# Patient Record
Sex: FEMALE | Race: WHITE | NOT HISPANIC OR LATINO | Employment: FULL TIME | URBAN - METROPOLITAN AREA
[De-identification: names, ages, dates, MRNs, and addresses within clinical notes are randomized per-mention and may not be internally consistent; named-entity substitution may affect disease eponyms.]

---

## 2023-03-09 ENCOUNTER — PRE-ADMISSION TESTING (OUTPATIENT)
Dept: ADMISSIONS | Facility: MEDICAL CENTER | Age: 58
End: 2023-03-09
Attending: SURGERY
Payer: COMMERCIAL

## 2023-03-09 DIAGNOSIS — Z01.810 PRE-OPERATIVE CARDIOVASCULAR EXAMINATION: ICD-10-CM

## 2023-03-09 DIAGNOSIS — Z01.812 PRE-OPERATIVE LABORATORY EXAMINATION: ICD-10-CM

## 2023-03-09 LAB
ALBUMIN SERPL BCP-MCNC: 4.2 G/DL (ref 3.2–4.9)
ALBUMIN/GLOB SERPL: 1.4 G/DL
ALP SERPL-CCNC: 122 U/L (ref 30–99)
ALT SERPL-CCNC: 45 U/L (ref 2–50)
ANION GAP SERPL CALC-SCNC: 11 MMOL/L (ref 7–16)
AST SERPL-CCNC: 27 U/L (ref 12–45)
BASOPHILS # BLD AUTO: 0.5 % (ref 0–1.8)
BASOPHILS # BLD: 0.04 K/UL (ref 0–0.12)
BILIRUB SERPL-MCNC: 0.4 MG/DL (ref 0.1–1.5)
BUN SERPL-MCNC: 9 MG/DL (ref 8–22)
CALCIUM ALBUM COR SERPL-MCNC: 9.1 MG/DL (ref 8.5–10.5)
CALCIUM SERPL-MCNC: 9.3 MG/DL (ref 8.5–10.5)
CHLORIDE SERPL-SCNC: 103 MMOL/L (ref 96–112)
CO2 SERPL-SCNC: 26 MMOL/L (ref 20–33)
CREAT SERPL-MCNC: 0.44 MG/DL (ref 0.5–1.4)
EKG IMPRESSION: NORMAL
EOSINOPHIL # BLD AUTO: 0.09 K/UL (ref 0–0.51)
EOSINOPHIL NFR BLD: 1.1 % (ref 0–6.9)
ERYTHROCYTE [DISTWIDTH] IN BLOOD BY AUTOMATED COUNT: 48.2 FL (ref 35.9–50)
GFR SERPLBLD CREATININE-BSD FMLA CKD-EPI: 111 ML/MIN/1.73 M 2
GLOBULIN SER CALC-MCNC: 3 G/DL (ref 1.9–3.5)
GLUCOSE SERPL-MCNC: 118 MG/DL (ref 65–99)
HCT VFR BLD AUTO: 40.3 % (ref 37–47)
HGB BLD-MCNC: 13.9 G/DL (ref 12–16)
IMM GRANULOCYTES # BLD AUTO: 0.04 K/UL (ref 0–0.11)
IMM GRANULOCYTES NFR BLD AUTO: 0.5 % (ref 0–0.9)
INR PPP: 0.94 (ref 0.87–1.13)
LYMPHOCYTES # BLD AUTO: 2.54 K/UL (ref 1–4.8)
LYMPHOCYTES NFR BLD: 30.6 % (ref 22–41)
MCH RBC QN AUTO: 34 PG (ref 27–33)
MCHC RBC AUTO-ENTMCNC: 34.5 G/DL (ref 33.6–35)
MCV RBC AUTO: 98.5 FL (ref 81.4–97.8)
MONOCYTES # BLD AUTO: 0.45 K/UL (ref 0–0.85)
MONOCYTES NFR BLD AUTO: 5.4 % (ref 0–13.4)
NEUTROPHILS # BLD AUTO: 5.14 K/UL (ref 2–7.15)
NEUTROPHILS NFR BLD: 61.9 % (ref 44–72)
NRBC # BLD AUTO: 0 K/UL
NRBC BLD-RTO: 0 /100 WBC
PLATELET # BLD AUTO: 330 K/UL (ref 164–446)
PMV BLD AUTO: 9.5 FL (ref 9–12.9)
POTASSIUM SERPL-SCNC: 3.8 MMOL/L (ref 3.6–5.5)
PROT SERPL-MCNC: 7.2 G/DL (ref 6–8.2)
PROTHROMBIN TIME: 12.5 SEC (ref 12–14.6)
RBC # BLD AUTO: 4.09 M/UL (ref 4.2–5.4)
SODIUM SERPL-SCNC: 140 MMOL/L (ref 135–145)
WBC # BLD AUTO: 8.3 K/UL (ref 4.8–10.8)

## 2023-03-09 PROCEDURE — 93005 ELECTROCARDIOGRAM TRACING: CPT

## 2023-03-09 PROCEDURE — 85025 COMPLETE CBC W/AUTO DIFF WBC: CPT

## 2023-03-09 PROCEDURE — 36415 COLL VENOUS BLD VENIPUNCTURE: CPT

## 2023-03-09 PROCEDURE — 85610 PROTHROMBIN TIME: CPT

## 2023-03-09 PROCEDURE — 93010 ELECTROCARDIOGRAM REPORT: CPT | Performed by: INTERNAL MEDICINE

## 2023-03-09 PROCEDURE — 80053 COMPREHEN METABOLIC PANEL: CPT

## 2023-03-09 RX ORDER — ATORVASTATIN CALCIUM 20 MG/1
TABLET, FILM COATED ORAL
COMMUNITY
Start: 2023-01-23 | End: 2023-05-24

## 2023-03-14 DIAGNOSIS — Z17.1 MALIGNANT NEOPLASM OF OVERLAPPING SITES OF LEFT BREAST IN FEMALE, ESTROGEN RECEPTOR NEGATIVE (HCC): ICD-10-CM

## 2023-03-14 DIAGNOSIS — C50.812 MALIGNANT NEOPLASM OF OVERLAPPING SITES OF LEFT BREAST IN FEMALE, ESTROGEN RECEPTOR NEGATIVE (HCC): ICD-10-CM

## 2023-03-21 ENCOUNTER — PHARMACY VISIT (OUTPATIENT)
Dept: PHARMACY | Facility: MEDICAL CENTER | Age: 58
End: 2023-03-21
Payer: MEDICARE

## 2023-03-21 ENCOUNTER — HOSPITAL ENCOUNTER (OUTPATIENT)
Facility: MEDICAL CENTER | Age: 58
End: 2023-03-21
Attending: SURGERY | Admitting: SURGERY
Payer: COMMERCIAL

## 2023-03-21 ENCOUNTER — APPOINTMENT (OUTPATIENT)
Dept: RADIOLOGY | Facility: MEDICAL CENTER | Age: 58
End: 2023-03-21
Attending: SURGERY
Payer: COMMERCIAL

## 2023-03-21 ENCOUNTER — ANESTHESIA EVENT (OUTPATIENT)
Dept: SURGERY | Facility: MEDICAL CENTER | Age: 58
End: 2023-03-21
Payer: COMMERCIAL

## 2023-03-21 ENCOUNTER — ANESTHESIA (OUTPATIENT)
Dept: SURGERY | Facility: MEDICAL CENTER | Age: 58
End: 2023-03-21
Payer: COMMERCIAL

## 2023-03-21 VITALS
WEIGHT: 192.68 LBS | RESPIRATION RATE: 33 BRPM | TEMPERATURE: 97 F | SYSTOLIC BLOOD PRESSURE: 137 MMHG | HEART RATE: 76 BPM | DIASTOLIC BLOOD PRESSURE: 73 MMHG | BODY MASS INDEX: 32.9 KG/M2 | HEIGHT: 64 IN | OXYGEN SATURATION: 95 %

## 2023-03-21 DIAGNOSIS — G89.18 POSTOPERATIVE PAIN: ICD-10-CM

## 2023-03-21 PROBLEM — Z17.0 MALIGNANT NEOPLASM OF UPPER-OUTER QUADRANT OF LEFT BREAST IN FEMALE, ESTROGEN RECEPTOR POSITIVE (HCC): Status: ACTIVE | Noted: 2023-03-21

## 2023-03-21 PROBLEM — C50.412 MALIGNANT NEOPLASM OF UPPER-OUTER QUADRANT OF LEFT BREAST IN FEMALE, ESTROGEN RECEPTOR POSITIVE (HCC): Status: ACTIVE | Noted: 2023-03-21

## 2023-03-21 PROCEDURE — 00532 ANES ACCESS CTR VENOUS CRCJ: CPT | Performed by: ANESTHESIOLOGY

## 2023-03-21 PROCEDURE — 700101 HCHG RX REV CODE 250: Performed by: ANESTHESIOLOGY

## 2023-03-21 PROCEDURE — 700111 HCHG RX REV CODE 636 W/ 250 OVERRIDE (IP): Performed by: SURGERY

## 2023-03-21 PROCEDURE — 700105 HCHG RX REV CODE 258: Performed by: SURGERY

## 2023-03-21 PROCEDURE — A9270 NON-COVERED ITEM OR SERVICE: HCPCS | Performed by: ANESTHESIOLOGY

## 2023-03-21 PROCEDURE — 700101 HCHG RX REV CODE 250: Performed by: SURGERY

## 2023-03-21 PROCEDURE — 700111 HCHG RX REV CODE 636 W/ 250 OVERRIDE (IP): Performed by: ANESTHESIOLOGY

## 2023-03-21 PROCEDURE — 160036 HCHG PACU - EA ADDL 30 MINS PHASE I: Performed by: SURGERY

## 2023-03-21 PROCEDURE — 160028 HCHG SURGERY MINUTES - 1ST 30 MINS LEVEL 3: Performed by: SURGERY

## 2023-03-21 PROCEDURE — C1788 PORT, INDWELLING, IMP: HCPCS | Performed by: SURGERY

## 2023-03-21 PROCEDURE — 160025 RECOVERY II MINUTES (STATS): Performed by: SURGERY

## 2023-03-21 PROCEDURE — 160035 HCHG PACU - 1ST 60 MINS PHASE I: Performed by: SURGERY

## 2023-03-21 PROCEDURE — 160039 HCHG SURGERY MINUTES - EA ADDL 1 MIN LEVEL 3: Performed by: SURGERY

## 2023-03-21 PROCEDURE — 160048 HCHG OR STATISTICAL LEVEL 1-5: Performed by: SURGERY

## 2023-03-21 PROCEDURE — 160046 HCHG PACU - 1ST 60 MINS PHASE II: Performed by: SURGERY

## 2023-03-21 PROCEDURE — 700102 HCHG RX REV CODE 250 W/ 637 OVERRIDE(OP): Performed by: ANESTHESIOLOGY

## 2023-03-21 PROCEDURE — 160009 HCHG ANES TIME/MIN: Performed by: SURGERY

## 2023-03-21 PROCEDURE — RXMED WILLOW AMBULATORY MEDICATION CHARGE: Performed by: SURGERY

## 2023-03-21 PROCEDURE — 160002 HCHG RECOVERY MINUTES (STAT): Performed by: SURGERY

## 2023-03-21 DEVICE — CATHETER POWERPORT CLEARVUE MRI 8FR ATTACHABLE CHRONOFLEX: Type: IMPLANTABLE DEVICE | Site: CHEST | Status: FUNCTIONAL

## 2023-03-21 RX ORDER — ONDANSETRON 2 MG/ML
4 INJECTION INTRAMUSCULAR; INTRAVENOUS
Status: DISCONTINUED | OUTPATIENT
Start: 2023-03-21 | End: 2023-03-21 | Stop reason: HOSPADM

## 2023-03-21 RX ORDER — SODIUM CHLORIDE, SODIUM LACTATE, POTASSIUM CHLORIDE, CALCIUM CHLORIDE 600; 310; 30; 20 MG/100ML; MG/100ML; MG/100ML; MG/100ML
INJECTION, SOLUTION INTRAVENOUS CONTINUOUS
Status: DISCONTINUED | OUTPATIENT
Start: 2023-03-21 | End: 2023-03-21 | Stop reason: HOSPADM

## 2023-03-21 RX ORDER — DIPHENHYDRAMINE HYDROCHLORIDE 50 MG/ML
12.5 INJECTION INTRAMUSCULAR; INTRAVENOUS
Status: DISCONTINUED | OUTPATIENT
Start: 2023-03-21 | End: 2023-03-21 | Stop reason: HOSPADM

## 2023-03-21 RX ORDER — OXYCODONE HCL 5 MG/5 ML
10 SOLUTION, ORAL ORAL
Status: COMPLETED | OUTPATIENT
Start: 2023-03-21 | End: 2023-03-21

## 2023-03-21 RX ORDER — CELECOXIB 200 MG/1
200 CAPSULE ORAL ONCE
Status: COMPLETED | OUTPATIENT
Start: 2023-03-21 | End: 2023-03-21

## 2023-03-21 RX ORDER — MEPERIDINE HYDROCHLORIDE 25 MG/ML
6.25 INJECTION INTRAMUSCULAR; INTRAVENOUS; SUBCUTANEOUS
Status: DISCONTINUED | OUTPATIENT
Start: 2023-03-21 | End: 2023-03-21 | Stop reason: HOSPADM

## 2023-03-21 RX ORDER — MIDAZOLAM HYDROCHLORIDE 1 MG/ML
INJECTION INTRAMUSCULAR; INTRAVENOUS PRN
Status: DISCONTINUED | OUTPATIENT
Start: 2023-03-21 | End: 2023-03-21 | Stop reason: SURG

## 2023-03-21 RX ORDER — CEFAZOLIN SODIUM 1 G/3ML
INJECTION, POWDER, FOR SOLUTION INTRAMUSCULAR; INTRAVENOUS PRN
Status: DISCONTINUED | OUTPATIENT
Start: 2023-03-21 | End: 2023-03-21 | Stop reason: SURG

## 2023-03-21 RX ORDER — BUPIVACAINE HYDROCHLORIDE AND EPINEPHRINE 5; 5 MG/ML; UG/ML
INJECTION, SOLUTION EPIDURAL; INTRACAUDAL; PERINEURAL
Status: DISCONTINUED | OUTPATIENT
Start: 2023-03-21 | End: 2023-03-21 | Stop reason: HOSPADM

## 2023-03-21 RX ORDER — ACETAMINOPHEN 500 MG
1000 TABLET ORAL ONCE
Status: COMPLETED | OUTPATIENT
Start: 2023-03-21 | End: 2023-03-21

## 2023-03-21 RX ORDER — ONDANSETRON 2 MG/ML
INJECTION INTRAMUSCULAR; INTRAVENOUS PRN
Status: DISCONTINUED | OUTPATIENT
Start: 2023-03-21 | End: 2023-03-21 | Stop reason: SURG

## 2023-03-21 RX ORDER — LIDOCAINE HYDROCHLORIDE 20 MG/ML
INJECTION, SOLUTION EPIDURAL; INFILTRATION; INTRACAUDAL; PERINEURAL PRN
Status: DISCONTINUED | OUTPATIENT
Start: 2023-03-21 | End: 2023-03-21 | Stop reason: SURG

## 2023-03-21 RX ORDER — HEPARIN SODIUM,PORCINE 1000/ML
VIAL (ML) INJECTION
Status: DISCONTINUED | OUTPATIENT
Start: 2023-03-21 | End: 2023-03-21 | Stop reason: HOSPADM

## 2023-03-21 RX ORDER — HEPARIN SODIUM 5000 [USP'U]/ML
INJECTION, SOLUTION INTRAVENOUS; SUBCUTANEOUS
Status: DISCONTINUED
Start: 2023-03-21 | End: 2023-03-21 | Stop reason: HOSPADM

## 2023-03-21 RX ORDER — BUPIVACAINE HYDROCHLORIDE AND EPINEPHRINE 5; 5 MG/ML; UG/ML
INJECTION, SOLUTION EPIDURAL; INTRACAUDAL; PERINEURAL
Status: DISCONTINUED
Start: 2023-03-21 | End: 2023-03-21 | Stop reason: HOSPADM

## 2023-03-21 RX ORDER — LABETALOL HYDROCHLORIDE 5 MG/ML
INJECTION, SOLUTION INTRAVENOUS PRN
Status: DISCONTINUED | OUTPATIENT
Start: 2023-03-21 | End: 2023-03-21 | Stop reason: SURG

## 2023-03-21 RX ORDER — DEXAMETHASONE SODIUM PHOSPHATE 4 MG/ML
INJECTION, SOLUTION INTRA-ARTICULAR; INTRALESIONAL; INTRAMUSCULAR; INTRAVENOUS; SOFT TISSUE PRN
Status: DISCONTINUED | OUTPATIENT
Start: 2023-03-21 | End: 2023-03-21 | Stop reason: SURG

## 2023-03-21 RX ORDER — HALOPERIDOL 5 MG/ML
1 INJECTION INTRAMUSCULAR
Status: DISCONTINUED | OUTPATIENT
Start: 2023-03-21 | End: 2023-03-21 | Stop reason: HOSPADM

## 2023-03-21 RX ORDER — OXYCODONE HCL 5 MG/5 ML
5 SOLUTION, ORAL ORAL
Status: COMPLETED | OUTPATIENT
Start: 2023-03-21 | End: 2023-03-21

## 2023-03-21 RX ORDER — HYDROCODONE BITARTRATE AND ACETAMINOPHEN 5; 325 MG/1; MG/1
1 TABLET ORAL EVERY 4 HOURS PRN
Qty: 10 TABLET | Refills: 0 | Status: SHIPPED | OUTPATIENT
Start: 2023-03-21 | End: 2023-03-24

## 2023-03-21 RX ORDER — OXYCODONE HYDROCHLORIDE AND ACETAMINOPHEN 5; 325 MG/1; MG/1
1 TABLET ORAL EVERY 4 HOURS PRN
Qty: 10 TABLET | Refills: 0 | Status: SHIPPED | OUTPATIENT
Start: 2023-03-21 | End: 2023-03-24

## 2023-03-21 RX ADMIN — PROPOFOL 150 MG: 10 INJECTION, EMULSION INTRAVENOUS at 09:04

## 2023-03-21 RX ADMIN — MIDAZOLAM HYDROCHLORIDE 2 MG: 1 INJECTION, SOLUTION INTRAMUSCULAR; INTRAVENOUS at 09:01

## 2023-03-21 RX ADMIN — ONDANSETRON 4 MG: 2 INJECTION INTRAMUSCULAR; INTRAVENOUS at 09:34

## 2023-03-21 RX ADMIN — LIDOCAINE HYDROCHLORIDE 100 MG: 20 INJECTION, SOLUTION EPIDURAL; INFILTRATION; INTRACAUDAL at 09:04

## 2023-03-21 RX ADMIN — OXYCODONE HYDROCHLORIDE 10 MG: 5 SOLUTION ORAL at 10:05

## 2023-03-21 RX ADMIN — DEXAMETHASONE SODIUM PHOSPHATE 8 MG: 4 INJECTION, SOLUTION INTRA-ARTICULAR; INTRALESIONAL; INTRAMUSCULAR; INTRAVENOUS; SOFT TISSUE at 09:16

## 2023-03-21 RX ADMIN — ACETAMINOPHEN 1000 MG: 500 TABLET, FILM COATED ORAL at 08:03

## 2023-03-21 RX ADMIN — SODIUM CHLORIDE, POTASSIUM CHLORIDE, SODIUM LACTATE AND CALCIUM CHLORIDE: 600; 310; 30; 20 INJECTION, SOLUTION INTRAVENOUS at 09:01

## 2023-03-21 RX ADMIN — FENTANYL CITRATE 50 MCG: 50 INJECTION, SOLUTION INTRAMUSCULAR; INTRAVENOUS at 09:04

## 2023-03-21 RX ADMIN — CELECOXIB 200 MG: 200 CAPSULE ORAL at 08:03

## 2023-03-21 RX ADMIN — FENTANYL CITRATE 25 MCG: 50 INJECTION, SOLUTION INTRAMUSCULAR; INTRAVENOUS at 10:41

## 2023-03-21 RX ADMIN — CEFAZOLIN 2 G: 330 INJECTION, POWDER, FOR SOLUTION INTRAMUSCULAR; INTRAVENOUS at 09:08

## 2023-03-21 RX ADMIN — LABETALOL HYDROCHLORIDE 5 MG: 5 INJECTION, SOLUTION INTRAVENOUS at 09:04

## 2023-03-21 ASSESSMENT — PAIN SCALES - GENERAL: PAIN_LEVEL: 2

## 2023-03-21 ASSESSMENT — FIBROSIS 4 INDEX: FIB4 SCORE: 0.71

## 2023-03-21 NOTE — OR NURSING
0956 Patient arrived to PACU from OR. Report received. Patient attached to monitoring. VSS. Patient oxygenating well on 6 L via mask. Dressing on right chest is CDI, no drainage noted.     1005 Patient complaining of 8/10 pain in surgical site.  10 mg of oxy given PO per MAR. Patient refusing IV pain medication for now because of drowsiness.     1041 Patient having ongoing pain. 25 mcg of fentanyl given per MAR     1105 Patient meets phase two criteria. Tolerating PO liquids without complication. Report given to Silvia MILLAN RN. Patient transferred to phase two via [facility].

## 2023-03-21 NOTE — ANESTHESIA PROCEDURE NOTES
Airway    Date/Time: 3/21/2023 9:05 AM  Performed by: Chula Price M.D.  Authorized by: Chula Price M.D.     Location:  OR  Urgency:  Elective  Difficult Airway: No    Indications for Airway Management:  Anesthesia      Spontaneous Ventilation: absent    Sedation Level:  Deep  Preoxygenated: Yes    Mask Difficulty Assessment:  1 - vent by mask  Final Airway Type:  Supraglottic airway  Final Supraglottic Airway:  Standard LMA    SGA Size:  4  Number of Attempts at Approach:  1  Number of Other Approaches Attempted:  0

## 2023-03-21 NOTE — ANESTHESIA TIME REPORT
Anesthesia Start and Stop Event Times     Date Time Event    3/21/2023 0836 Ready for Procedure     0901 Anesthesia Start     0952 Anesthesia Stop        Responsible Staff  03/21/23    Name Role Begin End    Chula Price M.D. Anesth 0901 0952        Overtime Reason:  no overtime (within assigned shift)    Comments:

## 2023-03-21 NOTE — ANESTHESIA PREPROCEDURE EVALUATION
Case: [id number] Date/Time: 03/21/23 0915    Procedure: PLACEMENT OF A RIGHT POWER PORT    Pre-op diagnosis: PRIMARY MALIGNANT NEOPLASM OF    Location: [facility] ROOM 25 / SURGERY SAME DAY [facility]    Surgeons: Peña Monahan M.D.      56 yo female with breast cancer for port placement  Borderline HTN, monitoring at home per pt  Denies other medical illnesses, + cigarette smoking    Relevant Problems   No relevant active problems       Physical Exam    Airway   Mallampati: I  TM distance: >3 FB  Neck ROM: full       Cardiovascular - normal exam  Rhythm: regular  Rate: normal  (-) murmur     Dental - normal exam           Pulmonary - normal exam  Breath sounds clear to auscultation     Abdominal    Neurological - normal exam                 Anesthesia Plan    ASA 2       Plan - general       Airway plan will be LMA          Induction: intravenous    Postoperative Plan: Postoperative administration of opioids is intended.    Pertinent diagnostic labs and testing reviewed    Informed Consent:    Anesthetic plan and risks discussed with patient.    Use of blood products discussed with: patient whom consented to blood products.

## 2023-03-21 NOTE — OR NURSING
1105: report from JASON Echevarria. Pt arrived to Phase 2 bay #17. Stand and transfer to recliner chair. Connected to monitor. PIV saline locked. Right chest noted with guaze and tegaderm clean dry and intact.     1108: friend brought to bedside    1114: discharge instructions given to pt and friend by Silvia MILLAN RN. PIV removed with tip intact. Pt to get dressed.    1120: Pt discharged home in stable condition. Down to pick-up area via wheelchair accompanied by JACKIE Pryor. All belongings taken.

## 2023-03-21 NOTE — DISCHARGE INSTRUCTIONS
What to Expect Post Anesthesia    Rest and take it easy for the first 24 hours.  A responsible adult is recommended to remain with you during that time.  It is normal to feel sleepy.  We encourage you to not do anything that requires balance, judgment or coordination.    FOR 24 HOURS DO NOT:  Drive, operate machinery or run household appliances.  Drink beer or alcoholic beverages.  Make important decisions or sign legal documents.    To avoid nausea, slowly advance diet as tolerated, avoiding spicy or greasy foods for the first day.  Add more substantial food to your diet according to your provider's instructions.  Babies can be fed formula or breast milk as soon as they are hungry.  INCREASE FLUIDS AND FIBER TO AVOID CONSTIPATION.    MILD FLU-LIKE SYMPTOMS ARE NORMAL.  YOU MAY EXPERIENCE GENERALIZED MUSCLE ACHES, THROAT IRRITATION, HEADACHE AND/OR SOME NAUSEA.    If any questions arise, call your provider.  If your provider is not available, please feel free to call the Surgical Center at [phone].    MEDICATIONS: Resume taking daily medication.  Take prescribed pain medication with food.  If no medication is prescribed, you may take non-aspirin pain medication if needed.  PAIN MEDICATION CAN BE VERY CONSTIPATING.  Take a stool softener or laxative such as senokot, pericolace, or milk of magnesia if needed.    Last pain medication (oxycodone) given at 10:05 AM. Tylenol and celebrex given at 8:00 AM.   Prescription for Percocet @ pharmacy.

## 2023-03-21 NOTE — ANESTHESIA POSTPROCEDURE EVALUATION
Patient: Yolanda Roe    Procedure Summary     Date: 03/21/23 Room / Location: [facility] ROOM 25 / SURGERY SAME DAY [facility]    Anesthesia Start: 0901 Anesthesia Stop: 0952    Procedure: PLACEMENT OF A RIGHT POWER PORT (Right: Chest) Diagnosis: (PRIMARY MALIGNANT NEOPLASM )    Surgeons: Peña Monahan M.D. Responsible Provider: Chula Price M.D.    Anesthesia Type: general ASA Status: 2          Final Anesthesia Type: general  Last vitals  BP   Blood Pressure: (!) 190/106 (MD notified)    Temp   36.1 °C (97 °F)    Pulse   84   Resp   20    SpO2   98 %      Anesthesia Post Evaluation    Patient location during evaluation: PACU  Patient participation: complete - patient participated  Level of consciousness: awake and alert  Pain score: 2    Airway patency: patent  Anesthetic complications: no  Cardiovascular status: hemodynamically stable  Respiratory status: acceptable  Hydration status: euvolemic    PONV: none          No notable events documented.

## 2023-03-21 NOTE — OR SURGEON
Immediate Post OP Note    PreOp Diagnosis: Left Breast Cancer      PostOp Diagnosis: same      Procedure(s):  PLACEMENT OF A RIGHT POWER PORT - Wound Class: Clean  Ultrasound mapping of anatomy of neck with ultrasound venous cannulation    Surgeon(s):  Peña Monahan M.D.    Anesthesiologist/Type of Anesthesia:  Anesthesiologist: Chula Price M.D./General    Surgical Staff:  Circulator: Maria D Vazquez R.N.  Scrub Person: Abdelrahman Tarango  Radiology Technologist: Francis Abraham    Specimens removed if any:  * No specimens in log *    Estimated Blood Loss: minimal    Findings: tip of catheter at RA/SVC junction    Complications: no apparent complications        3/21/2023 10:06 AM Peña Monahan M.D.

## 2023-03-22 NOTE — OP REPORT
DATE OF SERVICE:  03/21/2023     SURGEON:  Peña Monahan MD     ANESTHESIOLOGIST:  Chula Price MD     TYPE OF ANESTHETIC:  General anesthetic using laryngeal mask airway plus local   0.5% Marcaine with epinephrine.     PREOPERATIVE DIAGNOSIS:  Invasive ductal carcinoma of the left breast, grade   III, ER/ID and HER-2 negative.     POSTOPERATIVE DIAGNOSIS:  Invasive ductal carcinoma of the left breast, grade   III, ER/ID and HER-2 negative.     PROCEDURES:  1.  Placement of a PowerPort via the right internal jugular vein.  2.  Ultrasound mapping of the neck anatomy with ultrasound guidance for venous   cannulation.     INDICATIONS OF PROCEDURE:  The patient is a 57-year-old patient who has a   3-month history of a palpable mass in the left breast. She does have a history   of back in 2003 on the left side with previous lumpectomy, node biopsy   followed by chemotherapy and radiation therapy when she was in [place].  The   current mass was noted about 3 months ago by the patient.  This ultimately led   to a mammogram and ultrasound, which revealed a massive, at least 2-3 cm in   size in the upper slightly inner aspect.  A biopsy had been performed, which   revealed a grade III, triple-negative invasive ductal carcinoma. On physical   examination, she was noted to have a much larger mass on the order of 7x5 cm   at the 11 o'clock position of the left breast.  NCCN guidelines were reviewed   and options were discussed and the patient was then referred to medical   oncology for discussion of neoadjuvant chemotherapy.  She saw Dr. Gómez and   neoadjuvant therapy is being arranged.  She presents today for placement of a   PowerPort so that she can have central venous access for her chemotherapy.    This PowerPort was placed via the right internal jugular vein today without   apparent complications with the tip at the right atrial superior vena caval   junction.  Ultrasound was used for mapping of the neck  anatomy as well as   venous cannulation.  There were no apparent complications.     FINDINGS AND PROCEDURE:  The patient was brought to the operating room and   placed on the table in supine position.  General anesthetic was then provided   by the anesthesiologist, Dr. Price.  A timeout was called.  The correct   patient, correct diagnosis, correct surgery, and correct location of surgery   were discussed and agreed upon.  She did receive preoperative IV antibiotics.    She was placed in the slightly Trendelenburg position and the head was gently   turned towards the left side.  The right neck and chest area was then prepped   and draped in usual sterile fashion.  Using a sterile ultrasound, 13.5 MHz   probe, the anatomy of the neck was evaluated.  The carotid artery, internal   jugular vein, external jugular vein, innominate vein, thyroid gland and other   structures were identified on both sides.  The patient was noted to have a   fairly large right internal jugular vein that was suitable for placement of a   port.  A small nick was made in the skin with #15 blade near the junction of   the 2 heads of the sternocleidomastoid muscle.  Through this small incision, a   16-gauge hollow needle was inserted under ultrasound guidance into the right   internal jugular vein, when blood returned into the syringe, a guidewire was   placed through the hollow needle, advanced into the superior vena cava.    Position of the vein was confirmed with ultrasound.  The position in the   superior vena cava was confirmed with fluoroscopy.  The wire was secured to   the neck.  Next, an incision was made just below the midportion of the right   clavicle with #15 blade.  This was about 4 cm long.  Dissection was carried   down with electrocautery down to the fascia of the pectoralis major muscle.  A   pocket was made over the fascia below the clavicle large enough to   accommodate the PowerPort.  PowerPort was flushed with heparinized  saline and   then the port itself was placed into the pocket.  A tunneling device was then   used to tunnel the catheter portion from the infraclavicular incision through   a subcutaneous route over the clavicle and out the incision in the right neck.    The catheter was then cut to the predetermined distance.  A trocar and   peel-away introducer was inserted over the guidewire in the neck and then   advanced into the superior vena cava under fluoroscopic vision.  This was done   to prevent any kinking of the wire.  The trocar and guidewire were then   removed.  The precut catheter was then placed into the peel-away introducer,   advanced to its full distance and then the peel-away introducer was removed.    Fluoroscopy revealed the tip of the catheter to be at the right atrial   superior vena caval junction.  Blood was easily aspirated through the port.    The port was then injected with dilute solution of heparinized saline and then   one mL of full-strength heparin at 1000 units per mL.  The port was sutured   to the chest wall in 2 locations with 3-0 Prolene suture to prevent flipping   of the port.  The wounds were irrigated and injected with 0.5% Marcaine with   epinephrine.  Both wounds were then closed using running 3-0 Vicryl sutures   for the subcutaneous tissue and running 4-0 Monocryl suture for the skin in   subcuticular fashion.  Steri-Strips and sterile dressings were placed on both   incisions.  The patient tolerated the procedure well with no complications.    Final sponge and needle count were correct.        ______________________________  ELLE GEE MD    [facility] [place]    DD:  03/21/2023 16:45  DT:  03/21/2023 19:49    Job#:  [id number]

## 2023-03-28 RX ORDER — HEPARIN SODIUM (PORCINE) LOCK FLUSH IV SOLN 100 UNIT/ML 100 UNIT/ML
500 SOLUTION INTRAVENOUS PRN
Status: CANCELLED | OUTPATIENT
Start: 2023-04-12

## 2023-03-28 RX ORDER — DIPHENHYDRAMINE HYDROCHLORIDE 50 MG/ML
50 INJECTION INTRAMUSCULAR; INTRAVENOUS PRN
Status: CANCELLED | OUTPATIENT
Start: 2023-04-05

## 2023-03-28 RX ORDER — DIPHENHYDRAMINE HYDROCHLORIDE 50 MG/ML
50 INJECTION INTRAMUSCULAR; INTRAVENOUS PRN
Status: CANCELLED | OUTPATIENT
Start: 2023-04-12

## 2023-03-28 RX ORDER — 0.9 % SODIUM CHLORIDE 0.9 %
3 VIAL (ML) INJECTION PRN
Status: CANCELLED | OUTPATIENT
Start: 2023-04-05

## 2023-03-28 RX ORDER — METHYLPREDNISOLONE SODIUM SUCCINATE 125 MG/2ML
125 INJECTION, POWDER, LYOPHILIZED, FOR SOLUTION INTRAMUSCULAR; INTRAVENOUS PRN
Status: CANCELLED | OUTPATIENT
Start: 2023-04-19

## 2023-03-28 RX ORDER — SODIUM CHLORIDE 9 MG/ML
INJECTION, SOLUTION INTRAVENOUS CONTINUOUS
Status: CANCELLED | OUTPATIENT
Start: 2023-04-05

## 2023-03-28 RX ORDER — PROCHLORPERAZINE MALEATE 10 MG
10 TABLET ORAL EVERY 6 HOURS PRN
Status: CANCELLED | OUTPATIENT
Start: 2023-04-12

## 2023-03-28 RX ORDER — 0.9 % SODIUM CHLORIDE 0.9 %
VIAL (ML) INJECTION PRN
Status: CANCELLED | OUTPATIENT
Start: 2023-04-12

## 2023-03-28 RX ORDER — ONDANSETRON 2 MG/ML
4 INJECTION INTRAMUSCULAR; INTRAVENOUS PRN
Status: CANCELLED | OUTPATIENT
Start: 2023-04-19

## 2023-03-28 RX ORDER — 0.9 % SODIUM CHLORIDE 0.9 %
10 VIAL (ML) INJECTION PRN
Status: CANCELLED | OUTPATIENT
Start: 2023-04-05

## 2023-03-28 RX ORDER — SODIUM CHLORIDE 9 MG/ML
INJECTION, SOLUTION INTRAVENOUS CONTINUOUS
Status: CANCELLED | OUTPATIENT
Start: 2023-04-12

## 2023-03-28 RX ORDER — METHYLPREDNISOLONE SODIUM SUCCINATE 125 MG/2ML
125 INJECTION, POWDER, LYOPHILIZED, FOR SOLUTION INTRAMUSCULAR; INTRAVENOUS PRN
Status: CANCELLED | OUTPATIENT
Start: 2023-04-12

## 2023-03-28 RX ORDER — PROCHLORPERAZINE MALEATE 10 MG
10 TABLET ORAL EVERY 6 HOURS PRN
Status: CANCELLED | OUTPATIENT
Start: 2023-04-05

## 2023-03-28 RX ORDER — METHYLPREDNISOLONE SODIUM SUCCINATE 125 MG/2ML
125 INJECTION, POWDER, LYOPHILIZED, FOR SOLUTION INTRAMUSCULAR; INTRAVENOUS PRN
Status: CANCELLED | OUTPATIENT
Start: 2023-04-05

## 2023-03-28 RX ORDER — ONDANSETRON 8 MG/1
8 TABLET, ORALLY DISINTEGRATING ORAL PRN
Status: CANCELLED | OUTPATIENT
Start: 2023-04-12

## 2023-03-28 RX ORDER — 0.9 % SODIUM CHLORIDE 0.9 %
3 VIAL (ML) INJECTION PRN
Status: CANCELLED | OUTPATIENT
Start: 2023-04-19

## 2023-03-28 RX ORDER — 0.9 % SODIUM CHLORIDE 0.9 %
VIAL (ML) INJECTION PRN
Status: CANCELLED | OUTPATIENT
Start: 2023-04-19

## 2023-03-28 RX ORDER — ONDANSETRON 8 MG/1
8 TABLET, ORALLY DISINTEGRATING ORAL PRN
Status: CANCELLED | OUTPATIENT
Start: 2023-04-05

## 2023-03-28 RX ORDER — 0.9 % SODIUM CHLORIDE 0.9 %
VIAL (ML) INJECTION PRN
Status: CANCELLED | OUTPATIENT
Start: 2023-04-05

## 2023-03-28 RX ORDER — HEPARIN SODIUM (PORCINE) LOCK FLUSH IV SOLN 100 UNIT/ML 100 UNIT/ML
500 SOLUTION INTRAVENOUS PRN
Status: CANCELLED | OUTPATIENT
Start: 2023-04-05

## 2023-03-28 RX ORDER — EPINEPHRINE 1 MG/ML(1)
0.5 AMPUL (ML) INJECTION PRN
Status: CANCELLED | OUTPATIENT
Start: 2023-04-12

## 2023-03-28 RX ORDER — 0.9 % SODIUM CHLORIDE 0.9 %
10 VIAL (ML) INJECTION PRN
Status: CANCELLED | OUTPATIENT
Start: 2023-04-19

## 2023-03-28 RX ORDER — 0.9 % SODIUM CHLORIDE 0.9 %
3 VIAL (ML) INJECTION PRN
Status: CANCELLED | OUTPATIENT
Start: 2023-04-12

## 2023-03-28 RX ORDER — EPINEPHRINE 1 MG/ML(1)
0.5 AMPUL (ML) INJECTION PRN
Status: CANCELLED | OUTPATIENT
Start: 2023-04-05

## 2023-03-28 RX ORDER — 0.9 % SODIUM CHLORIDE 0.9 %
10 VIAL (ML) INJECTION PRN
Status: CANCELLED | OUTPATIENT
Start: 2023-04-12

## 2023-03-28 RX ORDER — DIPHENHYDRAMINE HYDROCHLORIDE 50 MG/ML
50 INJECTION INTRAMUSCULAR; INTRAVENOUS PRN
Status: CANCELLED | OUTPATIENT
Start: 2023-04-19

## 2023-03-28 RX ORDER — ONDANSETRON 2 MG/ML
4 INJECTION INTRAMUSCULAR; INTRAVENOUS PRN
Status: CANCELLED | OUTPATIENT
Start: 2023-04-12

## 2023-03-28 RX ORDER — EPINEPHRINE 1 MG/ML(1)
0.5 AMPUL (ML) INJECTION PRN
Status: CANCELLED | OUTPATIENT
Start: 2023-04-19

## 2023-03-28 RX ORDER — ONDANSETRON 8 MG/1
8 TABLET, ORALLY DISINTEGRATING ORAL PRN
Status: CANCELLED | OUTPATIENT
Start: 2023-04-19

## 2023-03-28 RX ORDER — ONDANSETRON 2 MG/ML
4 INJECTION INTRAMUSCULAR; INTRAVENOUS PRN
Status: CANCELLED | OUTPATIENT
Start: 2023-04-05

## 2023-03-28 RX ORDER — HEPARIN SODIUM (PORCINE) LOCK FLUSH IV SOLN 100 UNIT/ML 100 UNIT/ML
500 SOLUTION INTRAVENOUS PRN
Status: CANCELLED | OUTPATIENT
Start: 2023-04-19

## 2023-03-28 RX ORDER — SODIUM CHLORIDE 9 MG/ML
INJECTION, SOLUTION INTRAVENOUS CONTINUOUS
Status: CANCELLED | OUTPATIENT
Start: 2023-04-19

## 2023-03-28 RX ORDER — PROCHLORPERAZINE MALEATE 10 MG
10 TABLET ORAL EVERY 6 HOURS PRN
Status: CANCELLED | OUTPATIENT
Start: 2023-04-19

## 2023-04-04 ENCOUNTER — HOSPITAL ENCOUNTER (OUTPATIENT)
Facility: MEDICAL CENTER | Age: 58
End: 2023-04-04
Attending: NURSE PRACTITIONER
Payer: COMMERCIAL

## 2023-04-04 PROCEDURE — 80053 COMPREHEN METABOLIC PANEL: CPT

## 2023-04-04 PROCEDURE — 84443 ASSAY THYROID STIM HORMONE: CPT

## 2023-04-04 PROCEDURE — 87340 HEPATITIS B SURFACE AG IA: CPT

## 2023-04-04 PROCEDURE — 86803 HEPATITIS C AB TEST: CPT

## 2023-04-04 PROCEDURE — 86706 HEP B SURFACE ANTIBODY: CPT

## 2023-04-04 PROCEDURE — 86704 HEP B CORE ANTIBODY TOTAL: CPT

## 2023-04-04 NOTE — PROGRESS NOTES
"Pharmacy Chemotherapy calculation:    DX: triple negative breast cancer    Cycle 1    Day 1  Previous treatment = 2003 at other facility    Regimen: pembrolizumab + paclitaxel/carboplatin --> pembrolizumab + cyclophosphamide/doxorubicin or epirubicin --> pembrolizmab  Pembrolizumab 200mg IV over 30 min on day 1  Paclitaxel 80mg/m2 IV over 60 min on days 1,8,15  Carboplatin AUC 1.5 IV over 30 min on day 1,8,15  Q21 days x 4 cycles  Followed by  Pembrolizumab 200mg IV over 30 min on day 1  Cyclophosphamide 600mg/m2 IV over 30 min on day 1  Doxorubicin 60mg/m2 IVP on day 1 OR epirubicin 90mg/m2 IVP on day 1  Q21 days x 4 cycles  Followed by  Pembrolizumab 200mg IV over 30 min on day 1  Q21 days x 9 cycles  NCCN Guidelines for Breast Cancer V.2.2022  Alexis P, et al- N Engl J Med. 2020 Feb 27;382(9):810-821.     BP (!) 156/87   Pulse 83   Temp 36.4 °C (97.6 °F) (Temporal)   Resp 18   Ht 1.61 m (5' 3.39\")   Wt 87.1 kg (192 lb 0.3 oz)   SpO2 97%   BMI 33.60 kg/m²   Body surface area is 1.97 meters squared.    All lab results 4/4/23 and 4/5/23 within treatment parameters. Potassium replaced. Free T4 pending.   Scr =0.52 Est crcl ~ 119mL/min (min Scr = 0.7)     Pembrolizumab 200mg fixed dose   No calculation required   ok to treat with final dose = 200mg IV on day 1    Paclitaxel 80mg/m2  x 1.97m2 = 157.6mg   <10% difference, ok to treat with final dose = 162mg IV on days 1,8, and 15    Carboplatin AUC 1.5 (119 + 25) = 216mg   <10% difference, ok to treat with final dose = 216mg IV on days 1,8, and 15      MATEUSZ Cadena Pharm.D.    "

## 2023-04-04 NOTE — PROGRESS NOTES
"Pharmacy Chemotherapy Verification Note:    Patient Name: Yolanda Roe      Dx: Metastatic Breast CA (ER/LA/HER2 negative) IIb       Protocol: Pembrolizumab + PACLitaxel + CARBOplatin (x 4 cycles) followed by  Pembrolizumab + Cyclophosphamide + DOXOrubicin   (x4 cycles)  Pembrolizumab 200 mg IV over 30 minutes on Day 1  PACLitaxel 80 mg/m2 IV over 60 minutes on Days 1, 8 and 15  Carboplatin AUC 1.5 IV over 30 minutes Days 1, 8, and 15  Repeat every 21 days x 4 cycles  ~followed by~  Pembrolizumab 200 mg IV over 30 minutes on Day 1  Cyclophosphamide 600 mg/m2 IV over 30 minutes on Day 1  DOXOrubicin 60 mg/m2 IV push on Day 1  Repeat every 21 days for 4 cycles    *Dosing Reference*  NCCN Guidelines for Breast Cancer V.2.2022  Alexis P, et al N Engl J Med 2020; 382(4) 139-920      Allergies:  Patient has no known allergies.      BP (!) 156/87   Pulse 83   Temp 36.4 °C (97.6 °F) (Temporal)   Resp 18   Ht 1.61 m (5' 3.39\")   Wt 87.1 kg (192 lb 0.3 oz)   SpO2 97%   BMI 33.60 kg/m²  Body surface area is 1.97 meters squared.  ANC~ 4060  Plt = 249 k        Hgb= 13.3             SCr = 0.52 mg/dL  CrCl = 119 mL/min (min SCr 0.7)  AST/ALT/AP = 25/28/122  TBili = <0.2           TSH=0.74     Drug Order   (Drug name, dose, route, IV Fluid & volume, frequency, number of doses) Cycle: 1 Day 1      Previous treatment: left side breast CA in 2003 s/p lumpectomy adjuvent chemo/XRT     Medication = Pembrolizumab  Base Dose = 200 mg   Fixed dose; no calc required= 200 mg  Final Dose = 200 mg  Route = IV  Fluid & Volume = NS 50 mL  Admin Duration = Over 30 minutes          <10% difference, okay to treat with final dose       Medication = PACLitaxel  Base Dose = 80 mg/m²  Calc Dose: Base Dose x 1.97 m² = 157.6 mg  Final Dose = 162 mg  Route = IV  Fluid & Volume =  mL  Admin Duration = Over 60 minutes          <10% difference, okay to treat with final dose     Medication = CARBOplatin   Base Dose = AUC 1.5   Calc Dose:Base " Dose x (119 ml/min + 25) = 216 mg  Final Dose = 216 mg  Route = IV  Fluid & Volume =  mL  Admin Duration = Over 30 minutes          <10% difference, okay to treat with final dose       By my signature below, I confirm this process was performed independently with the BSA and all final chemotherapy dosing calculations congruent. I have reviewed the above chemotherapy order and that my calculation of the final dose and BSA (when applicable) corroborate those calculations of the  pharmacist.     Diana Younger, PharmD, BCPS

## 2023-04-05 ENCOUNTER — OUTPATIENT INFUSION SERVICES (OUTPATIENT)
Dept: ONCOLOGY | Facility: MEDICAL CENTER | Age: 58
End: 2023-04-05
Attending: INTERNAL MEDICINE
Payer: COMMERCIAL

## 2023-04-05 ENCOUNTER — DOCUMENTATION (OUTPATIENT)
Dept: ONCOLOGY | Facility: MEDICAL CENTER | Age: 58
End: 2023-04-05
Payer: COMMERCIAL

## 2023-04-05 ENCOUNTER — PATIENT OUTREACH (OUTPATIENT)
Dept: ONCOLOGY | Facility: MEDICAL CENTER | Age: 58
End: 2023-04-05

## 2023-04-05 VITALS
SYSTOLIC BLOOD PRESSURE: 156 MMHG | DIASTOLIC BLOOD PRESSURE: 87 MMHG | OXYGEN SATURATION: 97 % | HEIGHT: 63 IN | BODY MASS INDEX: 34.02 KG/M2 | TEMPERATURE: 97.6 F | WEIGHT: 192.02 LBS | HEART RATE: 83 BPM | RESPIRATION RATE: 18 BRPM

## 2023-04-05 DIAGNOSIS — C50.812 MALIGNANT NEOPLASM OF OVERLAPPING SITES OF LEFT BREAST IN FEMALE, ESTROGEN RECEPTOR NEGATIVE (HCC): ICD-10-CM

## 2023-04-05 DIAGNOSIS — C50.412 MALIGNANT NEOPLASM OF UPPER-OUTER QUADRANT OF LEFT BREAST IN FEMALE, ESTROGEN RECEPTOR POSITIVE (HCC): ICD-10-CM

## 2023-04-05 DIAGNOSIS — Z17.1 MALIGNANT NEOPLASM OF OVERLAPPING SITES OF LEFT BREAST IN FEMALE, ESTROGEN RECEPTOR NEGATIVE (HCC): ICD-10-CM

## 2023-04-05 DIAGNOSIS — Z17.0 MALIGNANT NEOPLASM OF UPPER-OUTER QUADRANT OF LEFT BREAST IN FEMALE, ESTROGEN RECEPTOR POSITIVE (HCC): ICD-10-CM

## 2023-04-05 DIAGNOSIS — E87.6 HYPOKALEMIA: ICD-10-CM

## 2023-04-05 DIAGNOSIS — Z65.8 PSYCHOSOCIAL DISTRESS: ICD-10-CM

## 2023-04-05 LAB
ALBUMIN SERPL BCP-MCNC: 4.1 G/DL (ref 3.2–4.9)
ALBUMIN/GLOB SERPL: 1.6 G/DL
ALP SERPL-CCNC: 122 U/L (ref 30–99)
ALT SERPL-CCNC: 28 U/L (ref 2–50)
ANION GAP SERPL CALC-SCNC: 15 MMOL/L (ref 7–16)
AST SERPL-CCNC: 25 U/L (ref 12–45)
BASOPHILS # BLD AUTO: 0.4 % (ref 0–1.8)
BASOPHILS # BLD: 0.03 K/UL (ref 0–0.12)
BILIRUB SERPL-MCNC: <0.2 MG/DL (ref 0.1–1.5)
BUN SERPL-MCNC: 12 MG/DL (ref 8–22)
CALCIUM ALBUM COR SERPL-MCNC: 9 MG/DL (ref 8.5–10.5)
CALCIUM SERPL-MCNC: 9.1 MG/DL (ref 8.5–10.5)
CHLORIDE SERPL-SCNC: 105 MMOL/L (ref 96–112)
CO2 SERPL-SCNC: 24 MMOL/L (ref 20–33)
CREAT SERPL-MCNC: 0.52 MG/DL (ref 0.5–1.4)
EOSINOPHIL # BLD AUTO: 0.15 K/UL (ref 0–0.51)
EOSINOPHIL NFR BLD: 2.1 % (ref 0–6.9)
ERYTHROCYTE [DISTWIDTH] IN BLOOD BY AUTOMATED COUNT: 45.8 FL (ref 35.9–50)
GFR SERPLBLD CREATININE-BSD FMLA CKD-EPI: 107 ML/MIN/1.73 M 2
GLOBULIN SER CALC-MCNC: 2.6 G/DL (ref 1.9–3.5)
GLUCOSE SERPL-MCNC: 116 MG/DL (ref 65–99)
HBV CORE AB SERPL QL IA: NONREACTIVE
HBV SURFACE AB SERPL IA-ACNC: <3.5 MIU/ML (ref 0–10)
HBV SURFACE AG SER QL: NORMAL
HCT VFR BLD AUTO: 36.3 % (ref 37–47)
HCV AB SER QL: NORMAL
HGB BLD-MCNC: 13.3 G/DL (ref 12–16)
IMM GRANULOCYTES # BLD AUTO: 0.04 K/UL (ref 0–0.11)
IMM GRANULOCYTES NFR BLD AUTO: 0.6 % (ref 0–0.9)
LYMPHOCYTES # BLD AUTO: 2.49 K/UL (ref 1–4.8)
LYMPHOCYTES NFR BLD: 34.9 % (ref 22–41)
MCH RBC QN AUTO: 34.9 PG (ref 27–33)
MCHC RBC AUTO-ENTMCNC: 36.6 G/DL (ref 33.6–35)
MCV RBC AUTO: 95.3 FL (ref 81.4–97.8)
MONOCYTES # BLD AUTO: 0.37 K/UL (ref 0–0.85)
MONOCYTES NFR BLD AUTO: 5.2 % (ref 0–13.4)
NEUTROPHILS # BLD AUTO: 4.06 K/UL (ref 2–7.15)
NEUTROPHILS NFR BLD: 56.8 % (ref 44–72)
NRBC # BLD AUTO: 0 K/UL
NRBC BLD-RTO: 0 /100 WBC
OUTPT INFUS CBC COMMENT OICOM: ABNORMAL
PLATELET # BLD AUTO: 249 K/UL (ref 164–446)
PMV BLD AUTO: 9.1 FL (ref 9–12.9)
POTASSIUM SERPL-SCNC: 3.1 MMOL/L (ref 3.6–5.5)
PROT SERPL-MCNC: 6.7 G/DL (ref 6–8.2)
RBC # BLD AUTO: 3.81 M/UL (ref 4.2–5.4)
SODIUM SERPL-SCNC: 144 MMOL/L (ref 135–145)
T4 FREE SERPL-MCNC: 1.11 NG/DL (ref 0.93–1.7)
TSH SERPL DL<=0.005 MIU/L-ACNC: 0.74 UIU/ML (ref 0.38–5.33)
WBC # BLD AUTO: 7.1 K/UL (ref 4.8–10.8)

## 2023-04-05 PROCEDURE — 96417 CHEMO IV INFUS EACH ADDL SEQ: CPT

## 2023-04-05 PROCEDURE — 85025 COMPLETE CBC W/AUTO DIFF WBC: CPT

## 2023-04-05 PROCEDURE — 700101 HCHG RX REV CODE 250: Performed by: INTERNAL MEDICINE

## 2023-04-05 PROCEDURE — 700111 HCHG RX REV CODE 636 W/ 250 OVERRIDE (IP): Performed by: INTERNAL MEDICINE

## 2023-04-05 PROCEDURE — 96366 THER/PROPH/DIAG IV INF ADDON: CPT

## 2023-04-05 PROCEDURE — 304540 HCHG NITRO SET VENT 2ND TUB

## 2023-04-05 PROCEDURE — 96368 THER/DIAG CONCURRENT INF: CPT

## 2023-04-05 PROCEDURE — 96413 CHEMO IV INFUSION 1 HR: CPT

## 2023-04-05 PROCEDURE — A4212 NON CORING NEEDLE OR STYLET: HCPCS

## 2023-04-05 PROCEDURE — 96415 CHEMO IV INFUSION ADDL HR: CPT

## 2023-04-05 PROCEDURE — 700105 HCHG RX REV CODE 258: Performed by: INTERNAL MEDICINE

## 2023-04-05 PROCEDURE — 84439 ASSAY OF FREE THYROXINE: CPT

## 2023-04-05 PROCEDURE — 96367 TX/PROPH/DG ADDL SEQ IV INF: CPT

## 2023-04-05 PROCEDURE — 96375 TX/PRO/DX INJ NEW DRUG ADDON: CPT

## 2023-04-05 RX ORDER — POTASSIUM CHLORIDE 7.45 MG/ML
10 INJECTION INTRAVENOUS
Status: COMPLETED | OUTPATIENT
Start: 2023-04-05 | End: 2023-04-05

## 2023-04-05 RX ORDER — ONDANSETRON 8 MG/1
8 TABLET, ORALLY DISINTEGRATING ORAL EVERY 8 HOURS PRN
COMMUNITY
End: 2023-08-23

## 2023-04-05 RX ORDER — LIDOCAINE AND PRILOCAINE 25; 25 MG/G; MG/G
1 CREAM TOPICAL PRN
Status: ON HOLD | COMMUNITY

## 2023-04-05 RX ORDER — HEPARIN SODIUM (PORCINE) LOCK FLUSH IV SOLN 100 UNIT/ML 100 UNIT/ML
500 SOLUTION INTRAVENOUS PRN
Status: DISCONTINUED | OUTPATIENT
Start: 2023-04-05 | End: 2023-04-05 | Stop reason: HOSPADM

## 2023-04-05 RX ADMIN — LIDOCAINE HYDROCHLORIDE 0.5 ML: 10 INJECTION, SOLUTION EPIDURAL; INFILTRATION; INTRACAUDAL; PERINEURAL at 07:42

## 2023-04-05 RX ADMIN — POTASSIUM CHLORIDE 10 MEQ: 7.46 INJECTION, SOLUTION INTRAVENOUS at 11:01

## 2023-04-05 RX ADMIN — CARBOPLATIN 216 MG: 10 INJECTION, SOLUTION INTRAVENOUS at 13:17

## 2023-04-05 RX ADMIN — FAMOTIDINE 20 MG: 10 INJECTION INTRAVENOUS at 08:50

## 2023-04-05 RX ADMIN — DEXAMETHASONE SODIUM PHOSPHATE 12 MG: 4 INJECTION, SOLUTION INTRA-ARTICULAR; INTRALESIONAL; INTRAMUSCULAR; INTRAVENOUS; SOFT TISSUE at 09:05

## 2023-04-05 RX ADMIN — POTASSIUM CHLORIDE 10 MEQ: 7.46 INJECTION, SOLUTION INTRAVENOUS at 12:03

## 2023-04-05 RX ADMIN — POTASSIUM CHLORIDE 10 MEQ: 7.46 INJECTION, SOLUTION INTRAVENOUS at 13:08

## 2023-04-05 RX ADMIN — POTASSIUM CHLORIDE 10 MEQ: 7.46 INJECTION, SOLUTION INTRAVENOUS at 14:16

## 2023-04-05 RX ADMIN — DIPHENHYDRAMINE HYDROCHLORIDE 25 MG: 50 INJECTION, SOLUTION INTRAMUSCULAR; INTRAVENOUS at 09:25

## 2023-04-05 RX ADMIN — SODIUM CHLORIDE 200 MG: 9 INJECTION, SOLUTION INTRAVENOUS at 10:06

## 2023-04-05 RX ADMIN — PACLITAXEL 162 MG: 6 INJECTION, SOLUTION INTRAVENOUS at 10:50

## 2023-04-05 RX ADMIN — HEPARIN 500 UNITS: 100 SYRINGE at 15:25

## 2023-04-05 RX ADMIN — ONDANSETRON 16 MG: 2 INJECTION INTRAMUSCULAR; INTRAVENOUS at 09:40

## 2023-04-05 ASSESSMENT — FIBROSIS 4 INDEX: FIB4 SCORE: 0.83

## 2023-04-05 NOTE — PROGRESS NOTES
"[facility] Intake    Diagnosis Type: Breast Cancer  Preferred Language: English   Insurance:[employer] medicaid  Dental Insurance:\"unsure\" Last Appointment Date: \" about 4 years ago\"  Primary Care Provider Reviewed: yes  Last Appointment Date: \"about a month ago with Yuko Lala\"  Introduced Yolanda Roe to Oncology Support Services and provided contact information on 4/5/2023 .  Patient Care Team: Dr. Gómez at [facility]   Current Barriers Identified Include: Transportation  MyChart Status: Activated  Communication Preferences:Email; Best Contact Number: [phone]  Patient Contact's Reviewed: yes     Met with patient in [facility] during treatment for [facility] intake. Pt. States she has children and friends as her support system. Educated pt. On [employer]/events for Cancer Support Groups, reviewed the Resource Center, and provided pt. With LYNN Portillo's and BRAYAN Naranjo's card for contact information. Pt. States she uses [employer] for medical appointments and it gets\"pricey\". Provided patient with [facility] Resource information. Administered Distress Screening, pt scored an 8 stating \"training replacement for when I'm out of the office and anxiety of first infusion\".    Outcome:  No further needs at this time.          "

## 2023-04-05 NOTE — PROGRESS NOTES
"Nutrition Services: RD Consultation/ New Chemotherapy Start  Yolanda Roe is a 57 y.o. female with diagnosis of malignant neoplasm of left breast    RD met with pt to assess current intake, appetite, and nutritional status.  Pt presents to appointment unaccompanied. Denies n/v/d/c. Denies any changes to appetite or intake. Denies any changes to weight or presence of fluid retention. States was told is consuming too much water given low potassium and was provided high potassium containing foods. States is drinking more than 3-20oz bottles of water per day. Pt did not express any further nutrition-related questions or concerns at this time.     Dietary intake pattern:  Denies food allergies or intolerances, dislikes beef liver.      Past Medical History:   Diagnosis Date    Arthritis 03/09/2023    left knee    Cancer (HCC) 03/09/2023    breasty cancer initially diagnosed in 2003 and underwent chemo and radiation, recurrence of cancer in left breast    Dental disorder 03/09/2023    upper and lower partials    High cholesterol 03/09/2023    on medication    Hypertension 03/09/2023    no medication    Malignant neoplasm of upper-outer quadrant of left breast in female, estrogen receptor positive (HCC) 3/21/2023       Past Surgical History:   Procedure Laterality Date    CATH PLACEMENT Right 3/21/2023    Procedure: PLACEMENT OF A RIGHT POWER PORT;  Surgeon: Peña Monahan M.D.;  Location: SURGERY SAME DAY [facility];  Service: General    OTHER ORTHOPEDIC SURGERY  03/09/2023    back surgery L4-L5 1998    LUMPECTOMY  2003    2 surgeries    HYSTERECTOMY, VAGINAL  1991    \"2 partial hysterectomies\" still has one ovary     Biochemical/ Anthropometric Assessment:  Treatment Regimen: OP Breast Cancer Pembrolizumab + PACLitaxel + CARBOplatin (Neoadjuvant Course 1 x 4 Cycles) + Pembrolizumab + Cyclophosphamide + DOXOrubicin (Neoadjuvant Course 2 x 4 Cycles)  Pertinent Labs: K 3.1  Pertinent Meds: zofran, lipitor  Wt Readings " "from Last 1 Encounters:   04/05/23 87.1 kg (192 lb 0.3 oz)      Ht Readings from Last 1 Encounters:   04/05/23 1.61 m (5' 3.39\")      BMI Readings from Last 1 Encounters:   04/05/23 33.60 kg/m²   BMI Classification: Obesity Class I   Nutrition-Focused Physical Exam: Appears adequately nourished     Weight History:  Wt Readings from Last 6 Encounters:   04/05/23 87.1 kg (192 lb 0.3 oz)   03/21/23 87.4 kg (192 lb 10.9 oz)     Weight Change/Malnutrition Risk: Wt appearing stable per chart review within past 3 weeks. P denies any weight changes at this time.     Interventions:  Introduced self and discussed role of dietitian throughout treatment process   Provided and discussed handout regarding general nutrition guidelines as well as nutritional recommendations for patient's with breast cancer, including tips/tricks should side effects of tx occur.   Encouraged balanced diet with plant-based focus. Verbalized importance of nutrition and maintaining LBM throughout treatment.   Encouraged physical activity as tolerated with emphasis on reducing long periods of sedentary activity as able.   Increase meal and snack frequency to 4-6 x/day.   Focus on high protein foods, fruits and vegetables with all meals/snacks - handout provided.  RD reviewed high potassium beverages to include, such as orange juice, milk, coconut water, or V8 low-sodium juice.     Pt reports understanding and was receptive to information provided.   RD provided contact information. Encouraged pt to reach out as questions/concerns arise.   RD available PRN   [phone]   "

## 2023-04-05 NOTE — PROGRESS NOTES
Chemotherapy Verification - SECONDARY RN       Height = 1.61 m  Weight = 87.1 kg  BSA = 1.97 m^2       Medication: pembrolizumab  Dose: 200 mg (set dose)  Calculated Dose: 200 mg (set dose)                             (In mg/m2, AUC, mg/kg)     Medication: paclitaxel  Dose: 80 mg/m^2  Calculated Dose: 157.6 mg                             (In mg/m2, AUC, mg/kg)    Medication: carboplatin  Dose: AUC=1.5  Calculated Dose: 216.078 mg                            (In mg/m2, AUC, mg/kg)    Carboplatin calculation (if applicable):  (1.5*(119.052+25)) = 216.078    I confirm that this process was performed independently.

## 2023-04-05 NOTE — PROGRESS NOTES
into Infusions Services for Cycle 1 of Keytruda / Taxol / Carboplatin / Labs for Malignant neoplasm of overlapping sites of left breast in female. Pt denied having any new or acute complaints today. Port accessed in a sterile manner, had + blood return, flushed briskly. Pt given anticancer therapy as prescribed, tolerated well, denied having any complaints during or after infusion. Also electrolyte replaced per protocol. Port had + blood return after, flushed per [facility] policy, de-accessed, needle intact, insertion site covered with sterile gauze and paper tape. Discharge home to self care in [facility]. Appointment confirm for next treatment.

## 2023-04-05 NOTE — PROGRESS NOTES
Chemotherapy Verification - PRIMARY RN      Height = 63.39 in  Weight = 192 lb  BSA = 1.97 m2       Medication: Keytruda  Dose: 200 mg  Calculated Dose: 200 mg / Fixed dose                             (In mg/m2, AUC, mg/kg)     Medication: Taxol  Dose: 80 mg/m2  Calculated Dose: 157.6 mg                             (In mg/m2, AUC, mg/kg)    Medication: Carboplatin  Dose: 1.5 AUC  Calculated Dose: 218.181 mg                             (In mg/m2, AUC, mg/kg)    Carboplatin calculation (if applicable):  (((140-58)*[id number])*(0.7+(1*0.15))/(0.7*72)+25)*1.5*((1=1)+(1=2)) = 218.181 mg      I confirm this process was performed independently with the BSA and all final chemotherapy dosing calculations congruent.  Any discrepancies of 10% or greater have been addressed with the chemotherapy pharmacist. The resolution of the discrepancy has been documented in the EPIC progress notes.

## 2023-04-07 ENCOUNTER — PATIENT OUTREACH (OUTPATIENT)
Dept: ONCOLOGY | Facility: MEDICAL CENTER | Age: 58
End: 2023-04-07
Payer: COMMERCIAL

## 2023-04-07 NOTE — PROGRESS NOTES
"On April 7, 2023, Oncology Social Worker Maryse Gdoinez contacted pt. via telephone to follow up on psychosocial distress screening.  Pt. shared she was doing \"okay so far.\"   OSW Herbert encouraged pt. to get in contact with [employer] for transportation assistance.  Pt. agreed to do so.  Pt. denies additional stressors/barriers to care at this time.    "

## 2023-04-11 ENCOUNTER — HOSPITAL ENCOUNTER (OUTPATIENT)
Facility: MEDICAL CENTER | Age: 58
End: 2023-04-11
Attending: INTERNAL MEDICINE
Payer: COMMERCIAL

## 2023-04-11 ENCOUNTER — PATIENT OUTREACH (OUTPATIENT)
Dept: ONCOLOGY | Facility: MEDICAL CENTER | Age: 58
End: 2023-04-11
Payer: COMMERCIAL

## 2023-04-11 LAB
ANION GAP SERPL CALC-SCNC: 12 MMOL/L (ref 7–16)
BUN SERPL-MCNC: 14 MG/DL (ref 8–22)
CALCIUM SERPL-MCNC: 9 MG/DL (ref 8.5–10.5)
CHLORIDE SERPL-SCNC: 106 MMOL/L (ref 96–112)
CO2 SERPL-SCNC: 22 MMOL/L (ref 20–33)
CREAT SERPL-MCNC: 0.47 MG/DL (ref 0.5–1.4)
GFR SERPLBLD CREATININE-BSD FMLA CKD-EPI: 110 ML/MIN/1.73 M 2
GLUCOSE SERPL-MCNC: 111 MG/DL (ref 65–99)
POTASSIUM SERPL-SCNC: 3.8 MMOL/L (ref 3.6–5.5)
SODIUM SERPL-SCNC: 140 MMOL/L (ref 135–145)

## 2023-04-11 PROCEDURE — 80048 BASIC METABOLIC PNL TOTAL CA: CPT

## 2023-04-11 NOTE — PROGRESS NOTES
"Oncology Nurse Navigation (ONN) Assessment:  LYNN Mckoy reached out to patient to follow up on provider referral.  ONN discussed my role and the resources at [facility]. ONN explained I am here to provide support, education and guidance throughout her healthcare journey and to assist with any barriers to care.  Patient shared her understanding of his plan of care. ONN reviewed the patient's scheduled appointments.   She is scheduled for chemotherapy tomorrow, 4/12/23 and agrees to meet in person and receive assistance in the Resource Center.  Her hematologist is Dr. Gómez  at [facility] & patient has f/u visit with her today.  Patient's support team is daughter, Tanesha.  Patient's barrier to care is transportation.  She has been taking Uber which \"gets expensive\".  OSW provided patient with MTM information earlier.   Patient is employeed full time and tolerating her chemotherapy well.  Patient shared she has received a [employer] (MOTR) wig voucher. ONN introduction letter , information about [facility] Calendar sent to patient via personal email.   Patient verbalized understanding of information provided.  Her current questions were answered & she was encouraged to contact Navigation with future questions or concerns.       BARRIERS ASSESSMENT:  TRANSPORTATION:  Taking Uber to appointments, MTM information provided.  EMPLOYMENT:   Works full time as  at [employer].  No barriers.  FINANCIAL:  Denies barriers.  MOTR reviewed with patient.  ONN explained online application.  INSURANCE:  Denies barriers.  [facility] plan-Medicare.  SUPPORT SYSTEM:  Daughter, Tanesha.  PSYCHOLOGICAL:   DST of 8/10 completed 4/5/23 in [facility].   COMMUNICATION:  No barriers noted   FAMILY CARE:  No barriers.   SELF CARE:  No barriers.         INTERVENTION:  ONN introduction letter & " [facility] Calendar sent to patient today.

## 2023-04-12 ENCOUNTER — HOSPITAL ENCOUNTER (OUTPATIENT)
Facility: MEDICAL CENTER | Age: 58
End: 2023-04-12
Payer: COMMERCIAL

## 2023-04-12 ENCOUNTER — OUTPATIENT INFUSION SERVICES (OUTPATIENT)
Dept: ONCOLOGY | Facility: MEDICAL CENTER | Age: 58
End: 2023-04-12
Attending: INTERNAL MEDICINE
Payer: COMMERCIAL

## 2023-04-12 ENCOUNTER — PATIENT OUTREACH (OUTPATIENT)
Dept: ONCOLOGY | Facility: MEDICAL CENTER | Age: 58
End: 2023-04-12
Payer: COMMERCIAL

## 2023-04-12 VITALS
TEMPERATURE: 98 F | HEART RATE: 99 BPM | BODY MASS INDEX: 33.52 KG/M2 | OXYGEN SATURATION: 96 % | SYSTOLIC BLOOD PRESSURE: 155 MMHG | DIASTOLIC BLOOD PRESSURE: 94 MMHG | RESPIRATION RATE: 18 BRPM | WEIGHT: 189.15 LBS | HEIGHT: 63 IN

## 2023-04-12 DIAGNOSIS — Z17.1 MALIGNANT NEOPLASM OF OVERLAPPING SITES OF LEFT BREAST IN FEMALE, ESTROGEN RECEPTOR NEGATIVE (HCC): ICD-10-CM

## 2023-04-12 DIAGNOSIS — C50.812 MALIGNANT NEOPLASM OF OVERLAPPING SITES OF LEFT BREAST IN FEMALE, ESTROGEN RECEPTOR NEGATIVE (HCC): ICD-10-CM

## 2023-04-12 LAB
BASOPHILS # BLD AUTO: 0.8 % (ref 0–1.8)
BASOPHILS # BLD: 0.04 K/UL (ref 0–0.12)
CREAT SERPL-MCNC: 0.55 MG/DL (ref 0.5–1.4)
EOSINOPHIL # BLD AUTO: 0.16 K/UL (ref 0–0.51)
EOSINOPHIL NFR BLD: 3 % (ref 0–6.9)
ERYTHROCYTE [DISTWIDTH] IN BLOOD BY AUTOMATED COUNT: 45.1 FL (ref 35.9–50)
GFR SERPLBLD CREATININE-BSD FMLA CKD-EPI: 106 ML/MIN/1.73 M 2
HCT VFR BLD AUTO: 36.7 % (ref 37–47)
HGB BLD-MCNC: 12.9 G/DL (ref 12–16)
IMM GRANULOCYTES # BLD AUTO: 0.04 K/UL (ref 0–0.11)
IMM GRANULOCYTES NFR BLD AUTO: 0.8 % (ref 0–0.9)
LYMPHOCYTES # BLD AUTO: 2.61 K/UL (ref 1–4.8)
LYMPHOCYTES NFR BLD: 49.4 % (ref 22–41)
MCH RBC QN AUTO: 34.2 PG (ref 27–33)
MCHC RBC AUTO-ENTMCNC: 35.1 G/DL (ref 33.6–35)
MCV RBC AUTO: 97.3 FL (ref 81.4–97.8)
MONOCYTES # BLD AUTO: 0.25 K/UL (ref 0–0.85)
MONOCYTES NFR BLD AUTO: 4.7 % (ref 0–13.4)
NEUTROPHILS # BLD AUTO: 2.18 K/UL (ref 2–7.15)
NEUTROPHILS NFR BLD: 41.3 % (ref 44–72)
NRBC # BLD AUTO: 0 K/UL
NRBC BLD-RTO: 0 /100 WBC
OUTPT INFUS CBC COMMENT OICOM: ABNORMAL
PATHOLOGY CONSULT NOTE: NORMAL
PLATELET # BLD AUTO: 240 K/UL (ref 164–446)
PMV BLD AUTO: 9.8 FL (ref 9–12.9)
RBC # BLD AUTO: 3.77 M/UL (ref 4.2–5.4)
WBC # BLD AUTO: 5.3 K/UL (ref 4.8–10.8)

## 2023-04-12 PROCEDURE — 82565 ASSAY OF CREATININE: CPT

## 2023-04-12 PROCEDURE — 700105 HCHG RX REV CODE 258: Performed by: INTERNAL MEDICINE

## 2023-04-12 PROCEDURE — 700111 HCHG RX REV CODE 636 W/ 250 OVERRIDE (IP): Performed by: INTERNAL MEDICINE

## 2023-04-12 PROCEDURE — 85025 COMPLETE CBC W/AUTO DIFF WBC: CPT

## 2023-04-12 PROCEDURE — 96417 CHEMO IV INFUS EACH ADDL SEQ: CPT

## 2023-04-12 PROCEDURE — 96413 CHEMO IV INFUSION 1 HR: CPT

## 2023-04-12 PROCEDURE — 700101 HCHG RX REV CODE 250: Performed by: INTERNAL MEDICINE

## 2023-04-12 PROCEDURE — A4212 NON CORING NEEDLE OR STYLET: HCPCS

## 2023-04-12 PROCEDURE — 96375 TX/PRO/DX INJ NEW DRUG ADDON: CPT

## 2023-04-12 PROCEDURE — 96415 CHEMO IV INFUSION ADDL HR: CPT

## 2023-04-12 PROCEDURE — 304540 HCHG NITRO SET VENT 2ND TUB

## 2023-04-12 RX ORDER — HEPARIN SODIUM (PORCINE) LOCK FLUSH IV SOLN 100 UNIT/ML 100 UNIT/ML
500 SOLUTION INTRAVENOUS PRN
Status: DISCONTINUED | OUTPATIENT
Start: 2023-04-12 | End: 2023-04-12 | Stop reason: HOSPADM

## 2023-04-12 RX ADMIN — PACLITAXEL 162 MG: 6 INJECTION, SOLUTION INTRAVENOUS at 16:23

## 2023-04-12 RX ADMIN — HEPARIN 500 UNITS: 100 SYRINGE at 18:40

## 2023-04-12 RX ADMIN — DEXAMETHASONE SODIUM PHOSPHATE 12 MG: 4 INJECTION, SOLUTION INTRA-ARTICULAR; INTRALESIONAL; INTRAMUSCULAR; INTRAVENOUS; SOFT TISSUE at 15:57

## 2023-04-12 RX ADMIN — DIPHENHYDRAMINE HYDROCHLORIDE 25 MG: 50 INJECTION, SOLUTION INTRAMUSCULAR; INTRAVENOUS at 15:20

## 2023-04-12 RX ADMIN — CARBOPLATIN 213 MG: 10 INJECTION, SOLUTION INTRAVENOUS at 18:01

## 2023-04-12 RX ADMIN — FAMOTIDINE 20 MG: 10 INJECTION INTRAVENOUS at 15:59

## 2023-04-12 RX ADMIN — ONDANSETRON 16 MG: 2 INJECTION INTRAMUSCULAR; INTRAVENOUS at 15:41

## 2023-04-12 RX ADMIN — LIDOCAINE HYDROCHLORIDE 0.5 ML: 10 INJECTION, SOLUTION EPIDURAL; INFILTRATION; INTRACAUDAL; PERINEURAL at 13:35

## 2023-04-12 ASSESSMENT — FIBROSIS 4 INDEX: FIB4 SCORE: 1.1

## 2023-04-12 NOTE — PROGRESS NOTES
"Pharmacy Chemotherapy Verification    Patient Name: Yolanda Roe      Dx: Metastatic Breast CA (ER/MO/HER2 negative) IIb       Protocol: Pembrolizumab + PACLitaxel + CARBOplatin (x 4 cycles) followed by  Pembrolizumab + Cyclophosphamide + DOXOrubicin   (x4 cycles)  Pembrolizumab 200 mg IV over 30 minutes on Day 1  PACLitaxel 80 mg/m2 IV over 60 minutes on Days 1, 8 and 15  CARBOplatin AUC 1.5 IV over 30 minutes Days 1, 8, and 15  Repeat every 21 days x 4 cycles  ~followed by~  Pembrolizumab 200 mg IV over 30 minutes on Day 1  Cyclophosphamide 600 mg/m2 IV over 30 minutes on Day 1  DOXOrubicin 60 mg/m2 IV push on Day 1  Repeat every 21 days for 4 cycles  NCCN Guidelines for Breast Cancer V.2.2022  Alexis P, et al N Engl J Med 2020; 382(4) 547-935    Allergies:  Patient has no known allergies.      BP (!) 155/94   Pulse 99   Temp 36.7 °C (98 °F) (Temporal)   Resp 18   Ht 1.61 m (5' 3.39\")   Wt 85.8 kg (189 lb 2.5 oz)   SpO2 96%   BMI 33.10 kg/m²  Body surface area is 1.96 meters squared.    Labs 4/11/23  SCr 0.47 CrCl 117.3 mL/min     Labs 4/12/23  ANC 2180 Hgb 12.9 Plt 240k     Drug Order   (Drug name, dose, route, IV Fluid & volume, frequency, number of doses) Cycle: 1 Day 8      Previous treatment: C1D1 4/5/23     Medication = Pembrolizumab  Base Dose = 200 mg   Fixed dose; no calculation required  Final Dose = 200 mg  Route = IV  Fluid & Volume = NS 50 mL  Admin Duration = Over 30 minutes          <10% difference, okay to treat with final dose       Medication = PACLitaxel  Base Dose = 80 mg/m²  Calc Dose: Base Dose x 1.96 m² = 156.8 mg  Final Dose = 162 mg  Route = IV  Fluid & Volume =  mL  Admin Duration = Over 60 minutes          <10% difference, okay to treat with final dose     Medication = CARBOplatin   Base Dose = AUC 1.5   Calc Dose:Base Dose x (117.3 ml/min + 25) = 213.45 mg  Final Dose = 213 mg  Route = IV  Fluid & Volume =  mL  Admin Duration = Over 30 minutes          <10% " difference, okay to treat with final dose       By my signature below, I confirm this process was performed independently with the BSA and all final chemotherapy dosing calculations congruent. I have reviewed the above chemotherapy order and that my calculation of the final dose and BSA (when applicable) corroborate those calculations of the  pharmacist.     Jessica Jenkins, PharmD, BCOP

## 2023-04-12 NOTE — PROGRESS NOTES
Chemotherapy Verification - PRIMARY RN      Height = 161 cm  Weight = 85.8 kg  BSA = 1.96 m^2       Medication: PACLitaxel (TAXOL)  Dose: 80 mg/m^2  Calculated Dose: 156.8 mg                             (In mg/m2, AUC, mg/kg)     Medication: CARBOplatin (PARAPLATIN)  Dose: AUC 1.5  Calculated Dose: 215.484 mg                             (In mg/m2, AUC, mg/kg)    Carboplatin calculation (if applicable):  (((140-58)*[id number])*(0.7+(1*0.15))/(0.7*72)+25)*1.5*((1=1)+(1=2)) = 215.484 mg      I confirm this process was performed independently with the BSA and all final chemotherapy dosing calculations congruent.  Any discrepancies of 10% or greater have been addressed with the chemotherapy pharmacist. The resolution of the discrepancy has been documented in the EPIC progress notes.

## 2023-04-12 NOTE — PROGRESS NOTES
Chemotherapy Verification - SECONDARY RN       Height = 161 cm  Weight = 85.8 kg  BSA = 1.96 m2       Medication: Taxol  Dose: 80 mg/m2  Calculated Dose: 156.8 mg                             (In mg/m2, AUC, mg/kg)     Medication: Carboplatin  Dose: AUC = 1.5  Calculated Dose: 220.5 mg                             (In mg/m2, AUC, mg/kg)      Carboplatin calculation (if applicable):  (((140-58)*88.2)*(0.7+(1*0.15))/(0.7*72)+25)*1.5*((1=1)+(1=2)) = 220.463 mg      I confirm that this process was performed independently.

## 2023-04-12 NOTE — PROGRESS NOTES
"Pharmacy Chemotherapy calculation:    DX: triple negative breast cancer    Cycle 1    Day 8  Previous treatment = 4/5/23    Regimen: pembrolizumab + paclitaxel/carboplatin --> pembrolizumab + cyclophosphamide/doxorubicin or epirubicin --> pembrolizmab  Pembrolizumab 200mg IV over 30 min on day 1  Paclitaxel 80mg/m2 IV over 60 min on days 1,8,15  Carboplatin AUC 1.5 IV over 30 min on day 1,8,15  Q21 days x 4 cycles  Followed by  Pembrolizumab 200mg IV over 30 min on day 1  Cyclophosphamide 600mg/m2 IV over 30 min on day 1  Doxorubicin 60mg/m2 IVP on day 1 OR epirubicin 90mg/m2 IVP on day 1  Q21 days x 4 cycles  Followed by  Pembrolizumab 200mg IV over 30 min on day 1  Q21 days x 9 cycles  NCCN Guidelines for Breast Cancer V.2.2022  Alexis FISHER, et al- N Engl J Med. 2020 Feb 27;382(9):810-821.     BP (!) 155/94   Pulse 99   Temp 36.7 °C (98 °F) (Temporal)   Resp 18   Ht 1.61 m (5' 3.39\")   Wt 85.8 kg (189 lb 2.5 oz)   SpO2 96%   BMI 33.10 kg/m²   Body surface area is 1.96 meters squared.    All lab results 4/11/23 & 4/12/23 within treatment parameters.  Scr =0.47 Est crcl ~ 117mL/min (min Scr = 0.7)     Pembrolizumab 200mg fixed dose   No calculation required   ok to treat with final dose = ---mg IV on day 1    Paclitaxel 80mg/m2  x 1.96m2 = 156.8mg   <10% difference, ok to treat with final dose = 162mg IV on days 1,8, and 15    Carboplatin AUC 1.5 (117+ 25) = 213mg   <10% difference, ok to treat with final dose = 213mg IV on days 1,8, and 15      MATEUSZ Cadena Pharm.D.    "

## 2023-04-12 NOTE — PROGRESS NOTES
Radiation therapy PSR called LYNN Mckoy, patient is waiting to meet in lobby.  LYNN escorted patient to the conference room for privacy.  We reviewed her recent appointments and LYNN shared additional resource information with patient.  LYNN walked patient to the Resource Center and introduced her to our volunteer.  Patient was provided a [employer] tote and given tour of resource center resources.  LYNN Mckoy encouraged patient to call or stop by the XRT department for future questions or assistance.

## 2023-04-13 NOTE — PROGRESS NOTES
Yolanda is here for Day 8 Taxol/Carboplatin. Lidocaine used prior to accessing port. Port accessed using sterile technique; brisk blood return noted. Labs drawn as ordered. Labs reviewed and within parameters to proceed with treatment. Pre-medications given per MAR. Taxol given per MAR. Carboplatin given per MAR. Heparin instilled prior to de-accessing port. Gauze/tape applied to site. Next appointment scheduled. Discharged to self  care; no apparent distress noted.

## 2023-04-17 RX ORDER — HEPARIN SODIUM (PORCINE) LOCK FLUSH IV SOLN 100 UNIT/ML 100 UNIT/ML
500 SOLUTION INTRAVENOUS PRN
Status: CANCELLED | OUTPATIENT
Start: 2023-04-25

## 2023-04-17 RX ORDER — DIPHENHYDRAMINE HYDROCHLORIDE 50 MG/ML
50 INJECTION INTRAMUSCULAR; INTRAVENOUS PRN
Status: CANCELLED | OUTPATIENT
Start: 2023-05-04

## 2023-04-17 RX ORDER — ONDANSETRON 2 MG/ML
4 INJECTION INTRAMUSCULAR; INTRAVENOUS PRN
Status: CANCELLED | OUTPATIENT
Start: 2023-04-27

## 2023-04-17 RX ORDER — 0.9 % SODIUM CHLORIDE 0.9 %
10 VIAL (ML) INJECTION PRN
Status: CANCELLED | OUTPATIENT
Start: 2023-05-04

## 2023-04-17 RX ORDER — HEPARIN SODIUM (PORCINE) LOCK FLUSH IV SOLN 100 UNIT/ML 100 UNIT/ML
500 SOLUTION INTRAVENOUS PRN
Status: CANCELLED | OUTPATIENT
Start: 2023-05-11

## 2023-04-17 RX ORDER — DIPHENHYDRAMINE HYDROCHLORIDE 50 MG/ML
50 INJECTION INTRAMUSCULAR; INTRAVENOUS PRN
Status: CANCELLED | OUTPATIENT
Start: 2023-04-27

## 2023-04-17 RX ORDER — 0.9 % SODIUM CHLORIDE 0.9 %
3 VIAL (ML) INJECTION PRN
Status: CANCELLED | OUTPATIENT
Start: 2023-05-04

## 2023-04-17 RX ORDER — 0.9 % SODIUM CHLORIDE 0.9 %
10 VIAL (ML) INJECTION PRN
Status: CANCELLED | OUTPATIENT
Start: 2023-05-11

## 2023-04-17 RX ORDER — PROCHLORPERAZINE MALEATE 10 MG
10 TABLET ORAL EVERY 6 HOURS PRN
Status: CANCELLED | OUTPATIENT
Start: 2023-05-11

## 2023-04-17 RX ORDER — ONDANSETRON 8 MG/1
8 TABLET, ORALLY DISINTEGRATING ORAL PRN
Status: CANCELLED | OUTPATIENT
Start: 2023-04-27

## 2023-04-17 RX ORDER — 0.9 % SODIUM CHLORIDE 0.9 %
3 VIAL (ML) INJECTION PRN
Status: CANCELLED | OUTPATIENT
Start: 2023-04-27

## 2023-04-17 RX ORDER — 0.9 % SODIUM CHLORIDE 0.9 %
10 VIAL (ML) INJECTION PRN
Status: CANCELLED | OUTPATIENT
Start: 2023-04-25

## 2023-04-17 RX ORDER — 0.9 % SODIUM CHLORIDE 0.9 %
10 VIAL (ML) INJECTION PRN
Status: CANCELLED | OUTPATIENT
Start: 2023-04-27

## 2023-04-17 RX ORDER — ONDANSETRON 8 MG/1
8 TABLET, ORALLY DISINTEGRATING ORAL PRN
Status: CANCELLED | OUTPATIENT
Start: 2023-05-11

## 2023-04-17 RX ORDER — SODIUM CHLORIDE 9 MG/ML
INJECTION, SOLUTION INTRAVENOUS CONTINUOUS
Status: CANCELLED | OUTPATIENT
Start: 2023-04-27

## 2023-04-17 RX ORDER — 0.9 % SODIUM CHLORIDE 0.9 %
VIAL (ML) INJECTION PRN
Status: CANCELLED | OUTPATIENT
Start: 2023-05-04

## 2023-04-17 RX ORDER — EPINEPHRINE 1 MG/ML(1)
0.5 AMPUL (ML) INJECTION PRN
Status: CANCELLED | OUTPATIENT
Start: 2023-05-04

## 2023-04-17 RX ORDER — SODIUM CHLORIDE 9 MG/ML
INJECTION, SOLUTION INTRAVENOUS CONTINUOUS
Status: CANCELLED | OUTPATIENT
Start: 2023-05-04

## 2023-04-17 RX ORDER — HEPARIN SODIUM (PORCINE) LOCK FLUSH IV SOLN 100 UNIT/ML 100 UNIT/ML
500 SOLUTION INTRAVENOUS PRN
Status: CANCELLED | OUTPATIENT
Start: 2023-04-27

## 2023-04-17 RX ORDER — 0.9 % SODIUM CHLORIDE 0.9 %
VIAL (ML) INJECTION PRN
Status: CANCELLED | OUTPATIENT
Start: 2023-05-11

## 2023-04-17 RX ORDER — ONDANSETRON 2 MG/ML
4 INJECTION INTRAMUSCULAR; INTRAVENOUS PRN
Status: CANCELLED | OUTPATIENT
Start: 2023-05-04

## 2023-04-17 RX ORDER — METHYLPREDNISOLONE SODIUM SUCCINATE 125 MG/2ML
125 INJECTION, POWDER, LYOPHILIZED, FOR SOLUTION INTRAMUSCULAR; INTRAVENOUS PRN
Status: CANCELLED | OUTPATIENT
Start: 2023-05-04

## 2023-04-17 RX ORDER — ONDANSETRON 8 MG/1
8 TABLET, ORALLY DISINTEGRATING ORAL PRN
Status: CANCELLED | OUTPATIENT
Start: 2023-05-04

## 2023-04-17 RX ORDER — PROCHLORPERAZINE MALEATE 10 MG
10 TABLET ORAL EVERY 6 HOURS PRN
Status: CANCELLED | OUTPATIENT
Start: 2023-05-04

## 2023-04-17 RX ORDER — 0.9 % SODIUM CHLORIDE 0.9 %
VIAL (ML) INJECTION PRN
Status: CANCELLED | OUTPATIENT
Start: 2023-04-27

## 2023-04-17 RX ORDER — HEPARIN SODIUM (PORCINE) LOCK FLUSH IV SOLN 100 UNIT/ML 100 UNIT/ML
500 SOLUTION INTRAVENOUS PRN
Status: CANCELLED | OUTPATIENT
Start: 2023-05-04

## 2023-04-17 RX ORDER — 0.9 % SODIUM CHLORIDE 0.9 %
3 VIAL (ML) INJECTION PRN
Status: CANCELLED | OUTPATIENT
Start: 2023-04-25

## 2023-04-17 RX ORDER — SODIUM CHLORIDE 9 MG/ML
INJECTION, SOLUTION INTRAVENOUS CONTINUOUS
Status: CANCELLED | OUTPATIENT
Start: 2023-05-11

## 2023-04-17 RX ORDER — EPINEPHRINE 1 MG/ML(1)
0.5 AMPUL (ML) INJECTION PRN
Status: CANCELLED | OUTPATIENT
Start: 2023-04-27

## 2023-04-17 RX ORDER — ONDANSETRON 2 MG/ML
4 INJECTION INTRAMUSCULAR; INTRAVENOUS PRN
Status: CANCELLED | OUTPATIENT
Start: 2023-05-11

## 2023-04-17 RX ORDER — 0.9 % SODIUM CHLORIDE 0.9 %
VIAL (ML) INJECTION PRN
Status: CANCELLED | OUTPATIENT
Start: 2023-04-25

## 2023-04-17 RX ORDER — METHYLPREDNISOLONE SODIUM SUCCINATE 125 MG/2ML
125 INJECTION, POWDER, LYOPHILIZED, FOR SOLUTION INTRAMUSCULAR; INTRAVENOUS PRN
Status: CANCELLED | OUTPATIENT
Start: 2023-04-27

## 2023-04-17 RX ORDER — PROCHLORPERAZINE MALEATE 10 MG
10 TABLET ORAL EVERY 6 HOURS PRN
Status: CANCELLED | OUTPATIENT
Start: 2023-04-27

## 2023-04-17 RX ORDER — DIPHENHYDRAMINE HYDROCHLORIDE 50 MG/ML
50 INJECTION INTRAMUSCULAR; INTRAVENOUS PRN
Status: CANCELLED | OUTPATIENT
Start: 2023-05-11

## 2023-04-17 RX ORDER — METHYLPREDNISOLONE SODIUM SUCCINATE 125 MG/2ML
125 INJECTION, POWDER, LYOPHILIZED, FOR SOLUTION INTRAMUSCULAR; INTRAVENOUS PRN
Status: CANCELLED | OUTPATIENT
Start: 2023-05-11

## 2023-04-17 RX ORDER — 0.9 % SODIUM CHLORIDE 0.9 %
3 VIAL (ML) INJECTION PRN
Status: CANCELLED | OUTPATIENT
Start: 2023-05-11

## 2023-04-17 RX ORDER — EPINEPHRINE 1 MG/ML(1)
0.5 AMPUL (ML) INJECTION PRN
Status: CANCELLED | OUTPATIENT
Start: 2023-05-11

## 2023-04-19 ENCOUNTER — HOSPITAL ENCOUNTER (EMERGENCY)
Facility: MEDICAL CENTER | Age: 58
End: 2023-04-19
Attending: EMERGENCY MEDICINE
Payer: COMMERCIAL

## 2023-04-19 ENCOUNTER — APPOINTMENT (OUTPATIENT)
Dept: URGENT CARE | Facility: CLINIC | Age: 58
End: 2023-04-19
Payer: COMMERCIAL

## 2023-04-19 ENCOUNTER — OUTPATIENT INFUSION SERVICES (OUTPATIENT)
Dept: ONCOLOGY | Facility: MEDICAL CENTER | Age: 58
End: 2023-04-19
Attending: INTERNAL MEDICINE
Payer: COMMERCIAL

## 2023-04-19 ENCOUNTER — APPOINTMENT (OUTPATIENT)
Dept: URGENT CARE | Facility: PHYSICIAN GROUP | Age: 58
End: 2023-04-19
Payer: COMMERCIAL

## 2023-04-19 VITALS
WEIGHT: 188.71 LBS | HEIGHT: 63 IN | HEART RATE: 85 BPM | DIASTOLIC BLOOD PRESSURE: 101 MMHG | OXYGEN SATURATION: 91 % | BODY MASS INDEX: 33.44 KG/M2 | TEMPERATURE: 97.1 F | SYSTOLIC BLOOD PRESSURE: 131 MMHG | RESPIRATION RATE: 18 BRPM

## 2023-04-19 VITALS
BODY MASS INDEX: 32.22 KG/M2 | HEIGHT: 64 IN | RESPIRATION RATE: 16 BRPM | HEART RATE: 81 BPM | OXYGEN SATURATION: 95 % | DIASTOLIC BLOOD PRESSURE: 84 MMHG | WEIGHT: 188.71 LBS | TEMPERATURE: 98 F | SYSTOLIC BLOOD PRESSURE: 137 MMHG

## 2023-04-19 DIAGNOSIS — Z17.1 MALIGNANT NEOPLASM OF OVERLAPPING SITES OF LEFT BREAST IN FEMALE, ESTROGEN RECEPTOR NEGATIVE (HCC): ICD-10-CM

## 2023-04-19 DIAGNOSIS — Z17.0 MALIGNANT NEOPLASM OF UPPER-OUTER QUADRANT OF LEFT BREAST IN FEMALE, ESTROGEN RECEPTOR POSITIVE (HCC): ICD-10-CM

## 2023-04-19 DIAGNOSIS — R03.0 ELEVATED BLOOD PRESSURE READING: ICD-10-CM

## 2023-04-19 DIAGNOSIS — C50.412 MALIGNANT NEOPLASM OF UPPER-OUTER QUADRANT OF LEFT BREAST IN FEMALE, ESTROGEN RECEPTOR POSITIVE (HCC): ICD-10-CM

## 2023-04-19 DIAGNOSIS — C50.812 MALIGNANT NEOPLASM OF OVERLAPPING SITES OF LEFT BREAST IN FEMALE, ESTROGEN RECEPTOR NEGATIVE (HCC): ICD-10-CM

## 2023-04-19 LAB
BASOPHILS # BLD AUTO: 0.8 % (ref 0–1.8)
BASOPHILS # BLD: 0.03 K/UL (ref 0–0.12)
CREAT SERPL-MCNC: 0.46 MG/DL (ref 0.5–1.4)
EOSINOPHIL # BLD AUTO: 0.12 K/UL (ref 0–0.51)
EOSINOPHIL NFR BLD: 3.1 % (ref 0–6.9)
ERYTHROCYTE [DISTWIDTH] IN BLOOD BY AUTOMATED COUNT: 46.8 FL (ref 35.9–50)
GFR SERPLBLD CREATININE-BSD FMLA CKD-EPI: 110 ML/MIN/1.73 M 2
HCT VFR BLD AUTO: 37 % (ref 37–47)
HGB BLD-MCNC: 12.4 G/DL (ref 12–16)
IMM GRANULOCYTES # BLD AUTO: 0.03 K/UL (ref 0–0.11)
IMM GRANULOCYTES NFR BLD AUTO: 0.8 % (ref 0–0.9)
LYMPHOCYTES # BLD AUTO: 2.32 K/UL (ref 1–4.8)
LYMPHOCYTES NFR BLD: 60.7 % (ref 22–41)
MCH RBC QN AUTO: 33.6 PG (ref 27–33)
MCHC RBC AUTO-ENTMCNC: 33.5 G/DL (ref 33.6–35)
MCV RBC AUTO: 100.3 FL (ref 81.4–97.8)
MONOCYTES # BLD AUTO: 0.22 K/UL (ref 0–0.85)
MONOCYTES NFR BLD AUTO: 5.8 % (ref 0–13.4)
NEUTROPHILS # BLD AUTO: 1.1 K/UL (ref 2–7.15)
NEUTROPHILS NFR BLD: 28.8 % (ref 44–72)
NRBC # BLD AUTO: 0 K/UL
NRBC BLD-RTO: 0 /100 WBC
OUTPT INFUS CBC COMMENT OICOM: ABNORMAL
PLATELET # BLD AUTO: 219 K/UL (ref 164–446)
PMV BLD AUTO: 9.5 FL (ref 9–12.9)
RBC # BLD AUTO: 3.69 M/UL (ref 4.2–5.4)
WBC # BLD AUTO: 3.8 K/UL (ref 4.8–10.8)

## 2023-04-19 PROCEDURE — 36591 DRAW BLOOD OFF VENOUS DEVICE: CPT

## 2023-04-19 PROCEDURE — 82565 ASSAY OF CREATININE: CPT

## 2023-04-19 PROCEDURE — 700101 HCHG RX REV CODE 250: Performed by: INTERNAL MEDICINE

## 2023-04-19 PROCEDURE — A4212 NON CORING NEEDLE OR STYLET: HCPCS

## 2023-04-19 PROCEDURE — 700111 HCHG RX REV CODE 636 W/ 250 OVERRIDE (IP): Performed by: INTERNAL MEDICINE

## 2023-04-19 PROCEDURE — 85025 COMPLETE CBC W/AUTO DIFF WBC: CPT

## 2023-04-19 PROCEDURE — 99282 EMERGENCY DEPT VISIT SF MDM: CPT

## 2023-04-19 RX ORDER — HEPARIN SODIUM (PORCINE) LOCK FLUSH IV SOLN 100 UNIT/ML 100 UNIT/ML
500 SOLUTION INTRAVENOUS PRN
Status: DISCONTINUED | OUTPATIENT
Start: 2023-04-19 | End: 2023-04-19

## 2023-04-19 RX ORDER — AMLODIPINE BESYLATE 5 MG/1
5 TABLET ORAL DAILY
Qty: 30 TABLET | Refills: 0 | Status: ON HOLD | OUTPATIENT
Start: 2023-04-19 | End: 2024-03-20

## 2023-04-19 RX ADMIN — LIDOCAINE HYDROCHLORIDE 0.5 ML: 10 INJECTION, SOLUTION EPIDURAL; INFILTRATION; INTRACAUDAL; PERINEURAL at 13:59

## 2023-04-19 RX ADMIN — HEPARIN 500 UNITS: 100 SYRINGE at 15:15

## 2023-04-19 ASSESSMENT — FIBROSIS 4 INDEX
FIB4 SCORE: 1.25
FIB4 SCORE: 1.14

## 2023-04-19 NOTE — PROGRESS NOTES
Chemotherapy Verification - PRIMARY RN NO CHEMOTHERAPY TODAY DUE ELEVATED B/P      Height = 63.39 in  Weight = 188 lb  BSA = 1.96 m2       Medication: Taxol  Dose: 80 mg/m2  Calculated Dose: 156.8 mg                             (In mg/m2, AUC, mg/kg)    Medication: Carboplatin  Dose: 1.5 AUC  Calculated Dose: 225 mg                             (In mg/m2, AUC, mg/kg)    Carboplatin calculation (if applicable):  (1.5*(125+25)) = 225 mg    I confirm this process was performed independently with the BSA and all final chemotherapy dosing calculations congruent.  Any discrepancies of 10% or greater have been addressed with the chemotherapy pharmacist. The resolution of the discrepancy has been documented in the EPIC progress notes.

## 2023-04-19 NOTE — PROGRESS NOTES
"Pharmacy Chemotherapy Verification    **CHEMOTHERAPY CANCELLED DUE TO ELEVATED BLOOD PRESSURE**    DX: triple negative breast cancer    Cycle 1    Day 15  Previous treatment = 4/12/23    Regimen: pembrolizumab + PACLitaxel/CARBOplatin --> pembrolizumab + cyclophosphamide/DOOXrubicin or epirubicin --> pembrolizmab  Pembrolizumab 200 mg IV over 30 min on day 1  PACLitaxel 80 mg/m2 IV over 60 min on days 1,8,15  CARBOplatin AUC 1.5 IV over 30 min on day 1,8,15  Q21 days x 4 cycles  Followed by  Pembrolizumab 200 mg IV over 30 min on day 1  Cyclophosphamide 600 mg/m2 IV over 30 min on day 1  DOXOrubicin 60 mg/m2 IVP on day 1 OR epirubicin 90 mg/m2 IVP on day 1  Q21 days x 4 cycles  Followed by  Pembrolizumab 200mg IV over 30 min on day 1  Q21 days x 9 cycles  NCCN Guidelines for Breast Cancer V.2.2022  Alexis P, et al- N Engl J Med. 2020 Feb 27;382(9):810-821.     Allergies:Patient has no known allergies.  BP (!) 156/98   Pulse 95   Temp 36.2 °C (97.1 °F) (Temporal)   Resp 18   Ht 1.61 m (5' 3.39\")   Wt 85.6 kg (188 lb 11.4 oz)   SpO2 91%   BMI 33.02 kg/m²   Body surface area is 1.96 meters squared.    Labs 4/19/23  ANC 1100 Hgb 12.4 Plt 219k  SCr 0.46 CrCl 117 mL/min     Labs 4/11/23  AST/ALT/AP = 25/28/122 Tbili<0.2    Pembrolizumab 200 mg fixed dose, no calculation required  OK to treat with final dose = --- mg IV on day 1        Jessica Jenkins, PharmD, BCOP      "

## 2023-04-19 NOTE — PROGRESS NOTES
into [facility] for Cycle 1 Day 15 of Taxol / Carboplatin / Labs for Malignant neoplasm of overlapping sites of left breast in female. Pt denied having any new or acute complaints today. Reports tolerating past treatment well. Port accessed in a sterile manner, had + blood return, flushed briskly.  called regarding patients elevated B/P , orders received to hold chemotherapy today, patient to see PCP ASAP. Port had + blood return after, flushed per [facility] policy, de-accessed, needle intact, insertion site covered with sterile gauze and paper tape. Patient aware of s/s of stroke and what to do, also handout given. Discharge home to self care in [facility]. Appointment confirm for next treatment.

## 2023-04-20 NOTE — ED TRIAGE NOTES
"Chief Complaint   Patient presents with    Blood Pressure Problem     Patient reports she went to infusion therapy today for chemo and was told to come to ER for high blood pressure. Not currently taking high bp medication       58 yo female to triage for above complaint. Patient currently receiving chemo for breast cancer.  Patient placed in family room.    Pt is alert and oriented, speaking in full sentences, follows commands and responds appropriately to questions.     Patient placed back in lobby and educated on triage process. Asked to inform RN of any changes.    BP (!) 185/108   Pulse 87   Temp 36.5 °C (97.7 °F) (Temporal)   Resp 17   Ht 1.626 m (5' 4\")   Wt 85.6 kg (188 lb 11.4 oz)   SpO2 98%   BMI 32.39 kg/m²     "

## 2023-04-20 NOTE — ED NOTES
Patient provided discharge instructions. Patient verbalized understanding. Patient leaving ER in stable condition. Patient ambulatory with steady gait.

## 2023-04-20 NOTE — ED PROVIDER NOTES
ER Provider Note    Scribed for Chandan French M.d. by Ernesto Harley. 4/19/2023  9:53 PM    Primary Care Provider: Yuko Lala P.A.-C.    CHIEF COMPLAINT  Chief Complaint   Patient presents with    Blood Pressure Problem     Patient reports she went to infusion therapy today for chemo and was told to come to ER for high blood pressure. Not currently taking high bp medication         HPI/ROS  LIMITATION TO HISTORY   Select: : None  OUTSIDE HISTORIAN(S):  None    Yolanda Roe is a 57 y.o. female who presents to the ED for evaluation of high blood pressure. Patient states she has history of breast cancer. States she went to infusion therapy today for chemo and was told to go to the ER for high blood pressure. Patient states she is not currently on blood pressure medication. States she was found to have high blood pressure during other doctor appointments in the past as well. States she has had constant headaches since starting chemo. The headaches have been ongoing since about April. Denies chest pain, shortness of breath, weakness, or numbness. Denies pain. Denies any other medical problems other than the breast cancer. Denies history of diabetes. States her oncologist is Dr. Gómez.    PAST MEDICAL HISTORY  Past Medical History:   Diagnosis Date    Arthritis 03/09/2023    left knee    Cancer (HCC) 03/09/2023    breasty cancer initially diagnosed in 2003 and underwent chemo and radiation, recurrence of cancer in left breast    Dental disorder 03/09/2023    upper and lower partials    High cholesterol 03/09/2023    on medication    Hypertension 03/09/2023    no medication    Malignant neoplasm of upper-outer quadrant of left breast in female, estrogen receptor positive (HCC) 3/21/2023       SURGICAL HISTORY  Past Surgical History:   Procedure Laterality Date    CATH PLACEMENT Right 3/21/2023    Procedure: PLACEMENT OF A RIGHT POWER PORT;  Surgeon: Peña Monahan M.D.;  Location: SURGERY SAME DAY  "[facility];  Service: General    OTHER ORTHOPEDIC SURGERY  03/09/2023    back surgery L4-L5 1998    LUMPECTOMY  2003    2 surgeries    HYSTERECTOMY, VAGINAL  1991    \"2 partial hysterectomies\" still has one ovary       FAMILY HISTORY  No family history on file.    SOCIAL HISTORY   reports that she has been smoking cigarettes. She started smoking about 27 years ago. She has a 13.50 pack-year smoking history. She has never been exposed to tobacco smoke. She has never used smokeless tobacco. She reports current alcohol use of about 4.2 oz per week. She reports that she does not use drugs.    CURRENT MEDICATIONS  Discharge Medication List as of 4/19/2023 10:30 PM        CONTINUE these medications which have NOT CHANGED    Details   ondansetron (ZOFRAN ODT) 8 MG TABLET DISPERSIBLE Take 8 mg by mouth every 8 hours as needed for Nausea. BID PRN, Historical Med      lidocaine-prilocaine (EMLA) 2.5-2.5 % Cream Apply  topically as needed., Historical Med      atorvastatin (LIPITOR) 20 MG Tab TAKE 1 TABLET BY MOUTH ONCE DAILY AT BEDTIME FOR CHOLESTEROL LOWERING, Historical Med      Acetaminophen (TYLENOL ARTHRITIS PAIN PO) Take 1 Tablet by mouth every 6 hours as needed., Historical Med             ALLERGIES  Patient has no known allergies.    PHYSICAL EXAM  BP (!) 144/76   Pulse 87   Temp 36.5 °C (97.7 °F) (Temporal)   Resp 17   Ht 1.626 m (5' 4\")   Wt 85.6 kg (188 lb 11.4 oz)   SpO2 95%   BMI 32.39 kg/m²   Constitutional: Alert in no apparent distress.  HENT: No signs of trauma, Bilateral external ears normal, Nose normal. Uvula midline.   Eyes: Pupils are equal and reactive, Conjunctiva normal, Non-icteric.   Neck: Normal range of motion, No tenderness, Supple, No stridor.   Lymphatic: No lymphadenopathy noted.   Cardiovascular: Regular rate and rhythm, no murmurs.   Thorax & Lungs: Normal breath sounds, No respiratory distress, No wheezing, No chest tenderness.   Abdomen: Bowel sounds normal, Soft, No tenderness, No " peritoneal signs, No masses, No pulsatile masses.   Skin: Warm, Dry, No erythema, No rash.   Back: No bony tenderness, No CVA tenderness.   Extremities: Intact distal pulses, No edema, No tenderness, No cyanosis.  Musculoskeletal: Good range of motion in all major joints. No tenderness to palpation or major deformities noted.   Neurologic: Alert , Normal motor function, Normal sensory function, No focal deficits noted. Cranial nerves II through XII intact.  5 out of 5 strength x4.  Sensation intact light touch.  Normal finger-nose-finger.  Normal reflexes bilaterally.  No clonus. EOMI. PERRL.    Psychiatric: Affect normal, Judgment normal, Mood normal.        COURSE & MEDICAL DECISION MAKING     ED Observation Status? No; Patient does not meet criteria for ED Observation.     INITIAL ASSESSMENT, COURSE AND PLAN  Care Narrative: 57 y.o. female presents with chief complaint of elevated blood pressure reading  Patient with no chest pain or no new or different headaches that persist  Patient with no weakness or numbness  Consulted with pharmacy regarding best medication to start patient on she agrees that amlodipine is likely preferred agent  Patient agrees to call to schedule follow-up appointment with primary care physician and oncologist    9:53 PM Patient seen and examined at bedside.       DISPOSITION AND DISCUSSIONS        Escalation of care considered, and ultimately not performed: acute inpatient care management, however at this time, the patient is most appropriate for outpatient management.  Decision tools and prescription drugs considered including, but not limited to:Amlodipine     The patient will return for new or worsening symptoms and is stable at the time of discharge.    The patient is referred to a primary physician for blood pressure management, diabetic screening, and for all other preventative health concerns.    DISPOSITION:  Patient will be discharged home in stable condition.    FOLLOW  UP:  Yuko Lala P.A.-C.  [address]  [address]  [facility] [id number] [phone]      If symptoms worsen    [facility], Emergency Dept  [address] [facility]  [place] [address] [phone]          OUTPATIENT MEDICATIONS:  Discharge Medication List as of 4/19/2023 10:30 PM        START taking these medications    Details   amLODIPine (NORVASC) 5 MG Tab Take 1 Tablet by mouth every day., Disp-30 Tablet, R-0, Normal              FINAL DIANGOSIS  1. Elevated blood pressure reading           IErnesto (Scribe), am scribing for, and in the presence of, Chandan French M.D..    Electronically signed by: Ernesto Harley (Scribe), 4/19/2023    IChandan M.D. personally performed the services described in this documentation, as scribed by Ernesto Harley in my presence, and it is both accurate and complete.     The note accurately reflects work and decisions made by me.  Chandan French M.D.  4/20/2023  1:55 AM

## 2023-04-20 NOTE — ED NOTES
Patient to room from lobby with steady gait. Changed into gown, connected to monitor. Agree with triage note. Charted for ERP. Call light within reach.

## 2023-04-21 NOTE — PROGRESS NOTES
"Pharmacy Chemotherapy calculation:    DX: triple negative breast cancer    Cycle 2   Day 1 [C1D15 cancelled due to hypertension]  Previous treatment = C1D8 = 4/12/23    Regimen: pembrolizumab + paclitaxel/carboplatin --> pembrolizumab + cyclophosphamide/doxorubicin or epirubicin --> pembrolizmab  Pembrolizumab 200mg IV over 30 min on day 1  Paclitaxel 80mg/m2 IV over 60 min on days 1,8,15  Carboplatin AUC 1.5 IV over 30 min on day 1,8,15  Q21 days x 4 cycles  Followed by  Pembrolizumab 200mg IV over 30 min on day 1  Cyclophosphamide 600mg/m2 IV over 30 min on day 1  Doxorubicin 60mg/m2 IVP on day 1 OR epirubicin 90mg/m2 IVP on day 1  Q21 days x 4 cycles  Followed by  Pembrolizumab 200mg IV over 30 min on day 1  Q21 days x 9 cycles  NCCN Guidelines for Breast Cancer V.2.2022  Alexis P, et al- N Engl J Med. 2020 Feb 27;382(9):810-821.     BP (!) 154/93   Pulse 91   Temp 37.1 °C (98.7 °F) (Temporal)   Resp 18   Ht 1.61 m (5' 3.39\")   Wt 85.1 kg (187 lb 9.8 oz)   SpO2 97%   BMI 32.83 kg/m²   Body surface area is 1.95 meters squared.     All lab results 4/25/23 ([facility] and [facility]) within treatment parameters.  Scr =0.49 Est crcl ~ 116 mL/min (min Scr = 0.7)     Pembrolizumab 200mg fixed dose   No calculation required   ok to treat with final dose = 200 mg IV on day 1    Paclitaxel 80mg/m2  x 1.95 m2 = 156mg   <10% difference, ok to treat with final dose = 162 mg IV on days 1,8, and 15    Carboplatin AUC 1.5 (116+ 25) = 211 mg   <10% difference, ok to treat with final dose = 200 mg IV on days 1,8, and 15      MATEUSZ Cadena PharmEmilyD.    "

## 2023-04-24 NOTE — PROGRESS NOTES
"Pharmacy Chemotherapy Verification    Patient Name: Yolanda Roe      Dx: Metastatic Breast CA (ER/MO/HER2 negative) IIb       Protocol: Pembrolizumab + PACLitaxel + CARBOplatin (x 4 cycles) followed by  Pembrolizumab + Cyclophosphamide + DOXOrubicin   (x4 cycles)  Pembrolizumab 200 mg IV over 30 minutes on Day 1  PACLitaxel 80 mg/m2 IV over 60 minutes on Days 1, 8 and 15  CARBOplatin AUC 1.5 IV over 30 minutes Days 1, 8, and 15  Repeat every 21 days x 4 cycles  ~followed by~  Pembrolizumab 200 mg IV over 30 minutes on Day 1  Cyclophosphamide 600 mg/m2 IV over 30 minutes on Day 1  DOXOrubicin 60 mg/m2 IV push on Day 1  Repeat every 21 days for 4 cycles  NCCN Guidelines for Breast Cancer V.2.2022  Alexis FISHER, et al N Engl J Med 2020; 382(5) 342-833    Allergies:  Patient has no known allergies.      BP (!) 154/93   Pulse 91   Temp 37.1 °C (98.7 °F) (Temporal)   Resp 18   Ht 1.61 m (5' 3.39\")   Wt 85.1 kg (187 lb 9.8 oz)   SpO2 97%   BMI 32.83 kg/m²  Body surface area is 1.95 meters squared.    Labs 4/25/23  SCr 0.49 CrCl 116 mL/min   TSH 1.02  ANC 1890 Hgb 12.9 Plt 268k  LFT 26/40/134    T Bili 0.3     Drug Order   (Drug name, dose, route, IV Fluid & volume, frequency, number of doses) Cycle: 2 Day 1 (C2D15 cancelled by MD for elevated BP)      Previous treatment: C2D8 4/12/23     Medication = Pembrolizumab  Base Dose = 200 mg   Fixed dose; no calculation required  Final Dose = 200 mg  Route = IV  Fluid & Volume = NS 50 mL  Admin Duration = Over 30 minutes          <10% difference, okay to treat with final dose       Medication = PACLitaxel  Base Dose = 80 mg/m²  Calc Dose: Base Dose x 1.95 m² = 156 mg  Final Dose = 162 mg  Route = IV  Fluid & Volume =  mL  Admin Duration = Over 60 minutes          <10% difference, okay to treat with final dose     Medication = CARBOplatin   Base Dose = AUC 1.5   Calc Dose:Base Dose x (116 ml/min + 25) = 211.5 mg  Final Dose = 200 mg  Route = IV  Fluid & Volume = NS " 250 mL  Admin Duration = Over 30 minutes          <10% difference, okay to treat with final dose       By my signature below, I confirm this process was performed independently with the BSA and all final chemotherapy dosing calculations congruent. I have reviewed the above chemotherapy order and that my calculation of the final dose and BSA (when applicable) corroborate those calculations of the  pharmacist.     Diana Younger, PharmD

## 2023-04-25 ENCOUNTER — HOSPITAL ENCOUNTER (OUTPATIENT)
Facility: MEDICAL CENTER | Age: 58
End: 2023-04-25
Attending: INTERNAL MEDICINE
Payer: COMMERCIAL

## 2023-04-25 LAB
ALBUMIN SERPL BCP-MCNC: 4.5 G/DL (ref 3.2–4.9)
ALBUMIN/GLOB SERPL: 2 G/DL
ALP SERPL-CCNC: 134 U/L (ref 30–99)
ALT SERPL-CCNC: 40 U/L (ref 2–50)
AMBIGUOUS DTTM AMBI4: NORMAL
ANION GAP SERPL CALC-SCNC: 11 MMOL/L (ref 7–16)
AST SERPL-CCNC: 26 U/L (ref 12–45)
BILIRUB SERPL-MCNC: 0.3 MG/DL (ref 0.1–1.5)
BUN SERPL-MCNC: 11 MG/DL (ref 8–22)
CALCIUM ALBUM COR SERPL-MCNC: 8.9 MG/DL (ref 8.5–10.5)
CALCIUM SERPL-MCNC: 9.3 MG/DL (ref 8.5–10.5)
CHLORIDE SERPL-SCNC: 105 MMOL/L (ref 96–112)
CO2 SERPL-SCNC: 24 MMOL/L (ref 20–33)
CREAT SERPL-MCNC: 0.49 MG/DL (ref 0.5–1.4)
GFR SERPLBLD CREATININE-BSD FMLA CKD-EPI: 108 ML/MIN/1.73 M 2
GLOBULIN SER CALC-MCNC: 2.2 G/DL (ref 1.9–3.5)
GLUCOSE SERPL-MCNC: 113 MG/DL (ref 65–99)
POTASSIUM SERPL-SCNC: 4 MMOL/L (ref 3.6–5.5)
PROT SERPL-MCNC: 6.7 G/DL (ref 6–8.2)
SODIUM SERPL-SCNC: 140 MMOL/L (ref 135–145)
TSH SERPL DL<=0.005 MIU/L-ACNC: 1.02 UIU/ML (ref 0.38–5.33)

## 2023-04-25 PROCEDURE — 84443 ASSAY THYROID STIM HORMONE: CPT

## 2023-04-25 PROCEDURE — 80053 COMPREHEN METABOLIC PANEL: CPT

## 2023-04-26 ENCOUNTER — OUTPATIENT INFUSION SERVICES (OUTPATIENT)
Dept: ONCOLOGY | Facility: MEDICAL CENTER | Age: 58
End: 2023-04-26
Attending: INTERNAL MEDICINE
Payer: COMMERCIAL

## 2023-04-26 VITALS
WEIGHT: 187.61 LBS | HEART RATE: 91 BPM | DIASTOLIC BLOOD PRESSURE: 93 MMHG | RESPIRATION RATE: 18 BRPM | TEMPERATURE: 98.7 F | HEIGHT: 63 IN | BODY MASS INDEX: 33.24 KG/M2 | OXYGEN SATURATION: 97 % | SYSTOLIC BLOOD PRESSURE: 154 MMHG

## 2023-04-26 DIAGNOSIS — C50.812 MALIGNANT NEOPLASM OF OVERLAPPING SITES OF LEFT BREAST IN FEMALE, ESTROGEN RECEPTOR NEGATIVE (HCC): ICD-10-CM

## 2023-04-26 DIAGNOSIS — Z17.1 MALIGNANT NEOPLASM OF OVERLAPPING SITES OF LEFT BREAST IN FEMALE, ESTROGEN RECEPTOR NEGATIVE (HCC): ICD-10-CM

## 2023-04-26 LAB — T4 FREE SERPL-MCNC: 1.09 NG/DL (ref 0.93–1.7)

## 2023-04-26 PROCEDURE — 700105 HCHG RX REV CODE 258: Mod: UD | Performed by: INTERNAL MEDICINE

## 2023-04-26 PROCEDURE — A4212 NON CORING NEEDLE OR STYLET: HCPCS

## 2023-04-26 PROCEDURE — 96417 CHEMO IV INFUS EACH ADDL SEQ: CPT

## 2023-04-26 PROCEDURE — 84439 ASSAY OF FREE THYROXINE: CPT

## 2023-04-26 PROCEDURE — 304540 HCHG NITRO SET VENT 2ND TUB

## 2023-04-26 PROCEDURE — 700111 HCHG RX REV CODE 636 W/ 250 OVERRIDE (IP): Mod: UD

## 2023-04-26 PROCEDURE — 700111 HCHG RX REV CODE 636 W/ 250 OVERRIDE (IP): Mod: UD | Performed by: INTERNAL MEDICINE

## 2023-04-26 PROCEDURE — 96413 CHEMO IV INFUSION 1 HR: CPT

## 2023-04-26 PROCEDURE — 96375 TX/PRO/DX INJ NEW DRUG ADDON: CPT

## 2023-04-26 RX ORDER — LIDOCAINE HYDROCHLORIDE 10 MG/ML
INJECTION, SOLUTION EPIDURAL; INFILTRATION; INTRACAUDAL; PERINEURAL
Status: COMPLETED
Start: 2023-04-26 | End: 2023-04-26

## 2023-04-26 RX ORDER — LORAZEPAM 0.5 MG/1
1 TABLET ORAL NIGHTLY PRN
COMMUNITY
Start: 2023-03-09 | End: 2023-09-06

## 2023-04-26 RX ADMIN — DIPHENHYDRAMINE HYDROCHLORIDE 25 MG: 50 INJECTION, SOLUTION INTRAMUSCULAR; INTRAVENOUS at 09:59

## 2023-04-26 RX ADMIN — HEPARIN 500 UNITS: 100 SYRINGE at 13:06

## 2023-04-26 RX ADMIN — FAMOTIDINE 20 MG: 10 INJECTION INTRAVENOUS at 09:22

## 2023-04-26 RX ADMIN — ONDANSETRON 16 MG: 2 INJECTION INTRAMUSCULAR; INTRAVENOUS at 09:41

## 2023-04-26 RX ADMIN — DEXAMETHASONE SODIUM PHOSPHATE 12 MG: 4 INJECTION, SOLUTION INTRA-ARTICULAR; INTRALESIONAL; INTRAMUSCULAR; INTRAVENOUS; SOFT TISSUE at 09:29

## 2023-04-26 RX ADMIN — CARBOPLATIN 200 MG: 10 INJECTION, SOLUTION INTRAVENOUS at 12:21

## 2023-04-26 RX ADMIN — LIDOCAINE HYDROCHLORIDE 0.5 ML: 10 INJECTION, SOLUTION EPIDURAL; INFILTRATION; INTRACAUDAL; PERINEURAL at 09:00

## 2023-04-26 RX ADMIN — SODIUM CHLORIDE 200 MG: 9 INJECTION, SOLUTION INTRAVENOUS at 10:24

## 2023-04-26 RX ADMIN — PACLITAXEL 162 MG: 6 INJECTION, SOLUTION INTRAVENOUS at 11:03

## 2023-04-26 ASSESSMENT — FIBROSIS 4 INDEX: FIB4 SCORE: 1.11

## 2023-04-26 NOTE — PROGRESS NOTES
Chemotherapy Verification - PRIMARY RN      Height = 161 cm  Weight = 85.1 kg  BSA = 1.95 m^2       Medication: Pembrolizumab  Dose: SET DOSE  Calculated Dose: 200 mg                             (In mg/m2, AUC, mg/kg)     Medication: Paclitaxel  Dose: 80 mg/m^2  Calculated Dose: 156 mg                             (In mg/m2, AUC, mg/kg)    Medication: Carboplatin  Dose: AUC=1.5  Calculated Dose: 211.9 mg                             (In mg/m2, AUC, mg/kg)      Carboplatin calculation (if applicable): (((140-59)*[id number])*(0.7+(1*0.15))/(0.7*72)+25)*1.5*((1=1)+(1=2)) = 211.879      I confirm this process was performed independently with the BSA and all final chemotherapy dosing calculations congruent.  Any discrepancies of 10% or greater have been addressed with the chemotherapy pharmacist. The resolution of the discrepancy has been documented in the EPIC progress notes.

## 2023-04-26 NOTE — PROGRESS NOTES
Chemotherapy Verification - SECONDARY RN       Height = 161 cm  Weight = 85.1 kg  BSA = 1.95 m2       Medication: Keytruda  Dose: 200 mg set dose  Calculated Dose: 200 mg                             (In mg/m2, AUC, mg/kg)     Medication: Taxol  Dose: 80 mg/m2  Calculated Dose: 156 mg                             (In mg/m2, AUC, mg/kg)    Medication: Carboplatin  Dose: AUC 1.5  Calculated Dose: 211.5 mg                             (In mg/m2, AUC, mg/kg)    Carboplatin calculation (if applicable):  (1.5*(116+25)) = 211.5    I confirm that this process was performed independently.

## 2023-04-27 NOTE — PROGRESS NOTES
Pt presented to infusion for C1:C2:D1 Taxol/Carbo/Keytruda. D15 of C1 was cancelled due to high BP, pt is now on meds. Denied s/s of infection or open wounds. Labs drawn yesterday at [facility], met parameters for tx. Port accessed in sterile fashion after lidocaine used, brisk blood return observed. Pre-meds given. Keytruda, Taxol (1 hour) then Carboplatin infused without issue, no rxn noted. Port flushed, heparinized and de-accessed with needle intact, gauze drsg placed. Has next appt, returns weekly. Left on foot to self care.

## 2023-04-28 NOTE — PROGRESS NOTES
"Pharmacy Chemotherapy Verification    DX: triple negative breast cancer    Cycle 2 Day 8  Previous treatment = 4/12/23    Regimen: pembrolizumab + PACLitaxel/CARBOplatin --> pembrolizumab + cyclophosphamide/DOOXrubicin or epirubicin --> Pembrolizmab  Pembrolizumab 200 mg IV over 30 min on day 1  PACLitaxel 80 mg/m2 IV over 60 min on days 1,8,15  CARBOplatin AUC 1.5 IV over 30 min on day 1,8,15  Q21 days x 4 cycles  Followed by  Pembrolizumab 200 mg IV over 30 min on day 1  Cyclophosphamide 600 mg/m2 IV over 30 min on day 1  DOXOrubicin 60 mg/m2 IVP on day 1 OR epirubicin 90 mg/m2 IVP on day 1  Q21 days x 4 cycles  Followed by  Pembrolizumab 200mg IV over 30 min on day 1  Q21 days x 9 cycles  NCCN Guidelines for Breast Cancer V.2.2022  Alexis P, et al- N Engl J Med. 2020 Feb 27;382(8):810-821.     Allergies:Patient has no known allergies.  BP (!) 160/81   Pulse 94   Temp 36.2 °C (97.2 °F) (Temporal)   Resp 18   Ht 1.605 m (5' 3.19\")   Wt 85.6 kg (188 lb 11.4 oz)   SpO2 96%   BMI 33.23 kg/m²   Body surface area is 1.95 meters squared.    Labs 5/3/23  ANC 2090 Hgb 11.5 Plt 175k  SCr 0.47 CrCl 117 mL/min     Labs 4/25/23  AST/ALT/AP = 26/40/134 Tbili = 0.3    Pembrolizumab 200 mg fixed dose, no calculation required  OK to treat with final dose = 0 mg IV on day 1    PACLitaxel 80 mg/m2  x 1.95 m2 = 156 mg   <10% difference, ok to treat with final dose = 162 mg IV on Days 1,8, and 15    CARBOplatin AUC 1.5 (117 + 25) = 213 mg   <10% difference, ok to treat with final dose = 200 mg IV on Days 1,8, and 15    Jessica Jenkins, PharmD, BCOP      "

## 2023-05-02 NOTE — PROGRESS NOTES
"Pharmacy Chemotherapy Verification    Patient Name: Yolanda Roe      Dx: Metastatic Breast CA (ER/[place]/HER2 negative) IIb       Protocol: Pembrolizumab + PACLitaxel + CARBOplatin (x 4 cycles) followed by  Pembrolizumab + Cyclophosphamide + DOXOrubicin   (x4 cycles)  Pembrolizumab 200 mg IV over 30 minutes on Day 1  PACLitaxel 80 mg/m2 IV over 60 minutes on Days 1, 8 and 15  CARBOplatin AUC 1.5 IV over 30 minutes Days 1, 8, and 15  Repeat every 21 days x 4 cycles  ~followed by~  Pembrolizumab 200 mg IV over 30 minutes on Day 1  Cyclophosphamide 600 mg/m2 IV over 30 minutes on Day 1  DOXOrubicin 60 mg/m2 IV push on Day 1  Repeat every 21 days for 4 cycles  NCCN Guidelines for Breast Cancer V.2.2022  Alexis P, et al N Engl J Med 2020; 382(4) [phone]    Allergies:  Patient has no known allergies.      BP (!) 160/81   Pulse 94   Temp 36.2 °C (97.2 °F) (Temporal)   Resp 18   Ht 1.605 m (5' 3.19\")   Wt 85.6 kg (188 lb 11.4 oz)   SpO2 96%   BMI 33.23 kg/m²  Body surface area is 1.95 meters squared.    Labs 5/3/23:  ANC~ 2090 Plt = 175k   Hgb = 11.5     SCr = 0.47 mg/dL CrCl ~ 117 mL/min      Drug Order   (Drug name, dose, route, IV Fluid & volume, frequency, number of doses) Cycle 2 Day 8    Previous treatment: C2D1 4/26/23     Medication = Pembrolizumab  Base Dose = 200 mg   Fixed dose; no calculation required  Final Dose = -- mg  Route = IV  Fluid & Volume = NS 50 mL  Admin Duration = Over 30 minutes          <10% difference, okay to treat with final dose       Medication = PACLitaxel  Base Dose = 80 mg/m²  Calc Dose: Base Dose x 1.95 m² = 156 mg  Final Dose = 162 mg  Route = IV  Fluid & Volume =  mL  Admin Duration = Over 60 minutes          <10% difference, okay to treat with final dose     Medication = CARBOplatin   Base Dose = AUC 1.5   Calc Dose:Base Dose x (117 ml/min + 25) = 213 mg  Final Dose = 200 mg  Route = IV  Fluid & Volume =  mL  Admin Duration = Over 30 minutes          <10% " difference, okay to treat with final dose       By my signature below, I confirm this process was performed independently with the BSA and all final chemotherapy dosing calculations congruent. I have reviewed the above chemotherapy order and that my calculation of the final dose and BSA (when applicable) corroborate those calculations of the  pharmacist.     Misha Mckeon, PharmD

## 2023-05-03 ENCOUNTER — OUTPATIENT INFUSION SERVICES (OUTPATIENT)
Dept: ONCOLOGY | Facility: MEDICAL CENTER | Age: 58
End: 2023-05-03
Attending: INTERNAL MEDICINE
Payer: COMMERCIAL

## 2023-05-03 VITALS
TEMPERATURE: 97.2 F | RESPIRATION RATE: 18 BRPM | BODY MASS INDEX: 33.44 KG/M2 | HEIGHT: 63 IN | DIASTOLIC BLOOD PRESSURE: 81 MMHG | OXYGEN SATURATION: 96 % | WEIGHT: 188.71 LBS | SYSTOLIC BLOOD PRESSURE: 160 MMHG | HEART RATE: 94 BPM

## 2023-05-03 DIAGNOSIS — C50.812 MALIGNANT NEOPLASM OF OVERLAPPING SITES OF LEFT BREAST IN FEMALE, ESTROGEN RECEPTOR NEGATIVE (HCC): ICD-10-CM

## 2023-05-03 DIAGNOSIS — Z17.1 MALIGNANT NEOPLASM OF OVERLAPPING SITES OF LEFT BREAST IN FEMALE, ESTROGEN RECEPTOR NEGATIVE (HCC): ICD-10-CM

## 2023-05-03 LAB
BASOPHILS # BLD AUTO: 0.6 % (ref 0–1.8)
BASOPHILS # BLD: 0.03 K/UL (ref 0–0.12)
CREAT SERPL-MCNC: 0.47 MG/DL (ref 0.5–1.4)
EOSINOPHIL # BLD AUTO: 0.14 K/UL (ref 0–0.51)
EOSINOPHIL NFR BLD: 2.7 % (ref 0–6.9)
ERYTHROCYTE [DISTWIDTH] IN BLOOD BY AUTOMATED COUNT: 47.4 FL (ref 35.9–50)
GFR SERPLBLD CREATININE-BSD FMLA CKD-EPI: 110 ML/MIN/1.73 M 2
HCT VFR BLD AUTO: 33.8 % (ref 37–47)
HGB BLD-MCNC: 11.5 G/DL (ref 12–16)
IMM GRANULOCYTES # BLD AUTO: 0.03 K/UL (ref 0–0.11)
IMM GRANULOCYTES NFR BLD AUTO: 0.6 % (ref 0–0.9)
LYMPHOCYTES # BLD AUTO: 2.64 K/UL (ref 1–4.8)
LYMPHOCYTES NFR BLD: 50.8 % (ref 22–41)
MCH RBC QN AUTO: 33.5 PG (ref 27–33)
MCHC RBC AUTO-ENTMCNC: 34 G/DL (ref 33.6–35)
MCV RBC AUTO: 98.5 FL (ref 81.4–97.8)
MONOCYTES # BLD AUTO: 0.27 K/UL (ref 0–0.85)
MONOCYTES NFR BLD AUTO: 5.2 % (ref 0–13.4)
NEUTROPHILS # BLD AUTO: 2.09 K/UL (ref 2–7.15)
NEUTROPHILS NFR BLD: 40.1 % (ref 44–72)
NRBC # BLD AUTO: 0 K/UL
NRBC BLD-RTO: 0 /100 WBC
OUTPT INFUS CBC COMMENT OICOM: ABNORMAL
PLATELET # BLD AUTO: 175 K/UL (ref 164–446)
PMV BLD AUTO: 9.8 FL (ref 9–12.9)
RBC # BLD AUTO: 3.43 M/UL (ref 4.2–5.4)
WBC # BLD AUTO: 5.2 K/UL (ref 4.8–10.8)

## 2023-05-03 PROCEDURE — 700105 HCHG RX REV CODE 258: Mod: UD | Performed by: INTERNAL MEDICINE

## 2023-05-03 PROCEDURE — 82565 ASSAY OF CREATININE: CPT

## 2023-05-03 PROCEDURE — 85025 COMPLETE CBC W/AUTO DIFF WBC: CPT

## 2023-05-03 PROCEDURE — 700101 HCHG RX REV CODE 250: Mod: UD | Performed by: INTERNAL MEDICINE

## 2023-05-03 PROCEDURE — 96413 CHEMO IV INFUSION 1 HR: CPT

## 2023-05-03 PROCEDURE — 96375 TX/PRO/DX INJ NEW DRUG ADDON: CPT

## 2023-05-03 PROCEDURE — 700111 HCHG RX REV CODE 636 W/ 250 OVERRIDE (IP): Mod: UD | Performed by: INTERNAL MEDICINE

## 2023-05-03 PROCEDURE — 96417 CHEMO IV INFUS EACH ADDL SEQ: CPT

## 2023-05-03 PROCEDURE — 304540 HCHG NITRO SET VENT 2ND TUB

## 2023-05-03 RX ADMIN — CARBOPLATIN 200 MG: 10 INJECTION, SOLUTION INTRAVENOUS at 17:08

## 2023-05-03 RX ADMIN — LIDOCAINE HYDROCHLORIDE 0.5 ML: 10 INJECTION, SOLUTION EPIDURAL; INFILTRATION; INTRACAUDAL; PERINEURAL at 13:44

## 2023-05-03 RX ADMIN — HEPARIN 500 UNITS: 100 SYRINGE at 17:43

## 2023-05-03 RX ADMIN — DIPHENHYDRAMINE HYDROCHLORIDE 25 MG: 50 INJECTION, SOLUTION INTRAMUSCULAR; INTRAVENOUS at 14:47

## 2023-05-03 RX ADMIN — ONDANSETRON 16 MG: 2 INJECTION INTRAMUSCULAR; INTRAVENOUS at 15:11

## 2023-05-03 RX ADMIN — PACLITAXEL 162 MG: 6 INJECTION, SOLUTION INTRAVENOUS at 15:45

## 2023-05-03 RX ADMIN — FAMOTIDINE 20 MG: 10 INJECTION INTRAVENOUS at 14:48

## 2023-05-03 RX ADMIN — DEXAMETHASONE SODIUM PHOSPHATE 12 MG: 4 INJECTION, SOLUTION INTRA-ARTICULAR; INTRALESIONAL; INTRAMUSCULAR; INTRAVENOUS; SOFT TISSUE at 14:59

## 2023-05-03 ASSESSMENT — FIBROSIS 4 INDEX: FIB4 SCORE: 1.11

## 2023-05-03 ASSESSMENT — PAIN DESCRIPTION - PAIN TYPE: TYPE: ACUTE PAIN

## 2023-05-03 NOTE — PROGRESS NOTES
Chemotherapy Verification - PRIMARY RN      Height = 160.5 cm  Weight = 85.6 kg  BSA = 1.95 m^2       Medication: paclitaxel (Taxol)  Dose: 80 mg/m^2  Calculated Dose: 156 mg                             (In mg/m2, AUC, mg/kg)     Medication: carboplatin (Paraplatin)  Dose: Target AUC = 1.5  Calculated Dose: 212.837 mg                             (In mg/m2, AUC, mg/kg)    Carboplatin calculation (if applicable):  (1.5*(116.891+25)) = 212.837 mg      I confirm this process was performed independently with the BSA and all final chemotherapy dosing calculations congruent.  Any discrepancies of 10% or greater have been addressed with the chemotherapy pharmacist. The resolution of the discrepancy has been documented in the EPIC progress notes.

## 2023-05-03 NOTE — PROGRESS NOTES
Chemotherapy Verification - SECONDARY RN       Height = 160.5 cm  Weight = 85.6 kg  BSA = 1.95 m2       Medication: Taxol  Dose: 80 mg/m2  Calculated Dose: 156 mg                             (In mg/m2, AUC, mg/kg)     Medication: Carboplatin  Dose: AUC 1.5  Calculated Dose: 213 mg                             (In mg/m2, AUC, mg/kg)    Carboplatin calculation (if applicable):  (1.5*(117+25)) = 213    I confirm that this process was performed independently.

## 2023-05-04 NOTE — PROGRESS NOTES
Carboplatin completed without issue.  Port flushed per policy, de-accessed, and site covered with gauze and tape.  Pt discharged from [facility] in NAD under self care.

## 2023-05-04 NOTE — PROGRESS NOTES
[facility]anda [facility] for Day 8/ Cycle 2/ of paclitaxel and carboplatin for breast cancer. Pt denied having any new or acute complaints today, reports tolerating past treatments well. 1% lidocaine applied over port prior to access, Port accessed in a sterile manner, had positive blood return and flushed briskly. Labs drawn as ordered and reviewed. Pt meets parameters for treatment today. Premeds given as ordered. Pt given paclitaxel as prescribed, tolerated well, denied having any complaints during or after infusion. Carboplatin hung and infusing. Report given to JASON Hopkins. Pt has future appointments.

## 2023-05-09 ENCOUNTER — HOSPITAL ENCOUNTER (OUTPATIENT)
Facility: MEDICAL CENTER | Age: 58
End: 2023-05-09
Attending: NURSE PRACTITIONER
Payer: COMMERCIAL

## 2023-05-09 PROCEDURE — 80053 COMPREHEN METABOLIC PANEL: CPT

## 2023-05-09 NOTE — PROGRESS NOTES
"Pharmacy Chemotherapy Verification    DX: triple negative breast cancer    Cycle 2 Day 15  Previous treatment = 5/3/23    Regimen: pembrolizumab + PACLitaxel/CARBOplatin --> pembrolizumab + cyclophosphamide/DOOXrubicin or epirubicin --> Pembrolizmab  Pembrolizumab 200 mg IV over 30 min on day 1  PACLitaxel 80 mg/m2 IV over 60 min on days 1,8,15  CARBOplatin AUC 1.5 IV over 30 min on day 1,8,15  Q21 days x 4 cycles  Followed by  Pembrolizumab 200 mg IV over 30 min on day 1  Cyclophosphamide 600 mg/m2 IV over 30 min on day 1  DOXOrubicin 60 mg/m2 IVP on day 1 OR epirubicin 90 mg/m2 IVP on day 1  Q21 days x 4 cycles  Followed by  Pembrolizumab 200mg IV over 30 min on day 1  Q21 days x 9 cycles  NCCN Guidelines for Breast Cancer V.2.2022  Alexis P, et al- N Engl J Med. 2020 Feb 27;382():[phone].     Allergies:Patient has no known allergies.  /83   Pulse (!) 101   Temp 36.1 °C (97 °F) (Temporal)   Resp 18   Ht 1.605 m (5' 3.19\")   Wt 85.4 kg (188 lb 4.4 oz)   SpO2 95%   BMI 33.15 kg/m²   Body surface area is 1.95 meters squared.    Labs  5/9/23 ([facility])  ANC 1060 Hgb 11.2 Plt 169k    Labs 5/9/23  SCr 0.46 CrCl 116.6 mL/min   AST/ALT/AP = 31/45/112 Tbili = 0.4    Pembrolizumab 200 mg fixed dose, no calculation required  OK to treat with final dose = 0 mg IV on day 1    PACLitaxel 80 mg/m2  x 1.95 m2 = 156 mg   <10% difference, ok to treat with final dose = 162 mg IV on Days 1,8, and 15    CARBOplatin AUC 1.5 (117 + 25) = 213 mg   <10% difference, ok to treat with final dose = 200 mg IV on Days 1,8, and 15    Jessica Jenkins, PharmD, BCOP      "

## 2023-05-10 ENCOUNTER — OUTPATIENT INFUSION SERVICES (OUTPATIENT)
Dept: ONCOLOGY | Facility: MEDICAL CENTER | Age: 58
End: 2023-05-10
Attending: INTERNAL MEDICINE
Payer: COMMERCIAL

## 2023-05-10 VITALS
WEIGHT: 188.27 LBS | SYSTOLIC BLOOD PRESSURE: 110 MMHG | HEART RATE: 101 BPM | BODY MASS INDEX: 33.36 KG/M2 | TEMPERATURE: 97 F | OXYGEN SATURATION: 95 % | DIASTOLIC BLOOD PRESSURE: 83 MMHG | HEIGHT: 63 IN | RESPIRATION RATE: 18 BRPM

## 2023-05-10 DIAGNOSIS — Z17.1 MALIGNANT NEOPLASM OF OVERLAPPING SITES OF LEFT BREAST IN FEMALE, ESTROGEN RECEPTOR NEGATIVE (HCC): ICD-10-CM

## 2023-05-10 DIAGNOSIS — C50.812 MALIGNANT NEOPLASM OF OVERLAPPING SITES OF LEFT BREAST IN FEMALE, ESTROGEN RECEPTOR NEGATIVE (HCC): ICD-10-CM

## 2023-05-10 LAB
ALBUMIN SERPL BCP-MCNC: 4.2 G/DL (ref 3.2–4.9)
ALBUMIN/GLOB SERPL: 1.7 G/DL
ALP SERPL-CCNC: 112 U/L (ref 30–99)
ALT SERPL-CCNC: 45 U/L (ref 2–50)
ANION GAP SERPL CALC-SCNC: 10 MMOL/L (ref 7–16)
AST SERPL-CCNC: 31 U/L (ref 12–45)
BILIRUB SERPL-MCNC: 0.4 MG/DL (ref 0.1–1.5)
BUN SERPL-MCNC: 12 MG/DL (ref 8–22)
CALCIUM ALBUM COR SERPL-MCNC: 8.6 MG/DL (ref 8.5–10.5)
CALCIUM SERPL-MCNC: 8.8 MG/DL (ref 8.5–10.5)
CHLORIDE SERPL-SCNC: 104 MMOL/L (ref 96–112)
CO2 SERPL-SCNC: 26 MMOL/L (ref 20–33)
CREAT SERPL-MCNC: 0.46 MG/DL (ref 0.5–1.4)
GFR SERPLBLD CREATININE-BSD FMLA CKD-EPI: 110 ML/MIN/1.73 M 2
GLOBULIN SER CALC-MCNC: 2.5 G/DL (ref 1.9–3.5)
GLUCOSE SERPL-MCNC: 97 MG/DL (ref 65–99)
POTASSIUM SERPL-SCNC: 3.7 MMOL/L (ref 3.6–5.5)
PROT SERPL-MCNC: 6.7 G/DL (ref 6–8.2)
SODIUM SERPL-SCNC: 140 MMOL/L (ref 135–145)

## 2023-05-10 PROCEDURE — 700105 HCHG RX REV CODE 258: Mod: UD | Performed by: INTERNAL MEDICINE

## 2023-05-10 PROCEDURE — 700101 HCHG RX REV CODE 250: Mod: UD | Performed by: INTERNAL MEDICINE

## 2023-05-10 PROCEDURE — 304540 HCHG NITRO SET VENT 2ND TUB

## 2023-05-10 PROCEDURE — 96375 TX/PRO/DX INJ NEW DRUG ADDON: CPT

## 2023-05-10 PROCEDURE — 96417 CHEMO IV INFUS EACH ADDL SEQ: CPT

## 2023-05-10 PROCEDURE — 700111 HCHG RX REV CODE 636 W/ 250 OVERRIDE (IP): Mod: UD | Performed by: INTERNAL MEDICINE

## 2023-05-10 PROCEDURE — A4212 NON CORING NEEDLE OR STYLET: HCPCS

## 2023-05-10 PROCEDURE — 96413 CHEMO IV INFUSION 1 HR: CPT

## 2023-05-10 RX ADMIN — DEXAMETHASONE SODIUM PHOSPHATE 12 MG: 4 INJECTION, SOLUTION INTRA-ARTICULAR; INTRALESIONAL; INTRAMUSCULAR; INTRAVENOUS; SOFT TISSUE at 14:12

## 2023-05-10 RX ADMIN — ONDANSETRON 16 MG: 2 INJECTION INTRAMUSCULAR; INTRAVENOUS at 14:37

## 2023-05-10 RX ADMIN — HEPARIN 500 UNITS: 100 SYRINGE at 17:22

## 2023-05-10 RX ADMIN — FAMOTIDINE 20 MG: 10 INJECTION INTRAVENOUS at 14:14

## 2023-05-10 RX ADMIN — DIPHENHYDRAMINE HYDROCHLORIDE 25 MG: 50 INJECTION, SOLUTION INTRAMUSCULAR; INTRAVENOUS at 14:27

## 2023-05-10 RX ADMIN — CARBOPLATIN 200 MG: 10 INJECTION, SOLUTION INTRAVENOUS at 16:36

## 2023-05-10 RX ADMIN — PACLITAXEL 162 MG: 6 INJECTION, SOLUTION INTRAVENOUS at 15:05

## 2023-05-10 RX ADMIN — LIDOCAINE HYDROCHLORIDE 0.5 ML: 10 INJECTION, SOLUTION EPIDURAL; INFILTRATION; INTRACAUDAL; PERINEURAL at 13:54

## 2023-05-10 ASSESSMENT — PAIN DESCRIPTION - PAIN TYPE: TYPE: ACUTE PAIN

## 2023-05-10 ASSESSMENT — FIBROSIS 4 INDEX: FIB4 SCORE: 1.56

## 2023-05-10 NOTE — PROGRESS NOTES
[facility]olanda [facility] for Day 15/ Cycle 2/ of paclitaxel and carboplatin for breast cancer. Pt denied having any new or acute complaints today, reports tolerating past treatments well. 1% lidocaine given for port prior to access, Port accessed in a sterile manner, had positive blood return and flushed briskly. Labs drawn as ordered on 5/9/23 and reviewed. Pt meets parameters for treatment today.     Premeds given as ordered. Pt given paclitaxel as prescribed, tolerated well, denied having any complaints during or after infusion. Carboplatin as prescribed and both without adverse reactions.     Port flushed with blood return noted; heparin locked; covered with gauze and tape. Pt with next appointment and left in no apparent distress.

## 2023-05-10 NOTE — PROGRESS NOTES
Chemotherapy Verification - PRIMARY RN      Height = 160.5 cm  Weight = 85.4 kg  BSA = 1.95 m2       Medication: Paclitaxel (Taxol)   Dose: 80 mg/m2  Calculated Dose: 156 mg                             (In mg/m2, AUC, mg/kg)       Medication: Carboplatin (Paraplatin)  Dose: AUC = 1.5  Calculated Dose: 212.387 mg                            (In mg/m2, AUC, mg/kg)      Carboplatin calculation (if applicable):  (1.5*(116.591+25)) = 212.387      I confirm this process was performed independently with the BSA and all final chemotherapy dosing calculations congruent.  Any discrepancies of 10% or greater have been addressed with the chemotherapy pharmacist. The resolution of the discrepancy has been documented in the EPIC progress notes.

## 2023-05-10 NOTE — PROGRESS NOTES
"Pharmacy Chemotherapy Verification    Patient Name: Yolanda Roe      Dx: Metastatic Breast CA (ER/[place]/HER2 negative) IIb       Protocol: Pembrolizumab + PACLitaxel + CARBOplatin (x 4 cycles) followed by  Pembrolizumab + Cyclophosphamide + DOXOrubicin   (x4 cycles)  Pembrolizumab 200 mg IV over 30 minutes on Day 1  PACLitaxel 80 mg/m2 IV over 60 minutes on Days 1, 8 and 15  CARBOplatin AUC 1.5 IV over 30 minutes Days 1, 8, and 15  Repeat every 21 days x 4 cycles  ~followed by~  Pembrolizumab 200 mg IV over 30 minutes on Day 1  Cyclophosphamide 600 mg/m2 IV over 30 minutes on Day 1  DOXOrubicin 60 mg/m2 IV push on Day 1  Repeat every 21 days for 4 cycles  NCCN Guidelines for Breast Cancer V.2.2022  Alexis P, et al N Engl J Med 2020; [phone]) [phone]    Allergies:  Patient has no known allergies.      /83   Pulse (!) 101   Temp 36.1 °C (97 °F) (Temporal)   Resp 18   Ht 1.605 m (5' 3.19\")   Wt 85.4 kg (188 lb 4.4 oz)   SpO2 95%   BMI 33.15 kg/m²  Body surface area is 1.95 meters squared.    Labs 5/9/23:  ANC~ 1060 Plt = 169k   Hgb = 11.2     SCr = 0.46 mg/dL CrCl ~ 117 mL/min      Drug Order   (Drug name, dose, route, IV Fluid & volume, frequency, number of doses) Cycle 2 Day 15    Previous treatment: C2D8 5/3/23     Medication = Pembrolizumab  Base Dose = 200 mg   Fixed dose; no calculation required  Final Dose = -- mg  Route = IV  Fluid & Volume = NS 50 mL  Admin Duration = Over 30 minutes          <10% difference, okay to treat with final dose       Medication = PACLitaxel  Base Dose = 80 mg/m²  Calc Dose: Base Dose x 1.95 m² = 156 mg  Final Dose = 162 mg  Route = IV  Fluid & Volume =  mL  Admin Duration = Over 60 minutes          <10% difference, okay to treat with final dose     Medication = CARBOplatin   Base Dose = AUC 1.5   Calc Dose:Base Dose x (117 ml/min + 25) = 213 mg  Final Dose = 200 mg  Route = IV  Fluid & Volume =  mL  Admin Duration = Over 30 minutes          <10% difference, " okay to treat with final dose       By my signature below, I confirm this process was performed independently with the BSA and all final chemotherapy dosing calculations congruent. I have reviewed the above chemotherapy order and that my calculation of the final dose and BSA (when applicable) corroborate those calculations of the  pharmacist.     Diana Younger, PharmD

## 2023-05-10 NOTE — PROGRESS NOTES
Chemotherapy Verification - SECONDARY RN       Height = 1.605 m  Weight = 85.4 kg  BSA = 1.95 m^2       Medication: paclitaxel  Dose: 80 mg/m^2  Calculated Dose: 156 mg                             (In mg/m2, AUC, mg/kg)     Medication: carboplatin  Dose: AUC=1.5  Calculated Dose: 212.387 mg                            (In mg/m2, AUC, mg/kg)    Carboplatin calculation (if applicable):  (1.5*(116.591+25)) = 212.387    I confirm that this process was performed independently.

## 2023-05-17 ENCOUNTER — OUTPATIENT INFUSION SERVICES (OUTPATIENT)
Dept: ONCOLOGY | Facility: MEDICAL CENTER | Age: 58
End: 2023-05-17
Attending: INTERNAL MEDICINE
Payer: COMMERCIAL

## 2023-05-17 VITALS
HEIGHT: 63 IN | RESPIRATION RATE: 16 BRPM | TEMPERATURE: 97.2 F | HEART RATE: 90 BPM | DIASTOLIC BLOOD PRESSURE: 88 MMHG | SYSTOLIC BLOOD PRESSURE: 137 MMHG | OXYGEN SATURATION: 98 % | BODY MASS INDEX: 33.4 KG/M2 | WEIGHT: 188.49 LBS

## 2023-05-17 DIAGNOSIS — C50.812 MALIGNANT NEOPLASM OF OVERLAPPING SITES OF LEFT BREAST IN FEMALE, ESTROGEN RECEPTOR NEGATIVE (HCC): ICD-10-CM

## 2023-05-17 DIAGNOSIS — Z17.0 MALIGNANT NEOPLASM OF UPPER-OUTER QUADRANT OF LEFT BREAST IN FEMALE, ESTROGEN RECEPTOR POSITIVE (HCC): ICD-10-CM

## 2023-05-17 DIAGNOSIS — C50.412 MALIGNANT NEOPLASM OF UPPER-OUTER QUADRANT OF LEFT BREAST IN FEMALE, ESTROGEN RECEPTOR POSITIVE (HCC): ICD-10-CM

## 2023-05-17 DIAGNOSIS — Z17.1 MALIGNANT NEOPLASM OF OVERLAPPING SITES OF LEFT BREAST IN FEMALE, ESTROGEN RECEPTOR NEGATIVE (HCC): ICD-10-CM

## 2023-05-17 LAB
ALBUMIN SERPL BCP-MCNC: 4 G/DL (ref 3.2–4.9)
ALBUMIN/GLOB SERPL: 1.7 G/DL
ALP SERPL-CCNC: 113 U/L (ref 30–99)
ALT SERPL-CCNC: 58 U/L (ref 2–50)
ANION GAP SERPL CALC-SCNC: 10 MMOL/L (ref 7–16)
AST SERPL-CCNC: 38 U/L (ref 12–45)
BASOPHILS # BLD AUTO: 0.3 % (ref 0–1.8)
BASOPHILS # BLD: 0.01 K/UL (ref 0–0.12)
BILIRUB SERPL-MCNC: 0.4 MG/DL (ref 0.1–1.5)
BUN SERPL-MCNC: 13 MG/DL (ref 8–22)
CALCIUM ALBUM COR SERPL-MCNC: 8.6 MG/DL (ref 8.5–10.5)
CALCIUM SERPL-MCNC: 8.6 MG/DL (ref 8.5–10.5)
CHLORIDE SERPL-SCNC: 109 MMOL/L (ref 96–112)
CO2 SERPL-SCNC: 23 MMOL/L (ref 20–33)
CREAT SERPL-MCNC: 0.44 MG/DL (ref 0.5–1.4)
EOSINOPHIL # BLD AUTO: 0.03 K/UL (ref 0–0.51)
EOSINOPHIL NFR BLD: 1 % (ref 0–6.9)
ERYTHROCYTE [DISTWIDTH] IN BLOOD BY AUTOMATED COUNT: 50.6 FL (ref 35.9–50)
GFR SERPLBLD CREATININE-BSD FMLA CKD-EPI: 111 ML/MIN/1.73 M 2
GLOBULIN SER CALC-MCNC: 2.3 G/DL (ref 1.9–3.5)
GLUCOSE SERPL-MCNC: 110 MG/DL (ref 65–99)
HCT VFR BLD AUTO: 30.6 % (ref 37–47)
HGB BLD-MCNC: 10.6 G/DL (ref 12–16)
IMM GRANULOCYTES # BLD AUTO: 0.01 K/UL (ref 0–0.11)
IMM GRANULOCYTES NFR BLD AUTO: 0.3 % (ref 0–0.9)
LYMPHOCYTES # BLD AUTO: 1.89 K/UL (ref 1–4.8)
LYMPHOCYTES NFR BLD: 64.3 % (ref 22–41)
MCH RBC QN AUTO: 35.2 PG (ref 27–33)
MCHC RBC AUTO-ENTMCNC: 34.6 G/DL (ref 33.6–35)
MCV RBC AUTO: 101.7 FL (ref 81.4–97.8)
MONOCYTES # BLD AUTO: 0.14 K/UL (ref 0–0.85)
MONOCYTES NFR BLD AUTO: 4.8 % (ref 0–13.4)
NEUTROPHILS # BLD AUTO: 0.86 K/UL (ref 2–7.15)
NEUTROPHILS NFR BLD: 29.3 % (ref 44–72)
NRBC # BLD AUTO: 0 K/UL
NRBC BLD-RTO: 0 /100 WBC
OUTPT INFUS CBC COMMENT OICOM: ABNORMAL
PLATELET # BLD AUTO: 168 K/UL (ref 164–446)
PMV BLD AUTO: 9.5 FL (ref 9–12.9)
POTASSIUM SERPL-SCNC: 3.6 MMOL/L (ref 3.6–5.5)
PROT SERPL-MCNC: 6.3 G/DL (ref 6–8.2)
RBC # BLD AUTO: 3.01 M/UL (ref 4.2–5.4)
SODIUM SERPL-SCNC: 142 MMOL/L (ref 135–145)
T4 FREE SERPL-MCNC: 1.14 NG/DL (ref 0.93–1.7)
TSH SERPL DL<=0.005 MIU/L-ACNC: 0.76 UIU/ML (ref 0.38–5.33)
WBC # BLD AUTO: 2.9 K/UL (ref 4.8–10.8)

## 2023-05-17 PROCEDURE — 700111 HCHG RX REV CODE 636 W/ 250 OVERRIDE (IP): Mod: UD | Performed by: INTERNAL MEDICINE

## 2023-05-17 PROCEDURE — 36591 DRAW BLOOD OFF VENOUS DEVICE: CPT

## 2023-05-17 PROCEDURE — 84439 ASSAY OF FREE THYROXINE: CPT

## 2023-05-17 PROCEDURE — 96372 THER/PROPH/DIAG INJ SC/IM: CPT

## 2023-05-17 PROCEDURE — 85025 COMPLETE CBC W/AUTO DIFF WBC: CPT

## 2023-05-17 PROCEDURE — 36593 DECLOT VASCULAR DEVICE: CPT

## 2023-05-17 PROCEDURE — 700111 HCHG RX REV CODE 636 W/ 250 OVERRIDE (IP): Mod: UD

## 2023-05-17 PROCEDURE — A4212 NON CORING NEEDLE OR STYLET: HCPCS

## 2023-05-17 PROCEDURE — 84443 ASSAY THYROID STIM HORMONE: CPT

## 2023-05-17 PROCEDURE — 80053 COMPREHEN METABOLIC PANEL: CPT

## 2023-05-17 RX ORDER — CARISOPRODOL 350 MG/1
350 TABLET ORAL 4 TIMES DAILY
COMMUNITY
End: 2023-09-06

## 2023-05-17 RX ADMIN — HEPARIN 500 UNITS: 100 SYRINGE at 11:31

## 2023-05-17 RX ADMIN — FILGRASTIM-SNDZ 480 MCG: 480 INJECTION, SOLUTION INTRAVENOUS; SUBCUTANEOUS at 11:31

## 2023-05-17 RX ADMIN — ALTEPLASE 2 MG: 2.2 INJECTION, POWDER, LYOPHILIZED, FOR SOLUTION INTRAVENOUS at 10:53

## 2023-05-17 ASSESSMENT — FIBROSIS 4 INDEX: FIB4 SCORE: 1.56

## 2023-05-17 NOTE — PROGRESS NOTES
Pt arrived ambulatory to [facility] for Cycle 3, day 1 Carbo/Taxol/Keytruda. POC discussed.  R chest port in place with EMLA cream, accessed in sterile field, no blood return noted.  Alteplase given as ordered, while instilling labs drawn from [facility].  Results reviewed and reported to Dr. Gómez.  Orders received to defer treatment x 1 week and give Zarxio today.  Zarxio given as ordered to back of R arm. Blood return noted from port after 30 minutes dwell time.  Port flushed per policy, de-accessed, and site covered with gauze and tape.  Neutropenic precautions reviewed with patient, she verbalized understanding. Next appointment confirmed, pt discharged from IS in NAD under self care.

## 2023-05-17 NOTE — PROGRESS NOTES
"Pharmacy Chemotherapy Verification    **CHEMOTHERAPY DEFERRED DUE TO LOW ANC**    DX: triple negative breast cancer    Cycle 3 Day 1  Previous treatment = 5/10/22    Regimen: pembrolizumab + PACLitaxel/CARBOplatin --> pembrolizumab + cyclophosphamide/DOOXrubicin or epirubicin --> Pembrolizmab  Pembrolizumab 200 mg IV over 30 min on day 1  PACLitaxel 80 mg/m2 IV over 60 min on days 1,8,15  CARBOplatin AUC 1.5 IV over 30 min on day 1,8,15  Q21 days x 4 cycles  Followed by  Pembrolizumab 200 mg IV over 30 min on day 1  Cyclophosphamide 600 mg/m2 IV over 30 min on day 1  DOXOrubicin 60 mg/m2 IVP on day 1 OR epirubicin 90 mg/m2 IVP on day 1  Q21 days x 4 cycles  Followed by  Pembrolizumab 200mg IV over 30 min on day 1  Q21 days x 9 cycles  NCCN Guidelines for Breast Cancer V.2.2022  Alexis P, et al- N Engl J Med. 2020 Feb 27;382(9):810-821.     Allergies:Patient has no known allergies.  /88   Pulse 90   Temp 36.2 °C (97.2 °F) (Temporal)   Resp 16   Ht 1.605 m (5' 3.19\")   Wt 85.5 kg (188 lb 7.9 oz)   SpO2 98%   BMI 33.19 kg/m²   Body surface area is 1.95 meters squared.    Labs  5/17/*23   Hgb 10.6 Plt 168K  SCr 0.44 CrCl 117  AST/ALT/AP = 38/5/pending Tbili = 0.4      Jessica Jenkins, PharmD, BCOP    " Pt to ER for further eval

## 2023-05-20 NOTE — PROGRESS NOTES
"Pharmacy Chemotherapy Verification    DX: triple negative breast cancer    Cycle 3 Day 1  Previous treatment = 5/10/22    Regimen: pembrolizumab + PACLitaxel/CARBOplatin --> pembrolizumab + cyclophosphamide/DOOXrubicin or epirubicin --> Pembrolizmab  Pembrolizumab 200 mg IV over 30 min on day 1  PACLitaxel 80 mg/m2 IV over 60 min on days 1,8,15  CARBOplatin AUC 1.5 IV over 30 min on day 1,8,15  Q21 days x 4 cycles  Followed by  Pembrolizumab 200 mg IV over 30 min on day 1  Cyclophosphamide 600 mg/m2 IV over 30 min on day 1  DOXOrubicin 60 mg/m2 IVP on day 1 OR epirubicin 90 mg/m2 IVP on day 1  Q21 days x 4 cycles  Followed by  Pembrolizumab 200mg IV over 30 min on day 1  Q21 days x 9 cycles  NCCN Guidelines for Breast Cancer V.2.2022  Alexis P, et al- N Engl J Med. 2020 Feb 27;382(3):810-821.     Allergies:Patient has no known allergies.  BP (!) 155/92   Pulse 97   Temp 36.3 °C (97.4 °F) (Temporal)   Resp 16   Ht 1.605 m (5' 3.19\")   Wt 86.3 kg (190 lb 4.1 oz)   SpO2 97%   BMI 33.50 kg/m²   Body surface area is 1.96 meters squared.    Labs  5/23/23 ([facility])  ANC 1380 Hgb 11.2 Plt 210k  SCr 0.46 CrCl 117.7 mL/min   AST/ALT/AP = 20/43/148 Tbili = 0.3    Labs 5/17/23  TSH 0.76 Free T4 1.14    Pembrolizumab 200 mg fixed dose, no calculation required  OK to treat with final dose = 200 mg IV on Day 1    PACLitaxel 80 mg/m2  x 1.96 m2 = 156.8 mg   <10% difference, ok to treat with final dose = 162 mg IV on Days 1,8, and 15    CARBOplatin AUC 1.5 (117.7 + 25) = 214.05 mg   <10% difference, ok to treat with final dose = 200 mg IV on Days 1,8, and 15    Jessica Jenkins, PharmD, BCOP  "

## 2023-05-23 ENCOUNTER — HOSPITAL ENCOUNTER (OUTPATIENT)
Facility: MEDICAL CENTER | Age: 58
End: 2023-05-23
Attending: INTERNAL MEDICINE
Payer: COMMERCIAL

## 2023-05-23 PROCEDURE — 80053 COMPREHEN METABOLIC PANEL: CPT

## 2023-05-24 ENCOUNTER — OUTPATIENT INFUSION SERVICES (OUTPATIENT)
Dept: ONCOLOGY | Facility: MEDICAL CENTER | Age: 58
End: 2023-05-24
Attending: INTERNAL MEDICINE
Payer: COMMERCIAL

## 2023-05-24 VITALS
WEIGHT: 190.26 LBS | HEART RATE: 97 BPM | RESPIRATION RATE: 16 BRPM | DIASTOLIC BLOOD PRESSURE: 92 MMHG | BODY MASS INDEX: 33.71 KG/M2 | OXYGEN SATURATION: 97 % | TEMPERATURE: 97.4 F | SYSTOLIC BLOOD PRESSURE: 155 MMHG | HEIGHT: 63 IN

## 2023-05-24 DIAGNOSIS — C50.812 MALIGNANT NEOPLASM OF OVERLAPPING SITES OF LEFT BREAST IN FEMALE, ESTROGEN RECEPTOR NEGATIVE (HCC): ICD-10-CM

## 2023-05-24 DIAGNOSIS — Z17.1 MALIGNANT NEOPLASM OF OVERLAPPING SITES OF LEFT BREAST IN FEMALE, ESTROGEN RECEPTOR NEGATIVE (HCC): ICD-10-CM

## 2023-05-24 LAB
ALBUMIN SERPL BCP-MCNC: 4.2 G/DL (ref 3.2–4.9)
ALBUMIN/GLOB SERPL: 1.9 G/DL
ALP SERPL-CCNC: 148 U/L (ref 30–99)
ALT SERPL-CCNC: 43 U/L (ref 2–50)
ANION GAP SERPL CALC-SCNC: 11 MMOL/L (ref 7–16)
AST SERPL-CCNC: 20 U/L (ref 12–45)
BILIRUB SERPL-MCNC: 0.3 MG/DL (ref 0.1–1.5)
BUN SERPL-MCNC: 10 MG/DL (ref 8–22)
CALCIUM ALBUM COR SERPL-MCNC: 9 MG/DL (ref 8.5–10.5)
CALCIUM SERPL-MCNC: 9.2 MG/DL (ref 8.5–10.5)
CHLORIDE SERPL-SCNC: 104 MMOL/L (ref 96–112)
CO2 SERPL-SCNC: 26 MMOL/L (ref 20–33)
CREAT SERPL-MCNC: 0.46 MG/DL (ref 0.5–1.4)
GFR SERPLBLD CREATININE-BSD FMLA CKD-EPI: 110 ML/MIN/1.73 M 2
GLOBULIN SER CALC-MCNC: 2.2 G/DL (ref 1.9–3.5)
GLUCOSE SERPL-MCNC: 108 MG/DL (ref 65–99)
POTASSIUM SERPL-SCNC: 3.6 MMOL/L (ref 3.6–5.5)
PROT SERPL-MCNC: 6.4 G/DL (ref 6–8.2)
SODIUM SERPL-SCNC: 141 MMOL/L (ref 135–145)

## 2023-05-24 PROCEDURE — 96413 CHEMO IV INFUSION 1 HR: CPT

## 2023-05-24 PROCEDURE — 304540 HCHG NITRO SET VENT 2ND TUB

## 2023-05-24 PROCEDURE — A4212 NON CORING NEEDLE OR STYLET: HCPCS

## 2023-05-24 PROCEDURE — 96375 TX/PRO/DX INJ NEW DRUG ADDON: CPT

## 2023-05-24 PROCEDURE — 700105 HCHG RX REV CODE 258: Mod: UD | Performed by: INTERNAL MEDICINE

## 2023-05-24 PROCEDURE — 700111 HCHG RX REV CODE 636 W/ 250 OVERRIDE (IP): Mod: UD | Performed by: INTERNAL MEDICINE

## 2023-05-24 PROCEDURE — 96417 CHEMO IV INFUS EACH ADDL SEQ: CPT

## 2023-05-24 RX ORDER — HEPARIN SODIUM (PORCINE) LOCK FLUSH IV SOLN 100 UNIT/ML 100 UNIT/ML
500 SOLUTION INTRAVENOUS PRN
Status: CANCELLED | OUTPATIENT
Start: 2023-06-08

## 2023-05-24 RX ORDER — SODIUM CHLORIDE 9 MG/ML
INJECTION, SOLUTION INTRAVENOUS CONTINUOUS
Status: CANCELLED | OUTPATIENT
Start: 2023-06-01

## 2023-05-24 RX ORDER — ONDANSETRON 8 MG/1
8 TABLET, ORALLY DISINTEGRATING ORAL PRN
Status: CANCELLED | OUTPATIENT
Start: 2023-05-24

## 2023-05-24 RX ORDER — 0.9 % SODIUM CHLORIDE 0.9 %
3 VIAL (ML) INJECTION PRN
Status: CANCELLED | OUTPATIENT
Start: 2023-06-08

## 2023-05-24 RX ORDER — 0.9 % SODIUM CHLORIDE 0.9 %
10 VIAL (ML) INJECTION PRN
Status: CANCELLED | OUTPATIENT
Start: 2023-06-01

## 2023-05-24 RX ORDER — 0.9 % SODIUM CHLORIDE 0.9 %
VIAL (ML) INJECTION PRN
Status: CANCELLED | OUTPATIENT
Start: 2023-06-08

## 2023-05-24 RX ORDER — ONDANSETRON 2 MG/ML
4 INJECTION INTRAMUSCULAR; INTRAVENOUS PRN
Status: CANCELLED | OUTPATIENT
Start: 2023-05-24

## 2023-05-24 RX ORDER — 0.9 % SODIUM CHLORIDE 0.9 %
10 VIAL (ML) INJECTION PRN
Status: CANCELLED | OUTPATIENT
Start: 2023-06-08

## 2023-05-24 RX ORDER — EPINEPHRINE 1 MG/ML(1)
0.5 AMPUL (ML) INJECTION PRN
Status: CANCELLED | OUTPATIENT
Start: 2023-05-24

## 2023-05-24 RX ORDER — ROSUVASTATIN CALCIUM 10 MG/1
10 TABLET, COATED ORAL NIGHTLY
Status: ON HOLD | COMMUNITY
Start: 2023-05-19

## 2023-05-24 RX ORDER — ONDANSETRON 2 MG/ML
4 INJECTION INTRAMUSCULAR; INTRAVENOUS PRN
Status: CANCELLED | OUTPATIENT
Start: 2023-06-08

## 2023-05-24 RX ORDER — METHYLPREDNISOLONE SODIUM SUCCINATE 125 MG/2ML
125 INJECTION, POWDER, LYOPHILIZED, FOR SOLUTION INTRAMUSCULAR; INTRAVENOUS PRN
Status: CANCELLED | OUTPATIENT
Start: 2023-06-01

## 2023-05-24 RX ORDER — ONDANSETRON 2 MG/ML
4 INJECTION INTRAMUSCULAR; INTRAVENOUS PRN
Status: CANCELLED | OUTPATIENT
Start: 2023-06-01

## 2023-05-24 RX ORDER — DIPHENHYDRAMINE HYDROCHLORIDE 50 MG/ML
50 INJECTION INTRAMUSCULAR; INTRAVENOUS PRN
Status: CANCELLED | OUTPATIENT
Start: 2023-06-01

## 2023-05-24 RX ORDER — PROCHLORPERAZINE MALEATE 10 MG
10 TABLET ORAL EVERY 6 HOURS PRN
Status: CANCELLED | OUTPATIENT
Start: 2023-06-01

## 2023-05-24 RX ORDER — SODIUM CHLORIDE 9 MG/ML
INJECTION, SOLUTION INTRAVENOUS CONTINUOUS
Status: CANCELLED | OUTPATIENT
Start: 2023-05-24

## 2023-05-24 RX ORDER — 0.9 % SODIUM CHLORIDE 0.9 %
VIAL (ML) INJECTION PRN
Status: CANCELLED | OUTPATIENT
Start: 2023-05-24

## 2023-05-24 RX ORDER — DIPHENHYDRAMINE HYDROCHLORIDE 50 MG/ML
50 INJECTION INTRAMUSCULAR; INTRAVENOUS PRN
Status: CANCELLED | OUTPATIENT
Start: 2023-05-24

## 2023-05-24 RX ORDER — METHYLPREDNISOLONE SODIUM SUCCINATE 125 MG/2ML
125 INJECTION, POWDER, LYOPHILIZED, FOR SOLUTION INTRAMUSCULAR; INTRAVENOUS PRN
Status: CANCELLED | OUTPATIENT
Start: 2023-06-08

## 2023-05-24 RX ORDER — 0.9 % SODIUM CHLORIDE 0.9 %
VIAL (ML) INJECTION PRN
Status: CANCELLED | OUTPATIENT
Start: 2023-06-01

## 2023-05-24 RX ORDER — 0.9 % SODIUM CHLORIDE 0.9 %
3 VIAL (ML) INJECTION PRN
Status: CANCELLED | OUTPATIENT
Start: 2023-06-01

## 2023-05-24 RX ORDER — HEPARIN SODIUM (PORCINE) LOCK FLUSH IV SOLN 100 UNIT/ML 100 UNIT/ML
500 SOLUTION INTRAVENOUS PRN
Status: CANCELLED | OUTPATIENT
Start: 2023-05-24

## 2023-05-24 RX ORDER — 0.9 % SODIUM CHLORIDE 0.9 %
10 VIAL (ML) INJECTION PRN
Status: CANCELLED | OUTPATIENT
Start: 2023-05-24

## 2023-05-24 RX ORDER — ONDANSETRON 8 MG/1
8 TABLET, ORALLY DISINTEGRATING ORAL PRN
Status: CANCELLED | OUTPATIENT
Start: 2023-06-08

## 2023-05-24 RX ORDER — EPINEPHRINE 1 MG/ML(1)
0.5 AMPUL (ML) INJECTION PRN
Status: CANCELLED | OUTPATIENT
Start: 2023-06-08

## 2023-05-24 RX ORDER — HEPARIN SODIUM (PORCINE) LOCK FLUSH IV SOLN 100 UNIT/ML 100 UNIT/ML
500 SOLUTION INTRAVENOUS PRN
Status: CANCELLED | OUTPATIENT
Start: 2023-06-01

## 2023-05-24 RX ORDER — ONDANSETRON 8 MG/1
8 TABLET, ORALLY DISINTEGRATING ORAL PRN
Status: CANCELLED | OUTPATIENT
Start: 2023-06-01

## 2023-05-24 RX ORDER — SODIUM CHLORIDE 9 MG/ML
INJECTION, SOLUTION INTRAVENOUS CONTINUOUS
Status: CANCELLED | OUTPATIENT
Start: 2023-06-08

## 2023-05-24 RX ORDER — METHYLPREDNISOLONE SODIUM SUCCINATE 125 MG/2ML
125 INJECTION, POWDER, LYOPHILIZED, FOR SOLUTION INTRAMUSCULAR; INTRAVENOUS PRN
Status: CANCELLED | OUTPATIENT
Start: 2023-05-24

## 2023-05-24 RX ORDER — DIPHENHYDRAMINE HYDROCHLORIDE 50 MG/ML
50 INJECTION INTRAMUSCULAR; INTRAVENOUS PRN
Status: CANCELLED | OUTPATIENT
Start: 2023-06-08

## 2023-05-24 RX ORDER — PROCHLORPERAZINE MALEATE 10 MG
10 TABLET ORAL EVERY 6 HOURS PRN
Status: CANCELLED | OUTPATIENT
Start: 2023-05-24

## 2023-05-24 RX ORDER — PROCHLORPERAZINE MALEATE 10 MG
10 TABLET ORAL EVERY 6 HOURS PRN
Status: CANCELLED | OUTPATIENT
Start: 2023-06-08

## 2023-05-24 RX ORDER — EPINEPHRINE 1 MG/ML(1)
0.5 AMPUL (ML) INJECTION PRN
Status: CANCELLED | OUTPATIENT
Start: 2023-06-01

## 2023-05-24 RX ORDER — 0.9 % SODIUM CHLORIDE 0.9 %
3 VIAL (ML) INJECTION PRN
Status: CANCELLED | OUTPATIENT
Start: 2023-05-24

## 2023-05-24 RX ADMIN — ONDANSETRON 16 MG: 2 INJECTION INTRAMUSCULAR; INTRAVENOUS at 14:09

## 2023-05-24 RX ADMIN — DIPHENHYDRAMINE HYDROCHLORIDE 25 MG: 50 INJECTION, SOLUTION INTRAMUSCULAR; INTRAVENOUS at 13:52

## 2023-05-24 RX ADMIN — CARBOPLATIN 200 MG: 10 INJECTION, SOLUTION INTRAVENOUS at 16:54

## 2023-05-24 RX ADMIN — HEPARIN 500 UNITS: 100 SYRINGE at 17:48

## 2023-05-24 RX ADMIN — DEXAMETHASONE SODIUM PHOSPHATE 12 MG: 4 INJECTION, SOLUTION INTRA-ARTICULAR; INTRALESIONAL; INTRAMUSCULAR; INTRAVENOUS; SOFT TISSUE at 14:34

## 2023-05-24 RX ADMIN — FAMOTIDINE 20 MG: 10 INJECTION INTRAVENOUS at 13:52

## 2023-05-24 RX ADMIN — SODIUM CHLORIDE 200 MG: 9 INJECTION, SOLUTION INTRAVENOUS at 14:57

## 2023-05-24 RX ADMIN — PACLITAXEL 162 MG: 6 INJECTION, SOLUTION INTRAVENOUS at 15:35

## 2023-05-24 ASSESSMENT — FIBROSIS 4 INDEX: FIB4 SCORE: 1.07

## 2023-05-24 NOTE — PROGRESS NOTES
Chemotherapy Verification - SECONDARY RN   Course 1: C3 D1    Height = 160.5 cm  Weight = 86.3 kg  BSA = 1.96 m^2       Medication: pembrolizumab (KEYTRUDA)  Dose: 200 mg set dose  Calculated Dose: 200 mg set dose                             (In mg/m2, AUC, mg/kg)     Medication: paclitaxel (TAXOL)  Dose: 80 mg/m2  Calculated Dose: 156.8 mg                             (In mg/m2, AUC, mg/kg)    Medication: carboplatin (PARAPLATIN)  Dose: target AUC 1.5  Calculated Dose: 214.15 mg                             (In mg/m2, AUC, mg/kg)      Carboplatin calculation (if applicable):  (1.5*(117.764+25)) = 214.146    I confirm that this process was performed independently.

## 2023-05-24 NOTE — PROGRESS NOTES
"Pharmacy Chemotherapy Verification    Patient Name: Yolanda Roe      Dx: Metastatic Breast CA (ER/[place]/HER2 negative) IIb       Protocol: Pembrolizumab + PACLitaxel + CARBOplatin (x 4 cycles) followed by  Pembrolizumab + Cyclophosphamide + DOXOrubicin   (x4 cycles)  Pembrolizumab 200 mg IV over 30 minutes on Day 1  PACLitaxel 80 mg/m2 IV over 60 minutes on Days 1, 8 and 15  CARBOplatin AUC 1.5 IV over 30 minutes Days 1, 8, and 15  Repeat every 21 days x 4 cycles  ~followed by~  Pembrolizumab 200 mg IV over 30 minutes on Day 1  Cyclophosphamide 600 mg/m2 IV over 30 minutes on Day 1  DOXOrubicin 60 mg/m2 IV push on Day 1  Repeat every 21 days for 4 cycles  NCCN Guidelines for Breast Cancer V.2.2022  Alexis P, et al N Engl J Med 2020; 382(5) 545-937    Allergies:  Patient has no known allergies.      BP (!) 155/92   Pulse 97   Temp 36.3 °C (97.4 °F) (Temporal)   Resp 16   Ht 1.605 m (5' 3.19\")   Wt 86.3 kg (190 lb 4.1 oz)   SpO2 97%   BMI 33.50 kg/m²  Body surface area is 1.96 meters squared.    Labs 5/23/23 ([facility]):  ANC~ 1380 Plt = 210k   Hgb = 11.2     Labs 5/23/23:  SCr = 0.46mg/dL CrCl ~ 118 mL/min (Minimum Scr of 0.7 used)  AST/ALT/ALK  = 20/43/148 TBili = 0.3  Lab 5/17/23:  TSH = 0.76  Free T4 = 1.14     Drug Order   (Drug name, dose, route, IV Fluid & volume, frequency, number of doses) Cycle 3 Day 1  Previous treatment: C2D15 on 5/10/23     Medication = Pembrolizumab  Base Dose = 200 mg   Fixed dose; no calculation required  Final Dose = 200 mg  Route = IV  Fluid & Volume = NS 50 mL  Admin Duration = Over 30 minutes          <10% difference, okay to treat with final dose       Medication = PACLitaxel  Base Dose = 80 mg/m²  Calc Dose: Base Dose x 1.96 m² = 156.8 mg  Final Dose = 162 mg  Route = IV  Fluid & Volume =  mL  Admin Duration = Over 60 minutes          <10% difference, okay to treat with final dose     Medication = CARBOplatin   Base Dose = AUC 1.5   Calc Dose:AUC x (118 ml/min + 25) = " 214.5 mg  Final Dose = 200 mg  Route = IV  Fluid & Volume =  mL  Admin Duration = Over 30 minutes          <10% difference, okay to treat with final dose       By my signature below, I confirm this process was performed independently with the BSA and all final chemotherapy dosing calculations congruent. I have reviewed the above chemotherapy order and that my calculation of the final dose and BSA (when applicable) corroborate those calculations of the  pharmacist.     Michelle Castro, PharmD

## 2023-05-24 NOTE — PROGRESS NOTES
Chemotherapy Verification - PRIMARY RN      Height = 160.5 cm  Weight = 86.3 kg  BSA = 1.96 m^2       Medication: pembrolizumab (KEYTRUDA)  Dose: 200 mg (set dose)  Calculated Dose: 200 mg (set dose)                             (In mg/m2, AUC, mg/kg)     Medication: PACLitaxel (TAXOL)  Dose: 80 mg/m^2  Calculated Dose: 156.8 mg                             (In mg/m2, AUC, mg/kg)    Medication: CARBOplatin (PARAPLATIN)  Dose: AUC 1.5   Calculated Dose: 214.338 mg                             (In mg/m2, AUC, mg/kg)    Carboplatin calculation (if applicable):  (((140-59)*[id number])*(0.7+(1*0.15))/(0.7*72)+25)*1.5*((1=1)+(1=2)) = 214.338 mg      I confirm this process was performed independently with the BSA and all final chemotherapy dosing calculations congruent.  Any discrepancies of 10% or greater have been addressed with the chemotherapy pharmacist. The resolution of the discrepancy has been documented in the EPIC progress notes.

## 2023-05-25 ENCOUNTER — OUTPATIENT INFUSION SERVICES (OUTPATIENT)
Dept: ONCOLOGY | Facility: MEDICAL CENTER | Age: 58
End: 2023-05-25
Attending: INTERNAL MEDICINE
Payer: COMMERCIAL

## 2023-05-25 VITALS
SYSTOLIC BLOOD PRESSURE: 152 MMHG | TEMPERATURE: 97.8 F | BODY MASS INDEX: 33.59 KG/M2 | HEART RATE: 112 BPM | RESPIRATION RATE: 18 BRPM | HEIGHT: 63 IN | OXYGEN SATURATION: 98 % | WEIGHT: 189.6 LBS | DIASTOLIC BLOOD PRESSURE: 93 MMHG

## 2023-05-25 DIAGNOSIS — Z17.1 MALIGNANT NEOPLASM OF OVERLAPPING SITES OF LEFT BREAST IN FEMALE, ESTROGEN RECEPTOR NEGATIVE (HCC): ICD-10-CM

## 2023-05-25 DIAGNOSIS — C50.812 MALIGNANT NEOPLASM OF OVERLAPPING SITES OF LEFT BREAST IN FEMALE, ESTROGEN RECEPTOR NEGATIVE (HCC): ICD-10-CM

## 2023-05-25 PROCEDURE — 700111 HCHG RX REV CODE 636 W/ 250 OVERRIDE (IP): Mod: UD | Performed by: INTERNAL MEDICINE

## 2023-05-25 PROCEDURE — 96372 THER/PROPH/DIAG INJ SC/IM: CPT

## 2023-05-25 RX ADMIN — FILGRASTIM-SNDZ 480 MCG: 480 INJECTION, SOLUTION INTRAVENOUS; SUBCUTANEOUS at 17:54

## 2023-05-25 ASSESSMENT — FIBROSIS 4 INDEX: FIB4 SCORE: 1.07

## 2023-05-25 NOTE — PROGRESS NOTES
Yolanda is here for Keytruda/Taxol/Carboplatin. Port accessed using sterile technique; brisk blood return noted. Labs previously drawn and reviewed today. Labs within parameters to proceed treatment. Pre-medications given per MAR. Keytruda/Taxol/Carboplatin given per MAR. Heparin instilled prior to de-accessing port. Port de-accessed; gauze/tape applied to site. Next appointment scheduled. Discharged to self care; no apparent distress noted.

## 2023-05-26 ENCOUNTER — OUTPATIENT INFUSION SERVICES (OUTPATIENT)
Dept: ONCOLOGY | Facility: MEDICAL CENTER | Age: 58
End: 2023-05-26
Attending: INTERNAL MEDICINE
Payer: COMMERCIAL

## 2023-05-26 VITALS
BODY MASS INDEX: 32.41 KG/M2 | WEIGHT: 189.82 LBS | DIASTOLIC BLOOD PRESSURE: 82 MMHG | HEIGHT: 64 IN | TEMPERATURE: 98.7 F | SYSTOLIC BLOOD PRESSURE: 149 MMHG | RESPIRATION RATE: 17 BRPM | HEART RATE: 98 BPM | OXYGEN SATURATION: 95 %

## 2023-05-26 DIAGNOSIS — Z17.1 MALIGNANT NEOPLASM OF OVERLAPPING SITES OF LEFT BREAST IN FEMALE, ESTROGEN RECEPTOR NEGATIVE (HCC): ICD-10-CM

## 2023-05-26 DIAGNOSIS — C50.812 MALIGNANT NEOPLASM OF OVERLAPPING SITES OF LEFT BREAST IN FEMALE, ESTROGEN RECEPTOR NEGATIVE (HCC): ICD-10-CM

## 2023-05-26 PROCEDURE — 96372 THER/PROPH/DIAG INJ SC/IM: CPT

## 2023-05-26 PROCEDURE — 700111 HCHG RX REV CODE 636 W/ 250 OVERRIDE (IP): Mod: UD | Performed by: INTERNAL MEDICINE

## 2023-05-26 RX ADMIN — FILGRASTIM-SNDZ 480 MCG: 480 INJECTION, SOLUTION INTRAVENOUS; SUBCUTANEOUS at 17:05

## 2023-05-26 ASSESSMENT — FIBROSIS 4 INDEX: FIB4 SCORE: 1.07

## 2023-05-26 NOTE — PROGRESS NOTES
Pt presents to [facility] for filgrastim-sndz injection. Filgrastim-sndz injected into R back arm with no s/s of adverse reactions. Next appointment confirmed and education provided. Pt discharged to self care with all personal belongings and in NAD.

## 2023-05-27 NOTE — PROGRESS NOTES
Pt presented to infusion for 3rd day of daily Zarxio injections. Tolerating well so far. No s/s of infection. Zarxio given in back of left arm, declined bandaid. Has next chemo appt, left on foot to self care.

## 2023-05-30 NOTE — PROGRESS NOTES
"Pharmacy Chemotherapy Verification    Patient Name: Yolanda Roe      Dx: Metastatic Breast CA (ER/[place]/HER2 negative) IIb       Protocol: Pembrolizumab + PACLitaxel + CARBOplatin (x 4 cycles) followed by  Pembrolizumab + Cyclophosphamide + DOXOrubicin   (x4 cycles)  Pembrolizumab 200 mg IV over 30 minutes on Day 1  PACLitaxel 80 mg/m2 IV over 60 minutes on Days 1, 8 and 15  CARBOplatin AUC 1.5 IV over 30 minutes Days 1, 8, and 15  Repeat every 21 days x 4 cycles  ~followed by~  Pembrolizumab 200 mg IV over 30 minutes on Day 1  Cyclophosphamide 600 mg/m2 IV over 30 minutes on Day 1  DOXOrubicin 60 mg/m2 IV push on Day 1  Repeat every 21 days for 4 cycles  NCCN Guidelines for Breast Cancer V.2.2022  Alexis P, et al N Engl J Med 2020; 382(3) 820-588    Allergies:  Patient has no known allergies.      BP (!) 151/80   Pulse 87   Temp 36.1 °C (97 °F) (Temporal)   Resp 16   Ht 1.62 m (5' 3.78\")   Wt 85.1 kg (187 lb 9.8 oz)   SpO2 93%   BMI 32.43 kg/m²  Body surface area is 1.96 meters squared.    Labs 5/31/23:  ANC~ 930 Plt = 185k   Hgb = 10.3     SCr = 0.39 mg/dL CrCl ~ 116 mL/min (Minimum Scr of 0.7 used)  Labs 5/23/23:  AST/ALT/ALK  = 20/43/148 TBili = 0.3  Lab 5/17/23:  TSH = 0.76  Free T4 = 1.14   *Okay to treat with low ANC per Dr Gómez; will be receiving Zarxio    Drug Order   (Drug name, dose, route, IV Fluid & volume, frequency, number of doses) Cycle 3 Day 8  Previous treatment: C3D1 on 5/24/23     Medication = Pembrolizumab  Base Dose = 200 mg   Fixed dose; no calculation required  Final Dose =0 mg  Route = IV  Fluid & Volume = NS 50 mL  Admin Duration = Over 30 minutes   Day 1 only       <10% difference, okay to treat with final dose       Medication = PACLitaxel  Base Dose = 80 mg/m²  Calc Dose: Base Dose x 1.96 m² = 156.8 mg  Final Dose = 162 mg  Route = IV  Fluid & Volume =  mL  Admin Duration = Over 60 minutes          <10% difference, okay to treat with final dose     Medication = " CARBOplatin   Base Dose = AUC 1.5   Calc Dose:AUC x (116 ml/min + 25) = 211.5mg  Final Dose = 200 mg  Route = IV  Fluid & Volume =  mL  Admin Duration = Over 30 minutes          <10% difference, okay to treat with final dose       By my signature below, I confirm this process was performed independently with the BSA and all final chemotherapy dosing calculations congruent. I have reviewed the above chemotherapy order and that my calculation of the final dose and BSA (when applicable) corroborate those calculations of the  pharmacist.     Diana Younger, PharmD

## 2023-05-30 NOTE — PROGRESS NOTES
"Pharmacy Chemotherapy Verification    DX: triple negative breast cancer    Cycle 3 Day 8  Previous treatment = 5/24/23    Regimen: pembrolizumab + PACLitaxel/CARBOplatin --> pembrolizumab + cyclophosphamide/DOOXrubicin or epirubicin --> Pembrolizmab  Pembrolizumab 200 mg IV over 30 min on day 1  PACLitaxel 80 mg/m2 IV over 60 min on days 1,8,15  CARBOplatin AUC 1.5 IV over 30 min on day 1,8,15  Q21 days x 4 cycles  Followed by  Pembrolizumab 200 mg IV over 30 min on day 1  Cyclophosphamide 600 mg/m2 IV over 30 min on day 1  DOXOrubicin 60 mg/m2 IVP on day 1 OR epirubicin 90 mg/m2 IVP on day 1  Q21 days x 4 cycles  Followed by  Pembrolizumab 200mg IV over 30 min on day 1  Q21 days x 9 cycles  NCCN Guidelines for Breast Cancer V.2.2022  Alexis P, et al- N Engl J Med. 2020 Feb 27;382(6):810-821.     Allergies:Patient has no known allergies.  BP (!) 151/80   Pulse 87   Temp 36.1 °C (97 °F) (Temporal)   Resp 16   Ht 1.62 m (5' 3.78\")   Wt 85.1 kg (187 lb 9.8 oz)   SpO2 93%   BMI 32.43 kg/m²   Body surface area is 1.96 meters squared.    All lab results 5/31/23 within treatment parameters. Except ANC < 1000.   Scr = 0.39 Est crcl ~ 116mL/min (min Scr = 0.7)   MD aware of all current lab results. Orders received to proceed with treatment.  Zarxio scheduled for 2 days post chemotherapy.      Pembrolizumab 200 mg fixed dose, no calculation required  OK to treat with final dose = --- mg IV on Day 1    PACLitaxel 80 mg/m2  x 1.96m2 = 156.8mg   <10% difference, ok to treat with final dose = 162 mg IV on Days 1,8, and 15    CARBOplatin AUC 1.5 (116+ 25) = 211mg   <10% difference, ok to treat with final dose = 200 mg IV on Days 1,8, and 15    MATEUSZ Cadena Pharm.D.    "

## 2023-05-31 ENCOUNTER — OUTPATIENT INFUSION SERVICES (OUTPATIENT)
Dept: ONCOLOGY | Facility: MEDICAL CENTER | Age: 58
End: 2023-05-31
Attending: INTERNAL MEDICINE
Payer: COMMERCIAL

## 2023-05-31 VITALS
BODY MASS INDEX: 32.03 KG/M2 | HEART RATE: 87 BPM | RESPIRATION RATE: 16 BRPM | HEIGHT: 64 IN | OXYGEN SATURATION: 93 % | DIASTOLIC BLOOD PRESSURE: 80 MMHG | WEIGHT: 187.61 LBS | TEMPERATURE: 97 F | SYSTOLIC BLOOD PRESSURE: 151 MMHG

## 2023-05-31 DIAGNOSIS — Z17.1 MALIGNANT NEOPLASM OF OVERLAPPING SITES OF LEFT BREAST IN FEMALE, ESTROGEN RECEPTOR NEGATIVE (HCC): ICD-10-CM

## 2023-05-31 DIAGNOSIS — C50.812 MALIGNANT NEOPLASM OF OVERLAPPING SITES OF LEFT BREAST IN FEMALE, ESTROGEN RECEPTOR NEGATIVE (HCC): ICD-10-CM

## 2023-05-31 LAB
BASOPHILS # BLD AUTO: 0.6 % (ref 0–1.8)
BASOPHILS # BLD: 0.02 K/UL (ref 0–0.12)
CREAT SERPL-MCNC: 0.39 MG/DL (ref 0.5–1.4)
EOSINOPHIL # BLD AUTO: 0.04 K/UL (ref 0–0.51)
EOSINOPHIL NFR BLD: 1.2 % (ref 0–6.9)
ERYTHROCYTE [DISTWIDTH] IN BLOOD BY AUTOMATED COUNT: 52.2 FL (ref 35.9–50)
GFR SERPLBLD CREATININE-BSD FMLA CKD-EPI: 115 ML/MIN/1.73 M 2
HCT VFR BLD AUTO: 29.8 % (ref 37–47)
HGB BLD-MCNC: 10.3 G/DL (ref 12–16)
IMM GRANULOCYTES # BLD AUTO: 0.05 K/UL (ref 0–0.11)
IMM GRANULOCYTES NFR BLD AUTO: 1.4 % (ref 0–0.9)
LYMPHOCYTES # BLD AUTO: 2.08 K/UL (ref 1–4.8)
LYMPHOCYTES NFR BLD: 59.9 % (ref 22–41)
MCH RBC QN AUTO: 34.2 PG (ref 27–33)
MCHC RBC AUTO-ENTMCNC: 34.6 G/DL (ref 32.2–35.5)
MCV RBC AUTO: 99 FL (ref 81.4–97.8)
MONOCYTES # BLD AUTO: 0.35 K/UL (ref 0–0.85)
MONOCYTES NFR BLD AUTO: 10.1 % (ref 0–13.4)
NEUTROPHILS # BLD AUTO: 0.93 K/UL (ref 1.82–7.42)
NEUTROPHILS NFR BLD: 26.8 % (ref 44–72)
NRBC # BLD AUTO: 0 K/UL
NRBC BLD-RTO: 0 /100 WBC (ref 0–0.2)
OUTPT INFUS CBC COMMENT OICOM: ABNORMAL
PLATELET # BLD AUTO: 185 K/UL (ref 164–446)
PMV BLD AUTO: 9.6 FL (ref 9–12.9)
RBC # BLD AUTO: 3.01 M/UL (ref 4.2–5.4)
WBC # BLD AUTO: 3.5 K/UL (ref 4.8–10.8)

## 2023-05-31 PROCEDURE — A4212 NON CORING NEEDLE OR STYLET: HCPCS

## 2023-05-31 PROCEDURE — 96375 TX/PRO/DX INJ NEW DRUG ADDON: CPT

## 2023-05-31 PROCEDURE — 700111 HCHG RX REV CODE 636 W/ 250 OVERRIDE (IP): Mod: UD | Performed by: INTERNAL MEDICINE

## 2023-05-31 PROCEDURE — 96413 CHEMO IV INFUSION 1 HR: CPT

## 2023-05-31 PROCEDURE — 96417 CHEMO IV INFUS EACH ADDL SEQ: CPT

## 2023-05-31 PROCEDURE — 700105 HCHG RX REV CODE 258: Mod: UD | Performed by: INTERNAL MEDICINE

## 2023-05-31 PROCEDURE — 96376 TX/PRO/DX INJ SAME DRUG ADON: CPT

## 2023-05-31 PROCEDURE — 85025 COMPLETE CBC W/AUTO DIFF WBC: CPT

## 2023-05-31 PROCEDURE — 304540 HCHG NITRO SET VENT 2ND TUB

## 2023-05-31 PROCEDURE — 82565 ASSAY OF CREATININE: CPT

## 2023-05-31 RX ORDER — METHYLPREDNISOLONE SODIUM SUCCINATE 125 MG/2ML
125 INJECTION, POWDER, LYOPHILIZED, FOR SOLUTION INTRAMUSCULAR; INTRAVENOUS PRN
Status: DISCONTINUED | OUTPATIENT
Start: 2023-05-31 | End: 2023-05-31 | Stop reason: HOSPADM

## 2023-05-31 RX ORDER — DIPHENHYDRAMINE HYDROCHLORIDE 50 MG/ML
50 INJECTION INTRAMUSCULAR; INTRAVENOUS PRN
Status: COMPLETED | OUTPATIENT
Start: 2023-05-31 | End: 2023-05-31

## 2023-05-31 RX ORDER — EPINEPHRINE 1 MG/ML(1)
0.5 AMPUL (ML) INJECTION PRN
Status: DISCONTINUED | OUTPATIENT
Start: 2023-05-31 | End: 2023-05-31 | Stop reason: HOSPADM

## 2023-05-31 RX ADMIN — ONDANSETRON HYDROCHLORIDE 16 MG: 2 INJECTION, SOLUTION INTRAMUSCULAR; INTRAVENOUS at 11:04

## 2023-05-31 RX ADMIN — CARBOPLATIN 200 MG: 10 INJECTION, SOLUTION INTRAVENOUS at 13:26

## 2023-05-31 RX ADMIN — DIPHENHYDRAMINE HYDROCHLORIDE 25 MG: 50 INJECTION, SOLUTION INTRAMUSCULAR; INTRAVENOUS at 10:48

## 2023-05-31 RX ADMIN — HEPARIN 500 UNITS: 100 SYRINGE at 15:18

## 2023-05-31 RX ADMIN — PACLITAXEL 162 MG: 6 INJECTION, SOLUTION, CONCENTRATE INTRAVENOUS at 11:58

## 2023-05-31 RX ADMIN — FAMOTIDINE 20 MG: 10 INJECTION, SOLUTION INTRAVENOUS at 10:50

## 2023-05-31 RX ADMIN — DEXAMETHASONE SODIUM PHOSPHATE 12 MG: 4 INJECTION, SOLUTION INTRA-ARTICULAR; INTRALESIONAL; INTRAMUSCULAR; INTRAVENOUS; SOFT TISSUE at 11:24

## 2023-05-31 RX ADMIN — DIPHENHYDRAMINE HYDROCHLORIDE 50 MG: 50 INJECTION, SOLUTION INTRAMUSCULAR; INTRAVENOUS at 14:08

## 2023-05-31 ASSESSMENT — FIBROSIS 4 INDEX: FIB4 SCORE: 1.07

## 2023-05-31 NOTE — PROGRESS NOTES
Chemotherapy Verification - SECONDARY RN   Course 1: C3 D8  Per Dr. Henry OK to proceed with treatment with ANC of 930    Height = 162 cm  Weight = 85.1 kg  BSA = 1.96 m^2       Medication: paclitaxel (TAXOL)  Dose: 80 mg/m2  Calculated Dose: 156.8 mg                             (In mg/m2, AUC, mg/kg)     Medication: carboplatin (PARAPLATIN)  Dose: AUC 1.5  Calculated Dose: 211.65 mg                             (In mg/m2, AUC, mg/kg)      Carboplatin calculation (if applicable):  (1.5*(116.099+25)) = 211.649    I confirm that this process was performed independently.

## 2023-05-31 NOTE — PROGRESS NOTES
Chemotherapy Verification - PRIMARY RN      Height = 1.62m  Weight = 85.1kg  BSA = 1.96m^2       Medication: Paclitaxel  Dose: 80mg/m^2  Calculated Dose: 156.8mg                             (In mg/m2, AUC, mg/kg)     Medication: Carboplatin  Dose: Target AUC 1.5  Calculated Dose: 211.649mg                             (In mg/m2, AUC, mg/kg)      Carboplatin calculation (if applicable):  (1.5*(116.099+25)) = 211.649mg      I confirm this process was performed independently with the BSA and all final chemotherapy dosing calculations congruent.  Any discrepancies of 10% or greater have been addressed with the chemotherapy pharmacist. The resolution of the discrepancy has been documented in the EPIC progress notes.

## 2023-05-31 NOTE — PROGRESS NOTES
[facility]olanda [facility] for Day [facility]/ Course 1 Cycle 3 of Taxol and Carboplatin for breast cancer. Pt denied having any new or acute complaints today, reports tolerating past treatments well. Port accessed in a sterile manner, had + blood return, labs drawn, flushed briskly. Labs came back with ANC of 0.93. Contacted Dr Henry about labs value and MD gave ok to proceed with treatment since pt is receiving Zarxio the next two consecutive days. Pt given pre-meds and chemo as prescribed. Pt had a reaction of itchiness starting after receiving 265ml of carboplatin. Carboplatin paused at 1403, 50mg of benadryl given at 1408. Waited 30mins after injection, pt's itching subsided. Contacted Josh GONZALEZ about reaction, LISA ordered to run remaining carboplatin at half the rate which was prescribed. Resumed infusion at 1451 and ended at 1500. Pt tolerated remaining 30ml of carboplatin and NS rinse. No side effects or reactions after administering remaining carboplatin. IV Port had + blood return after, flushed per [facility] policy, de-accessed, needle intact, insertion site covered with sterile gauze and paper tape. Pt has future appointments. Pt discharged home to self care.

## 2023-06-01 ENCOUNTER — OUTPATIENT INFUSION SERVICES (OUTPATIENT)
Dept: ONCOLOGY | Facility: MEDICAL CENTER | Age: 58
End: 2023-06-01
Attending: INTERNAL MEDICINE
Payer: COMMERCIAL

## 2023-06-01 VITALS
HEIGHT: 64 IN | WEIGHT: 190.04 LBS | DIASTOLIC BLOOD PRESSURE: 101 MMHG | OXYGEN SATURATION: 92 % | SYSTOLIC BLOOD PRESSURE: 166 MMHG | TEMPERATURE: 97.5 F | BODY MASS INDEX: 32.44 KG/M2 | RESPIRATION RATE: 16 BRPM | HEART RATE: 105 BPM

## 2023-06-01 DIAGNOSIS — Z17.1 MALIGNANT NEOPLASM OF OVERLAPPING SITES OF LEFT BREAST IN FEMALE, ESTROGEN RECEPTOR NEGATIVE (HCC): ICD-10-CM

## 2023-06-01 DIAGNOSIS — C50.812 MALIGNANT NEOPLASM OF OVERLAPPING SITES OF LEFT BREAST IN FEMALE, ESTROGEN RECEPTOR NEGATIVE (HCC): ICD-10-CM

## 2023-06-01 PROCEDURE — 700111 HCHG RX REV CODE 636 W/ 250 OVERRIDE (IP): Mod: UD | Performed by: INTERNAL MEDICINE

## 2023-06-01 PROCEDURE — 96372 THER/PROPH/DIAG INJ SC/IM: CPT

## 2023-06-01 RX ADMIN — FILGRASTIM-SNDZ 480 MCG: 480 INJECTION, SOLUTION INTRAVENOUS; SUBCUTANEOUS at 17:14

## 2023-06-01 ASSESSMENT — FIBROSIS 4 INDEX: FIB4 SCORE: 0.97

## 2023-06-02 ENCOUNTER — OUTPATIENT INFUSION SERVICES (OUTPATIENT)
Dept: ONCOLOGY | Facility: MEDICAL CENTER | Age: 58
End: 2023-06-02
Attending: INTERNAL MEDICINE
Payer: COMMERCIAL

## 2023-06-02 VITALS
TEMPERATURE: 98.4 F | HEART RATE: 89 BPM | OXYGEN SATURATION: 95 % | BODY MASS INDEX: 32.14 KG/M2 | SYSTOLIC BLOOD PRESSURE: 144 MMHG | HEIGHT: 64 IN | RESPIRATION RATE: 18 BRPM | DIASTOLIC BLOOD PRESSURE: 80 MMHG | WEIGHT: 188.27 LBS

## 2023-06-02 DIAGNOSIS — C50.812 MALIGNANT NEOPLASM OF OVERLAPPING SITES OF LEFT BREAST IN FEMALE, ESTROGEN RECEPTOR NEGATIVE (HCC): ICD-10-CM

## 2023-06-02 DIAGNOSIS — Z17.1 MALIGNANT NEOPLASM OF OVERLAPPING SITES OF LEFT BREAST IN FEMALE, ESTROGEN RECEPTOR NEGATIVE (HCC): ICD-10-CM

## 2023-06-02 PROCEDURE — 700111 HCHG RX REV CODE 636 W/ 250 OVERRIDE (IP): Mod: UD | Performed by: INTERNAL MEDICINE

## 2023-06-02 PROCEDURE — 96372 THER/PROPH/DIAG INJ SC/IM: CPT

## 2023-06-02 RX ADMIN — FILGRASTIM-SNDZ 480 MCG: 480 INJECTION, SOLUTION INTRAVENOUS; SUBCUTANEOUS at 17:53

## 2023-06-02 ASSESSMENT — FIBROSIS 4 INDEX: FIB4 SCORE: 0.97

## 2023-06-02 NOTE — PROGRESS NOTES
Yolanda to infusion for Zarxio injection. 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 RN advised patient monitor at home and seek medical care if BP remains elevated. Zarxio given SQ in R upper arm, patient tolerated well with no adverse effects. Patient left in stable condition, knows when to return for appt tomorrow.

## 2023-06-03 NOTE — PROGRESS NOTES
Yolanda to infusion for Zarxio injection. Reports feeling well today aside from fatigue and lower back/knee pain, states she has been on her feet all day. Zarxio given SQ in L upper arm, patient tolerated well with no adverse effects. Patient left in stable condition, knows when to return for next appt. Per patient she is not able to come earlier than 1815 on Fridays due to work schedule, discussed with scheduling who only had appts at 1745 for the next few weeks. Patient states she will call day of to let RN know if she is going to be late.

## 2023-06-06 ENCOUNTER — HOSPITAL ENCOUNTER (OUTPATIENT)
Facility: MEDICAL CENTER | Age: 58
End: 2023-06-06
Attending: NURSE PRACTITIONER
Payer: COMMERCIAL

## 2023-06-06 LAB
ALBUMIN SERPL BCP-MCNC: 4.2 G/DL (ref 3.2–4.9)
ALBUMIN/GLOB SERPL: 1.8 G/DL
ALP SERPL-CCNC: 136 U/L (ref 30–99)
ALT SERPL-CCNC: 51 U/L (ref 2–50)
ANION GAP SERPL CALC-SCNC: 12 MMOL/L (ref 7–16)
AST SERPL-CCNC: 30 U/L (ref 12–45)
BILIRUB SERPL-MCNC: 0.4 MG/DL (ref 0.1–1.5)
BUN SERPL-MCNC: 9 MG/DL (ref 8–22)
CALCIUM ALBUM COR SERPL-MCNC: 8.8 MG/DL (ref 8.5–10.5)
CALCIUM SERPL-MCNC: 9 MG/DL (ref 8.5–10.5)
CHLORIDE SERPL-SCNC: 104 MMOL/L (ref 96–112)
CO2 SERPL-SCNC: 27 MMOL/L (ref 20–33)
CREAT SERPL-MCNC: 0.38 MG/DL (ref 0.5–1.4)
GFR SERPLBLD CREATININE-BSD FMLA CKD-EPI: 115 ML/MIN/1.73 M 2
GLOBULIN SER CALC-MCNC: 2.4 G/DL (ref 1.9–3.5)
GLUCOSE SERPL-MCNC: 101 MG/DL (ref 65–99)
POTASSIUM SERPL-SCNC: 3.3 MMOL/L (ref 3.6–5.5)
PROT SERPL-MCNC: 6.6 G/DL (ref 6–8.2)
SODIUM SERPL-SCNC: 143 MMOL/L (ref 135–145)
TSH SERPL DL<=0.005 MIU/L-ACNC: 0.78 UIU/ML (ref 0.38–5.33)

## 2023-06-06 PROCEDURE — 80053 COMPREHEN METABOLIC PANEL: CPT

## 2023-06-06 PROCEDURE — 84443 ASSAY THYROID STIM HORMONE: CPT

## 2023-06-07 ENCOUNTER — OUTPATIENT INFUSION SERVICES (OUTPATIENT)
Dept: ONCOLOGY | Facility: MEDICAL CENTER | Age: 58
End: 2023-06-07
Attending: INTERNAL MEDICINE
Payer: COMMERCIAL

## 2023-06-07 VITALS
BODY MASS INDEX: 32.81 KG/M2 | HEART RATE: 90 BPM | HEIGHT: 63 IN | WEIGHT: 185.19 LBS | OXYGEN SATURATION: 95 % | TEMPERATURE: 97.6 F | DIASTOLIC BLOOD PRESSURE: 75 MMHG | RESPIRATION RATE: 18 BRPM | SYSTOLIC BLOOD PRESSURE: 133 MMHG

## 2023-06-07 DIAGNOSIS — Z17.1 MALIGNANT NEOPLASM OF OVERLAPPING SITES OF LEFT BREAST IN FEMALE, ESTROGEN RECEPTOR NEGATIVE (HCC): ICD-10-CM

## 2023-06-07 DIAGNOSIS — C50.812 MALIGNANT NEOPLASM OF OVERLAPPING SITES OF LEFT BREAST IN FEMALE, ESTROGEN RECEPTOR NEGATIVE (HCC): ICD-10-CM

## 2023-06-07 PROCEDURE — A4212 NON CORING NEEDLE OR STYLET: HCPCS

## 2023-06-07 PROCEDURE — 96413 CHEMO IV INFUSION 1 HR: CPT

## 2023-06-07 PROCEDURE — 96417 CHEMO IV INFUS EACH ADDL SEQ: CPT

## 2023-06-07 PROCEDURE — 304540 HCHG NITRO SET VENT 2ND TUB

## 2023-06-07 PROCEDURE — 96376 TX/PRO/DX INJ SAME DRUG ADON: CPT

## 2023-06-07 PROCEDURE — 96375 TX/PRO/DX INJ NEW DRUG ADDON: CPT

## 2023-06-07 PROCEDURE — 700105 HCHG RX REV CODE 258: Mod: UD | Performed by: INTERNAL MEDICINE

## 2023-06-07 PROCEDURE — 700111 HCHG RX REV CODE 636 W/ 250 OVERRIDE (IP): Mod: UD | Performed by: INTERNAL MEDICINE

## 2023-06-07 RX ORDER — AZITHROMYCIN 250 MG/1
TABLET, FILM COATED ORAL
COMMUNITY
Start: 2023-06-06 | End: 2023-06-22

## 2023-06-07 RX ORDER — METHYLPREDNISOLONE SODIUM SUCCINATE 125 MG/2ML
125 INJECTION, POWDER, LYOPHILIZED, FOR SOLUTION INTRAMUSCULAR; INTRAVENOUS PRN
Status: DISCONTINUED | OUTPATIENT
Start: 2023-06-07 | End: 2023-06-07 | Stop reason: HOSPADM

## 2023-06-07 RX ORDER — POTASSIUM CHLORIDE 20 MEQ/1
40 TABLET, EXTENDED RELEASE ORAL ONCE
Status: CANCELLED
Start: 2023-06-09 | End: 2023-06-09

## 2023-06-07 RX ORDER — DIPHENHYDRAMINE HYDROCHLORIDE 50 MG/ML
50 INJECTION INTRAMUSCULAR; INTRAVENOUS PRN
Status: COMPLETED | OUTPATIENT
Start: 2023-06-07 | End: 2023-06-07

## 2023-06-07 RX ADMIN — DEXAMETHASONE SODIUM PHOSPHATE 12 MG: 4 INJECTION, SOLUTION INTRAMUSCULAR; INTRAVENOUS at 10:47

## 2023-06-07 RX ADMIN — PACLITAXEL 162 MG: 6 INJECTION, SOLUTION INTRAVENOUS at 11:39

## 2023-06-07 RX ADMIN — FAMOTIDINE 20 MG: 10 INJECTION, SOLUTION INTRAVENOUS at 10:42

## 2023-06-07 RX ADMIN — DIPHENHYDRAMINE HYDROCHLORIDE 50 MG: 50 INJECTION, SOLUTION INTRAMUSCULAR; INTRAVENOUS at 13:40

## 2023-06-07 RX ADMIN — CARBOPLATIN 200 MG: 10 INJECTION, SOLUTION INTRAVENOUS at 12:49

## 2023-06-07 RX ADMIN — ONDANSETRON HYDROCHLORIDE 16 MG: 2 INJECTION, SOLUTION INTRAMUSCULAR; INTRAVENOUS at 11:03

## 2023-06-07 RX ADMIN — HEPARIN 500 UNITS: 100 SYRINGE at 16:05

## 2023-06-07 RX ADMIN — DIPHENHYDRAMINE HYDROCHLORIDE 25 MG: 50 INJECTION, SOLUTION INTRAMUSCULAR; INTRAVENOUS at 11:20

## 2023-06-07 ASSESSMENT — FIBROSIS 4 INDEX: FIB4 SCORE: 1.34

## 2023-06-07 NOTE — PROGRESS NOTES
Chemotherapy Verification - SECONDARY RN   Course 1: C3 D15    Height = 160 cm  Weight = 84 kg  BSA = 1.93 m^2       Medication: paclitaxel (TAXOL)  Dose: 80 mg/m2  Calculated Dose: 154.4 mg                             (In mg/m2, AUC, mg/kg)     Medication: carboplatin (PARAPLATIN)  Dose: target AUC 1.5  Calculated Dose: 209.36 mg                             (In mg/m2, AUC, mg/kg)      Carboplatin calculation (if applicable):  (1.5*(114.57+25)) = 209.355    I confirm that this process was performed independently.

## 2023-06-07 NOTE — PROGRESS NOTES
"Pharmacy Chemotherapy Verification    DX: triple negative breast cancer    Cycle 3 Day 15  Previous treatment = 5/31/23    Regimen: pembrolizumab + PACLitaxel/CARBOplatin --> pembrolizumab + cyclophosphamide/DOOXrubicin or epirubicin --> Pembrolizmab  Pembrolizumab 200 mg IV over 30 min on day 1  PACLitaxel 80 mg/m2 IV over 60 min on days 1,8,15  CARBOplatin AUC 1.5 IV over 30 min on day 1,8,15  Q21 days x 4 cycles  Followed by  Pembrolizumab 200 mg IV over 30 min on day 1  Cyclophosphamide 600 mg/m2 IV over 30 min on day 1  DOXOrubicin 60 mg/m2 IVP on day 1 OR epirubicin 90 mg/m2 IVP on day 1  Q21 days x 4 cycles  Followed by  Pembrolizumab 200mg IV over 30 min on day 1  Q21 days x 9 cycles  NCCN Guidelines for Breast Cancer V.2.2022  Alexis P, et al- N Engl J Med. 2020 Feb 27;382(2):810-821.     Allergies:Patient has no known allergies.  /76   Pulse 89   Temp 36.6 °C (97.9 °F) (Temporal)   Resp 18   Ht 1.6 m (5' 2.99\") Comment: Bare foot  Wt 84 kg (185 lb 3 oz)   SpO2 97%   BMI 32.81 kg/m²   Body surface area is 1.93 meters squared.    All lab results 6/6/23 from [facility]/ [facility] within treatment parameters.   Scr = 0.38 Est crcl ~ 114mL/min    Pembrolizumab 200 mg fixed dose, no calculation required  OK to treat with final dose = --- mg IV on Day 1    PACLitaxel 80 mg/m2  x 1.93m2 = 154.4mg   <10% difference, ok to treat with final dose = 162 mg IV on Days 1,8, and 15    CARBOplatin AUC 1.5 (114+ 25) = 208.5mg   <10% difference, ok to treat with final dose = 200mg IV on Days 1,8, and 15    MATEUSZ Cadena PharmEmilyD.  "

## 2023-06-07 NOTE — PROGRESS NOTES
Chemotherapy Verification - PRIMARY RN      Height = 1.6 m  Weight = 84 kg  BSA = 1.93 m^2       Medication: paclitaxel  Dose: 80 mg/m^2  Calculated Dose: 154.4 mg                             (In mg/m2, AUC, mg/kg)     Medication: carboplatin  Dose: AUC=1.5  Calculated Dose: 209.355 mg                             (In mg/m2, AUC, mg/kg)    Carboplatin calculation (if applicable):  (1.5*(114.57+25)) = 209.355      I confirm this process was performed independently with the BSA and all final chemotherapy dosing calculations congruent.  Any discrepancies of 10% or greater have been addressed with the chemotherapy pharmacist. The resolution of the discrepancy has been documented in the EPIC progress notes.

## 2023-06-08 ENCOUNTER — OUTPATIENT INFUSION SERVICES (OUTPATIENT)
Dept: ONCOLOGY | Facility: MEDICAL CENTER | Age: 58
End: 2023-06-08
Attending: INTERNAL MEDICINE
Payer: COMMERCIAL

## 2023-06-08 VITALS
BODY MASS INDEX: 33.59 KG/M2 | HEART RATE: 101 BPM | DIASTOLIC BLOOD PRESSURE: 82 MMHG | WEIGHT: 189.6 LBS | HEIGHT: 63 IN | OXYGEN SATURATION: 96 % | RESPIRATION RATE: 18 BRPM | TEMPERATURE: 98 F | SYSTOLIC BLOOD PRESSURE: 146 MMHG

## 2023-06-08 DIAGNOSIS — Z17.1 MALIGNANT NEOPLASM OF OVERLAPPING SITES OF LEFT BREAST IN FEMALE, ESTROGEN RECEPTOR NEGATIVE (HCC): ICD-10-CM

## 2023-06-08 DIAGNOSIS — C50.812 MALIGNANT NEOPLASM OF OVERLAPPING SITES OF LEFT BREAST IN FEMALE, ESTROGEN RECEPTOR NEGATIVE (HCC): ICD-10-CM

## 2023-06-08 PROCEDURE — 700111 HCHG RX REV CODE 636 W/ 250 OVERRIDE (IP): Mod: UD | Performed by: INTERNAL MEDICINE

## 2023-06-08 PROCEDURE — A9270 NON-COVERED ITEM OR SERVICE: HCPCS | Mod: UD | Performed by: INTERNAL MEDICINE

## 2023-06-08 PROCEDURE — 96372 THER/PROPH/DIAG INJ SC/IM: CPT

## 2023-06-08 PROCEDURE — 700102 HCHG RX REV CODE 250 W/ 637 OVERRIDE(OP): Mod: UD | Performed by: INTERNAL MEDICINE

## 2023-06-08 RX ORDER — METHYLPREDNISOLONE SODIUM SUCCINATE 125 MG/2ML
125 INJECTION, POWDER, LYOPHILIZED, FOR SOLUTION INTRAMUSCULAR; INTRAVENOUS PRN
Status: CANCELLED | OUTPATIENT
Start: 2023-06-15

## 2023-06-08 RX ORDER — 0.9 % SODIUM CHLORIDE 0.9 %
10 VIAL (ML) INJECTION PRN
Status: CANCELLED | OUTPATIENT
Start: 2023-06-22

## 2023-06-08 RX ORDER — SODIUM CHLORIDE 9 MG/ML
INJECTION, SOLUTION INTRAVENOUS CONTINUOUS
Status: CANCELLED | OUTPATIENT
Start: 2023-06-15

## 2023-06-08 RX ORDER — PROCHLORPERAZINE MALEATE 10 MG
10 TABLET ORAL EVERY 6 HOURS PRN
Status: CANCELLED | OUTPATIENT
Start: 2023-06-29

## 2023-06-08 RX ORDER — 0.9 % SODIUM CHLORIDE 0.9 %
10 VIAL (ML) INJECTION PRN
Status: CANCELLED | OUTPATIENT
Start: 2023-06-29

## 2023-06-08 RX ORDER — DIPHENHYDRAMINE HYDROCHLORIDE 50 MG/ML
50 INJECTION INTRAMUSCULAR; INTRAVENOUS PRN
Status: CANCELLED | OUTPATIENT
Start: 2023-06-22

## 2023-06-08 RX ORDER — HEPARIN SODIUM (PORCINE) LOCK FLUSH IV SOLN 100 UNIT/ML 100 UNIT/ML
500 SOLUTION INTRAVENOUS PRN
Status: CANCELLED | OUTPATIENT
Start: 2023-06-29

## 2023-06-08 RX ORDER — EPINEPHRINE 1 MG/ML(1)
0.5 AMPUL (ML) INJECTION PRN
Status: CANCELLED | OUTPATIENT
Start: 2023-06-15

## 2023-06-08 RX ORDER — DIPHENHYDRAMINE HYDROCHLORIDE 50 MG/ML
50 INJECTION INTRAMUSCULAR; INTRAVENOUS PRN
Status: CANCELLED | OUTPATIENT
Start: 2023-06-15

## 2023-06-08 RX ORDER — ONDANSETRON 8 MG/1
8 TABLET, ORALLY DISINTEGRATING ORAL PRN
Status: CANCELLED | OUTPATIENT
Start: 2023-06-29

## 2023-06-08 RX ORDER — 0.9 % SODIUM CHLORIDE 0.9 %
3 VIAL (ML) INJECTION PRN
Status: CANCELLED | OUTPATIENT
Start: 2023-06-29

## 2023-06-08 RX ORDER — SODIUM CHLORIDE 9 MG/ML
INJECTION, SOLUTION INTRAVENOUS CONTINUOUS
Status: CANCELLED | OUTPATIENT
Start: 2023-06-22

## 2023-06-08 RX ORDER — METHYLPREDNISOLONE SODIUM SUCCINATE 125 MG/2ML
125 INJECTION, POWDER, LYOPHILIZED, FOR SOLUTION INTRAMUSCULAR; INTRAVENOUS PRN
Status: CANCELLED | OUTPATIENT
Start: 2023-06-29

## 2023-06-08 RX ORDER — 0.9 % SODIUM CHLORIDE 0.9 %
3 VIAL (ML) INJECTION PRN
Status: CANCELLED | OUTPATIENT
Start: 2023-06-14

## 2023-06-08 RX ORDER — 0.9 % SODIUM CHLORIDE 0.9 %
VIAL (ML) INJECTION PRN
Status: CANCELLED | OUTPATIENT
Start: 2023-06-22

## 2023-06-08 RX ORDER — ONDANSETRON 8 MG/1
8 TABLET, ORALLY DISINTEGRATING ORAL PRN
Status: CANCELLED | OUTPATIENT
Start: 2023-06-22

## 2023-06-08 RX ORDER — EPINEPHRINE 1 MG/ML(1)
0.5 AMPUL (ML) INJECTION PRN
Status: CANCELLED | OUTPATIENT
Start: 2023-06-22

## 2023-06-08 RX ORDER — 0.9 % SODIUM CHLORIDE 0.9 %
10 VIAL (ML) INJECTION PRN
Status: CANCELLED | OUTPATIENT
Start: 2023-06-14

## 2023-06-08 RX ORDER — HEPARIN SODIUM (PORCINE) LOCK FLUSH IV SOLN 100 UNIT/ML 100 UNIT/ML
500 SOLUTION INTRAVENOUS PRN
Status: CANCELLED | OUTPATIENT
Start: 2023-06-14

## 2023-06-08 RX ORDER — ONDANSETRON 2 MG/ML
4 INJECTION INTRAMUSCULAR; INTRAVENOUS PRN
Status: CANCELLED | OUTPATIENT
Start: 2023-06-29

## 2023-06-08 RX ORDER — POTASSIUM CHLORIDE 20 MEQ/1
40 TABLET, EXTENDED RELEASE ORAL ONCE
Status: COMPLETED | OUTPATIENT
Start: 2023-06-08 | End: 2023-06-08

## 2023-06-08 RX ORDER — METHYLPREDNISOLONE SODIUM SUCCINATE 125 MG/2ML
125 INJECTION, POWDER, LYOPHILIZED, FOR SOLUTION INTRAMUSCULAR; INTRAVENOUS PRN
Status: CANCELLED | OUTPATIENT
Start: 2023-06-22

## 2023-06-08 RX ORDER — 0.9 % SODIUM CHLORIDE 0.9 %
10 VIAL (ML) INJECTION PRN
Status: CANCELLED | OUTPATIENT
Start: 2023-06-15

## 2023-06-08 RX ORDER — ONDANSETRON 2 MG/ML
4 INJECTION INTRAMUSCULAR; INTRAVENOUS PRN
Status: CANCELLED | OUTPATIENT
Start: 2023-06-15

## 2023-06-08 RX ORDER — ONDANSETRON 8 MG/1
8 TABLET, ORALLY DISINTEGRATING ORAL PRN
Status: CANCELLED | OUTPATIENT
Start: 2023-06-15

## 2023-06-08 RX ORDER — HEPARIN SODIUM (PORCINE) LOCK FLUSH IV SOLN 100 UNIT/ML 100 UNIT/ML
500 SOLUTION INTRAVENOUS PRN
Status: CANCELLED | OUTPATIENT
Start: 2023-06-15

## 2023-06-08 RX ORDER — HEPARIN SODIUM (PORCINE) LOCK FLUSH IV SOLN 100 UNIT/ML 100 UNIT/ML
500 SOLUTION INTRAVENOUS PRN
Status: CANCELLED | OUTPATIENT
Start: 2023-06-22

## 2023-06-08 RX ORDER — 0.9 % SODIUM CHLORIDE 0.9 %
VIAL (ML) INJECTION PRN
Status: CANCELLED | OUTPATIENT
Start: 2023-06-15

## 2023-06-08 RX ORDER — PROCHLORPERAZINE MALEATE 10 MG
10 TABLET ORAL EVERY 6 HOURS PRN
Status: CANCELLED | OUTPATIENT
Start: 2023-06-22

## 2023-06-08 RX ORDER — SODIUM CHLORIDE 9 MG/ML
INJECTION, SOLUTION INTRAVENOUS CONTINUOUS
Status: CANCELLED | OUTPATIENT
Start: 2023-06-29

## 2023-06-08 RX ORDER — PROCHLORPERAZINE MALEATE 10 MG
10 TABLET ORAL EVERY 6 HOURS PRN
Status: CANCELLED | OUTPATIENT
Start: 2023-06-15

## 2023-06-08 RX ORDER — EPINEPHRINE 1 MG/ML(1)
0.5 AMPUL (ML) INJECTION PRN
Status: CANCELLED | OUTPATIENT
Start: 2023-06-29

## 2023-06-08 RX ORDER — 0.9 % SODIUM CHLORIDE 0.9 %
3 VIAL (ML) INJECTION PRN
Status: CANCELLED | OUTPATIENT
Start: 2023-06-15

## 2023-06-08 RX ORDER — 0.9 % SODIUM CHLORIDE 0.9 %
VIAL (ML) INJECTION PRN
Status: CANCELLED | OUTPATIENT
Start: 2023-06-29

## 2023-06-08 RX ORDER — 0.9 % SODIUM CHLORIDE 0.9 %
VIAL (ML) INJECTION PRN
Status: CANCELLED | OUTPATIENT
Start: 2023-06-14

## 2023-06-08 RX ORDER — 0.9 % SODIUM CHLORIDE 0.9 %
3 VIAL (ML) INJECTION PRN
Status: CANCELLED | OUTPATIENT
Start: 2023-06-22

## 2023-06-08 RX ORDER — DIPHENHYDRAMINE HYDROCHLORIDE 50 MG/ML
50 INJECTION INTRAMUSCULAR; INTRAVENOUS PRN
Status: CANCELLED | OUTPATIENT
Start: 2023-06-29

## 2023-06-08 RX ORDER — ONDANSETRON 2 MG/ML
4 INJECTION INTRAMUSCULAR; INTRAVENOUS PRN
Status: CANCELLED | OUTPATIENT
Start: 2023-06-22

## 2023-06-08 RX ADMIN — FILGRASTIM-SNDZ 480 MCG: 480 INJECTION, SOLUTION INTRAVENOUS; SUBCUTANEOUS at 17:22

## 2023-06-08 RX ADMIN — POTASSIUM CHLORIDE 40 MEQ: 1500 TABLET, EXTENDED RELEASE ORAL at 17:20

## 2023-06-08 ASSESSMENT — FIBROSIS 4 INDEX: FIB4 SCORE: 1.34

## 2023-06-08 NOTE — PROGRESS NOTES
Pt presents to [facility] for paclitaxel and carboplatin. R chest port accessed using sterile technique; brisk blood return observed and pt tolerated well. Labs previously drawn and within treatable parameters. Pre meds administered and paclitaxel infused with no s/s of adverse reactions. Carboplatin initiated at 1/3rd rate; approximately 45 minutes after start pt became very itchy. Infusion stopped and PRN benadryl given. Pts itching subsisted and infusion restarted at half rate and pt finished infusion with no further s/s of adverse reactions. Brisk blood return observed from port before flushed, heparin locked, and de-accessed; gauze and tape dressing applied. Next appointment confirmed and education provided. Pt discharged to self care with all personal belongings and in NAD.

## 2023-06-09 ENCOUNTER — OUTPATIENT INFUSION SERVICES (OUTPATIENT)
Dept: ONCOLOGY | Facility: MEDICAL CENTER | Age: 58
End: 2023-06-09
Attending: INTERNAL MEDICINE
Payer: COMMERCIAL

## 2023-06-09 VITALS
DIASTOLIC BLOOD PRESSURE: 70 MMHG | HEART RATE: 96 BPM | SYSTOLIC BLOOD PRESSURE: 142 MMHG | OXYGEN SATURATION: 94 % | BODY MASS INDEX: 33.28 KG/M2 | RESPIRATION RATE: 16 BRPM | TEMPERATURE: 96.7 F | WEIGHT: 187.83 LBS | HEIGHT: 63 IN

## 2023-06-09 DIAGNOSIS — C50.812 MALIGNANT NEOPLASM OF OVERLAPPING SITES OF LEFT BREAST IN FEMALE, ESTROGEN RECEPTOR NEGATIVE (HCC): ICD-10-CM

## 2023-06-09 DIAGNOSIS — Z17.1 MALIGNANT NEOPLASM OF OVERLAPPING SITES OF LEFT BREAST IN FEMALE, ESTROGEN RECEPTOR NEGATIVE (HCC): ICD-10-CM

## 2023-06-09 PROCEDURE — 96372 THER/PROPH/DIAG INJ SC/IM: CPT

## 2023-06-09 PROCEDURE — 700111 HCHG RX REV CODE 636 W/ 250 OVERRIDE (IP): Mod: UD | Performed by: INTERNAL MEDICINE

## 2023-06-09 RX ADMIN — FILGRASTIM-SNDZ 480 MCG: 480 INJECTION, SOLUTION INTRAVENOUS; SUBCUTANEOUS at 17:36

## 2023-06-09 ASSESSMENT — FIBROSIS 4 INDEX: FIB4 SCORE: 1.34

## 2023-06-09 NOTE — PROGRESS NOTES
Pt arrives to [facility] for Zarxio injection.  Pt denies s/sx of infection, nausea or other complaints today.  Zarxio given SC to back of LUE.  Educated pt on taking Loratadine to prevent bone pain.  Potassium 40meq tablets given to replace per electrolyte protocol.  Confirmed tomorrow's appt time with pt.  Pt dc home to self care.

## 2023-06-10 NOTE — PROGRESS NOTES
[facility]. POC discussed, pt reports fatigue, denies n/v, mouth sores or s/s of infection. Pt reports she will be taking her Claritin. Zarxio administered per MAR and pt tolerated well. No s/s of reactions or complications noted. Band-aid applied to site. Pt has future appointments. Pt discharged to home in good condition.

## 2023-06-13 NOTE — PROGRESS NOTES
"Pharmacy Chemotherapy Verification    Patient Name: Yolanda Roe      Dx: Metastatic Breast CA (ER/[place]/HER2 negative) IIb       Protocol: Pembrolizumab + PACLitaxel + CARBOplatin (x 4 cycles) followed by  Pembrolizumab + Cyclophosphamide + DOXOrubicin   (x4 cycles)  Pembrolizumab 200 mg IV over 30 minutes on Day 1  PACLitaxel 80 mg/m2 IV over 60 minutes on Days 1, 8 and 15  CARBOplatin AUC 1.5 IV over 30 minutes Days 1, 8, and 15  Repeat every 21 days x 4 cycles  ~followed by~  Pembrolizumab 200 mg IV over 30 minutes on Day 1  Cyclophosphamide 600 mg/m2 IV over 30 minutes on Day 1  DOXOrubicin 60 mg/m2 IV push on Day 1  Repeat every 21 days for 4 cycles  NCCN Guidelines for Breast Cancer V.2.2022  Alexis FISHER, et al N Engl J Med 2020; 382(0) 188-644    Allergies:  Patient has no known allergies.      /74   Pulse 93   Temp 35.9 °C (96.7 °F) (Temporal)   Resp 16   Ht 1.6 m (5' 2.99\")   Wt 83.8 kg (184 lb 11.9 oz)   SpO2 95%   BMI 32.73 kg/m²  Body surface area is 1.93 meters squared.    Labs 6/14/23:  ANC~ 1890 Plt = 217k   Hgb = 10.4  SCr = 0.4 mg/dL CrCl ~ 114.3 mL/min (Minimum SCr of 0.7 used)    Drug Order   (Drug name, dose, route, IV Fluid & volume, frequency, number of doses) Cycle 4 Day 1  Previous treatment: C3D15 on 6/7/23     Medication = Pembrolizumab  Base Dose = 200 mg   Fixed dose; no calculation required  Final Dose =200 mg  Route = IV  Fluid & Volume = NS 50 mL  Admin Duration = Over 30 minutes   Day 1 only       <10% difference, okay to treat with final dose       Medication = PACLitaxel  Base Dose = 80 mg/m²  Calc Dose: Base Dose x 1.93 m² = 154.4 mg  Final Dose = 162 mg  Route = IV  Fluid & Volume =  mL  Admin Duration = Over 60 minutes          <10% difference, okay to treat with final dose     Medication = CARBOplatin   Base Dose = AUC 1.5   Calc Dose:AUC x (114.3 ml/min + 25) = 209mg  Final Dose = 200 mg  Route = IV  Fluid & Volume =  mL  Admin Duration = Over 30 minutes "          <10% difference, okay to treat with final dose       By my signature below, I confirm this process was performed independently with the BSA and all final chemotherapy dosing calculations congruent. I have reviewed the above chemotherapy order and that my calculation of the final dose and BSA (when applicable) corroborate those calculations of the  pharmacist.     Diana Younger, PharmD

## 2023-06-13 NOTE — PROGRESS NOTES
"Pharmacy Chemotherapy Verification    DX: triple negative breast cancer    Cycle 4 Day 1  Previous treatment = 6/7/23    Regimen: pembrolizumab + PACLitaxel/CARBOplatin --> pembrolizumab + cyclophosphamide/DOOXrubicin or epirubicin --> Pembrolizmab  Pembrolizumab 200 mg IV over 30 min on day 1  PACLitaxel 80 mg/m2 IV over 60 min on days 1,8,15  CARBOplatin AUC 1.5 IV over 30 min on day 1,8,15  Q21 days x 4 cycles  Followed by  Pembrolizumab 200 mg IV over 30 min on day 1  Cyclophosphamide 600 mg/m2 IV over 30 min on day 1  DOXOrubicin 60 mg/m2 IVP on day 1 OR epirubicin 90 mg/m2 IVP on day 1  Q21 days x 4 cycles  Followed by  Pembrolizumab 200mg IV over 30 min on day 1  Q21 days x 9 cycles  NCCN Guidelines for Breast Cancer V.2.2022  Alexis P, et al- N Engl J Med. 2020 Feb 27;382(2):810-821.     Allergies:Patient has no known allergies.  /74   Pulse 93   Temp 35.9 °C (96.7 °F) (Temporal)   Resp 16   Ht 1.6 m (5' 2.99\")   Wt 83.8 kg (184 lb 11.9 oz)   SpO2 95%   BMI 32.73 kg/m²   Body surface area is 1.93 meters squared.    All lab results 6/14/23 within treatment parameters.   Scr = 0.4 Est crcl ~ 114mL/min (min Scr = 0.7)     Pembrolizumab 200 mg fixed dose, no calculation required  OK to treat with final dose = 200mg IV on Day 1    PACLitaxel 80 mg/m2  x 1.93m2 = 154.4mg   <10% difference, ok to treat with final dose = 162 mg IV on Days 1,8, and 15    CARBOplatin AUC 1.5 (114 + 25) = 208.5mg   <10% difference, ok to treat with final dose = 200mg IV on Days 1,8, and 15    MATEUSZ Cadena Pharm.D.  "

## 2023-06-14 ENCOUNTER — OUTPATIENT INFUSION SERVICES (OUTPATIENT)
Dept: ONCOLOGY | Facility: MEDICAL CENTER | Age: 58
End: 2023-06-14
Attending: INTERNAL MEDICINE
Payer: COMMERCIAL

## 2023-06-14 VITALS
HEIGHT: 63 IN | OXYGEN SATURATION: 95 % | SYSTOLIC BLOOD PRESSURE: 128 MMHG | TEMPERATURE: 96.7 F | DIASTOLIC BLOOD PRESSURE: 74 MMHG | BODY MASS INDEX: 32.73 KG/M2 | WEIGHT: 184.75 LBS | HEART RATE: 93 BPM | RESPIRATION RATE: 16 BRPM

## 2023-06-14 DIAGNOSIS — Z17.1 MALIGNANT NEOPLASM OF OVERLAPPING SITES OF LEFT BREAST IN FEMALE, ESTROGEN RECEPTOR NEGATIVE (HCC): ICD-10-CM

## 2023-06-14 DIAGNOSIS — C50.812 MALIGNANT NEOPLASM OF OVERLAPPING SITES OF LEFT BREAST IN FEMALE, ESTROGEN RECEPTOR NEGATIVE (HCC): ICD-10-CM

## 2023-06-14 LAB
ALBUMIN SERPL BCP-MCNC: 4.2 G/DL (ref 3.2–4.9)
ALBUMIN/GLOB SERPL: 1.8 G/DL
ALP SERPL-CCNC: 122 U/L (ref 30–99)
ALT SERPL-CCNC: 45 U/L (ref 2–50)
ANION GAP SERPL CALC-SCNC: 12 MMOL/L (ref 7–16)
AST SERPL-CCNC: 27 U/L (ref 12–45)
BASOPHILS # BLD AUTO: 1.3 % (ref 0–1.8)
BASOPHILS # BLD: 0.07 K/UL (ref 0–0.12)
BILIRUB SERPL-MCNC: 0.3 MG/DL (ref 0.1–1.5)
BUN SERPL-MCNC: 13 MG/DL (ref 8–22)
CALCIUM ALBUM COR SERPL-MCNC: 8.8 MG/DL (ref 8.5–10.5)
CALCIUM SERPL-MCNC: 9 MG/DL (ref 8.5–10.5)
CHLORIDE SERPL-SCNC: 105 MMOL/L (ref 96–112)
CO2 SERPL-SCNC: 24 MMOL/L (ref 20–33)
CREAT SERPL-MCNC: 0.4 MG/DL (ref 0.5–1.4)
EOSINOPHIL # BLD AUTO: 0.02 K/UL (ref 0–0.51)
EOSINOPHIL NFR BLD: 0.4 % (ref 0–6.9)
ERYTHROCYTE [DISTWIDTH] IN BLOOD BY AUTOMATED COUNT: 56.7 FL (ref 35.9–50)
GFR SERPLBLD CREATININE-BSD FMLA CKD-EPI: 114 ML/MIN/1.73 M 2
GLOBULIN SER CALC-MCNC: 2.4 G/DL (ref 1.9–3.5)
GLUCOSE SERPL-MCNC: 110 MG/DL (ref 65–99)
HCT VFR BLD AUTO: 30.2 % (ref 37–47)
HGB BLD-MCNC: 10.4 G/DL (ref 12–16)
IMM GRANULOCYTES # BLD AUTO: 0.49 K/UL (ref 0–0.11)
IMM GRANULOCYTES NFR BLD AUTO: 8.8 % (ref 0–0.9)
LYMPHOCYTES # BLD AUTO: 2.43 K/UL (ref 1–4.8)
LYMPHOCYTES NFR BLD: 43.9 % (ref 22–41)
MCH RBC QN AUTO: 35.4 PG (ref 27–33)
MCHC RBC AUTO-ENTMCNC: 34.4 G/DL (ref 32.2–35.5)
MCV RBC AUTO: 102.7 FL (ref 81.4–97.8)
MONOCYTES # BLD AUTO: 0.64 K/UL (ref 0–0.85)
MONOCYTES NFR BLD AUTO: 11.6 % (ref 0–13.4)
NEUTROPHILS # BLD AUTO: 1.89 K/UL (ref 1.82–7.42)
NEUTROPHILS NFR BLD: 34 % (ref 44–72)
NRBC # BLD AUTO: 0 K/UL
NRBC BLD-RTO: 0 /100 WBC (ref 0–0.2)
OUTPT INFUS CBC COMMENT OICOM: ABNORMAL
PLATELET # BLD AUTO: 217 K/UL (ref 164–446)
PMV BLD AUTO: 9.6 FL (ref 9–12.9)
POTASSIUM SERPL-SCNC: 3.8 MMOL/L (ref 3.6–5.5)
PROT SERPL-MCNC: 6.6 G/DL (ref 6–8.2)
RBC # BLD AUTO: 2.94 M/UL (ref 4.2–5.4)
SODIUM SERPL-SCNC: 141 MMOL/L (ref 135–145)
T4 FREE SERPL-MCNC: 1.23 NG/DL (ref 0.93–1.7)
TSH SERPL DL<=0.005 MIU/L-ACNC: 0.63 UIU/ML (ref 0.38–5.33)
WBC # BLD AUTO: 5.5 K/UL (ref 4.8–10.8)

## 2023-06-14 PROCEDURE — 700105 HCHG RX REV CODE 258: Mod: UD | Performed by: INTERNAL MEDICINE

## 2023-06-14 PROCEDURE — 80053 COMPREHEN METABOLIC PANEL: CPT

## 2023-06-14 PROCEDURE — 700111 HCHG RX REV CODE 636 W/ 250 OVERRIDE (IP): Mod: UD | Performed by: INTERNAL MEDICINE

## 2023-06-14 PROCEDURE — 96413 CHEMO IV INFUSION 1 HR: CPT

## 2023-06-14 PROCEDURE — 85025 COMPLETE CBC W/AUTO DIFF WBC: CPT

## 2023-06-14 PROCEDURE — 84439 ASSAY OF FREE THYROXINE: CPT

## 2023-06-14 PROCEDURE — 84443 ASSAY THYROID STIM HORMONE: CPT

## 2023-06-14 PROCEDURE — 96417 CHEMO IV INFUS EACH ADDL SEQ: CPT

## 2023-06-14 PROCEDURE — 96375 TX/PRO/DX INJ NEW DRUG ADDON: CPT

## 2023-06-14 PROCEDURE — A4212 NON CORING NEEDLE OR STYLET: HCPCS

## 2023-06-14 PROCEDURE — 96415 CHEMO IV INFUSION ADDL HR: CPT

## 2023-06-14 PROCEDURE — 304540 HCHG NITRO SET VENT 2ND TUB

## 2023-06-14 RX ORDER — DIPHENHYDRAMINE HYDROCHLORIDE 50 MG/ML
50 INJECTION INTRAMUSCULAR; INTRAVENOUS PRN
Status: DISCONTINUED | OUTPATIENT
Start: 2023-06-14 | End: 2023-06-14 | Stop reason: HOSPADM

## 2023-06-14 RX ORDER — METHYLPREDNISOLONE SODIUM SUCCINATE 125 MG/2ML
125 INJECTION, POWDER, LYOPHILIZED, FOR SOLUTION INTRAMUSCULAR; INTRAVENOUS PRN
Status: DISCONTINUED | OUTPATIENT
Start: 2023-06-14 | End: 2023-06-14 | Stop reason: HOSPADM

## 2023-06-14 RX ADMIN — DEXAMETHASONE SODIUM PHOSPHATE 12 MG: 4 INJECTION, SOLUTION INTRA-ARTICULAR; INTRALESIONAL; INTRAMUSCULAR; INTRAVENOUS; SOFT TISSUE at 12:25

## 2023-06-14 RX ADMIN — ONDANSETRON 16 MG: 2 INJECTION INTRAMUSCULAR; INTRAVENOUS at 12:36

## 2023-06-14 RX ADMIN — CARBOPLATIN 200 MG: 10 INJECTION, SOLUTION INTRAVENOUS at 15:18

## 2023-06-14 RX ADMIN — HEPARIN 500 UNITS: 100 SYRINGE at 17:50

## 2023-06-14 RX ADMIN — DIPHENHYDRAMINE HYDROCHLORIDE 25 MG: 50 INJECTION, SOLUTION INTRAMUSCULAR; INTRAVENOUS at 12:55

## 2023-06-14 RX ADMIN — FAMOTIDINE 20 MG: 10 INJECTION, SOLUTION INTRAVENOUS at 13:07

## 2023-06-14 RX ADMIN — SODIUM CHLORIDE 200 MG: 9 INJECTION, SOLUTION INTRAVENOUS at 13:20

## 2023-06-14 RX ADMIN — PACLITAXEL 162 MG: 6 INJECTION, SOLUTION INTRAVENOUS at 13:58

## 2023-06-14 ASSESSMENT — FIBROSIS 4 INDEX: FIB4 SCORE: 1.34

## 2023-06-14 NOTE — PROGRESS NOTES
Chemotherapy Verification - SECONDARY RN       Height = 1.6m  Weight = 83.8kg  BSA = 1.93m2       Medication: pembrolizumab  Dose: flat dose  Calculated Dose: 200mg                             (In mg/m2, AUC, mg/kg)     Medication: paclitaxel  Dose: 80mg/m2  Calculated Dose: 154.4mg                             (In mg/m2, AUC, mg/kg)    Medication: carboplatin  Dose: AUC 1.5  Calculated Dose: 208.8mg                             (In mg/m2, AUC, mg/kg)      Carboplatin calculation (if applicable):  (1.5*(114.2+25)) = 208.8    I confirm that this process was performed independently.

## 2023-06-14 NOTE — PROGRESS NOTES
[facility]anda into [facility] for Day [facility]/ Cycle 4 for keytruda, paclitaxel, an carboplatin. Pt denied having any new or acute complaints today, reports tolerating past treatments well. Port accessed in a sterile manner, had + blood return, flushed briskly. Pt given pre-medications, keytruda, taxol, and paraplatin as tolerated. Yolanda denied having any complaints during or after infusion. Port had + blood return after, flushed per [facility] policy, de-accessed, needle intact, insertion site covered with sterile gauze and paper tape. Next appointment scheduled, Yolanda discharged home to self care.

## 2023-06-14 NOTE — PROGRESS NOTES
Chemotherapy Verification - PRIMARY RN      Height = 160 cm  Weight = 83.8 kg  BSA = 1.93 m^2       Medication: pembrolizumab  Dose: 200 mg-set dose  Calculated Dose: 200 mg-set dose                             (In mg/m2, AUC, mg/kg)     Medication: paclitaxel  Dose: 80 mg/m^2  Calculated Dose: 154.4 mg                             (In mg/m2, AUC, mg/kg)    Medication: carboplatin  Dose: 1.5  Calculated Dose: 208.5 mg                             (In mg/m2, AUC, mg/kg)          Carboplatin calculation (if applicable):  (1.5*(114+25)) = 208.5      I confirm this process was performed independently with the BSA and all final chemotherapy dosing calculations congruent.  Any discrepancies of 10% or greater have been addressed with the chemotherapy pharmacist. The resolution of the discrepancy has been documented in the EPIC progress notes.

## 2023-06-15 ENCOUNTER — OUTPATIENT INFUSION SERVICES (OUTPATIENT)
Dept: ONCOLOGY | Facility: MEDICAL CENTER | Age: 58
End: 2023-06-15
Attending: INTERNAL MEDICINE
Payer: COMMERCIAL

## 2023-06-15 VITALS
OXYGEN SATURATION: 95 % | DIASTOLIC BLOOD PRESSURE: 84 MMHG | HEIGHT: 63 IN | HEART RATE: 103 BPM | BODY MASS INDEX: 32.77 KG/M2 | SYSTOLIC BLOOD PRESSURE: 135 MMHG | TEMPERATURE: 97.5 F | WEIGHT: 184.97 LBS | RESPIRATION RATE: 18 BRPM

## 2023-06-15 DIAGNOSIS — Z17.1 MALIGNANT NEOPLASM OF OVERLAPPING SITES OF LEFT BREAST IN FEMALE, ESTROGEN RECEPTOR NEGATIVE (HCC): ICD-10-CM

## 2023-06-15 DIAGNOSIS — C50.812 MALIGNANT NEOPLASM OF OVERLAPPING SITES OF LEFT BREAST IN FEMALE, ESTROGEN RECEPTOR NEGATIVE (HCC): ICD-10-CM

## 2023-06-15 PROCEDURE — 96372 THER/PROPH/DIAG INJ SC/IM: CPT

## 2023-06-15 PROCEDURE — 700111 HCHG RX REV CODE 636 W/ 250 OVERRIDE (IP): Mod: UD | Performed by: INTERNAL MEDICINE

## 2023-06-15 RX ADMIN — FILGRASTIM-SNDZ 480 MCG: 480 INJECTION, SOLUTION INTRAVENOUS; SUBCUTANEOUS at 17:44

## 2023-06-15 ASSESSMENT — FIBROSIS 4 INDEX: FIB4 SCORE: 1.09

## 2023-06-16 ENCOUNTER — OUTPATIENT INFUSION SERVICES (OUTPATIENT)
Dept: ONCOLOGY | Facility: MEDICAL CENTER | Age: 58
End: 2023-06-16
Attending: INTERNAL MEDICINE
Payer: COMMERCIAL

## 2023-06-16 VITALS
RESPIRATION RATE: 16 BRPM | HEIGHT: 63 IN | HEART RATE: 102 BPM | SYSTOLIC BLOOD PRESSURE: 129 MMHG | BODY MASS INDEX: 32.73 KG/M2 | DIASTOLIC BLOOD PRESSURE: 77 MMHG | TEMPERATURE: 96.9 F | OXYGEN SATURATION: 96 % | WEIGHT: 184.75 LBS

## 2023-06-16 DIAGNOSIS — C50.812 MALIGNANT NEOPLASM OF OVERLAPPING SITES OF LEFT BREAST IN FEMALE, ESTROGEN RECEPTOR NEGATIVE (HCC): ICD-10-CM

## 2023-06-16 DIAGNOSIS — Z17.1 MALIGNANT NEOPLASM OF OVERLAPPING SITES OF LEFT BREAST IN FEMALE, ESTROGEN RECEPTOR NEGATIVE (HCC): ICD-10-CM

## 2023-06-16 PROCEDURE — 700111 HCHG RX REV CODE 636 W/ 250 OVERRIDE (IP): Mod: UD | Performed by: INTERNAL MEDICINE

## 2023-06-16 PROCEDURE — 96372 THER/PROPH/DIAG INJ SC/IM: CPT

## 2023-06-16 RX ADMIN — FILGRASTIM-SNDZ 480 MCG: 480 INJECTION, SOLUTION INTRAVENOUS; SUBCUTANEOUS at 17:37

## 2023-06-16 ASSESSMENT — FIBROSIS 4 INDEX: FIB4 SCORE: 1.09

## 2023-06-16 NOTE — PROGRESS NOTES
Pt arrives to [facility] for Zarxio injection.  Pt denies s/sx of infection.  Pt reports mild nausea today.  Zarxio given SC to back of LUE.  Educated pt on taking Loratadine to prevent bone pain.  Confirmed tomorrow's appt time with pt.  Pt dc home to self care.

## 2023-06-17 NOTE — PROGRESS NOTES
[facility] for [facility] zar[facility]io injection.  Pt denied having any new complaints, acute infections, or unexpected side effects. She does report that her fatigue and symptoms were severe enough for her to call-in ill to work today. Zarxio injection given in upper back of left arm SQ. Pt tolerated well, band-aid applied to injection site. Future appointments confirmed with Pt prior to leaving, Yolanda discharged home to self care.

## 2023-06-21 ENCOUNTER — OUTPATIENT INFUSION SERVICES (OUTPATIENT)
Dept: ONCOLOGY | Facility: MEDICAL CENTER | Age: 58
End: 2023-06-21
Attending: INTERNAL MEDICINE
Payer: COMMERCIAL

## 2023-06-21 VITALS
DIASTOLIC BLOOD PRESSURE: 71 MMHG | RESPIRATION RATE: 17 BRPM | WEIGHT: 186.07 LBS | HEIGHT: 63 IN | SYSTOLIC BLOOD PRESSURE: 113 MMHG | OXYGEN SATURATION: 96 % | HEART RATE: 85 BPM | TEMPERATURE: 97.2 F | BODY MASS INDEX: 32.97 KG/M2

## 2023-06-21 DIAGNOSIS — C50.812 MALIGNANT NEOPLASM OF OVERLAPPING SITES OF LEFT BREAST IN FEMALE, ESTROGEN RECEPTOR NEGATIVE (HCC): ICD-10-CM

## 2023-06-21 DIAGNOSIS — Z17.1 MALIGNANT NEOPLASM OF OVERLAPPING SITES OF LEFT BREAST IN FEMALE, ESTROGEN RECEPTOR NEGATIVE (HCC): ICD-10-CM

## 2023-06-21 LAB
BASOPHILS # BLD AUTO: 0.8 % (ref 0–1.8)
BASOPHILS # BLD: 0.04 K/UL (ref 0–0.12)
CREAT SERPL-MCNC: 0.41 MG/DL (ref 0.5–1.4)
EOSINOPHIL # BLD AUTO: 0.04 K/UL (ref 0–0.51)
EOSINOPHIL NFR BLD: 0.8 % (ref 0–6.9)
ERYTHROCYTE [DISTWIDTH] IN BLOOD BY AUTOMATED COUNT: 60.3 FL (ref 35.9–50)
GFR SERPLBLD CREATININE-BSD FMLA CKD-EPI: 113 ML/MIN/1.73 M 2
HCT VFR BLD AUTO: 28.9 % (ref 37–47)
HGB BLD-MCNC: 9.9 G/DL (ref 12–16)
IMM GRANULOCYTES # BLD AUTO: 0.31 K/UL (ref 0–0.11)
IMM GRANULOCYTES NFR BLD AUTO: 6 % (ref 0–0.9)
LYMPHOCYTES # BLD AUTO: 2.28 K/UL (ref 1–4.8)
LYMPHOCYTES NFR BLD: 44.3 % (ref 22–41)
MCH RBC QN AUTO: 35.9 PG (ref 27–33)
MCHC RBC AUTO-ENTMCNC: 34.3 G/DL (ref 32.2–35.5)
MCV RBC AUTO: 104.7 FL (ref 81.4–97.8)
MONOCYTES # BLD AUTO: 0.51 K/UL (ref 0–0.85)
MONOCYTES NFR BLD AUTO: 9.9 % (ref 0–13.4)
NEUTROPHILS # BLD AUTO: 1.97 K/UL (ref 1.82–7.42)
NEUTROPHILS NFR BLD: 38.2 % (ref 44–72)
NRBC # BLD AUTO: 0 K/UL
NRBC BLD-RTO: 0 /100 WBC (ref 0–0.2)
OUTPT INFUS CBC COMMENT OICOM: ABNORMAL
PLATELET # BLD AUTO: 190 K/UL (ref 164–446)
PMV BLD AUTO: 9.7 FL (ref 9–12.9)
RBC # BLD AUTO: 2.76 M/UL (ref 4.2–5.4)
WBC # BLD AUTO: 5.2 K/UL (ref 4.8–10.8)

## 2023-06-21 PROCEDURE — 36593 DECLOT VASCULAR DEVICE: CPT

## 2023-06-21 PROCEDURE — 96375 TX/PRO/DX INJ NEW DRUG ADDON: CPT

## 2023-06-21 PROCEDURE — 96413 CHEMO IV INFUSION 1 HR: CPT

## 2023-06-21 PROCEDURE — 96415 CHEMO IV INFUSION ADDL HR: CPT

## 2023-06-21 PROCEDURE — 85025 COMPLETE CBC W/AUTO DIFF WBC: CPT

## 2023-06-21 PROCEDURE — 96417 CHEMO IV INFUS EACH ADDL SEQ: CPT

## 2023-06-21 PROCEDURE — 700105 HCHG RX REV CODE 258: Mod: UD | Performed by: INTERNAL MEDICINE

## 2023-06-21 PROCEDURE — 304540 HCHG NITRO SET VENT 2ND TUB

## 2023-06-21 PROCEDURE — 82565 ASSAY OF CREATININE: CPT

## 2023-06-21 PROCEDURE — A4212 NON CORING NEEDLE OR STYLET: HCPCS

## 2023-06-21 PROCEDURE — 700111 HCHG RX REV CODE 636 W/ 250 OVERRIDE (IP): Mod: UD | Performed by: INTERNAL MEDICINE

## 2023-06-21 PROCEDURE — 700111 HCHG RX REV CODE 636 W/ 250 OVERRIDE (IP): Mod: UD

## 2023-06-21 RX ADMIN — CARBOPLATIN 200 MG: 10 INJECTION INTRAVENOUS at 13:38

## 2023-06-21 RX ADMIN — DEXAMETHASONE SODIUM PHOSPHATE 12 MG: 4 INJECTION, SOLUTION INTRA-ARTICULAR; INTRALESIONAL; INTRAMUSCULAR; INTRAVENOUS; SOFT TISSUE at 10:50

## 2023-06-21 RX ADMIN — PACLITAXEL 162 MG: 6 INJECTION, SOLUTION INTRAVENOUS at 12:07

## 2023-06-21 RX ADMIN — HEPARIN 500 UNITS: 100 SYRINGE at 16:06

## 2023-06-21 RX ADMIN — ONDANSETRON 16 MG: 2 INJECTION INTRAMUSCULAR; INTRAVENOUS at 11:05

## 2023-06-21 RX ADMIN — ALTEPLASE 2 MG: 2.2 INJECTION, POWDER, LYOPHILIZED, FOR SOLUTION INTRAVENOUS at 09:43

## 2023-06-21 RX ADMIN — FAMOTIDINE 20 MG: 10 INJECTION, SOLUTION INTRAVENOUS at 11:45

## 2023-06-21 RX ADMIN — DIPHENHYDRAMINE HYDROCHLORIDE 25 MG: 50 INJECTION, SOLUTION INTRAMUSCULAR; INTRAVENOUS at 11:25

## 2023-06-21 ASSESSMENT — FIBROSIS 4 INDEX: FIB4 SCORE: 1.09

## 2023-06-21 NOTE — PROGRESS NOTES
Chemotherapy Verification - PRIMARY RN      Height = 160 cm  Weight = 84.4 kg  BSA = 1.94 m2       Medication: Taxol  Dose: 80 mg/m2  Calculated Dose: 155.2 mg                             (In mg/m2, AUC, mg/kg)     Medication: Carboplatin  Dose: Target AUC = 1.5  Calculated Dose: 225 mg                             (In mg/m2, AUC, mg/kg)        Carboplatin calculation (if applicable):  (1.5*(125+25)) = 225      I confirm this process was performed independently with the BSA and all final chemotherapy dosing calculations congruent.  Any discrepancies of 10% or greater have been addressed with the chemotherapy pharmacist. The resolution of the discrepancy has been documented in the EPIC progress notes.

## 2023-06-21 NOTE — PROGRESS NOTES
"Pharmacy Chemotherapy Verification    Patient Name: Yolanda Roe      Dx: Metastatic Breast CA (ER/[place]/HER2 negative) IIb       Protocol: Pembrolizumab + PACLitaxel + CARBOplatin (x 4 cycles) followed by  Pembrolizumab + Cyclophosphamide + DOXOrubicin   (x4 cycles)  Pembrolizumab 200 mg IV over 30 minutes on Day 1  PACLitaxel 80 mg/m2 IV over 60 minutes on Days 1, 8 and 15  CARBOplatin AUC 1.5 IV over 30 minutes Days 1, 8, and 15  Repeat every 21 days x 4 cycles  ~followed by~  Pembrolizumab 200 mg IV over 30 minutes on Day 1  Cyclophosphamide 600 mg/m2 IV over 30 minutes on Day 1  DOXOrubicin 60 mg/m2 IV push on Day 1  Repeat every 21 days for 4 cycles  NCCN Guidelines for Breast Cancer V.2.2022  Alexis P, et al N Engl J Med 2020; 382(4) 406-822    Allergies:  Patient has no known allergies.      /71   Pulse 85   Temp 36.2 °C (97.2 °F) (Temporal)   Resp 17   Ht 1.6 m (5' 2.99\")   Wt 84.4 kg (186 lb 1.1 oz)   SpO2 96%   BMI 32.97 kg/m²  Body surface area is 1.94 meters squared.    Labs 6/21/23:  ANC~ 1970 Plt = 190k   Hgb = 9.9     SCr = 0.41 mg/dL CrCl ~ 115 mL/min (Minimum SCr of 0.7 used)    Drug Order   (Drug name, dose, route, IV Fluid & volume, frequency, number of doses) Cycle 4 Day 8  Previous treatment: C4D1 on 6/14/23     Medication = Pembrolizumab  Base Dose = 200 mg   Fixed dose; no calculation required  Final Dose =0 mg  Route = IV  Fluid & Volume = NS 50 mL  Admin Duration = Over 30 minutes   Day 1 only       <10% difference, okay to treat with final dose       Medication = PACLitaxel  Base Dose = 80 mg/m²  Calc Dose: Base Dose x 1.94 m² = 155.2 mg  Final Dose = 162 mg  Route = IV  Fluid & Volume =  mL  Admin Duration = Over 60 minutes          <10% difference, okay to treat with final dose     Medication = CARBOplatin   Base Dose = AUC 1.5   Calc Dose:AUC x (115 ml/min + 25) = 210 mg  Final Dose = 200 mg  Route = IV  Fluid & Volume =  mL  Admin Duration = Over 30 minutes    "       <10% difference, okay to treat with final dose       By my signature below, I confirm this process was performed independently with the BSA and all final chemotherapy dosing calculations congruent. I have reviewed the above chemotherapy order and that my calculation of the final dose and BSA (when applicable) corroborate those calculations of the  pharmacist.     Michelle Castro, PharmD

## 2023-06-21 NOTE — PROGRESS NOTES
"Pharmacy Chemotherapy Verification    DX: triple negative breast cancer    Cycle 4 Day 8  Previous treatment = 6/14/23    Regimen: pembrolizumab + PACLitaxel/CARBOplatin --> pembrolizumab + cyclophosphamide/DOOXrubicin or epirubicin --> Pembrolizmab  Pembrolizumab 200 mg IV over 30 min on day 1  PACLitaxel 80 mg/m2 IV over 60 min on days 1,8,15  CARBOplatin AUC 1.5 IV over 30 min on day 1,8,15  Q21 days x 4 cycles  Followed by  Pembrolizumab 200 mg IV over 30 min on day 1  Cyclophosphamide 600 mg/m2 IV over 30 min on day 1  DOXOrubicin 60 mg/m2 IVP on day 1 OR epirubicin 90 mg/m2 IVP on day 1  Q21 days x 4 cycles  Followed by  Pembrolizumab 200mg IV over 30 min on day 1  Q21 days x 9 cycles  NCCN Guidelines for Breast Cancer V.2.2022  Alexis P, et al- N Engl J Med. 2020 Feb 27;382(8):810-821.     Allergies:Patient has no known allergies.  /71   Pulse 85   Temp 36.2 °C (97.2 °F) (Temporal)   Resp 17   Ht 1.6 m (5' 2.99\")   Wt 84.4 kg (186 lb 1.1 oz)   SpO2 96%   BMI 32.97 kg/m²   Body surface area is 1.94 meters squared.    All lab results 6/21/23 within treatment parameters.   Scr = 0.41 Est crcl ~ 115mL/min (min Scr = 0.7)     Pembrolizumab 200 mg fixed dose, no calculation required  OK to treat with final dose = 200mg IV on Day 1    PACLitaxel 80 mg/m2  x 1.94m2 = 155mg   <10% difference, ok to treat with final dose = 162mg IV on Days 1,8, and 15    CARBOplatin AUC 1.5 (115+ 25) = 210mg   <10% difference, ok to treat with final dose = 200mg IV on Days 1,8, and 15    MATEUSZ Cadena Pharm.D.  "

## 2023-06-21 NOTE — PROGRESS NOTES
Chemotherapy Verification - SECONDARY RN       Height = 160 cm  Weight = 84.4 kg  BSA = 1.94 m^2       Medication: PACLitaxel (TAXOL)  Dose: 80 mg/m^2  Calculated Dose: 155.2 mg                             (In mg/m2, AUC, mg/kg)     Medication: CARBOplatin (PARAPLATIN)  Dose: AUC 1.5  Calculated Dose: 210.092 mg                             (In mg/m2, AUC, mg/kg)    Carboplatin calculation (if applicable):  (1.5*(115.061+25)) = 210.092 mg    I confirm that this process was performed independently.

## 2023-06-22 ENCOUNTER — OUTPATIENT INFUSION SERVICES (OUTPATIENT)
Dept: ONCOLOGY | Facility: MEDICAL CENTER | Age: 58
End: 2023-06-22
Attending: INTERNAL MEDICINE
Payer: COMMERCIAL

## 2023-06-22 VITALS
OXYGEN SATURATION: 98 % | HEART RATE: 97 BPM | TEMPERATURE: 97.6 F | SYSTOLIC BLOOD PRESSURE: 132 MMHG | DIASTOLIC BLOOD PRESSURE: 84 MMHG | HEIGHT: 63 IN | RESPIRATION RATE: 18 BRPM | WEIGHT: 187.17 LBS | BODY MASS INDEX: 33.16 KG/M2

## 2023-06-22 DIAGNOSIS — C50.812 MALIGNANT NEOPLASM OF OVERLAPPING SITES OF LEFT BREAST IN FEMALE, ESTROGEN RECEPTOR NEGATIVE (HCC): ICD-10-CM

## 2023-06-22 DIAGNOSIS — Z17.1 MALIGNANT NEOPLASM OF OVERLAPPING SITES OF LEFT BREAST IN FEMALE, ESTROGEN RECEPTOR NEGATIVE (HCC): ICD-10-CM

## 2023-06-22 PROCEDURE — 700111 HCHG RX REV CODE 636 W/ 250 OVERRIDE (IP): Mod: UD | Performed by: INTERNAL MEDICINE

## 2023-06-22 PROCEDURE — 96372 THER/PROPH/DIAG INJ SC/IM: CPT

## 2023-06-22 RX ADMIN — FILGRASTIM-SNDZ 480 MCG: 480 INJECTION, SOLUTION INTRAVENOUS; SUBCUTANEOUS at 17:40

## 2023-06-22 ASSESSMENT — FIBROSIS 4 INDEX: FIB4 SCORE: 1.25

## 2023-06-22 NOTE — PROGRESS NOTES
Yolanda arrives ambulatory to [facility] for Cycle 4 Day 8 Taxol/ Carboplatin.  Yolanda denies any new or acute changes.  Yolanda reports having hypersensitivity reaction previously to carboplatin, reports tolerating Taxol well.  Right upper chest port accessed in sterile fashion, flushes easily, no blood return observed.  Labs collected via 23g butterfly to left AC.  Alteplase instilled to port.  Port checked after 30 minutes, + blood return observed.  Labs resulted and reviewed, parameters met for treatment.  Pre-medications administered per MAR.  Taxol infused as ordered, Yolanda tolerated well.  Carboplatin infused over 2 hours per patient preference.  No s/s adverse reaction observed or verbalized.  Port flushed with NS, heparin instilled.  Port de-accessed, sterile gauze and tape to site.  Yolanda discharged in no apparent distress, next appointment confirmed.

## 2023-06-23 ENCOUNTER — OUTPATIENT INFUSION SERVICES (OUTPATIENT)
Dept: ONCOLOGY | Facility: MEDICAL CENTER | Age: 58
End: 2023-06-23
Attending: INTERNAL MEDICINE
Payer: COMMERCIAL

## 2023-06-23 VITALS
SYSTOLIC BLOOD PRESSURE: 126 MMHG | DIASTOLIC BLOOD PRESSURE: 70 MMHG | OXYGEN SATURATION: 93 % | BODY MASS INDEX: 32.89 KG/M2 | RESPIRATION RATE: 16 BRPM | HEIGHT: 63 IN | TEMPERATURE: 97.9 F | WEIGHT: 185.63 LBS | HEART RATE: 100 BPM

## 2023-06-23 DIAGNOSIS — Z17.1 MALIGNANT NEOPLASM OF OVERLAPPING SITES OF LEFT BREAST IN FEMALE, ESTROGEN RECEPTOR NEGATIVE (HCC): ICD-10-CM

## 2023-06-23 DIAGNOSIS — C50.812 MALIGNANT NEOPLASM OF OVERLAPPING SITES OF LEFT BREAST IN FEMALE, ESTROGEN RECEPTOR NEGATIVE (HCC): ICD-10-CM

## 2023-06-23 PROCEDURE — 96372 THER/PROPH/DIAG INJ SC/IM: CPT

## 2023-06-23 PROCEDURE — 700111 HCHG RX REV CODE 636 W/ 250 OVERRIDE (IP): Mod: UD | Performed by: INTERNAL MEDICINE

## 2023-06-23 RX ADMIN — FILGRASTIM-SNDZ 480 MCG: 480 INJECTION, SOLUTION INTRAVENOUS; SUBCUTANEOUS at 17:34

## 2023-06-23 ASSESSMENT — FIBROSIS 4 INDEX: FIB4 SCORE: 1.25

## 2023-06-23 NOTE — PROGRESS NOTES
Yolanda is here for Zarxio injection. Zarxio injection given per MAR. Next appointment scheduled. Discharged to self care; no apparent distress noted.

## 2023-06-24 NOTE — PROGRESS NOTES
Pt arrived ambulatory to [facility] for Zarxio injection. POC discussed with pt and she agrees with plan. Pt medicated per MAR. Pt tolerated Zarxio injection without s/s adverse reaction. Pt discharged to self care, [facility]. Pt's next appt confirmed 6/28/2023.

## 2023-06-25 NOTE — PROGRESS NOTES
"Pharmacy Chemotherapy Verification    Patient Name: Yolanda Roe      Dx: Metastatic Breast CA (ER/NE/HER2 negative) IIb       Protocol: Pembrolizumab + PACLitaxel + CARBOplatin (x 4 cycles) followed by  Pembrolizumab + Cyclophosphamide + DOXOrubicin   (x4 cycles)  Pembrolizumab 200 mg IV over 30 minutes on Day 1  PACLitaxel 80 mg/m2 IV over 60 minutes on Days 1, 8 and 15  CARBOplatin AUC 1.5 IV over 30 minutes Days 1, 8, and 15  Repeat every 21 days x 4 cycles  ~followed by~  Pembrolizumab 200 mg IV over 30 minutes on Day 1  Cyclophosphamide 600 mg/m2 IV over 30 minutes on Day 1  DOXOrubicin 60 mg/m2 IV push on Day 1  Repeat every 21 days for 4 cycles  NCCN Guidelines for Breast Cancer V.2.2022  Alexis P, et al N Engl J Med 2020; 382(4) 658-877    Allergies:  Patient has no known allergies.      /78   Pulse 94   Temp 36.6 °C (97.8 °F) (Temporal)   Resp 18   Ht 1.6 m (5' 2.99\")   Wt 84.3 kg (185 lb 13.6 oz)   SpO2 97%   BMI 32.93 kg/m²  Body surface area is 1.94 meters squared.    Labs 6/27/23 ([facility])  ANC~1590 Hgb 10 Plt 182k  SCr 0.5 CrCl 115 mL/min   AST/ALT/AP = 26/39/102 Tbili 0.6  TSH 1.215 Free T4 0.81    Drug Order   (Drug name, dose, route, IV Fluid & volume, frequency, number of doses) Cycle 4 Day 15  Previous treatment: C4D8 6/21/23     Medication = Pembrolizumab  Base Dose = 200 mg   Fixed dose; no calculation required  Final Dose = 0 mg  Route = IV  Fluid & Volume = NS 50 mL  Admin Duration = Over 30 minutes   Day 1 only       <10% difference, okay to treat with final dose       Medication = PACLitaxel  Base Dose = 80 mg/m²  Calc Dose: Base Dose x 1.94 m² = 155.2 mg  Final Dose = 162 mg  Route = IV  Fluid & Volume =  mL  Admin Duration = Over 60 minutes          <10% difference, okay to treat with final dose     Medication = CARBOplatin   Base Dose = AUC 1.5   Calc Dose:AUC x (115 ml/min + 25) = 210 mg  Final Dose = 190 mg  Route = IV  Fluid & Volume =  mL  Admin Duration = " Over 30 minutes          <10% difference, okay to treat with final dose       By my signature below, I confirm this process was performed independently with the BSA and all final chemotherapy dosing calculations congruent. I have reviewed the above chemotherapy order and that my calculation of the final dose and BSA (when applicable) corroborate those calculations of the  pharmacist.     Jessica Jenkins, PharmD, BCOP

## 2023-06-27 ENCOUNTER — HOSPITAL ENCOUNTER (OUTPATIENT)
Facility: MEDICAL CENTER | Age: 58
End: 2023-06-27
Attending: INTERNAL MEDICINE
Payer: COMMERCIAL

## 2023-06-27 LAB — T3FREE SERPL-MCNC: 3.31 PG/ML (ref 2–4.4)

## 2023-06-27 PROCEDURE — 84481 FREE ASSAY (FT-3): CPT

## 2023-06-28 ENCOUNTER — OUTPATIENT INFUSION SERVICES (OUTPATIENT)
Dept: ONCOLOGY | Facility: MEDICAL CENTER | Age: 58
End: 2023-06-28
Attending: INTERNAL MEDICINE
Payer: COMMERCIAL

## 2023-06-28 VITALS
TEMPERATURE: 97.8 F | HEIGHT: 63 IN | OXYGEN SATURATION: 97 % | BODY MASS INDEX: 32.93 KG/M2 | SYSTOLIC BLOOD PRESSURE: 133 MMHG | DIASTOLIC BLOOD PRESSURE: 78 MMHG | RESPIRATION RATE: 18 BRPM | HEART RATE: 94 BPM | WEIGHT: 185.85 LBS

## 2023-06-28 DIAGNOSIS — Z17.1 MALIGNANT NEOPLASM OF OVERLAPPING SITES OF LEFT BREAST IN FEMALE, ESTROGEN RECEPTOR NEGATIVE (HCC): ICD-10-CM

## 2023-06-28 DIAGNOSIS — C50.812 MALIGNANT NEOPLASM OF OVERLAPPING SITES OF LEFT BREAST IN FEMALE, ESTROGEN RECEPTOR NEGATIVE (HCC): ICD-10-CM

## 2023-06-28 PROCEDURE — 700105 HCHG RX REV CODE 258: Mod: JZ,UD | Performed by: INTERNAL MEDICINE

## 2023-06-28 PROCEDURE — 96413 CHEMO IV INFUSION 1 HR: CPT

## 2023-06-28 PROCEDURE — A9270 NON-COVERED ITEM OR SERVICE: HCPCS | Mod: UD | Performed by: INTERNAL MEDICINE

## 2023-06-28 PROCEDURE — A4212 NON CORING NEEDLE OR STYLET: HCPCS

## 2023-06-28 PROCEDURE — 96415 CHEMO IV INFUSION ADDL HR: CPT

## 2023-06-28 PROCEDURE — 96417 CHEMO IV INFUS EACH ADDL SEQ: CPT

## 2023-06-28 PROCEDURE — 700102 HCHG RX REV CODE 250 W/ 637 OVERRIDE(OP): Mod: UD | Performed by: INTERNAL MEDICINE

## 2023-06-28 PROCEDURE — 700111 HCHG RX REV CODE 636 W/ 250 OVERRIDE (IP): Mod: UD | Performed by: INTERNAL MEDICINE

## 2023-06-28 PROCEDURE — 96375 TX/PRO/DX INJ NEW DRUG ADDON: CPT

## 2023-06-28 PROCEDURE — 304540 HCHG NITRO SET VENT 2ND TUB

## 2023-06-28 RX ORDER — POTASSIUM CHLORIDE 20 MEQ/1
40 TABLET, EXTENDED RELEASE ORAL ONCE
Status: COMPLETED | OUTPATIENT
Start: 2023-06-28 | End: 2023-06-28

## 2023-06-28 RX ADMIN — DEXAMETHASONE SODIUM PHOSPHATE 12 MG: 4 INJECTION, SOLUTION INTRA-ARTICULAR; INTRALESIONAL; INTRAMUSCULAR; INTRAVENOUS; SOFT TISSUE at 11:22

## 2023-06-28 RX ADMIN — CARBOPLATIN 190 MG: 10 INJECTION INTRAVENOUS at 13:50

## 2023-06-28 RX ADMIN — PACLITAXEL 162 MG: 6 INJECTION, SOLUTION INTRAVENOUS at 12:25

## 2023-06-28 RX ADMIN — HEPARIN 500 UNITS: 100 SYRINGE at 16:25

## 2023-06-28 RX ADMIN — POTASSIUM CHLORIDE 40 MEQ: 1500 TABLET, EXTENDED RELEASE ORAL at 11:21

## 2023-06-28 RX ADMIN — FAMOTIDINE 20 MG: 10 INJECTION INTRAVENOUS at 11:21

## 2023-06-28 RX ADMIN — DIPHENHYDRAMINE HYDROCHLORIDE 25 MG: 50 INJECTION, SOLUTION INTRAMUSCULAR; INTRAVENOUS at 11:50

## 2023-06-28 RX ADMIN — ONDANSETRON 16 MG: 2 INJECTION INTRAMUSCULAR; INTRAVENOUS at 11:35

## 2023-06-28 ASSESSMENT — FIBROSIS 4 INDEX: FIB4 SCORE: 1.25

## 2023-06-28 NOTE — PROGRESS NOTES
Chemotherapy Verification - PRIMARY RN      Height = 160 cm  Weight = 84.3 kg  BSA = 1.94 m2       Medication: Paclitaxol  Dose: 80 mg/m2  Calculated Dose: 155.2 mg                             (In mg/m2, AUC, mg/kg)     Medication: Carboplatin  Dose: AUC 1.5  Calculated Dose: 209.847 mg                             (In mg/m2, AUC, mg/kg)      Carboplatin calculation (if applicable):  (1.5*(114.898+25)) = 209.847      I confirm this process was performed independently with the BSA and all final chemotherapy dosing calculations congruent.  Any discrepancies of 10% or greater have been addressed with the chemotherapy pharmacist. The resolution of the discrepancy has been documented in the EPIC progress notes.

## 2023-06-28 NOTE — PROGRESS NOTES
Chemotherapy Verification - SECONDARY RN   Course 1: C4 D15     Height = 160 cm  Weight = 84.3 kg  BSA = 1.94 m^2       Medication: paclitaxel (TAXOL)  Dose: 80 mg/m2  Calculated Dose: 155.2 mg                             (In mg/m2, AUC, mg/kg)     Medication: carboplatin (PARAPLATIN)  Dose: target AUC 1.5  Calculated Dose: 209.85 mg                             (In mg/m2, AUC, mg/kg)      Carboplatin calculation (if applicable):  (1.5*(114.898+25)) = 209.847    I confirm that this process was performed independently.

## 2023-06-28 NOTE — PROGRESS NOTES
Pt arrived ambulatory to [facility] for D15 Course 1, Cycle 4 of carbo/taxol. POC discussed and pt acknowledged agreement. PORT in place with EMLA cream applied prior to arrival. PORT accessed per [facility] protocol, brisk blood return noted and pt tolerated well. Labs obtained from [facility] and reviewed. K+ 3.3. Potassium replaced orally per electrolyte replacement protocol. Pre-medications and chemotherapy administered per MAR. Carboplatin infused over 2 hours per pt preference due to previous hx of reaction; pt tolerated well. No s/s of reactions or complications noted. PORT flushed per policy and de-accessed with needle intact; site covered with sterile gauze/paper tape and pt tolerated well. Pt confirmed next appts for Zarxio and reports she will be holding off on her next cycle of chemo pending her scans this week and breast surgeon consult. Pt states Dr. Gómez future chemo is pending her surgical consult. Pt states Dr. Gómez's team will be following up if future appts is indicated in IS. Pt discharged ambulatory in [facility].

## 2023-06-29 ENCOUNTER — OUTPATIENT INFUSION SERVICES (OUTPATIENT)
Dept: ONCOLOGY | Facility: MEDICAL CENTER | Age: 58
End: 2023-06-29
Attending: INTERNAL MEDICINE
Payer: COMMERCIAL

## 2023-06-29 VITALS
HEART RATE: 102 BPM | OXYGEN SATURATION: 100 % | TEMPERATURE: 97.7 F | DIASTOLIC BLOOD PRESSURE: 79 MMHG | BODY MASS INDEX: 33.2 KG/M2 | RESPIRATION RATE: 18 BRPM | SYSTOLIC BLOOD PRESSURE: 156 MMHG | WEIGHT: 187.39 LBS | HEIGHT: 63 IN

## 2023-06-29 DIAGNOSIS — Z17.1 MALIGNANT NEOPLASM OF OVERLAPPING SITES OF LEFT BREAST IN FEMALE, ESTROGEN RECEPTOR NEGATIVE (HCC): ICD-10-CM

## 2023-06-29 DIAGNOSIS — C50.812 MALIGNANT NEOPLASM OF OVERLAPPING SITES OF LEFT BREAST IN FEMALE, ESTROGEN RECEPTOR NEGATIVE (HCC): ICD-10-CM

## 2023-06-29 PROCEDURE — 96372 THER/PROPH/DIAG INJ SC/IM: CPT

## 2023-06-29 PROCEDURE — 700111 HCHG RX REV CODE 636 W/ 250 OVERRIDE (IP): Mod: JZ,UD | Performed by: INTERNAL MEDICINE

## 2023-06-29 RX ADMIN — FILGRASTIM-SNDZ 480 MCG: 480 INJECTION, SOLUTION INTRAVENOUS; SUBCUTANEOUS at 17:29

## 2023-06-29 ASSESSMENT — FIBROSIS 4 INDEX: FIB4 SCORE: 1.25

## 2023-06-30 ENCOUNTER — OUTPATIENT INFUSION SERVICES (OUTPATIENT)
Dept: ONCOLOGY | Facility: MEDICAL CENTER | Age: 58
End: 2023-06-30
Attending: INTERNAL MEDICINE
Payer: COMMERCIAL

## 2023-06-30 VITALS
TEMPERATURE: 98 F | RESPIRATION RATE: 18 BRPM | HEART RATE: 97 BPM | SYSTOLIC BLOOD PRESSURE: 137 MMHG | DIASTOLIC BLOOD PRESSURE: 81 MMHG | OXYGEN SATURATION: 95 %

## 2023-06-30 DIAGNOSIS — C50.812 MALIGNANT NEOPLASM OF OVERLAPPING SITES OF LEFT BREAST IN FEMALE, ESTROGEN RECEPTOR NEGATIVE (HCC): ICD-10-CM

## 2023-06-30 DIAGNOSIS — Z17.1 MALIGNANT NEOPLASM OF OVERLAPPING SITES OF LEFT BREAST IN FEMALE, ESTROGEN RECEPTOR NEGATIVE (HCC): ICD-10-CM

## 2023-06-30 PROCEDURE — 700111 HCHG RX REV CODE 636 W/ 250 OVERRIDE (IP): Mod: JZ,UD | Performed by: INTERNAL MEDICINE

## 2023-06-30 PROCEDURE — 96372 THER/PROPH/DIAG INJ SC/IM: CPT

## 2023-06-30 RX ADMIN — FILGRASTIM-SNDZ 480 MCG: 480 INJECTION, SOLUTION INTRAVENOUS; SUBCUTANEOUS at 18:05

## 2023-06-30 NOTE — PROGRESS NOTES
Pt is here today for zarxio injection. She has nothing new to report.     Zarxio given to the back of her right arm and was tolerated well.     Pt will return tomorrow.

## 2023-07-01 NOTE — PROGRESS NOTES
[facility]olanda arrives to hospitals for post-chemotherapy Zarxio. Patient denies acute health concerns. Patient had a breast scan on 6/20. MRI to be done on 7/6. Provider appt scheduled for 7/18. Patient reports that provider may cancel course 2 and only proceed with maintenance keytruda after surgery. Awaiting changes to POC. Zarxio administered SQ to back of left arm without issues. Patient tolerated treatment well. Emailed scheduling regarding plan and potential appt for 7/5. Discharged home to self care in no apparent distress.

## 2023-07-06 RX ORDER — 0.9 % SODIUM CHLORIDE 0.9 %
VIAL (ML) INJECTION PRN
Status: CANCELLED | OUTPATIENT
Start: 2023-07-12

## 2023-07-06 RX ORDER — DIPHENHYDRAMINE HYDROCHLORIDE 50 MG/ML
50 INJECTION INTRAMUSCULAR; INTRAVENOUS PRN
Status: CANCELLED | OUTPATIENT
Start: 2023-07-12

## 2023-07-06 RX ORDER — HEPARIN SODIUM (PORCINE) LOCK FLUSH IV SOLN 100 UNIT/ML 100 UNIT/ML
500 SOLUTION INTRAVENOUS PRN
Status: CANCELLED | OUTPATIENT
Start: 2023-07-12

## 2023-07-06 RX ORDER — 0.9 % SODIUM CHLORIDE 0.9 %
10 VIAL (ML) INJECTION PRN
Status: CANCELLED | OUTPATIENT
Start: 2023-07-12

## 2023-07-06 RX ORDER — 0.9 % SODIUM CHLORIDE 0.9 %
3 VIAL (ML) INJECTION PRN
Status: CANCELLED | OUTPATIENT
Start: 2023-07-11

## 2023-07-06 RX ORDER — 0.9 % SODIUM CHLORIDE 0.9 %
3 VIAL (ML) INJECTION PRN
Status: CANCELLED | OUTPATIENT
Start: 2023-07-12

## 2023-07-06 RX ORDER — SODIUM CHLORIDE 9 MG/ML
INJECTION, SOLUTION INTRAVENOUS CONTINUOUS
Status: CANCELLED | OUTPATIENT
Start: 2023-07-12

## 2023-07-06 RX ORDER — PROCHLORPERAZINE MALEATE 10 MG
10 TABLET ORAL EVERY 6 HOURS PRN
Status: CANCELLED | OUTPATIENT
Start: 2023-07-12

## 2023-07-06 RX ORDER — SODIUM CHLORIDE 9 MG/ML
1000 INJECTION, SOLUTION INTRAVENOUS ONCE
Status: CANCELLED | OUTPATIENT
Start: 2023-07-12

## 2023-07-06 RX ORDER — HEPARIN SODIUM (PORCINE) LOCK FLUSH IV SOLN 100 UNIT/ML 100 UNIT/ML
500 SOLUTION INTRAVENOUS PRN
Status: CANCELLED | OUTPATIENT
Start: 2023-07-11

## 2023-07-06 RX ORDER — ONDANSETRON 8 MG/1
8 TABLET, ORALLY DISINTEGRATING ORAL PRN
Status: CANCELLED | OUTPATIENT
Start: 2023-07-12

## 2023-07-06 RX ORDER — 0.9 % SODIUM CHLORIDE 0.9 %
10 VIAL (ML) INJECTION PRN
Status: CANCELLED | OUTPATIENT
Start: 2023-07-11

## 2023-07-06 RX ORDER — METHYLPREDNISOLONE SODIUM SUCCINATE 125 MG/2ML
125 INJECTION, POWDER, LYOPHILIZED, FOR SOLUTION INTRAMUSCULAR; INTRAVENOUS PRN
Status: CANCELLED | OUTPATIENT
Start: 2023-07-12

## 2023-07-06 RX ORDER — 0.9 % SODIUM CHLORIDE 0.9 %
VIAL (ML) INJECTION PRN
Status: CANCELLED | OUTPATIENT
Start: 2023-07-11

## 2023-07-06 RX ORDER — EPINEPHRINE 1 MG/ML(1)
0.5 AMPUL (ML) INJECTION PRN
Status: CANCELLED | OUTPATIENT
Start: 2023-07-12

## 2023-07-06 RX ORDER — ONDANSETRON 2 MG/ML
4 INJECTION INTRAMUSCULAR; INTRAVENOUS PRN
Status: CANCELLED | OUTPATIENT
Start: 2023-07-12

## 2023-07-09 NOTE — PROGRESS NOTES
"Pharmacy Chemotherapy Verification    Patient Name: Yolanda Roe      Dx: Metastatic Breast CA (ER/AR/HER2 negative) IIb       Protocol: Pembrolizumab + PACLitaxel + CARBOplatin (x 4 cycles) followed by  Pembrolizumab + Cyclophosphamide + DOXOrubicin   (x4 cycles)  Pembrolizumab 200 mg IV over 30 minutes on Day 1  PACLitaxel 80 mg/m2 IV over 60 minutes on Days 1, 8 and 15  CARBOplatin AUC 1.5 IV over 30 minutes Days 1, 8, and 15  Repeat every 21 days x 4 cycles- completed 6/28/23  ~followed by~  Pembrolizumab 200 mg IV over 30 minutes on Day 1  Cyclophosphamide 600 mg/m2 IV over 30 minutes on Day 1  DOXOrubicin 60 mg/m2 IV push on Day 1  Repeat every 21 days for 4 cycles  NCCN Guidelines for Breast Cancer V.2.2022  Alexis P, et al N Engl J Med 2020; 382(1) 540-559    Allergies:  Patient has no known allergies.      /77   Pulse 95   Temp 36.4 °C (97.5 °F) (Temporal)   Resp 16   Ht 1.6 m (5' 2.99\")   Wt 84.1 kg (185 lb 6.5 oz)   SpO2 96%   BMI 32.85 kg/m²  Body surface area is 1.93 meters squared.    All lab results 7/12/23 within treatment parameters.     Drug Order   (Drug name, dose, route, IV Fluid & volume, frequency, number of doses) Cycle 5 Day 1  Previous treatment: C4D15 6/28/23     Medication = Pembrolizumab  Base Dose = 200 mg   Fixed dose; no calculation required  Final Dose = 200mg  Route = IV  Fluid & Volume = NS 50 mL  Admin Duration = Over 30 minutes         <10% difference, okay to treat with final dose       Medication = cyclophosphamide  Base Dose = 600mg/m²  Calc Dose: Base Dose x 1.93m² = 1158mg  Final Dose = 1200mg  Route = IV  Fluid & Volume =  mL  Admin Duration = Over 30-60min          <10% difference, okay to treat with final dose     Medication = doxorubicin  Base Dose = 60mg/m2  Calc Dose: base dose x 1.93m2 = 115.8mg  Final Dose = 120mg  Route = IV  Fluid & Volume = 60mL(2mg/mL)  Admin Duration = Over 20minutes          <10% difference, okay to treat with final " dose       By my signature below, I confirm this process was performed independently with the BSA and all final chemotherapy dosing calculations congruent. I have reviewed the above chemotherapy order and that my calculation of the final dose and BSA (when applicable) corroborate those calculations of the  pharmacist.     Garrett Cadena, PharmD

## 2023-07-12 ENCOUNTER — OUTPATIENT INFUSION SERVICES (OUTPATIENT)
Dept: ONCOLOGY | Facility: MEDICAL CENTER | Age: 58
End: 2023-07-12
Attending: INTERNAL MEDICINE
Payer: COMMERCIAL

## 2023-07-12 VITALS
SYSTOLIC BLOOD PRESSURE: 132 MMHG | BODY MASS INDEX: 32.85 KG/M2 | RESPIRATION RATE: 16 BRPM | HEART RATE: 95 BPM | OXYGEN SATURATION: 96 % | HEIGHT: 63 IN | TEMPERATURE: 97.5 F | DIASTOLIC BLOOD PRESSURE: 77 MMHG | WEIGHT: 185.41 LBS

## 2023-07-12 DIAGNOSIS — Z17.1 MALIGNANT NEOPLASM OF OVERLAPPING SITES OF LEFT BREAST IN FEMALE, ESTROGEN RECEPTOR NEGATIVE (HCC): ICD-10-CM

## 2023-07-12 DIAGNOSIS — C50.812 MALIGNANT NEOPLASM OF OVERLAPPING SITES OF LEFT BREAST IN FEMALE, ESTROGEN RECEPTOR NEGATIVE (HCC): ICD-10-CM

## 2023-07-12 LAB
ALBUMIN SERPL BCP-MCNC: 3.8 G/DL (ref 3.2–4.9)
ALBUMIN/GLOB SERPL: 1.5 G/DL
ALP SERPL-CCNC: 167 U/L (ref 30–99)
ALT SERPL-CCNC: 36 U/L (ref 2–50)
ANION GAP SERPL CALC-SCNC: 10 MMOL/L (ref 7–16)
AST SERPL-CCNC: 30 U/L (ref 12–45)
BASOPHILS # BLD AUTO: 0.2 % (ref 0–1.8)
BASOPHILS # BLD: 0.01 K/UL (ref 0–0.12)
BILIRUB SERPL-MCNC: 0.2 MG/DL (ref 0.1–1.5)
BUN SERPL-MCNC: 10 MG/DL (ref 8–22)
CALCIUM ALBUM COR SERPL-MCNC: 8.6 MG/DL (ref 8.5–10.5)
CALCIUM SERPL-MCNC: 8.4 MG/DL (ref 8.5–10.5)
CHLORIDE SERPL-SCNC: 106 MMOL/L (ref 96–112)
CO2 SERPL-SCNC: 23 MMOL/L (ref 20–33)
CREAT SERPL-MCNC: 0.41 MG/DL (ref 0.5–1.4)
EOSINOPHIL # BLD AUTO: 0.06 K/UL (ref 0–0.51)
EOSINOPHIL NFR BLD: 1.4 % (ref 0–6.9)
ERYTHROCYTE [DISTWIDTH] IN BLOOD BY AUTOMATED COUNT: 63.9 FL (ref 35.9–50)
GFR SERPLBLD CREATININE-BSD FMLA CKD-EPI: 113 ML/MIN/1.73 M 2
GLOBULIN SER CALC-MCNC: 2.6 G/DL (ref 1.9–3.5)
GLUCOSE SERPL-MCNC: 152 MG/DL (ref 65–99)
HCT VFR BLD AUTO: 26.1 % (ref 37–47)
HGB BLD-MCNC: 8.8 G/DL (ref 12–16)
IMM GRANULOCYTES # BLD AUTO: 0.01 K/UL (ref 0–0.11)
IMM GRANULOCYTES NFR BLD AUTO: 0.2 % (ref 0–0.9)
LYMPHOCYTES # BLD AUTO: 1.59 K/UL (ref 1–4.8)
LYMPHOCYTES NFR BLD: 36.6 % (ref 22–41)
MCH RBC QN AUTO: 37.1 PG (ref 27–33)
MCHC RBC AUTO-ENTMCNC: 33.7 G/DL (ref 32.2–35.5)
MCV RBC AUTO: 110.1 FL (ref 81.4–97.8)
MONOCYTES # BLD AUTO: 0.3 K/UL (ref 0–0.85)
MONOCYTES NFR BLD AUTO: 6.9 % (ref 0–13.4)
NEUTROPHILS # BLD AUTO: 2.37 K/UL (ref 1.82–7.42)
NEUTROPHILS NFR BLD: 54.7 % (ref 44–72)
NRBC # BLD AUTO: 0 K/UL
NRBC BLD-RTO: 0 /100 WBC (ref 0–0.2)
OUTPT INFUS CBC COMMENT OICOM: ABNORMAL
PLATELET # BLD AUTO: 168 K/UL (ref 164–446)
PMV BLD AUTO: 9.5 FL (ref 9–12.9)
POTASSIUM SERPL-SCNC: 3.9 MMOL/L (ref 3.6–5.5)
PROT SERPL-MCNC: 6.4 G/DL (ref 6–8.2)
RBC # BLD AUTO: 2.37 M/UL (ref 4.2–5.4)
SODIUM SERPL-SCNC: 139 MMOL/L (ref 135–145)
T4 FREE SERPL-MCNC: 0.96 NG/DL (ref 0.93–1.7)
TSH SERPL DL<=0.005 MIU/L-ACNC: 0.87 UIU/ML (ref 0.38–5.33)
WBC # BLD AUTO: 4.3 K/UL (ref 4.8–10.8)

## 2023-07-12 PROCEDURE — 700111 HCHG RX REV CODE 636 W/ 250 OVERRIDE (IP): Mod: UD | Performed by: INTERNAL MEDICINE

## 2023-07-12 PROCEDURE — 700105 HCHG RX REV CODE 258: Mod: UD | Performed by: INTERNAL MEDICINE

## 2023-07-12 PROCEDURE — 85025 COMPLETE CBC W/AUTO DIFF WBC: CPT

## 2023-07-12 PROCEDURE — A4212 NON CORING NEEDLE OR STYLET: HCPCS

## 2023-07-12 PROCEDURE — 84439 ASSAY OF FREE THYROXINE: CPT

## 2023-07-12 PROCEDURE — 96417 CHEMO IV INFUS EACH ADDL SEQ: CPT

## 2023-07-12 PROCEDURE — 96367 TX/PROPH/DG ADDL SEQ IV INF: CPT

## 2023-07-12 PROCEDURE — 84443 ASSAY THYROID STIM HORMONE: CPT

## 2023-07-12 PROCEDURE — 96413 CHEMO IV INFUSION 1 HR: CPT

## 2023-07-12 PROCEDURE — 36593 DECLOT VASCULAR DEVICE: CPT

## 2023-07-12 PROCEDURE — 96361 HYDRATE IV INFUSION ADD-ON: CPT

## 2023-07-12 PROCEDURE — 80053 COMPREHEN METABOLIC PANEL: CPT

## 2023-07-12 PROCEDURE — 96375 TX/PRO/DX INJ NEW DRUG ADDON: CPT

## 2023-07-12 RX ORDER — SODIUM CHLORIDE 9 MG/ML
1000 INJECTION, SOLUTION INTRAVENOUS ONCE
Status: COMPLETED | OUTPATIENT
Start: 2023-07-12 | End: 2023-07-12

## 2023-07-12 RX ADMIN — FOSAPREPITANT 150 MG: 150 INJECTION, POWDER, LYOPHILIZED, FOR SOLUTION INTRAVENOUS at 10:07

## 2023-07-12 RX ADMIN — SODIUM CHLORIDE 1000 ML: 9 INJECTION, SOLUTION INTRAVENOUS at 08:23

## 2023-07-12 RX ADMIN — SODIUM CHLORIDE 200 MG: 9 INJECTION, SOLUTION INTRAVENOUS at 10:51

## 2023-07-12 RX ADMIN — CYCLOPHOSPHAMIDE 1200 MG: 200 INJECTION, SOLUTION INTRAVENOUS at 12:13

## 2023-07-12 RX ADMIN — DOXORUBICIN HYDROCHLORIDE 120 MG: 2 INJECTION, SOLUTION INTRAVENOUS at 11:39

## 2023-07-12 RX ADMIN — ALTEPLASE 2 MG: 2.2 INJECTION, POWDER, LYOPHILIZED, FOR SOLUTION INTRAVENOUS at 07:44

## 2023-07-12 RX ADMIN — HEPARIN 500 UNITS: 100 SYRINGE at 13:52

## 2023-07-12 RX ADMIN — ONDANSETRON 16 MG: 2 INJECTION INTRAMUSCULAR; INTRAVENOUS at 09:46

## 2023-07-12 RX ADMIN — DEXAMETHASONE SODIUM PHOSPHATE 12 MG: 4 INJECTION, SOLUTION INTRA-ARTICULAR; INTRALESIONAL; INTRAMUSCULAR; INTRAVENOUS; SOFT TISSUE at 09:31

## 2023-07-12 ASSESSMENT — FIBROSIS 4 INDEX: FIB4 SCORE: 1.25

## 2023-07-12 NOTE — PROGRESS NOTES
Chemotherapy Verification - PRIMARY RN      Height = 1.6 m  Weight = 84.1 kg  BSA = 1.93 m^2       Medication: pembrolizumab  Dose: 200 mg (set dose)  Calculated Dose: 200 mg (set dose)                             (In mg/m2, AUC, mg/kg)     Medication: doxorubicin  Dose: 60 mg/m^2  Calculated Dose: 115.8 mg                             (In mg/m2, AUC, mg/kg)    Medication: cyclophosphamide  Dose: 600 mg/m^2  Calculated Dose: 1158 mg                             (In mg/m2, AUC, mg/kg)    I confirm this process was performed independently with the BSA and all final chemotherapy dosing calculations congruent.  Any discrepancies of 10% or greater have been addressed with the chemotherapy pharmacist. The resolution of the discrepancy has been documented in the EPIC progress notes.

## 2023-07-12 NOTE — PROGRESS NOTES
Pt presents to [facility] for first new cycle of pembrolizumab, doxorubicin, and cyclophosphamide. R chest port accessed using sterile technique; no blood return observed and pt tolerated flushing well. Alteplase instilled and brisk blood return observed after approximately 30 minutes. Labs drawn and within treatable parameters. Pre meds administered and pembrolizumab, doxorubicin, and cyclophosphamide with pre hydration infused with no s/s of adverse reactions. Brisk blood return observed from port before flushed, heparin locked, and de-accessed; gauze and tape dressing applied. Next appointment confirmed and education provided. Pt discharged to self care with all personal belongings and in NAD.

## 2023-07-12 NOTE — PROGRESS NOTES
"Pharmacy Chemotherapy Verification    DX: triple negative breast cancer    Cycle 5 Day 1  Previous treatment = 6/28/23    Regimen: pembrolizumab + PACLitaxel/CARBOplatin --> pembrolizumab + cyclophosphamide/DOOXrubicin or epirubicin --> Pembrolizmab  Pembrolizumab 200 mg IV over 30 min on day 1  PACLitaxel 80 mg/m2 IV over 60 min on days 1,8,15  CARBOplatin AUC 1.5 IV over 30 min on day 1,8,15  Q21 days x 4 cycles  Followed by  Pembrolizumab 200 mg IV over 30 min on day 1  Cyclophosphamide 600 mg/m2 IV over 30 min on day 1  DOXOrubicin 60 mg/m2 IVP on day 1 OR epirubicin 90 mg/m2 IVP on day 1  Q21 days x 4 cycles  Followed by  Pembrolizumab 200mg IV over 30 min on day 1  Q21 days x 9 cycles  NCCN Guidelines for Breast Cancer V.2.2022  Alexis P, et al- N Engl J Med. 2020 Feb 27;382(4):810-821.     Allergies:Patient has no known allergies.  /77   Pulse 95   Temp 36.4 °C (97.5 °F) (Temporal)   Resp 16   Ht 1.6 m (5' 2.99\")   Wt 84.1 kg (185 lb 6.5 oz)   SpO2 96%   BMI 32.85 kg/m²   Body surface area is 1.93 meters squared.    Labs 7/12/23  ANC~2370 Hgb 8.8 Plt 168k  SCr 0.41 CrCl 114.5 mL/min   AST/ALT/AP = 30/36/167 Tbili = 0.2  Free T4 0.96 TSH in process    ECHO 3/16/23 ([facility])  LVEF ~60%    Pembrolizumab 200 mg fixed dose, no calculation required  OK to treat with final dose = 200 mg IV on Day 1    DOXOrubicin 60 mg/m2 x 1.93 m2 = 115.8 mg   <10% difference, OK to treat with final dose = 120 mg IV    Cyclophosphamide 600 mg/m2 x 1.93 m2 = 1158 mg   <10% difference, OK to treat with final dose = 1200 mg IV    Jessica Jenkins, PharmD, BCOP  "

## 2023-07-12 NOTE — PROGRESS NOTES
Chemotherapy Verification - SECONDARY RN       Height = 160 cm  Weight = 84.1 kg  BSA = 1.93 m2       Medication: Keytruda  Dose: 200 mg set dose  Calculated Dose: 200 mg                             (In mg/m2, AUC, mg/kg)     Medication: Doxorubicin  Dose: 60 mg/m2  Calculated Dose: 115.8 mg                             (In mg/m2, AUC, mg/kg)    Medication: cyclophosphamide  Dose: 600 mg/m2  Calculated Dose: 1158 mg                             (In mg/m2, AUC, mg/kg)    I confirm that this process was performed independently.

## 2023-07-13 ENCOUNTER — OUTPATIENT INFUSION SERVICES (OUTPATIENT)
Dept: ONCOLOGY | Facility: MEDICAL CENTER | Age: 58
End: 2023-07-13
Attending: INTERNAL MEDICINE
Payer: COMMERCIAL

## 2023-07-13 VITALS
RESPIRATION RATE: 18 BRPM | BODY MASS INDEX: 33.59 KG/M2 | TEMPERATURE: 97.7 F | DIASTOLIC BLOOD PRESSURE: 82 MMHG | WEIGHT: 189.6 LBS | SYSTOLIC BLOOD PRESSURE: 152 MMHG | HEART RATE: 98 BPM | OXYGEN SATURATION: 97 % | HEIGHT: 63 IN

## 2023-07-13 DIAGNOSIS — Z17.1 MALIGNANT NEOPLASM OF OVERLAPPING SITES OF LEFT BREAST IN FEMALE, ESTROGEN RECEPTOR NEGATIVE (HCC): ICD-10-CM

## 2023-07-13 DIAGNOSIS — C50.812 MALIGNANT NEOPLASM OF OVERLAPPING SITES OF LEFT BREAST IN FEMALE, ESTROGEN RECEPTOR NEGATIVE (HCC): ICD-10-CM

## 2023-07-13 PROCEDURE — 96372 THER/PROPH/DIAG INJ SC/IM: CPT

## 2023-07-13 PROCEDURE — 700111 HCHG RX REV CODE 636 W/ 250 OVERRIDE (IP)

## 2023-07-13 PROCEDURE — 700111 HCHG RX REV CODE 636 W/ 250 OVERRIDE (IP): Mod: UD | Performed by: NURSE PRACTITIONER

## 2023-07-13 PROCEDURE — 700105 HCHG RX REV CODE 258: Mod: UD

## 2023-07-13 RX ADMIN — PEGFILGRASTIM-CBQV 6 MG: 6 INJECTION, SOLUTION SUBCUTANEOUS at 17:23

## 2023-07-13 ASSESSMENT — FIBROSIS 4 INDEX: FIB4 SCORE: 1.76

## 2023-07-14 ENCOUNTER — APPOINTMENT (OUTPATIENT)
Dept: ONCOLOGY | Facility: MEDICAL CENTER | Age: 58
End: 2023-07-14
Attending: INTERNAL MEDICINE
Payer: COMMERCIAL

## 2023-07-14 NOTE — PROGRESS NOTES
[facility]olanda amb[facility] for Udenyca. Yolanda completed last cycle of chemo 7/12/23 @ 1326. POC reviewed, medication drug info reviewed/ handout provided. Pt afebrile and denied S/S of infection at this time. Udenyca given as prescribed in back of L arm, band-aid applied.  RN reviewed neutropenic precautions, and S/S of when to follow up with their MD or the ED. Education done regarding possible side effect of bone pain, Pt verbalized understanding. Future appointments confirmed prior to leaving and Yolanda left department appearing in good spirits and NAD.

## 2023-07-20 ENCOUNTER — APPOINTMENT (OUTPATIENT)
Dept: ONCOLOGY | Facility: MEDICAL CENTER | Age: 58
End: 2023-07-20
Attending: INTERNAL MEDICINE
Payer: COMMERCIAL

## 2023-07-21 ENCOUNTER — APPOINTMENT (OUTPATIENT)
Dept: ONCOLOGY | Facility: MEDICAL CENTER | Age: 58
End: 2023-07-21
Attending: INTERNAL MEDICINE
Payer: COMMERCIAL

## 2023-07-27 ENCOUNTER — APPOINTMENT (OUTPATIENT)
Dept: ONCOLOGY | Facility: MEDICAL CENTER | Age: 58
End: 2023-07-27
Attending: INTERNAL MEDICINE
Payer: COMMERCIAL

## 2023-07-27 ENCOUNTER — PATIENT OUTREACH (OUTPATIENT)
Dept: ONCOLOGY | Facility: MEDICAL CENTER | Age: 58
End: 2023-07-27

## 2023-07-27 DIAGNOSIS — Z17.0 MALIGNANT NEOPLASM OF UPPER-OUTER QUADRANT OF LEFT BREAST IN FEMALE, ESTROGEN RECEPTOR POSITIVE (HCC): ICD-10-CM

## 2023-07-27 DIAGNOSIS — C50.412 MALIGNANT NEOPLASM OF UPPER-OUTER QUADRANT OF LEFT BREAST IN FEMALE, ESTROGEN RECEPTOR POSITIVE (HCC): ICD-10-CM

## 2023-07-27 NOTE — PROGRESS NOTES
"Oncology Nurse Navigation.    07/27/23 @ 1200.  Nurse Navigator (ELLEN) received information from  our Financial Resource Advocate ,Jose A, that the patient is loosing her Medicaid insurance on July 31, 2021.  She had qualified under the COVID relief program, but that has ended.  This week she received a letter from [place] Medicaid stating her insurance would not be renewed.    07/27/23 @ 1213.  Nurse Navigator called [facility] ([facility]) @  [phone] and spoke with Neli.  NN explained the patient's situation & Neli shared patient should call the same phone number and state she is looking for cancer Medicaid [facility]    07/27/23 @ 1215.  Nurse Navigator called the patient who shared her current situation related to the Medicaid insurance renewal being denied.  She also shared she continues to have transportation issues.  Patient continue to work full time at [employer] as a .  Her days off are Wednesday & Thursday and she attempts to schedule her oncology appointments on those days.  She shared she has no vehicle, her two daughters live out of the [place] area and are not able to assist her and, her neighbors are only able to assist her \"sometimes, because they work\".   Patient shared she has tried multiple times to contact [facility] without success.  She states \"I call the number, but it is very confusing\".  NN explained the process with [facility] and [facility] ([facility]).  NN provided the patient with the number shown above.  Patient confirmed she has NN contact information & will reach out for further assistance.       7/27/23 @ 1232.   NN sent a Teams message to our FRA, Jose A, with an update on the patient's insurance.    7/27 @ 1250.  Patient call NN and updated she spoke with Neli at [facility] who asked her some questions.  Neli is sending an email to French Rudolph at [facility] regarding " patient's situation.  Neli told patient to call Access to Healthcare back if patient did not hear from [facility] by Monday.  NN encouraged patient to stay positive.  Patient has a New Patient Consultation visit with Dr. Monahan on 7/31/23.  She asked about cancelling.  NN encouraged her to keep the appointment at this time, that we will try to avoid any delays in care.  Patient verbalized understanding and will contact NN for any future questions or concerns.

## 2023-07-28 ENCOUNTER — APPOINTMENT (OUTPATIENT)
Dept: ONCOLOGY | Facility: MEDICAL CENTER | Age: 58
End: 2023-07-28
Attending: INTERNAL MEDICINE
Payer: COMMERCIAL

## 2023-07-30 RX ORDER — DIPHENHYDRAMINE HYDROCHLORIDE 50 MG/ML
50 INJECTION INTRAMUSCULAR; INTRAVENOUS PRN
Status: CANCELLED | OUTPATIENT
Start: 2023-08-03

## 2023-07-30 RX ORDER — METHYLPREDNISOLONE SODIUM SUCCINATE 125 MG/2ML
125 INJECTION, POWDER, LYOPHILIZED, FOR SOLUTION INTRAMUSCULAR; INTRAVENOUS PRN
Status: CANCELLED | OUTPATIENT
Start: 2023-08-03

## 2023-07-30 RX ORDER — EPINEPHRINE 1 MG/ML(1)
0.5 AMPUL (ML) INJECTION PRN
Status: CANCELLED | OUTPATIENT
Start: 2023-08-03

## 2023-07-30 RX ORDER — ONDANSETRON 2 MG/ML
4 INJECTION INTRAMUSCULAR; INTRAVENOUS PRN
Status: CANCELLED | OUTPATIENT
Start: 2023-08-03

## 2023-07-30 RX ORDER — 0.9 % SODIUM CHLORIDE 0.9 %
10 VIAL (ML) INJECTION PRN
Status: CANCELLED | OUTPATIENT
Start: 2023-08-03

## 2023-07-30 RX ORDER — HEPARIN SODIUM (PORCINE) LOCK FLUSH IV SOLN 100 UNIT/ML 100 UNIT/ML
500 SOLUTION INTRAVENOUS PRN
Status: CANCELLED | OUTPATIENT
Start: 2023-08-01

## 2023-07-30 RX ORDER — 0.9 % SODIUM CHLORIDE 0.9 %
10 VIAL (ML) INJECTION PRN
Status: CANCELLED | OUTPATIENT
Start: 2023-08-01

## 2023-07-30 RX ORDER — SODIUM CHLORIDE 9 MG/ML
INJECTION, SOLUTION INTRAVENOUS CONTINUOUS
Status: CANCELLED | OUTPATIENT
Start: 2023-08-03

## 2023-07-30 RX ORDER — HEPARIN SODIUM (PORCINE) LOCK FLUSH IV SOLN 100 UNIT/ML 100 UNIT/ML
500 SOLUTION INTRAVENOUS PRN
Status: CANCELLED | OUTPATIENT
Start: 2023-08-03

## 2023-07-30 RX ORDER — 0.9 % SODIUM CHLORIDE 0.9 %
3 VIAL (ML) INJECTION PRN
Status: CANCELLED | OUTPATIENT
Start: 2023-08-01

## 2023-07-30 RX ORDER — 0.9 % SODIUM CHLORIDE 0.9 %
3 VIAL (ML) INJECTION PRN
Status: CANCELLED | OUTPATIENT
Start: 2023-08-03

## 2023-07-30 RX ORDER — ONDANSETRON 8 MG/1
8 TABLET, ORALLY DISINTEGRATING ORAL PRN
Status: CANCELLED | OUTPATIENT
Start: 2023-08-03

## 2023-07-30 RX ORDER — 0.9 % SODIUM CHLORIDE 0.9 %
VIAL (ML) INJECTION PRN
Status: CANCELLED | OUTPATIENT
Start: 2023-08-03

## 2023-07-30 RX ORDER — SODIUM CHLORIDE 9 MG/ML
1000 INJECTION, SOLUTION INTRAVENOUS ONCE
Status: CANCELLED | OUTPATIENT
Start: 2023-08-03

## 2023-07-30 RX ORDER — 0.9 % SODIUM CHLORIDE 0.9 %
VIAL (ML) INJECTION PRN
Status: CANCELLED | OUTPATIENT
Start: 2023-08-01

## 2023-07-30 RX ORDER — PROCHLORPERAZINE MALEATE 10 MG
10 TABLET ORAL EVERY 6 HOURS PRN
Status: CANCELLED | OUTPATIENT
Start: 2023-08-03

## 2023-08-01 NOTE — PROGRESS NOTES
"Pharmacy Chemotherapy Verification    DX: triple negative breast cancer    Cycle 6 total (course 2, cycle 1)  Previous treatment = 7/12/23    Regimen: pembrolizumab + PACLitaxel/CARBOplatin --> pembrolizumab + cyclophosphamide/DOOXrubicin or epirubicin --> Pembrolizmab  Pembrolizumab 200 mg IV over 30 min on day 1  PACLitaxel 80 mg/m2 IV over 60 min on days 1,8,15  CARBOplatin AUC 1.5 IV over 30 min on day 1,8,15  Q21 days x 4 cycles  Followed by  Pembrolizumab 200 mg IV over 30 min on day 1  Cyclophosphamide 600 mg/m2 IV over 30 min on day 1  *8/2/23- dose reduced by 20% to 480mg/m2 for cytopenias  DOXOrubicin 60 mg/m2 IVP on day 1 OR epirubicin 90 mg/m2 IVP on day 1  *8/2/23- dose reduced by 20% to 48mg/m2 for cytopenias  Q21 days x 4 cycles  Followed by  Pembrolizumab 200mg IV over 30 min on day 1  Q21 days x 9 cycles  NCCN Guidelines for Breast Cancer V.2.2022  Alexis P, et al- N Engl J Med. 2020 Feb 27;382(5):810-821.     Allergies:Patient has no known allergies.  /76   Pulse 89   Temp 36.2 °C (97.2 °F) (Temporal)   Resp 18   Ht 1.61 m (5' 3.39\") Comment: Shoes off  Wt 83.2 kg (183 lb 6.8 oz)   SpO2 94%   BMI 32.10 kg/m²   Body surface area is 1.93 meters squared.    All lab results 8/2/23 within treatment parameters. Potassium to be replaced per protocol.      ECHO 3/16/23 ([facility])  LVEF ~60%    Pembrolizumab 200 mg fixed dose, no calculation required  OK to treat with final dose = 200 mg IV     DOXOrubicin 48mg/m2 x 1.93m2 = 92.6mg   <10% difference, OK to treat with final dose = 96mg IV    Cyclophosphamide 480mg/m2 x 1.93m2 = 926mg   <10% difference, OK to treat with final dose = 960mg IV    Garrett Cadena, PharmD  "

## 2023-08-02 ENCOUNTER — OUTPATIENT INFUSION SERVICES (OUTPATIENT)
Dept: ONCOLOGY | Facility: MEDICAL CENTER | Age: 58
End: 2023-08-02
Attending: INTERNAL MEDICINE
Payer: COMMERCIAL

## 2023-08-02 VITALS
BODY MASS INDEX: 32.5 KG/M2 | WEIGHT: 183.42 LBS | TEMPERATURE: 97.2 F | SYSTOLIC BLOOD PRESSURE: 131 MMHG | OXYGEN SATURATION: 94 % | HEART RATE: 89 BPM | DIASTOLIC BLOOD PRESSURE: 76 MMHG | HEIGHT: 63 IN | RESPIRATION RATE: 18 BRPM

## 2023-08-02 DIAGNOSIS — Z17.1 MALIGNANT NEOPLASM OF OVERLAPPING SITES OF LEFT BREAST IN FEMALE, ESTROGEN RECEPTOR NEGATIVE (HCC): ICD-10-CM

## 2023-08-02 DIAGNOSIS — C50.812 MALIGNANT NEOPLASM OF OVERLAPPING SITES OF LEFT BREAST IN FEMALE, ESTROGEN RECEPTOR NEGATIVE (HCC): ICD-10-CM

## 2023-08-02 LAB
ALBUMIN SERPL BCP-MCNC: 3.8 G/DL (ref 3.2–4.9)
ALBUMIN/GLOB SERPL: 1.5 G/DL
ALP SERPL-CCNC: 123 U/L (ref 30–99)
ALT SERPL-CCNC: 24 U/L (ref 2–50)
ANION GAP SERPL CALC-SCNC: 8 MMOL/L (ref 7–16)
AST SERPL-CCNC: 23 U/L (ref 12–45)
BASOPHILS # BLD AUTO: 0.4 % (ref 0–1.8)
BASOPHILS # BLD: 0.02 K/UL (ref 0–0.12)
BILIRUB SERPL-MCNC: 0.2 MG/DL (ref 0.1–1.5)
BUN SERPL-MCNC: 7 MG/DL (ref 8–22)
CALCIUM ALBUM COR SERPL-MCNC: 8.9 MG/DL (ref 8.5–10.5)
CALCIUM SERPL-MCNC: 8.7 MG/DL (ref 8.5–10.5)
CHLORIDE SERPL-SCNC: 104 MMOL/L (ref 96–112)
CO2 SERPL-SCNC: 27 MMOL/L (ref 20–33)
CREAT SERPL-MCNC: 0.39 MG/DL (ref 0.5–1.4)
EOSINOPHIL # BLD AUTO: 0.01 K/UL (ref 0–0.51)
EOSINOPHIL NFR BLD: 0.2 % (ref 0–6.9)
ERYTHROCYTE [DISTWIDTH] IN BLOOD BY AUTOMATED COUNT: 59.5 FL (ref 35.9–50)
GFR SERPLBLD CREATININE-BSD FMLA CKD-EPI: 114 ML/MIN/1.73 M 2
GLOBULIN SER CALC-MCNC: 2.5 G/DL (ref 1.9–3.5)
GLUCOSE SERPL-MCNC: 115 MG/DL (ref 65–99)
HCT VFR BLD AUTO: 25.8 % (ref 37–47)
HGB BLD-MCNC: 8.7 G/DL (ref 12–16)
IMM GRANULOCYTES # BLD AUTO: 0.05 K/UL (ref 0–0.11)
IMM GRANULOCYTES NFR BLD AUTO: 0.9 % (ref 0–0.9)
LYMPHOCYTES # BLD AUTO: 1.8 K/UL (ref 1–4.8)
LYMPHOCYTES NFR BLD: 33.1 % (ref 22–41)
MCH RBC QN AUTO: 36.4 PG (ref 27–33)
MCHC RBC AUTO-ENTMCNC: 33.7 G/DL (ref 32.2–35.5)
MCV RBC AUTO: 107.9 FL (ref 81.4–97.8)
MONOCYTES # BLD AUTO: 0.47 K/UL (ref 0–0.85)
MONOCYTES NFR BLD AUTO: 8.6 % (ref 0–13.4)
NEUTROPHILS # BLD AUTO: 3.09 K/UL (ref 1.82–7.42)
NEUTROPHILS NFR BLD: 56.8 % (ref 44–72)
NRBC # BLD AUTO: 0 K/UL
NRBC BLD-RTO: 0 /100 WBC (ref 0–0.2)
OUTPT INFUS CBC COMMENT OICOM: ABNORMAL
PLATELET # BLD AUTO: 353 K/UL (ref 164–446)
PMV BLD AUTO: 9.5 FL (ref 9–12.9)
POTASSIUM SERPL-SCNC: 3.2 MMOL/L (ref 3.6–5.5)
PROT SERPL-MCNC: 6.3 G/DL (ref 6–8.2)
RBC # BLD AUTO: 2.39 M/UL (ref 4.2–5.4)
SODIUM SERPL-SCNC: 139 MMOL/L (ref 135–145)
T4 FREE SERPL-MCNC: 1.03 NG/DL (ref 0.93–1.7)
TSH SERPL DL<=0.005 MIU/L-ACNC: 0.87 UIU/ML (ref 0.38–5.33)
WBC # BLD AUTO: 5.4 K/UL (ref 4.8–10.8)

## 2023-08-02 PROCEDURE — 96417 CHEMO IV INFUS EACH ADDL SEQ: CPT

## 2023-08-02 PROCEDURE — 80053 COMPREHEN METABOLIC PANEL: CPT

## 2023-08-02 PROCEDURE — 85025 COMPLETE CBC W/AUTO DIFF WBC: CPT

## 2023-08-02 PROCEDURE — 84439 ASSAY OF FREE THYROXINE: CPT

## 2023-08-02 PROCEDURE — 700102 HCHG RX REV CODE 250 W/ 637 OVERRIDE(OP): Mod: JZ,UD | Performed by: INTERNAL MEDICINE

## 2023-08-02 PROCEDURE — 96413 CHEMO IV INFUSION 1 HR: CPT

## 2023-08-02 PROCEDURE — 700111 HCHG RX REV CODE 636 W/ 250 OVERRIDE (IP): Performed by: INTERNAL MEDICINE

## 2023-08-02 PROCEDURE — 96361 HYDRATE IV INFUSION ADD-ON: CPT

## 2023-08-02 PROCEDURE — 84443 ASSAY THYROID STIM HORMONE: CPT

## 2023-08-02 PROCEDURE — A9270 NON-COVERED ITEM OR SERVICE: HCPCS | Mod: JZ,UD | Performed by: INTERNAL MEDICINE

## 2023-08-02 PROCEDURE — 700105 HCHG RX REV CODE 258: Mod: JZ,UD | Performed by: INTERNAL MEDICINE

## 2023-08-02 PROCEDURE — 96375 TX/PRO/DX INJ NEW DRUG ADDON: CPT

## 2023-08-02 PROCEDURE — A4212 NON CORING NEEDLE OR STYLET: HCPCS

## 2023-08-02 PROCEDURE — 96367 TX/PROPH/DG ADDL SEQ IV INF: CPT

## 2023-08-02 RX ORDER — POTASSIUM CHLORIDE 20 MEQ/1
40 TABLET, EXTENDED RELEASE ORAL ONCE
Status: COMPLETED | OUTPATIENT
Start: 2023-08-02 | End: 2023-08-02

## 2023-08-02 RX ORDER — SODIUM CHLORIDE 9 MG/ML
1000 INJECTION, SOLUTION INTRAVENOUS ONCE
Status: COMPLETED | OUTPATIENT
Start: 2023-08-02 | End: 2023-08-02

## 2023-08-02 RX ADMIN — CYCLOPHOSPHAMIDE 960 MG: 200 INJECTION, SOLUTION INTRAVENOUS at 13:22

## 2023-08-02 RX ADMIN — SODIUM CHLORIDE 200 MG: 9 INJECTION, SOLUTION INTRAVENOUS at 11:46

## 2023-08-02 RX ADMIN — HEPARIN 500 UNITS: 100 SYRINGE at 14:43

## 2023-08-02 RX ADMIN — FOSAPREPITANT 150 MG: 150 INJECTION, POWDER, LYOPHILIZED, FOR SOLUTION INTRAVENOUS at 11:10

## 2023-08-02 RX ADMIN — POTASSIUM CHLORIDE 40 MEQ: 1500 TABLET, EXTENDED RELEASE ORAL at 10:28

## 2023-08-02 RX ADMIN — SODIUM CHLORIDE 1000 ML: 9 INJECTION, SOLUTION INTRAVENOUS at 09:40

## 2023-08-02 RX ADMIN — ONDANSETRON 16 MG: 2 INJECTION INTRAMUSCULAR; INTRAVENOUS at 10:55

## 2023-08-02 RX ADMIN — DOXORUBICIN HYDROCHLORIDE 96 MG: 2 INJECTION, SOLUTION INTRAVENOUS at 12:26

## 2023-08-02 RX ADMIN — DEXAMETHASONE SODIUM PHOSPHATE 12 MG: 4 INJECTION, SOLUTION INTRA-ARTICULAR; INTRALESIONAL; INTRAMUSCULAR; INTRAVENOUS; SOFT TISSUE at 10:28

## 2023-08-02 ASSESSMENT — FIBROSIS 4 INDEX: FIB4 SCORE: 1.76

## 2023-08-02 NOTE — PROGRESS NOTES
"Pharmacy Chemotherapy Verification    Patient Name: Yolanda Roe      Dx: Metastatic Breast CA (ER/LA/HER2 negative) IIb       Protocol: Pembrolizumab + PACLitaxel + CARBOplatin (x 4 cycles) followed by  Pembrolizumab + Cyclophosphamide + DOXOrubicin   (x4 cycles)  Pembrolizumab 200 mg IV over 30 minutes on Day 1  PACLitaxel 80 mg/m2 IV over 60 minutes on Days 1, 8 and 15  CARBOplatin AUC 1.5 IV over 30 minutes Days 1, 8, and 15  Repeat every 21 days x 4 cycles- completed 6/28/23  ~followed by~  Pembrolizumab 200 mg IV over 30 minutes on Day 1  Cyclophosphamide 600 mg/m2 IV over 30 minutes on Day 1  -Dose reduced to 480 mg/m2 starting Cycle 6 due to counts  DOXOrubicin 60 mg/m2 IV push on Day 1   -Dose reduced to 48 mg/m2 starting Cycle 6 due to counts  Repeat every 21 days for 4 cycles  NCCN Guidelines for Breast Cancer V.2.2022  Alexis P, et al N Engl J Med 2020; 382(5) 776-659    Allergies:  Patient has no known allergies.      /76   Pulse 89   Temp 36.2 °C (97.2 °F) (Temporal)   Resp 18   Ht 1.61 m (5' 3.39\") Comment: Shoes off  Wt 83.2 kg (183 lb 6.8 oz)   SpO2 94%   BMI 32.10 kg/m²  Body surface area is 1.93 meters squared.    Labs 8/2/23  ANC 3090 Hgb 8.7 Plt 353k  SCr 0.39 CrCl 113.3 mL/min   AST/ALT/AP = 23/24/123 Tbili = 0.2  TSH 0.87 Free T4 1.03    ECHO 3/16/23 ([facility])  LVEF ~60%     Drug Order   (Drug name, dose, route, IV Fluid & volume, frequency, number of doses) Cycle 6 Day 1  Previous treatment: C5D1 7/12/23     Medication = Pembrolizumab  Base Dose = 200 mg   Fixed dose; no calculation required  Final Dose = 200 mg  Route = IV  Fluid & Volume = NS 50 mL  Admin Duration = Over 30 minutes         <10% difference, okay to treat with final dose       Medication = cyclophosphamide  Base Dose = 480 mg/m²  Calc Dose: Base Dose x 1.93 m² = 926.4 mg  Final Dose = 960 mg  Route = IV  Fluid & Volume =  mL  Admin Duration = Over 30-60 minutes          <10% difference, okay to treat " with final dose     Medication = DOXOrubicin  Base Dose = 48 mg/m2  Calc Dose: base dose x 1.93 m2 = 92.64 mg  Final Dose = 96 mg  Route = IV  Fluid & Volume = 48 mL (2 mg/mL)  Admin Duration = Over 20 minutes          <10% difference, okay to treat with final dose       By my signature below, I confirm this process was performed independently with the BSA and all final chemotherapy dosing calculations congruent. I have reviewed the above chemotherapy order and that my calculation of the final dose and BSA (when applicable) corroborate those calculations of the  pharmacist.     Jessica Jenkins, PharmD, BCOP

## 2023-08-02 NOTE — PROGRESS NOTES
[facility] for Day [facility]/ Cycle 2 for keytruda, doxorubicin, and cyclophosphamide. Pt denied having any new or acute complaints today, reports tolerating past treatments well. Established and confirmed appointments to monitor for thrombocytopenia with [facility]. Port accessed in a sterile manner, had + blood return, flushed briskly. Pt given potassium replacement, hydration, pre-medications, keytruda, doxorubicin, and cytoxan as prescribed, tolerated well, denied having any complaints during or after infusion. Port had + blood return after, flushed per [facility] policy, de-accessed, needle intact, insertion site covered with sterile gauze and paper tape. Next appointment scheduled, Yolanda discharged home to self care.

## 2023-08-02 NOTE — PROGRESS NOTES
Chemotherapy Verification - SECONDARY RN       Height = 161 cm  Weight = 83.2 kg  BSA = 1.93 m2       Medication: Keytruda  Dose: 200 mg set dose  Calculated Dose: 200 mg                             (In mg/m2, AUC, mg/kg)     Medication: Doxorubicin  Dose: 48 mg/m2  Calculated Dose: 92.64 mg                             (In mg/m2, AUC, mg/kg)    Medication: Cytoxan  Dose: 480 mg/m2  Calculated Dose: 926.4 mg                             (In mg/m2, AUC, mg/kg)    I confirm that this process was performed independently.

## 2023-08-02 NOTE — PROGRESS NOTES
Chemotherapy Verification - PRIMARY RN      Height = 161 cm  Weight = 83.2 kg  BSA = 1.93 m^2       Medication: pembrolizumab  Dose: 200 mg-set dose  Calculated Dose: 200 mg-set dose                             (In mg/m2, AUC, mg/kg)     Medication: doxorubicin  Dose: 48 mg/m^2  Calculated Dose: 92.64 mg                             (In mg/m2, AUC, mg/kg)    Medication: cyclophosphamide  Dose: 480 mg/m^2  Calculated Dose: 926.4 mg                             (In mg/m2, AUC, mg/kg)        I confirm this process was performed independently with the BSA and all final chemotherapy dosing calculations congruent.  Any discrepancies of 10% or greater have been addressed with the chemotherapy pharmacist. The resolution of the discrepancy has been documented in the EPIC progress notes.

## 2023-08-03 ENCOUNTER — OUTPATIENT INFUSION SERVICES (OUTPATIENT)
Dept: ONCOLOGY | Facility: MEDICAL CENTER | Age: 58
End: 2023-08-03
Attending: INTERNAL MEDICINE
Payer: COMMERCIAL

## 2023-08-03 VITALS
DIASTOLIC BLOOD PRESSURE: 80 MMHG | SYSTOLIC BLOOD PRESSURE: 125 MMHG | RESPIRATION RATE: 18 BRPM | TEMPERATURE: 98 F | HEART RATE: 91 BPM | OXYGEN SATURATION: 97 %

## 2023-08-03 DIAGNOSIS — Z17.1 MALIGNANT NEOPLASM OF OVERLAPPING SITES OF LEFT BREAST IN FEMALE, ESTROGEN RECEPTOR NEGATIVE (HCC): ICD-10-CM

## 2023-08-03 DIAGNOSIS — C50.812 MALIGNANT NEOPLASM OF OVERLAPPING SITES OF LEFT BREAST IN FEMALE, ESTROGEN RECEPTOR NEGATIVE (HCC): ICD-10-CM

## 2023-08-03 PROCEDURE — 700111 HCHG RX REV CODE 636 W/ 250 OVERRIDE (IP)

## 2023-08-03 PROCEDURE — 700111 HCHG RX REV CODE 636 W/ 250 OVERRIDE (IP): Mod: UD | Performed by: INTERNAL MEDICINE

## 2023-08-03 PROCEDURE — 96372 THER/PROPH/DIAG INJ SC/IM: CPT

## 2023-08-03 RX ADMIN — PEGFILGRASTIM-CBQV 6 MG: 6 INJECTION, SOLUTION SUBCUTANEOUS at 17:12

## 2023-08-04 NOTE — PROGRESS NOTES
Yolanda to infusion for Udenyca injection. Reports feeling chills throughout the day today but did not check temp at home, patient afebrile here. Patient denies any apparent s/s of infection such as cough, dysuria or cloudy urine. RN educated patient on monitoring temp and going to ER for any fever >100.4 or any s/s of infection, patient verbalized understanding. Confirmed it has been at least 24 hours since completion of chemo. Udenyca injection given SQ in L upper arm, patient tolerated well with no adverse effects. Patient left in stable condition, knows when to return for next appt, will be discussing with Dr. Gómez as she thinks she is nearing her lifetime limit of doxorubicin as she was previously treated for breast cancer 18 years ago and thinks she received doxorubicin at that time.

## 2023-08-14 ENCOUNTER — HOSPITAL ENCOUNTER (OUTPATIENT)
Dept: LAB | Facility: MEDICAL CENTER | Age: 58
End: 2023-08-14
Attending: INTERNAL MEDICINE
Payer: COMMERCIAL

## 2023-08-14 LAB
ANION GAP SERPL CALC-SCNC: 10 MMOL/L (ref 7–16)
BUN SERPL-MCNC: 10 MG/DL (ref 8–22)
CALCIUM SERPL-MCNC: 8.6 MG/DL (ref 8.5–10.5)
CHLORIDE SERPL-SCNC: 108 MMOL/L (ref 96–112)
CO2 SERPL-SCNC: 23 MMOL/L (ref 20–33)
CREAT SERPL-MCNC: 0.48 MG/DL (ref 0.5–1.4)
GFR SERPLBLD CREATININE-BSD FMLA CKD-EPI: 109 ML/MIN/1.73 M 2
GLUCOSE SERPL-MCNC: 112 MG/DL (ref 65–99)
POTASSIUM SERPL-SCNC: 3.8 MMOL/L (ref 3.6–5.5)
SODIUM SERPL-SCNC: 141 MMOL/L (ref 135–145)

## 2023-08-14 PROCEDURE — 36415 COLL VENOUS BLD VENIPUNCTURE: CPT

## 2023-08-14 PROCEDURE — 80048 BASIC METABOLIC PNL TOTAL CA: CPT

## 2023-08-15 ENCOUNTER — APPOINTMENT (OUTPATIENT)
Dept: ADMISSIONS | Facility: MEDICAL CENTER | Age: 58
End: 2023-08-15
Attending: SURGERY
Payer: COMMERCIAL

## 2023-08-17 ENCOUNTER — PATIENT OUTREACH (OUTPATIENT)
Dept: ONCOLOGY | Facility: MEDICAL CENTER | Age: 58
End: 2023-08-17
Payer: COMMERCIAL

## 2023-08-17 NOTE — PROGRESS NOTES
"On August 17, 2023, Oncology Social Worker Maryse Godinez reached out to pt. to follow up on referral from Oncology Nurse Navigator.  INES Godinez re-introduced herself and reason for her call.  Pt. shared she no longer has medicaid as they terminated her medicaid due to \"COVID relief running out.\"  Pt. shared she obtained insurance through her employer but shared she \"can't afford the out of pockets.\"      INES Godinez inquired if pt. followed through with [facility].  Pt. shared they were asking for \"too many things and I did not want to get delayed.\"  INES Godinez explained [facility] would have been able to apply her for a special medicaid for breast cancer patients.      Pt. shared her navigator at [facility] has helped her apply for [facility] [employer].  INES Godinez stressed to pt. the importance of making sure she returns their call and provides any documents they may ask of her to see if she qualifies for financial assistance through them.  Pt. shared they were calling her now.  INES Godinez encouraged pt. to take call.      "

## 2023-08-22 ENCOUNTER — PATIENT OUTREACH (OUTPATIENT)
Dept: ONCOLOGY | Facility: MEDICAL CENTER | Age: 58
End: 2023-08-22
Payer: COMMERCIAL

## 2023-08-22 NOTE — PROGRESS NOTES
"Oncology Nurse Navigation:  Nurse Navigator called patient to reconnect with plan of care and insurance status.  Patient shared she did not attempt [facility] assistance, \"they were going to take too long\".  Patient shared she acquired medical insurance through her employer.   Patient shared she has contacted [employer] [facility] and submitted requested paperwork today.  Patient has breast surgery scheduled with Dr. Monahan 9/19/23..  NN reminded patient of breast cancer class that can be attended in-person or virtually.  Patient declined stating \"Dr. Monahan was very thorough\".  NN  offered to review any questions or concerns.  Patient denied any at this time.  Patient confirmed she has NN contact information and will call NN for any support as she moves forward in her care.    "

## 2023-08-23 ENCOUNTER — OUTPATIENT INFUSION SERVICES (OUTPATIENT)
Dept: ONCOLOGY | Facility: MEDICAL CENTER | Age: 58
End: 2023-08-23
Attending: INTERNAL MEDICINE
Payer: COMMERCIAL

## 2023-08-23 VITALS
RESPIRATION RATE: 18 BRPM | DIASTOLIC BLOOD PRESSURE: 83 MMHG | TEMPERATURE: 97 F | HEIGHT: 63 IN | OXYGEN SATURATION: 96 % | SYSTOLIC BLOOD PRESSURE: 137 MMHG | BODY MASS INDEX: 32.34 KG/M2 | HEART RATE: 95 BPM | WEIGHT: 182.54 LBS

## 2023-08-23 DIAGNOSIS — C50.812 MALIGNANT NEOPLASM OF OVERLAPPING SITES OF LEFT BREAST IN FEMALE, ESTROGEN RECEPTOR NEGATIVE (HCC): ICD-10-CM

## 2023-08-23 DIAGNOSIS — Z17.1 MALIGNANT NEOPLASM OF OVERLAPPING SITES OF LEFT BREAST IN FEMALE, ESTROGEN RECEPTOR NEGATIVE (HCC): ICD-10-CM

## 2023-08-23 LAB
ALBUMIN SERPL BCP-MCNC: 3.9 G/DL (ref 3.2–4.9)
ALBUMIN/GLOB SERPL: 1.7 G/DL
ALP SERPL-CCNC: 120 U/L (ref 30–99)
ALT SERPL-CCNC: 20 U/L (ref 2–50)
ANION GAP SERPL CALC-SCNC: 8 MMOL/L (ref 7–16)
AST SERPL-CCNC: 24 U/L (ref 12–45)
BASOPHILS # BLD AUTO: 0.4 % (ref 0–1.8)
BASOPHILS # BLD: 0.02 K/UL (ref 0–0.12)
BILIRUB SERPL-MCNC: 0.2 MG/DL (ref 0.1–1.5)
BUN SERPL-MCNC: 8 MG/DL (ref 8–22)
CALCIUM ALBUM COR SERPL-MCNC: 8.6 MG/DL (ref 8.5–10.5)
CALCIUM SERPL-MCNC: 8.5 MG/DL (ref 8.5–10.5)
CHLORIDE SERPL-SCNC: 107 MMOL/L (ref 96–112)
CO2 SERPL-SCNC: 24 MMOL/L (ref 20–33)
CREAT SERPL-MCNC: 0.42 MG/DL (ref 0.5–1.4)
EOSINOPHIL # BLD AUTO: 0.02 K/UL (ref 0–0.51)
EOSINOPHIL NFR BLD: 0.4 % (ref 0–6.9)
ERYTHROCYTE [DISTWIDTH] IN BLOOD BY AUTOMATED COUNT: 62.4 FL (ref 35.9–50)
GFR SERPLBLD CREATININE-BSD FMLA CKD-EPI: 112 ML/MIN/1.73 M 2
GLOBULIN SER CALC-MCNC: 2.3 G/DL (ref 1.9–3.5)
GLUCOSE SERPL-MCNC: 123 MG/DL (ref 65–99)
HCT VFR BLD AUTO: 28.3 % (ref 37–47)
HGB BLD-MCNC: 9.3 G/DL (ref 12–16)
IMM GRANULOCYTES # BLD AUTO: 0.02 K/UL (ref 0–0.11)
IMM GRANULOCYTES NFR BLD AUTO: 0.4 % (ref 0–0.9)
LYMPHOCYTES # BLD AUTO: 1.48 K/UL (ref 1–4.8)
LYMPHOCYTES NFR BLD: 29.4 % (ref 22–41)
MCH RBC QN AUTO: 36.8 PG (ref 27–33)
MCHC RBC AUTO-ENTMCNC: 32.9 G/DL (ref 32.2–35.5)
MCV RBC AUTO: 111.9 FL (ref 81.4–97.8)
MONOCYTES # BLD AUTO: 0.5 K/UL (ref 0–0.85)
MONOCYTES NFR BLD AUTO: 9.9 % (ref 0–13.4)
NEUTROPHILS # BLD AUTO: 2.99 K/UL (ref 1.82–7.42)
NEUTROPHILS NFR BLD: 59.5 % (ref 44–72)
NRBC # BLD AUTO: 0 K/UL
NRBC BLD-RTO: 0 /100 WBC (ref 0–0.2)
OUTPT INFUS CBC COMMENT OICOM: ABNORMAL
PLATELET # BLD AUTO: 271 K/UL (ref 164–446)
PMV BLD AUTO: 9 FL (ref 9–12.9)
POTASSIUM SERPL-SCNC: 3.8 MMOL/L (ref 3.6–5.5)
PROT SERPL-MCNC: 6.2 G/DL (ref 6–8.2)
RBC # BLD AUTO: 2.53 M/UL (ref 4.2–5.4)
SODIUM SERPL-SCNC: 139 MMOL/L (ref 135–145)
T4 FREE SERPL-MCNC: 0.91 NG/DL (ref 0.93–1.7)
TSH SERPL DL<=0.005 MIU/L-ACNC: 0.82 UIU/ML (ref 0.38–5.33)
WBC # BLD AUTO: 5 K/UL (ref 4.8–10.8)

## 2023-08-23 PROCEDURE — 700105 HCHG RX REV CODE 258

## 2023-08-23 PROCEDURE — 84443 ASSAY THYROID STIM HORMONE: CPT

## 2023-08-23 PROCEDURE — 700105 HCHG RX REV CODE 258: Performed by: INTERNAL MEDICINE

## 2023-08-23 PROCEDURE — 84439 ASSAY OF FREE THYROXINE: CPT

## 2023-08-23 PROCEDURE — 700111 HCHG RX REV CODE 636 W/ 250 OVERRIDE (IP): Performed by: INTERNAL MEDICINE

## 2023-08-23 PROCEDURE — 96361 HYDRATE IV INFUSION ADD-ON: CPT

## 2023-08-23 PROCEDURE — A4212 NON CORING NEEDLE OR STYLET: HCPCS

## 2023-08-23 PROCEDURE — 80053 COMPREHEN METABOLIC PANEL: CPT

## 2023-08-23 PROCEDURE — 700111 HCHG RX REV CODE 636 W/ 250 OVERRIDE (IP)

## 2023-08-23 PROCEDURE — 96413 CHEMO IV INFUSION 1 HR: CPT

## 2023-08-23 PROCEDURE — 85025 COMPLETE CBC W/AUTO DIFF WBC: CPT

## 2023-08-23 RX ORDER — HEPARIN SODIUM (PORCINE) LOCK FLUSH IV SOLN 100 UNIT/ML 100 UNIT/ML
500 SOLUTION INTRAVENOUS PRN
Status: CANCELLED | OUTPATIENT
Start: 2023-08-23

## 2023-08-23 RX ORDER — METHYLPREDNISOLONE SODIUM SUCCINATE 125 MG/2ML
125 INJECTION, POWDER, LYOPHILIZED, FOR SOLUTION INTRAMUSCULAR; INTRAVENOUS PRN
Status: CANCELLED | OUTPATIENT
Start: 2023-08-24

## 2023-08-23 RX ORDER — SODIUM CHLORIDE 9 MG/ML
1000 INJECTION, SOLUTION INTRAVENOUS ONCE
Status: COMPLETED | OUTPATIENT
Start: 2023-08-23 | End: 2023-08-23

## 2023-08-23 RX ORDER — HEPARIN SODIUM (PORCINE) LOCK FLUSH IV SOLN 100 UNIT/ML 100 UNIT/ML
500 SOLUTION INTRAVENOUS PRN
Status: CANCELLED | OUTPATIENT
Start: 2023-08-24

## 2023-08-23 RX ORDER — ONDANSETRON HYDROCHLORIDE 8 MG/1
TABLET, FILM COATED ORAL
COMMUNITY
End: 2023-08-23

## 2023-08-23 RX ORDER — 0.9 % SODIUM CHLORIDE 0.9 %
3 VIAL (ML) INJECTION PRN
Status: CANCELLED | OUTPATIENT
Start: 2023-08-24

## 2023-08-23 RX ORDER — 0.9 % SODIUM CHLORIDE 0.9 %
3 VIAL (ML) INJECTION PRN
Status: CANCELLED | OUTPATIENT
Start: 2023-08-23

## 2023-08-23 RX ORDER — PROCHLORPERAZINE MALEATE 10 MG
10 TABLET ORAL EVERY 6 HOURS PRN
Status: CANCELLED | OUTPATIENT
Start: 2023-08-24

## 2023-08-23 RX ORDER — ATORVASTATIN CALCIUM 20 MG/1
1 TABLET, FILM COATED ORAL
COMMUNITY
End: 2023-08-23

## 2023-08-23 RX ORDER — ONDANSETRON 2 MG/ML
4 INJECTION INTRAMUSCULAR; INTRAVENOUS PRN
Status: CANCELLED | OUTPATIENT
Start: 2023-08-24

## 2023-08-23 RX ORDER — 0.9 % SODIUM CHLORIDE 0.9 %
10 VIAL (ML) INJECTION PRN
Status: CANCELLED | OUTPATIENT
Start: 2023-08-24

## 2023-08-23 RX ORDER — 0.9 % SODIUM CHLORIDE 0.9 %
VIAL (ML) INJECTION PRN
Status: CANCELLED | OUTPATIENT
Start: 2023-08-23

## 2023-08-23 RX ORDER — 0.9 % SODIUM CHLORIDE 0.9 %
10 VIAL (ML) INJECTION PRN
Status: CANCELLED | OUTPATIENT
Start: 2023-08-23

## 2023-08-23 RX ORDER — EPINEPHRINE 1 MG/ML(1)
0.5 AMPUL (ML) INJECTION PRN
Status: CANCELLED | OUTPATIENT
Start: 2023-08-24

## 2023-08-23 RX ORDER — SODIUM CHLORIDE 9 MG/ML
1000 INJECTION, SOLUTION INTRAVENOUS ONCE
Status: CANCELLED | OUTPATIENT
Start: 2023-08-24

## 2023-08-23 RX ORDER — SODIUM CHLORIDE 9 MG/ML
INJECTION, SOLUTION INTRAVENOUS CONTINUOUS
Status: CANCELLED | OUTPATIENT
Start: 2023-08-24

## 2023-08-23 RX ORDER — 0.9 % SODIUM CHLORIDE 0.9 %
VIAL (ML) INJECTION PRN
Status: CANCELLED | OUTPATIENT
Start: 2023-08-24

## 2023-08-23 RX ORDER — ONDANSETRON HYDROCHLORIDE 8 MG/1
4 TABLET, FILM COATED ORAL EVERY 8 HOURS PRN
COMMUNITY
Start: 2023-06-19 | End: 2024-03-09

## 2023-08-23 RX ORDER — ONDANSETRON 8 MG/1
8 TABLET, ORALLY DISINTEGRATING ORAL PRN
Status: CANCELLED | OUTPATIENT
Start: 2023-08-24

## 2023-08-23 RX ORDER — DIPHENHYDRAMINE HYDROCHLORIDE 50 MG/ML
50 INJECTION INTRAMUSCULAR; INTRAVENOUS PRN
Status: CANCELLED | OUTPATIENT
Start: 2023-08-24

## 2023-08-23 RX ORDER — OLANZAPINE 5 MG/1
TABLET ORAL
COMMUNITY
Start: 2023-07-11 | End: 2023-08-23

## 2023-08-23 RX ADMIN — HEPARIN 500 UNITS: 100 SYRINGE at 10:35

## 2023-08-23 RX ADMIN — SODIUM CHLORIDE 1000 ML: 9 INJECTION, SOLUTION INTRAVENOUS at 09:00

## 2023-08-23 RX ADMIN — SODIUM CHLORIDE 200 MG: 9 INJECTION, SOLUTION INTRAVENOUS at 09:50

## 2023-08-23 ASSESSMENT — FIBROSIS 4 INDEX: FIB4 SCORE: 0.78

## 2023-08-23 NOTE — PROGRESS NOTES
"Pharmacy Chemotherapy Verification    DX: triple negative breast cancer    Cycle 7 total - starting pembrolizumab maintenance per MD due to previous anthracycline exposure.  Previous treatment = 8/2/23    Regimen: pembrolizumab + PACLitaxel/CARBOplatin --> pembrolizumab + cyclophosphamide/DOXrubicin or epirubicin --> Pembrolizmab  Pembrolizumab 200 mg IV over 30 min on day 1  PACLitaxel 80 mg/m2 IV over 60 min on days 1,8,15  CARBOplatin AUC 1.5 IV over 30 min on day 1,8,15  Q21 days x 4 cycles  Followed by  Pembrolizumab 200 mg IV over 30 min on day 1  Cyclophosphamide 600 mg/m2 IV over 30 min on day 1  *8/2/23- dose reduced by 20% to 480mg/m2 for cytopenias  DOXOrubicin 60 mg/m2 IVP on day 1 OR epirubicin 90 mg/m2 IVP on day 1  *8/2/23- dose reduced by 20% to 48mg/m2 for cytopenias  Q21 days x 4 cycles  Followed by  Pembrolizumab 200mg IV over 30 min on day 1  Q21 days x 9 cycles  NCCN Guidelines for Breast Cancer V.2.2022  Alexis FISHER, et al- N Engl J Med. 2020 Feb 27;382(9):810-821.     Allergies:Patient has no known allergies.  /83   Pulse 95   Temp 36.1 °C (97 °F) (Temporal)   Resp 18   Ht 1.61 m (5' 3.39\")   Wt 82.8 kg (182 lb 8.7 oz)   SpO2 96%   BMI 31.94 kg/m²   Body surface area is 1.92 meters squared.    All lab results 8/23/23 within treatment parameters.  Thyroid panel pending, MD to be notified if abnormal.    Pembrolizumab 200 mg fixed dose, no calculation required  OK to treat with final dose = 200 mg IV       Garrett Cadena, PharmD  "

## 2023-08-23 NOTE — PROGRESS NOTES
Chemotherapy Verification - PRIMARY RN      Height = 161 cm  Weight = 82.8 kg  BSA = 1.92 m2       Medication: keytruda  Dose: 200 mg (set dose)  Calculated Dose: 200 mg                             (In mg/m2, AUC, mg/kg)           I confirm this process was performed independently with the BSA and all final chemotherapy dosing calculations congruent.  Any discrepancies of 10% or greater have been addressed with the chemotherapy pharmacist. The resolution of the discrepancy has been documented in the EPIC progress notes.

## 2023-08-23 NOTE — PROGRESS NOTES
"Pharmacy Chemotherapy Verification    Patient Name: Yolanda Roe      Dx: Metastatic Breast CA (ER/NV/HER2 negative) IIb       Protocol: Pembrolizumab + PACLitaxel + CARBOplatin (x 4 cycles) followed by  Pembrolizumab + Cyclophosphamide + DOXOrubicin   (x4 cycles)  Pembrolizumab 200 mg IV over 30 minutes on Day 1  PACLitaxel 80 mg/m2 IV over 60 minutes on Days 1, 8 and 15  CARBOplatin AUC 1.5 IV over 30 minutes Days 1, 8, and 15  Repeat every 21 days x 4 cycles- completed 6/28/23  ~followed by~  Pembrolizumab 200 mg IV over 30 minutes on Day 1  Cyclophosphamide 600 mg/m2 IV over 30 minutes on Day 1  -Dose reduced to 480 mg/m2 starting Cycle 6 due to counts  DOXOrubicin 60 mg/m2 IV push on Day 1   -Dose reduced to 48 mg/m2 starting Cycle 6 due to counts  Repeat every 21 days for 4 cycles  Followed by **Course 2 ended a Cycle early per Dr. Gómez, maintenance pembro starting 8/23/23**  Pembrolizumab 200mg IV over 30 min on day 1  Q21 days x 9 cycles  NCCN Guidelines for Breast Cancer V.2.2022  Alexis P, et al N Engl J Med 2020; 382(9) 193-337    Allergies:  Patient has no known allergies.      /83   Pulse 95   Temp 36.1 °C (97 °F) (Temporal)   Resp 18   Ht 1.61 m (5' 3.39\")   Wt 82.8 kg (182 lb 8.7 oz)   SpO2 96%   BMI 31.94 kg/m²  Body surface area is 1.92 meters squared.    [facility]CHO 3/16/23 ([facility])  LVEF ~60%    Labs 8/23/23:  ANC~ 2990 Plt = 271k   Hgb = 9.3     SCr = 0.42 mg/dL CrCl ~ 113 mL/min   AST/ALT/AP = 24/20/120 TBili = 0.2  TSH/Free T4 = pending     Drug Order   (Drug name, dose, route, IV Fluid & volume, frequency, number of doses) Cycle 1 maintenance pembrolizumab (Cycle 7 overall)  Previous treatment: C6D1 8/2/23     Medication = Pembrolizumab  Base Dose = 200 mg   Fixed dose; no calculation required  Final Dose = 200 mg  Route = IV  Fluid & Volume = NS 50 mL  Admin Duration = Over 30 minutes         <10% difference, okay to treat with final dose         By my signature below, I " confirm this process was performed independently with the BSA and all final chemotherapy dosing calculations congruent. I have reviewed the above chemotherapy order and that my calculation of the final dose and BSA (when applicable) corroborate those calculations of the  pharmacist.     Misha Mckeon, PharmD

## 2023-08-23 NOTE — PROGRESS NOTES
Chemotherapy Verification - SECONDARY RN   Course 2: Cycle 3 Day 1    Height = 161 cm  Weight = 82.8 kg  BSA = 1.92 m^2       Medication: pembrolizumab (KEYTRUDA)  Dose: 200 mg set dose  Calculated Dose: 200 mg set dose                             (In mg/m2, AUC, mg/kg)     I confirm that this process was performed independently.

## 2023-08-23 NOTE — PROGRESS NOTES
Yolanda arrives ambulatory to [facility] for Day 1, Course 2, Cycle 3 keytruda for breast cancer.  Yolanda denies any new or acute changes.   Right upper chest port accessed in sterile fashion, flushes easily, brisk blood return observed.  Labs collected and reviewed,  parameters met for treatment today.  Keytruda infused as ordered with in-line filter in place.  No s/s adverse reaction observed or verbalized.  Port flushed with NS, heparin instilled.  Port de-accessed, sterile gauze and tape to site.  Yolanda discharged in no apparent distress, next appointment confirmed

## 2023-08-24 ENCOUNTER — APPOINTMENT (OUTPATIENT)
Dept: ONCOLOGY | Facility: MEDICAL CENTER | Age: 58
End: 2023-08-24
Attending: INTERNAL MEDICINE
Payer: COMMERCIAL

## 2023-09-05 ENCOUNTER — APPOINTMENT (OUTPATIENT)
Dept: ADMISSIONS | Facility: MEDICAL CENTER | Age: 58
End: 2023-09-05
Attending: SURGERY
Payer: COMMERCIAL

## 2023-09-06 ENCOUNTER — PRE-ADMISSION TESTING (OUTPATIENT)
Dept: ADMISSIONS | Facility: MEDICAL CENTER | Age: 58
End: 2023-09-06
Attending: SURGERY
Payer: COMMERCIAL

## 2023-09-11 NOTE — PROGRESS NOTES
"Pharmacy Chemotherapy Verification    DX: triple negative breast cancer    Cycle 8 total - pembrolizumab maintenance  Previous treatment = 8/23/23    Regimen: pembrolizumab + PACLitaxel/CARBOplatin --> pembrolizumab + cyclophosphamide/DOXrubicin or epirubicin --> Pembrolizmab  Pembrolizumab 200 mg IV over 30 min on day 1  PACLitaxel 80 mg/m2 IV over 60 min on days 1,8,15  CARBOplatin AUC 1.5 IV over 30 min on day 1,8,15  Q21 days x 4 cycles  Followed by  Pembrolizumab 200 mg IV over 30 min on day 1  Cyclophosphamide 600 mg/m2 IV over 30 min on day 1  *8/2/23- dose reduced by 20% to 480mg/m2 for cytopenias  DOXOrubicin 60 mg/m2 IVP on day 1 OR epirubicin 90 mg/m2 IVP on day 1  *8/2/23- dose reduced by 20% to 48mg/m2 for cytopenias  Q21 days x 4 cycles  Followed by  Pembrolizumab 200mg IV over 30 min on day 1  Q21 days x 9 cycles  NCCN Guidelines for Breast Cancer V.2.2022  Alexis P, et al- N Engl J Med. 2020 Feb 27;382(9):810-821.     Allergies:Patient has no known allergies.  /74   Pulse (!) 102   Temp 37.3 °C (99.2 °F) (Temporal)   Resp 18   Ht 1.61 m (5' 3.39\")   Wt 80.7 kg (177 lb 14.6 oz)   SpO2 95%   BMI 31.13 kg/m²   Body surface area is 1.9 meters squared.    All lab results 9/13/23 within treatment parameters.  MD to be notified of abnormal Thyroid results.   Potassium replaced.      Pembrolizumab 200 mg fixed dose, no calculation required  OK to treat with final dose = 200 mg IV       Garrett Cadena, PharmD  "

## 2023-09-13 ENCOUNTER — PRE-ADMISSION TESTING (OUTPATIENT)
Dept: ADMISSIONS | Facility: MEDICAL CENTER | Age: 58
End: 2023-09-13
Attending: SURGERY
Payer: COMMERCIAL

## 2023-09-13 ENCOUNTER — OUTPATIENT INFUSION SERVICES (OUTPATIENT)
Dept: ONCOLOGY | Facility: MEDICAL CENTER | Age: 58
End: 2023-09-13
Attending: INTERNAL MEDICINE
Payer: COMMERCIAL

## 2023-09-13 VITALS
WEIGHT: 177.91 LBS | OXYGEN SATURATION: 95 % | HEIGHT: 63 IN | TEMPERATURE: 99.2 F | RESPIRATION RATE: 18 BRPM | SYSTOLIC BLOOD PRESSURE: 131 MMHG | BODY MASS INDEX: 31.52 KG/M2 | DIASTOLIC BLOOD PRESSURE: 74 MMHG | HEART RATE: 102 BPM

## 2023-09-13 DIAGNOSIS — Z01.811 PRE-OPERATIVE RESPIRATORY EXAMINATION: ICD-10-CM

## 2023-09-13 DIAGNOSIS — Z17.1 MALIGNANT NEOPLASM OF OVERLAPPING SITES OF LEFT BREAST IN FEMALE, ESTROGEN RECEPTOR NEGATIVE (HCC): ICD-10-CM

## 2023-09-13 DIAGNOSIS — C50.812 MALIGNANT NEOPLASM OF OVERLAPPING SITES OF LEFT BREAST IN FEMALE, ESTROGEN RECEPTOR NEGATIVE (HCC): ICD-10-CM

## 2023-09-13 DIAGNOSIS — Z01.810 PRE-OPERATIVE CARDIOVASCULAR EXAMINATION: ICD-10-CM

## 2023-09-13 LAB
ALBUMIN SERPL BCP-MCNC: 4.4 G/DL (ref 3.2–4.9)
ALBUMIN/GLOB SERPL: 1.5 G/DL
ALP SERPL-CCNC: 183 U/L (ref 30–99)
ALT SERPL-CCNC: 65 U/L (ref 2–50)
ANION GAP SERPL CALC-SCNC: 13 MMOL/L (ref 7–16)
AST SERPL-CCNC: 75 U/L (ref 12–45)
BASOPHILS # BLD AUTO: 0.2 % (ref 0–1.8)
BASOPHILS # BLD: 0.01 K/UL (ref 0–0.12)
BILIRUB SERPL-MCNC: 0.2 MG/DL (ref 0.1–1.5)
BUN SERPL-MCNC: 9 MG/DL (ref 8–22)
CALCIUM ALBUM COR SERPL-MCNC: 8.5 MG/DL (ref 8.5–10.5)
CALCIUM SERPL-MCNC: 8.8 MG/DL (ref 8.5–10.5)
CHLORIDE SERPL-SCNC: 100 MMOL/L (ref 96–112)
CO2 SERPL-SCNC: 25 MMOL/L (ref 20–33)
CREAT SERPL-MCNC: 0.53 MG/DL (ref 0.5–1.4)
EOSINOPHIL # BLD AUTO: 0.01 K/UL (ref 0–0.51)
EOSINOPHIL NFR BLD: 0.2 % (ref 0–6.9)
ERYTHROCYTE [DISTWIDTH] IN BLOOD BY AUTOMATED COUNT: 48.6 FL (ref 35.9–50)
GFR SERPLBLD CREATININE-BSD FMLA CKD-EPI: 106 ML/MIN/1.73 M 2
GLOBULIN SER CALC-MCNC: 2.9 G/DL (ref 1.9–3.5)
GLUCOSE SERPL-MCNC: 128 MG/DL (ref 65–99)
HCT VFR BLD AUTO: 33.6 % (ref 37–47)
HGB BLD-MCNC: 11.8 G/DL (ref 12–16)
IMM GRANULOCYTES # BLD AUTO: 0.02 K/UL (ref 0–0.11)
IMM GRANULOCYTES NFR BLD AUTO: 0.5 % (ref 0–0.9)
INR PPP: 1.04 (ref 0.87–1.13)
LYMPHOCYTES # BLD AUTO: 0.86 K/UL (ref 1–4.8)
LYMPHOCYTES NFR BLD: 21.3 % (ref 22–41)
MCH RBC QN AUTO: 36.5 PG (ref 27–33)
MCHC RBC AUTO-ENTMCNC: 35.1 G/DL (ref 32.2–35.5)
MCV RBC AUTO: 104 FL (ref 81.4–97.8)
MONOCYTES # BLD AUTO: 0.59 K/UL (ref 0–0.85)
MONOCYTES NFR BLD AUTO: 14.6 % (ref 0–13.4)
NEUTROPHILS # BLD AUTO: 2.54 K/UL (ref 1.82–7.42)
NEUTROPHILS NFR BLD: 63.2 % (ref 44–72)
NRBC # BLD AUTO: 0 K/UL
NRBC BLD-RTO: 0 /100 WBC (ref 0–0.2)
OUTPT INFUS CBC COMMENT OICOM: ABNORMAL
PLATELET # BLD AUTO: 168 K/UL (ref 164–446)
PMV BLD AUTO: 9.2 FL (ref 9–12.9)
POTASSIUM SERPL-SCNC: 3.2 MMOL/L (ref 3.6–5.5)
PROT SERPL-MCNC: 7.3 G/DL (ref 6–8.2)
PROTHROMBIN TIME: 13.7 SEC (ref 12–14.6)
RBC # BLD AUTO: 3.23 M/UL (ref 4.2–5.4)
SODIUM SERPL-SCNC: 138 MMOL/L (ref 135–145)
T4 FREE SERPL-MCNC: 0.91 NG/DL (ref 0.93–1.7)
TSH SERPL DL<=0.005 MIU/L-ACNC: 0.42 UIU/ML (ref 0.38–5.33)
WBC # BLD AUTO: 4 K/UL (ref 4.8–10.8)

## 2023-09-13 PROCEDURE — 93005 ELECTROCARDIOGRAM TRACING: CPT

## 2023-09-13 PROCEDURE — 84443 ASSAY THYROID STIM HORMONE: CPT

## 2023-09-13 PROCEDURE — A4212 NON CORING NEEDLE OR STYLET: HCPCS

## 2023-09-13 PROCEDURE — 85610 PROTHROMBIN TIME: CPT

## 2023-09-13 PROCEDURE — A9270 NON-COVERED ITEM OR SERVICE: HCPCS | Mod: JZ | Performed by: INTERNAL MEDICINE

## 2023-09-13 PROCEDURE — 700102 HCHG RX REV CODE 250 W/ 637 OVERRIDE(OP): Mod: JZ | Performed by: INTERNAL MEDICINE

## 2023-09-13 PROCEDURE — 700105 HCHG RX REV CODE 258: Performed by: INTERNAL MEDICINE

## 2023-09-13 PROCEDURE — 80053 COMPREHEN METABOLIC PANEL: CPT

## 2023-09-13 PROCEDURE — 96413 CHEMO IV INFUSION 1 HR: CPT

## 2023-09-13 PROCEDURE — 85025 COMPLETE CBC W/AUTO DIFF WBC: CPT

## 2023-09-13 PROCEDURE — 700105 HCHG RX REV CODE 258

## 2023-09-13 PROCEDURE — 700111 HCHG RX REV CODE 636 W/ 250 OVERRIDE (IP): Performed by: INTERNAL MEDICINE

## 2023-09-13 PROCEDURE — 84439 ASSAY OF FREE THYROXINE: CPT

## 2023-09-13 PROCEDURE — 700111 HCHG RX REV CODE 636 W/ 250 OVERRIDE (IP)

## 2023-09-13 RX ORDER — DIPHENHYDRAMINE HYDROCHLORIDE 50 MG/ML
50 INJECTION INTRAMUSCULAR; INTRAVENOUS PRN
Status: CANCELLED | OUTPATIENT
Start: 2023-09-13

## 2023-09-13 RX ORDER — 0.9 % SODIUM CHLORIDE 0.9 %
VIAL (ML) INJECTION PRN
Status: CANCELLED | OUTPATIENT
Start: 2023-09-13

## 2023-09-13 RX ORDER — HEPARIN SODIUM (PORCINE) LOCK FLUSH IV SOLN 100 UNIT/ML 100 UNIT/ML
500 SOLUTION INTRAVENOUS PRN
Status: CANCELLED | OUTPATIENT
Start: 2023-09-13

## 2023-09-13 RX ORDER — EPINEPHRINE 1 MG/ML(1)
0.5 AMPUL (ML) INJECTION PRN
Status: CANCELLED | OUTPATIENT
Start: 2023-09-13

## 2023-09-13 RX ORDER — 0.9 % SODIUM CHLORIDE 0.9 %
10 VIAL (ML) INJECTION PRN
Status: CANCELLED | OUTPATIENT
Start: 2023-09-13

## 2023-09-13 RX ORDER — PROCHLORPERAZINE MALEATE 10 MG
10 TABLET ORAL EVERY 6 HOURS PRN
Status: CANCELLED | OUTPATIENT
Start: 2023-09-13

## 2023-09-13 RX ORDER — METHYLPREDNISOLONE SODIUM SUCCINATE 125 MG/2ML
125 INJECTION, POWDER, LYOPHILIZED, FOR SOLUTION INTRAMUSCULAR; INTRAVENOUS PRN
Status: CANCELLED | OUTPATIENT
Start: 2023-09-13

## 2023-09-13 RX ORDER — ONDANSETRON 2 MG/ML
4 INJECTION INTRAMUSCULAR; INTRAVENOUS PRN
Status: CANCELLED | OUTPATIENT
Start: 2023-09-13

## 2023-09-13 RX ORDER — SODIUM CHLORIDE 9 MG/ML
INJECTION, SOLUTION INTRAVENOUS CONTINUOUS
Status: CANCELLED | OUTPATIENT
Start: 2023-09-13

## 2023-09-13 RX ORDER — ONDANSETRON 8 MG/1
8 TABLET, ORALLY DISINTEGRATING ORAL PRN
Status: CANCELLED | OUTPATIENT
Start: 2023-09-13

## 2023-09-13 RX ORDER — POTASSIUM CHLORIDE 20 MEQ/1
40 TABLET, EXTENDED RELEASE ORAL ONCE
Status: COMPLETED | OUTPATIENT
Start: 2023-09-13 | End: 2023-09-13

## 2023-09-13 RX ORDER — 0.9 % SODIUM CHLORIDE 0.9 %
3 VIAL (ML) INJECTION PRN
Status: CANCELLED | OUTPATIENT
Start: 2023-09-13

## 2023-09-13 RX ADMIN — HEPARIN 500 UNITS: 100 SYRINGE at 10:35

## 2023-09-13 RX ADMIN — SODIUM CHLORIDE 200 MG: 9 INJECTION, SOLUTION INTRAVENOUS at 09:50

## 2023-09-13 RX ADMIN — POTASSIUM CHLORIDE 40 MEQ: 1500 TABLET, EXTENDED RELEASE ORAL at 09:30

## 2023-09-13 ASSESSMENT — FIBROSIS 4 INDEX: FIB4 SCORE: 1.17

## 2023-09-13 NOTE — PROGRESS NOTES
into [facility] for Cycle 4 of Keytruda maintenance for Malignant neoplasm of upper-outer quadrant of left breast in female. Pt denied having any new or acute complaints today, reports tolerating past treatments well. Port accessed in a sterile manner, had + blood return, flushed briskly. Pt given anticancer therapy as prescribed, tolerated well, denied having any complaints during or after infusion. Potasium replaced per protocol and TP orders. Port had + blood return after, flushed per [facility] policy, de-accessed, needle intact, insertion site covered with sterile gauze and paper tape. Discharge home to self care in [facility]. Appointment confirm for next treatment.

## 2023-09-13 NOTE — PROGRESS NOTES
"Pharmacy Chemotherapy Verification    Patient Name: Yolanda Roe      Dx: Metastatic Breast CA (ER/ID/HER2 negative) IIb       Protocol: Pembrolizumab + PACLitaxel + CARBOplatin (x 4 cycles) followed by  Pembrolizumab + Cyclophosphamide + DOXOrubicin   (x4 cycles)  Pembrolizumab 200 mg IV over 30 minutes on Day 1  PACLitaxel 80 mg/m2 IV over 60 minutes on Days 1, 8 and 15  CARBOplatin AUC 1.5 IV over 30 minutes Days 1, 8, and 15  Repeat every 21 days x 4 cycles- completed 6/28/23  ~followed by~  Pembrolizumab 200 mg IV over 30 minutes on Day 1  Cyclophosphamide 600 mg/m2 IV over 30 minutes on Day 1  -Dose reduced to 480 mg/m2 starting Cycle 6 due to counts  DOXOrubicin 60 mg/m2 IV push on Day 1   -Dose reduced to 48 mg/m2 starting Cycle 6 due to counts  Repeat every 21 days for 4 cycles  Followed by **Course 2 ended a Cycle early per Dr. Gómez, maintenance pembro starting 8/23/23**  Pembrolizumab 200mg IV over 30 min on day 1  Q21 days x 9 cycles  NCCN Guidelines for Breast Cancer V.2.2022  Alexis P, et al N Engl J Med 2020; 382(7) 347-749    Allergies:  Patient has no known allergies.      /74   Pulse (!) 102   Temp 37.3 °C (99.2 °F) (Temporal)   Resp 18   Ht 1.61 m (5' 3.39\")   Wt 80.7 kg (177 lb 14.6 oz)   SpO2 95%   BMI 31.13 kg/m²  Body surface area is 1.9 meters squared.    ECHO 3/16/23 ([facility])  LVEF ~60%    Labs 9/13/23:  ANC~ 2540 Plt = 168k   Hgb = 11.8     SCr = 0.53 mg/dL CrCl ~ 110 mL/min   AST/ALT/AP = 75/65/183 TBili = 0.2  TSH = 0.42 Free T4 = 0.91     Drug Order   (Drug name, dose, route, IV Fluid & volume, frequency, number of doses) Cycle 2 maintenance pembrolizumab (Cycle 8 overall)  Previous treatment: C7D1 8/23/23     Medication = Pembrolizumab  Base Dose = 200 mg   Fixed dose; no calculation required  Final Dose = 200 mg  Route = IV  Fluid & Volume = NS 50 mL  Admin Duration = Over 30 minutes         <10% difference, okay to treat with final dose         By my signature " below, I confirm this process was performed independently with the BSA and all final chemotherapy dosing calculations congruent. I have reviewed the above chemotherapy order and that my calculation of the final dose and BSA (when applicable) corroborate those calculations of the  pharmacist.     Misha Mckeon, PharmD

## 2023-09-13 NOTE — PROGRESS NOTES
"Chemotherapy Verification - SECONDARY RN       Height = 63.39\"  Weight = 80.7 kg  BSA = 1.9 m2       Medication: pembrolizumab (Keytruda)  Dose: set dose every 3 weeks  Calculated Dose: 200 mg                              I confirm that this process was performed independently.   "

## 2023-09-13 NOTE — PROGRESS NOTES
Chemotherapy Verification - PRIMARY RN      Height = 63.39 in  Weight = 177 lb  BSA = 1.9 m2       Medication: Keytruda  Dose: 200 mg  Calculated Dose: 200 mg / Fixed dose                             (In mg/m2, AUC, mg/kg)     I confirm this process was performed independently with the BSA and all final chemotherapy dosing calculations congruent.  Any discrepancies of 10% or greater have been addressed with the chemotherapy pharmacist. The resolution of the discrepancy has been documented in the EPIC progress notes.

## 2023-09-14 LAB — EKG IMPRESSION: NORMAL

## 2023-09-14 PROCEDURE — 93010 ELECTROCARDIOGRAM REPORT: CPT | Performed by: INTERNAL MEDICINE

## 2023-09-18 ENCOUNTER — ANESTHESIA EVENT (OUTPATIENT)
Dept: SURGERY | Facility: MEDICAL CENTER | Age: 58
End: 2023-09-18
Payer: COMMERCIAL

## 2023-09-19 ENCOUNTER — APPOINTMENT (OUTPATIENT)
Dept: RADIOLOGY | Facility: MEDICAL CENTER | Age: 58
End: 2023-09-19
Attending: SURGERY
Payer: COMMERCIAL

## 2023-09-19 ENCOUNTER — HOSPITAL ENCOUNTER (OUTPATIENT)
Facility: MEDICAL CENTER | Age: 58
End: 2023-09-20
Attending: SURGERY | Admitting: SURGERY
Payer: COMMERCIAL

## 2023-09-19 ENCOUNTER — ANESTHESIA (OUTPATIENT)
Dept: SURGERY | Facility: MEDICAL CENTER | Age: 58
End: 2023-09-19
Payer: COMMERCIAL

## 2023-09-19 DIAGNOSIS — C50.219 MALIGNANT NEOPLASM OF UPPER-INNER QUADRANT OF FEMALE BREAST, UNSPECIFIED ESTROGEN RECEPTOR STATUS, UNSPECIFIED LATERALITY (HCC): ICD-10-CM

## 2023-09-19 DIAGNOSIS — C50.212 MALIGNANT NEOPLASM OF UPPER-INNER QUADRANT OF LEFT FEMALE BREAST, UNSPECIFIED ESTROGEN RECEPTOR STATUS (HCC): ICD-10-CM

## 2023-09-19 PROBLEM — C50.412 BREAST CANCER OF UPPER-OUTER QUADRANT OF LEFT FEMALE BREAST (HCC): Status: ACTIVE | Noted: 2023-09-19

## 2023-09-19 LAB — PATHOLOGY CONSULT NOTE: NORMAL

## 2023-09-19 PROCEDURE — 76098 X-RAY EXAM SURGICAL SPECIMEN: CPT | Mod: LT

## 2023-09-19 PROCEDURE — 38792 RA TRACER ID OF SENTINL NODE: CPT | Mod: LT

## 2023-09-19 PROCEDURE — G0378 HOSPITAL OBSERVATION PER HR: HCPCS

## 2023-09-19 PROCEDURE — 160048 HCHG OR STATISTICAL LEVEL 1-5: Performed by: SURGERY

## 2023-09-19 PROCEDURE — A9270 NON-COVERED ITEM OR SERVICE: HCPCS | Performed by: ANESTHESIOLOGY

## 2023-09-19 PROCEDURE — 160002 HCHG RECOVERY MINUTES (STAT): Performed by: SURGERY

## 2023-09-19 PROCEDURE — 700105 HCHG RX REV CODE 258: Performed by: ANESTHESIOLOGY

## 2023-09-19 PROCEDURE — 160047 HCHG PACU  - EA ADDL 30 MINS PHASE II: Performed by: SURGERY

## 2023-09-19 PROCEDURE — 160041 HCHG SURGERY MINUTES - EA ADDL 1 MIN LEVEL 4: Performed by: SURGERY

## 2023-09-19 PROCEDURE — 700111 HCHG RX REV CODE 636 W/ 250 OVERRIDE (IP): Performed by: ANESTHESIOLOGY

## 2023-09-19 PROCEDURE — 96374 THER/PROPH/DIAG INJ IV PUSH: CPT

## 2023-09-19 PROCEDURE — 160035 HCHG PACU - 1ST 60 MINS PHASE I: Performed by: SURGERY

## 2023-09-19 PROCEDURE — 700102 HCHG RX REV CODE 250 W/ 637 OVERRIDE(OP): Performed by: ANESTHESIOLOGY

## 2023-09-19 PROCEDURE — 160025 RECOVERY II MINUTES (STATS): Performed by: SURGERY

## 2023-09-19 PROCEDURE — 110371 HCHG SHELL REV 272: Performed by: SURGERY

## 2023-09-19 PROCEDURE — 160046 HCHG PACU - 1ST 60 MINS PHASE II: Performed by: SURGERY

## 2023-09-19 PROCEDURE — 700111 HCHG RX REV CODE 636 W/ 250 OVERRIDE (IP): Mod: JZ | Performed by: SURGERY

## 2023-09-19 PROCEDURE — 88307 TISSUE EXAM BY PATHOLOGIST: CPT | Mod: 59

## 2023-09-19 PROCEDURE — A9270 NON-COVERED ITEM OR SERVICE: HCPCS | Performed by: SURGERY

## 2023-09-19 PROCEDURE — 700101 HCHG RX REV CODE 250: Performed by: ANESTHESIOLOGY

## 2023-09-19 PROCEDURE — 160036 HCHG PACU - EA ADDL 30 MINS PHASE I: Performed by: SURGERY

## 2023-09-19 PROCEDURE — 88309 TISSUE EXAM BY PATHOLOGIST: CPT

## 2023-09-19 PROCEDURE — 700101 HCHG RX REV CODE 250: Performed by: SURGERY

## 2023-09-19 PROCEDURE — 160009 HCHG ANES TIME/MIN: Performed by: SURGERY

## 2023-09-19 PROCEDURE — 700102 HCHG RX REV CODE 250 W/ 637 OVERRIDE(OP): Performed by: SURGERY

## 2023-09-19 PROCEDURE — 160029 HCHG SURGERY MINUTES - 1ST 30 MINS LEVEL 4: Performed by: SURGERY

## 2023-09-19 RX ORDER — DEXAMETHASONE SODIUM PHOSPHATE 4 MG/ML
INJECTION, SOLUTION INTRA-ARTICULAR; INTRALESIONAL; INTRAMUSCULAR; INTRAVENOUS; SOFT TISSUE PRN
Status: DISCONTINUED | OUTPATIENT
Start: 2023-09-19 | End: 2023-09-19 | Stop reason: SURG

## 2023-09-19 RX ORDER — SCOLOPAMINE TRANSDERMAL SYSTEM 1 MG/1
1 PATCH, EXTENDED RELEASE TRANSDERMAL
Status: DISCONTINUED | OUTPATIENT
Start: 2023-09-19 | End: 2023-09-20 | Stop reason: HOSPADM

## 2023-09-19 RX ORDER — LABETALOL HYDROCHLORIDE 5 MG/ML
5 INJECTION, SOLUTION INTRAVENOUS
Status: DISCONTINUED | OUTPATIENT
Start: 2023-09-19 | End: 2023-09-19 | Stop reason: HOSPADM

## 2023-09-19 RX ORDER — EPHEDRINE SULFATE 50 MG/ML
INJECTION, SOLUTION INTRAVENOUS PRN
Status: DISCONTINUED | OUTPATIENT
Start: 2023-09-19 | End: 2023-09-19 | Stop reason: SURG

## 2023-09-19 RX ORDER — HYDROMORPHONE HYDROCHLORIDE 1 MG/ML
0.1 INJECTION, SOLUTION INTRAMUSCULAR; INTRAVENOUS; SUBCUTANEOUS
Status: DISCONTINUED | OUTPATIENT
Start: 2023-09-19 | End: 2023-09-19 | Stop reason: HOSPADM

## 2023-09-19 RX ORDER — MEPERIDINE HYDROCHLORIDE 25 MG/ML
6.25 INJECTION INTRAMUSCULAR; INTRAVENOUS; SUBCUTANEOUS
Status: DISCONTINUED | OUTPATIENT
Start: 2023-09-19 | End: 2023-09-19 | Stop reason: HOSPADM

## 2023-09-19 RX ORDER — HYDROMORPHONE HYDROCHLORIDE 1 MG/ML
0.4 INJECTION, SOLUTION INTRAMUSCULAR; INTRAVENOUS; SUBCUTANEOUS
Status: DISCONTINUED | OUTPATIENT
Start: 2023-09-19 | End: 2023-09-19 | Stop reason: HOSPADM

## 2023-09-19 RX ORDER — ISOSULFAN BLUE 50 MG/5ML
INJECTION, SOLUTION SUBCUTANEOUS
Status: DISPENSED
Start: 2023-09-19 | End: 2023-09-19

## 2023-09-19 RX ORDER — LIDOCAINE HYDROCHLORIDE 40 MG/ML
SOLUTION TOPICAL PRN
Status: DISCONTINUED | OUTPATIENT
Start: 2023-09-19 | End: 2023-09-19 | Stop reason: SURG

## 2023-09-19 RX ORDER — SODIUM CHLORIDE, SODIUM LACTATE, POTASSIUM CHLORIDE, CALCIUM CHLORIDE 600; 310; 30; 20 MG/100ML; MG/100ML; MG/100ML; MG/100ML
INJECTION, SOLUTION INTRAVENOUS
Status: DISCONTINUED | OUTPATIENT
Start: 2023-09-19 | End: 2023-09-19 | Stop reason: SURG

## 2023-09-19 RX ORDER — CELECOXIB 200 MG/1
400 CAPSULE ORAL ONCE
Status: COMPLETED | OUTPATIENT
Start: 2023-09-19 | End: 2023-09-19

## 2023-09-19 RX ORDER — HYDRALAZINE HYDROCHLORIDE 20 MG/ML
5 INJECTION INTRAMUSCULAR; INTRAVENOUS
Status: DISCONTINUED | OUTPATIENT
Start: 2023-09-19 | End: 2023-09-19 | Stop reason: HOSPADM

## 2023-09-19 RX ORDER — SODIUM CHLORIDE, SODIUM LACTATE, POTASSIUM CHLORIDE, CALCIUM CHLORIDE 600; 310; 30; 20 MG/100ML; MG/100ML; MG/100ML; MG/100ML
INJECTION, SOLUTION INTRAVENOUS CONTINUOUS
Status: DISCONTINUED | OUTPATIENT
Start: 2023-09-19 | End: 2023-09-19 | Stop reason: HOSPADM

## 2023-09-19 RX ORDER — OXYCODONE HCL 5 MG/5 ML
5 SOLUTION, ORAL ORAL
Status: COMPLETED | OUTPATIENT
Start: 2023-09-19 | End: 2023-09-19

## 2023-09-19 RX ORDER — HALOPERIDOL 5 MG/ML
1 INJECTION INTRAMUSCULAR
Status: DISCONTINUED | OUTPATIENT
Start: 2023-09-19 | End: 2023-09-19 | Stop reason: HOSPADM

## 2023-09-19 RX ORDER — ONDANSETRON 2 MG/ML
4 INJECTION INTRAMUSCULAR; INTRAVENOUS
Status: DISCONTINUED | OUTPATIENT
Start: 2023-09-19 | End: 2023-09-19 | Stop reason: HOSPADM

## 2023-09-19 RX ORDER — MORPHINE SULFATE 4 MG/ML
2 INJECTION INTRAVENOUS
Status: DISCONTINUED | OUTPATIENT
Start: 2023-09-19 | End: 2023-09-20 | Stop reason: HOSPADM

## 2023-09-19 RX ORDER — DEXTROSE MONOHYDRATE, SODIUM CHLORIDE, AND POTASSIUM CHLORIDE 50; 1.49; 4.5 G/1000ML; G/1000ML; G/1000ML
INJECTION, SOLUTION INTRAVENOUS CONTINUOUS
Status: DISPENSED | OUTPATIENT
Start: 2023-09-19 | End: 2023-09-19

## 2023-09-19 RX ORDER — DIPHENHYDRAMINE HYDROCHLORIDE 50 MG/ML
12.5 INJECTION INTRAMUSCULAR; INTRAVENOUS
Status: DISCONTINUED | OUTPATIENT
Start: 2023-09-19 | End: 2023-09-19 | Stop reason: HOSPADM

## 2023-09-19 RX ORDER — CEFAZOLIN SODIUM 1 G/3ML
INJECTION, POWDER, FOR SOLUTION INTRAMUSCULAR; INTRAVENOUS PRN
Status: DISCONTINUED | OUTPATIENT
Start: 2023-09-19 | End: 2023-09-19 | Stop reason: SURG

## 2023-09-19 RX ORDER — ONDANSETRON 2 MG/ML
INJECTION INTRAMUSCULAR; INTRAVENOUS PRN
Status: DISCONTINUED | OUTPATIENT
Start: 2023-09-19 | End: 2023-09-19 | Stop reason: SURG

## 2023-09-19 RX ORDER — OXYCODONE HYDROCHLORIDE 5 MG/1
5 TABLET ORAL
Status: DISCONTINUED | OUTPATIENT
Start: 2023-09-19 | End: 2023-09-20 | Stop reason: HOSPADM

## 2023-09-19 RX ORDER — LIDOCAINE HYDROCHLORIDE 20 MG/ML
INJECTION, SOLUTION EPIDURAL; INFILTRATION; INTRACAUDAL; PERINEURAL PRN
Status: DISCONTINUED | OUTPATIENT
Start: 2023-09-19 | End: 2023-09-19 | Stop reason: SURG

## 2023-09-19 RX ORDER — MIDAZOLAM HYDROCHLORIDE 1 MG/ML
INJECTION INTRAMUSCULAR; INTRAVENOUS PRN
Status: DISCONTINUED | OUTPATIENT
Start: 2023-09-19 | End: 2023-09-19 | Stop reason: SURG

## 2023-09-19 RX ORDER — ROCURONIUM BROMIDE 10 MG/ML
INJECTION, SOLUTION INTRAVENOUS PRN
Status: DISCONTINUED | OUTPATIENT
Start: 2023-09-19 | End: 2023-09-19 | Stop reason: SURG

## 2023-09-19 RX ORDER — ONDANSETRON 2 MG/ML
4 INJECTION INTRAMUSCULAR; INTRAVENOUS EVERY 4 HOURS PRN
Status: DISCONTINUED | OUTPATIENT
Start: 2023-09-19 | End: 2023-09-20 | Stop reason: HOSPADM

## 2023-09-19 RX ORDER — DIPHENHYDRAMINE HYDROCHLORIDE 50 MG/ML
25 INJECTION INTRAMUSCULAR; INTRAVENOUS EVERY 6 HOURS PRN
Status: DISCONTINUED | OUTPATIENT
Start: 2023-09-19 | End: 2023-09-20 | Stop reason: HOSPADM

## 2023-09-19 RX ORDER — HYDROMORPHONE HYDROCHLORIDE 1 MG/ML
0.2 INJECTION, SOLUTION INTRAMUSCULAR; INTRAVENOUS; SUBCUTANEOUS
Status: DISCONTINUED | OUTPATIENT
Start: 2023-09-19 | End: 2023-09-19 | Stop reason: HOSPADM

## 2023-09-19 RX ORDER — OXYCODONE HCL 5 MG/5 ML
10 SOLUTION, ORAL ORAL
Status: COMPLETED | OUTPATIENT
Start: 2023-09-19 | End: 2023-09-19

## 2023-09-19 RX ORDER — BUPIVACAINE HYDROCHLORIDE AND EPINEPHRINE 5; 5 MG/ML; UG/ML
INJECTION, SOLUTION EPIDURAL; INTRACAUDAL; PERINEURAL
Status: DISPENSED
Start: 2023-09-19 | End: 2023-09-19

## 2023-09-19 RX ORDER — BUPIVACAINE HYDROCHLORIDE AND EPINEPHRINE 5; 5 MG/ML; UG/ML
INJECTION, SOLUTION EPIDURAL; INTRACAUDAL; PERINEURAL
Status: DISCONTINUED | OUTPATIENT
Start: 2023-09-19 | End: 2023-09-19 | Stop reason: HOSPADM

## 2023-09-19 RX ORDER — LABETALOL HYDROCHLORIDE 5 MG/ML
10 INJECTION, SOLUTION INTRAVENOUS EVERY 4 HOURS PRN
Status: DISCONTINUED | OUTPATIENT
Start: 2023-09-19 | End: 2023-09-20 | Stop reason: HOSPADM

## 2023-09-19 RX ORDER — HYDROMORPHONE HYDROCHLORIDE 2 MG/ML
INJECTION, SOLUTION INTRAMUSCULAR; INTRAVENOUS; SUBCUTANEOUS PRN
Status: DISCONTINUED | OUTPATIENT
Start: 2023-09-19 | End: 2023-09-19 | Stop reason: SURG

## 2023-09-19 RX ORDER — ACETAMINOPHEN 500 MG
1000 TABLET ORAL ONCE
Status: COMPLETED | OUTPATIENT
Start: 2023-09-19 | End: 2023-09-19

## 2023-09-19 RX ORDER — OXYCODONE HYDROCHLORIDE 5 MG/1
2.5 TABLET ORAL
Status: DISCONTINUED | OUTPATIENT
Start: 2023-09-19 | End: 2023-09-20 | Stop reason: HOSPADM

## 2023-09-19 RX ADMIN — MIDAZOLAM 2 MG: 1 INJECTION, SOLUTION INTRAMUSCULAR; INTRAVENOUS at 10:47

## 2023-09-19 RX ADMIN — EPHEDRINE SULFATE 10 MG: 50 INJECTION, SOLUTION INTRAVENOUS at 11:44

## 2023-09-19 RX ADMIN — PROPOFOL 50 MG: 10 INJECTION, EMULSION INTRAVENOUS at 12:18

## 2023-09-19 RX ADMIN — HYDROMORPHONE HYDROCHLORIDE 1 MG: 2 INJECTION INTRAMUSCULAR; INTRAVENOUS; SUBCUTANEOUS at 11:20

## 2023-09-19 RX ADMIN — HYDROMORPHONE HYDROCHLORIDE 0.2 MG: 1 INJECTION, SOLUTION INTRAMUSCULAR; INTRAVENOUS; SUBCUTANEOUS at 15:46

## 2023-09-19 RX ADMIN — CEFAZOLIN 2 G: 1 INJECTION, POWDER, FOR SOLUTION INTRAMUSCULAR; INTRAVENOUS at 10:54

## 2023-09-19 RX ADMIN — EPHEDRINE SULFATE 10 MG: 50 INJECTION, SOLUTION INTRAVENOUS at 11:03

## 2023-09-19 RX ADMIN — OXYCODONE HYDROCHLORIDE 10 MG: 5 SOLUTION ORAL at 14:10

## 2023-09-19 RX ADMIN — HYDROMORPHONE HYDROCHLORIDE 0.1 MG: 1 INJECTION, SOLUTION INTRAMUSCULAR; INTRAVENOUS; SUBCUTANEOUS at 15:52

## 2023-09-19 RX ADMIN — POTASSIUM CHLORIDE, DEXTROSE MONOHYDRATE AND SODIUM CHLORIDE: 150; 5; 450 INJECTION, SOLUTION INTRAVENOUS at 17:57

## 2023-09-19 RX ADMIN — HYDROMORPHONE HYDROCHLORIDE 1 MG: 2 INJECTION INTRAMUSCULAR; INTRAVENOUS; SUBCUTANEOUS at 12:18

## 2023-09-19 RX ADMIN — LIDOCAINE HYDROCHLORIDE 80 MG: 20 INJECTION, SOLUTION EPIDURAL; INFILTRATION; INTRACAUDAL at 10:47

## 2023-09-19 RX ADMIN — DEXAMETHASONE SODIUM PHOSPHATE 8 MG: 4 INJECTION INTRA-ARTICULAR; INTRALESIONAL; INTRAMUSCULAR; INTRAVENOUS; SOFT TISSUE at 11:05

## 2023-09-19 RX ADMIN — HYDROMORPHONE HYDROCHLORIDE 0.2 MG: 1 INJECTION, SOLUTION INTRAMUSCULAR; INTRAVENOUS; SUBCUTANEOUS at 15:38

## 2023-09-19 RX ADMIN — CELECOXIB 400 MG: 200 CAPSULE ORAL at 09:53

## 2023-09-19 RX ADMIN — LIDOCAINE HYDROCHLORIDE 4 ML: 40 SOLUTION TOPICAL at 10:54

## 2023-09-19 RX ADMIN — PROPOFOL 160 MG: 10 INJECTION, EMULSION INTRAVENOUS at 10:47

## 2023-09-19 RX ADMIN — FENTANYL CITRATE 100 MCG: 50 INJECTION, SOLUTION INTRAMUSCULAR; INTRAVENOUS at 10:47

## 2023-09-19 RX ADMIN — ROCURONIUM BROMIDE 10 MG: 50 INJECTION, SOLUTION INTRAVENOUS at 12:24

## 2023-09-19 RX ADMIN — SODIUM CHLORIDE, POTASSIUM CHLORIDE, SODIUM LACTATE AND CALCIUM CHLORIDE: 600; 310; 30; 20 INJECTION, SOLUTION INTRAVENOUS at 10:42

## 2023-09-19 RX ADMIN — EPHEDRINE SULFATE 10 MG: 50 INJECTION, SOLUTION INTRAVENOUS at 11:59

## 2023-09-19 RX ADMIN — FENTANYL CITRATE 25 MCG: 50 INJECTION, SOLUTION INTRAMUSCULAR; INTRAVENOUS at 14:10

## 2023-09-19 RX ADMIN — ONDANSETRON 4 MG: 2 INJECTION INTRAMUSCULAR; INTRAVENOUS at 13:00

## 2023-09-19 RX ADMIN — OXYCODONE HYDROCHLORIDE 5 MG: 5 TABLET ORAL at 17:00

## 2023-09-19 RX ADMIN — OXYCODONE HYDROCHLORIDE 5 MG: 5 TABLET ORAL at 20:05

## 2023-09-19 RX ADMIN — ROCURONIUM BROMIDE 50 MG: 50 INJECTION, SOLUTION INTRAVENOUS at 10:47

## 2023-09-19 RX ADMIN — SUGAMMADEX 200 MG: 100 INJECTION, SOLUTION INTRAVENOUS at 13:37

## 2023-09-19 RX ADMIN — ACETAMINOPHEN 1000 MG: 500 TABLET, FILM COATED ORAL at 09:54

## 2023-09-19 RX ADMIN — MORPHINE SULFATE 2 MG: 4 INJECTION, SOLUTION INTRAMUSCULAR; INTRAVENOUS at 21:30

## 2023-09-19 ASSESSMENT — FIBROSIS 4 INDEX
FIB4 SCORE: 3.27
FIB4 SCORE: 3.27

## 2023-09-19 ASSESSMENT — PATIENT HEALTH QUESTIONNAIRE - PHQ9
2. FEELING DOWN, DEPRESSED, IRRITABLE, OR HOPELESS: NOT AT ALL
SUM OF ALL RESPONSES TO PHQ9 QUESTIONS 1 AND 2: 0
SUM OF ALL RESPONSES TO PHQ9 QUESTIONS 1 AND 2: 0
1. LITTLE INTEREST OR PLEASURE IN DOING THINGS: NOT AT ALL
2. FEELING DOWN, DEPRESSED, IRRITABLE, OR HOPELESS: NOT AT ALL
1. LITTLE INTEREST OR PLEASURE IN DOING THINGS: NOT AT ALL
1. LITTLE INTEREST OR PLEASURE IN DOING THINGS: NOT AT ALL
2. FEELING DOWN, DEPRESSED, IRRITABLE, OR HOPELESS: NOT AT ALL
SUM OF ALL RESPONSES TO PHQ9 QUESTIONS 1 AND 2: 0

## 2023-09-19 ASSESSMENT — PAIN DESCRIPTION - PAIN TYPE
TYPE: SURGICAL PAIN
TYPE: ACUTE PAIN;SURGICAL PAIN
TYPE: SURGICAL PAIN
TYPE: ACUTE PAIN;SURGICAL PAIN
TYPE: SURGICAL PAIN
TYPE: SURGICAL PAIN

## 2023-09-19 ASSESSMENT — LIFESTYLE VARIABLES
EVER HAD A DRINK FIRST THING IN THE MORNING TO STEADY YOUR NERVES TO GET RID OF A HANGOVER: NO
EVER FELT BAD OR GUILTY ABOUT YOUR DRINKING: NO
AVERAGE NUMBER OF DAYS PER WEEK YOU HAVE A DRINK CONTAINING ALCOHOL: 0
ALCOHOL_USE: NO
HAVE YOU EVER FELT YOU SHOULD CUT DOWN ON YOUR DRINKING: NO
HAVE PEOPLE ANNOYED YOU BY CRITICIZING YOUR DRINKING: NO
ON A TYPICAL DAY WHEN YOU DRINK ALCOHOL HOW MANY DRINKS DO YOU HAVE: 0
TOTAL SCORE: 0
CONSUMPTION TOTAL: NEGATIVE
TOTAL SCORE: 0
HOW MANY TIMES IN THE PAST YEAR HAVE YOU HAD 5 OR MORE DRINKS IN A DAY: 0
TOTAL SCORE: 0

## 2023-09-19 ASSESSMENT — PAIN SCALES - GENERAL: PAIN_LEVEL: 4

## 2023-09-19 NOTE — OR SURGEON
Immediate Post OP Note  PreOp Diagnosis: Left Breast Cancer s/p neoadjuvant therapy      PostOp Diagnosis: same      Procedure(s):  LEFT TOTAL MASTECTOMY, LEFT AXILLARY SENTINEL NODE BIOPSY with Limited Node Dissection, RIGHT PROPHYLACTIC TOTAL MASTECTOMY - - Wound Class: Clean    Surgeon(s):  Peña Monahan M.D.    Anesthesiologist/Type of Anesthesia:  Anesthesiologist: Edith Alvarado M.D./General    Surgical Staff:  Assistant: MONTEZ Alvarado  Circulator: Tabitha Cooney R.N.; Dagmar Marrero R.N.  Relief Circulator: Gwendolyn Bailey R.N.  Scrub Person: Laine Bonilla; Yajaira Wahl    Specimens removed if any:  ID Type Source Tests Collected by Time Destination   A : RIGHT BREAST SUTURE MARKS THE TAIL Tissue Breast PATHOLOGY SPECIMEN Peña Monahan M.D. 9/19/2023 1140    B : left lymp node #1 Tissue Lymph Node PATHOLOGY SPECIMEN Peña Monahan M.D. 9/19/2023 1245    C : left lymph node #2 Tissue Lymph Node PATHOLOGY SPECIMEN Peña Monahan M.D. 9/19/2023 1247    D : left lymph node #3 Tissue Lymph Node PATHOLOGY SPECIMEN Peña Monahan M.D. 9/19/2023 1250    E : LEFT BREAST SUTURE NÚÑEZ AXILLARY LYMPHNODES Tissue Breast PATHOLOGY SPECIMEN Peña Monahan M.D. 9/19/2023 1257        Estimated Blood Loss: 25cc's    Findings: firm area in upper left breast that is over 5-6cm; very suspicious nodes in left axilla and specimen radiograph reveals the clip within one of the 3 sentinel nodes, tail of breast had several suspicious nodes that were removed with the breast, no obvious abnormality in the right breast    Complications: no apparent complications        9/19/2023 1:52 PM Peña Monahan M.D.

## 2023-09-19 NOTE — ANESTHESIA PROCEDURE NOTES
Airway    Date/Time: 9/19/2023 10:57 AM    Performed by: Edith Alvarado M.D.  Authorized by: Edith Alvarado M.D.    Location:  OR  Urgency:  Elective  Difficult Airway: No    Indications for Airway Management:  Anesthesia      Spontaneous Ventilation: absent    Sedation Level:  Deep  Preoxygenated: Yes    Patient Position:  Sniffing  MILS Maintained Throughout: No    Mask Difficulty Assessment:  1 - vent by mask  Final Airway Type:  Endotracheal airway  Final Endotracheal Airway:  ETT  Cuffed: Yes    Technique Used for Successful ETT Placement:  Direct laryngoscopy  Devices/Methods Used in Placement:  Intubating stylet and anterior pressure/BURP    Insertion Site:  Oral  Blade Type:  Saab  Laryngoscope Blade/Videolaryngoscope Blade Size:  2  ETT Size (mm):  6.5  Placement Verified by: capnometry    Cormack-Lehane Classification:  Grade I - full view of glottis  Number of Attempts at Approach:  1

## 2023-09-19 NOTE — ANESTHESIA PREPROCEDURE EVALUATION
Case: [id number] Date/Time: 09/19/23 1030    Procedures:       LEFT TOTAL MASTECTOMY, LEFT AXILLARY SENTINEL NODE BIOPSY (RADIONUCLIDE AND BLUE DYE), POSSIBLE NODE DISSECTION, RIGHT PROPHYLACTIC MASTECTOMY      BIOPSY, LYMPH NODE, SENTINEL    Pre-op diagnosis: PRIMARY MALIGNANT NEOPLASM OF UPPER INNER QUADRANT OF FEMALE BREAST - LEFT    Location: [facility] ROOM 21 / SURGERY SAME DAY [facility]    Surgeons: Peña Monahan M.D.          Relevant Problems   No relevant active problems       Physical Exam    Airway   Mallampati: IV       Cardiovascular   Rhythm: regular  Rate: normal     Dental - normal exam           Pulmonary   (+) decreased breath sounds     Abdominal - normal exam     Neurological              Anesthesia Plan    ASA 2       Plan - general       Airway plan will be ETT          Induction: intravenous    Postoperative Plan: Postoperative administration of opioids is intended.    Pertinent diagnostic labs and testing reviewed    Informed Consent:    Anesthetic plan and risks discussed with patient.    Use of blood products discussed with: whom consented to blood products.

## 2023-09-19 NOTE — OP REPORT
DATE OF SERVICE:  09/19/2023     SURGEON:  Peña Monahan MD     ASSISTANT:  LADAN Jimenez     ANESTHESIOLOGIST:  Edith Alvarado MD     PREOPERATIVE DIAGNOSIS:  Left breast invasive ductal carcinoma, status post   neoadjuvant therapy.     POSTOPERATIVE DIAGNOSIS:  Left breast invasive ductal carcinoma, status post   neoadjuvant therapy.     PROCEDURES:  1.  Left total mastectomy.  2.  Left sentinel node biopsy.  3.  Left sentinel node injection for sentinel node identification and mapping.  4.  Limited node dissection on the left.  5.  Right total mastectomy.     FINDINGS:  The patient is a 58-year-old female who recently was found to have   a mass in the upper aspect of the left breast.  A biopsy was performed and   revealed an invasive ductal carcinoma that was triple negative.  She   previously had a left breast cancer back in 2003 in a similar area and was   treated with lumpectomy, radiation therapy and chemotherapy.  The patient did   receive neoadjuvant therapy at this time for her triple negative breast   cancer.  She had limited response on the MRI scan with a mass persistent in   the upper aspect of the left breast as well as multiple enlarged lymph nodes.    There had been a biopsy of one of those lymph nodes prior to neoadjuvant   therapy with a clip in it.  Those biopsies have been negative.  Today, the   patient presented for a left total mastectomy, left sentinel node biopsy,   possible node dissection along with the right prophylactic total mastectomy.    These were performed.  The patient was noted to have a firm area in the upper   aspect of the left breast that had improved from her pre-neoadjuvant therapy,   but still is present.  She also had multiple lymph nodes in the axilla that   were suspicious.  Three sentinel nodes were identified, one with a clip in it   and some other nodes in the tail of the breast.  There were no obvious   abnormalities in the right breast.  The  patient tolerated the entire procedure   well.  There were no obvious complications.     FINDING AND PROCEDURE: The patient was brought to the operating room and   placed on the table in supine position.  General anesthetic was then provided   by the anesthesiologist, Dr. Alvarado.  A timeout was called.  The correct   patient, correct diagnosis, correct surgery, and correct location of surgery   were discussed and agreed upon.  She received preoperative IV antibiotics.    Dr. Monahan injected 5 mL of isosulfan blue into the left breast with 5 minutes   of breast massage.  Both the right and left breasts were then prepped and   draped in sterile fashion.  This included the axillary areas.  Palpation did   reveal the patient still had a mass in the upper aspect of the left breast   that measured 6-7 cm in size.  There was some puckering of the skin near her   previous incision.  There were no obvious abnormalities on palpation of the   right breast.  Elliptical incision was then designed around the nipple areolar   complex on both breasts using a skin marker.  The patient had somewhat ptotic   breast and therefore, a large portion of the skin did need to be removed.    The right breast was addressed first.  An incision made through skin and   dermis with #15 blade into the breast parenchyma with the scalpel.  All   bleeding was controlled with electrocautery.  [place] tenaculums were used to   elevate the skin flaps.  The skin flap was raised superiorly on the right side   up to the clavicle, medially to the lateral border of the sternum, inferior   to the inferior mammary crease, and laterally to the anterior border of   latissimus dorsi muscle.  The right breast was removed from the chest wall in   a medial to lateral direction, leaving the pectoralis major fascia.  The   breast was removed intact.  Dissection was carried out along the lateral   border of the pectoralis major and minor muscles and then carried across  the   clavipectoral fascia.  The entire tail of the breast was removed along with   the breast and the tail vessels were controlled with the LigaSure device.  No   attempt was made to obtain lymph nodes on the right side.  The right breast   was then oriented with sutures for the pathologist and sent to pathology for   further characterization.  The wound was irrigated with normal saline.    Bleeding points were controlled with electrocautery.  A #19-[place]ian   Jericho-Jones drain was then placed into the inferior flap and placed into the   right axilla and then across the right chest wall.  The drain was sutured to   the skin with a 3-0 nylon suture.  Next, the wound was irrigated with 1 liter   warm water.  All bleeding points were controlled with electrocautery.  The   wound was closed using 3-0 Vicryl sutures in interrupted fashion for the   subcutaneous tissue and a 4-0 Monocryl suture in subcuticular fashion for the   skin.  Next, the left breast was addressed.  Elliptical incision had been   designed around the nipple areolar complex on the left side to incorporate the   previous incision, which was high up on the left breast.  Incision on the   breast was made with #15 blade and all bleeding was controlled with   electrocautery.  Dissection was carried down into the breast parenchyma.    [place] tenaculums were then used to elevate the skin flaps.  The skin flap was   raised superiorly up to the clavicle, medially to the lateral border of the   sternum, inferiorly to the inferior mammary crease and then laterally to the   anterior border of latissimus dorsi muscle.  The left breast was removed from   the pectoralis major muscle in a medial to lateral direction, incorporating   the fascia.  The patient's tumor was in the upper aspect of the left breast.    The entire left breast was dissected off the pectoralis major and minor   muscles.  The lateral border of the pectoralis major muscle was then followed   into  the high axilla.  Dissection was carried out across the clavipectoral   fascia.  A blue dye track was noted going into the left axilla along with   reactivity noted in 3 lymph nodes.  These were carefully dissected out with   LigaSure device and were marked as 3 sentinel nodes.  Specimen radiograph   revealed the clip from the previous biopsy within one of the dissected nodes.    The nodes were sent to pathology for further characterization.  Further   interrogation of the axilla revealed no other blue or hot radioactive lymph   nodes, but there were several enlarged lymph nodes that were firm in the tail   of the breast in the low axilla. Therefore, a limited node dissection was   performed.  Dissection was carried out, making sure that the thoracodorsal and   long thoracic nerves were carefully protected and any larger veins were   controlled with 3-0 Vicryl ties.  The lymphatic tissue and leticia tissue in   this area was dissected along with the tail of the breast and then removed en   bloc.  The tail of the breast was marked with a suture, so that node   evaluation could be performed by pathology.  The specimen was sent to   pathology for further characterization.  The wound was irrigated with   approximately 1 liter warm water.  All bleeding points were controlled with   electrocautery.  Palpation did not reveal any other lymph nodes that were   enlarged.  A #19-Maltese Jericho-Jones drain was placed into the inferior flap   and placed in the left axilla and then across the chest wall.  Drain was   sutured to the skin with a 3-0 nylon suture.  Next, the wound was closed using   3-0 Vicryl sutures for the subcutaneous tissue in interrupted fashion and a   running 4-0 Monocryl suture for the skin in subcuticular fashion.  At this   point, sterile dressings were placed around the drains and then a customizable   Prevena was placed across the incisions.  Once the Prevena was cut to the   correct size, it was secured  to the chest wall, covering the incisions and   attached to the negative pressure device.  Next, an Ace wrap was placed around   the Prevena device with the device being cushioned with ABD pads.  The   patient tolerated the procedure well with no complication.  Final sponge and   needle count correct.  She was then extubated and transferred back to recovery   room for further postoperative care.     An assistant was utilized for the surgery.  The assistant assisted with   initial incisions, retraction of the major structures that the axillary node   dissection could be performed and also assisted with the multilayer closure on   both mastectomy incisions.    [place] Node Biopsy for Breast Cancer - Right  Operation performed with curative intent. Yes   Tracer(s) used to identify sentinel nodes in the upfront surgery (non-neoadjuvant) setting (select all that apply). N/A   Tracer(s) used to identify sentinel nodes in the neoadjuvant setting (select all that apply). Dye and Radioactive tracer   All nodes (colored or non-colored) present at the end of a dye-filled lymphatic channel were removed. Yes   All significantly radioactive nodes were removed. Yes   All palpably suspicious nodes were removed. Yes   Biopsy-proven positive nodes marked with clips prior to chemotherapy were identified and removed. N/A       ______________________________  MD TEE COBOS/AJIT/JERRELL    DD:  09/19/2023 14:15  DT:  09/19/2023 15:22    Job#:  [id number]    CC:YVETTE Rivas MD

## 2023-09-19 NOTE — ANESTHESIA TIME REPORT
Anesthesia Start and Stop Event Times     Date Time Event    9/19/2023 1037 Ready for Procedure     1042 Anesthesia Start     1357 Anesthesia Stop        Responsible Staff  09/19/23    Name Role Begin End    Edith Alvarado M.D. Anesth 1042 1357        Overtime Reason:  no overtime (within assigned shift)    Comments:

## 2023-09-19 NOTE — OR NURSING
1350 Patient arrived to PACU from OR. Report received. Patient attached to monitoring. VSS. Patient oxygenating well on 6 L via mask. Dressings on chest are CDI.     1410 Patient complaining of 8/10 pain in chest. Po oxy and IV fentanyl given per MAR    1430 Patient meets phase two criteria. Tolerating PO liquids without complication.        1530 Hand off report given to Belinda GOMEZ

## 2023-09-19 NOTE — OR NURSING
1613 patient recovered well in PACU, medicated per EMAR for pain, denies nausea, report called to JASON Garvey, patient transferring to T211, all belongings with patient and accounted for, friend Issac at bedside, patient agrees/ask questions regarding care, safety ensured, care transferred.

## 2023-09-19 NOTE — ANESTHESIA POSTPROCEDURE EVALUATION
Patient: Yolanda Roe    Procedure Summary     Date: 09/19/23 Room / Location: [facility] ROOM 21 / SURGERY SAME DAY [facility]    Anesthesia Start: 1042 Anesthesia Stop: 1357    Procedures:       LEFT TOTAL MASTECTOMY, LEFT AXILLARY SENTINEL NODE BIOPSY, RIGHT PROPHYLACTIC TOTAL MASTECTOMY (Bilateral: Breast)      BIOPSY, LYMPH NODE, SENTINEL (Left: Breast) Diagnosis: (PRIMARY MALIGNANT NEOPLASM OF UPPER INNER QUADRANT OF FEMALE BREAST LEFT)    Surgeons: Peña Monahan M.D. Responsible Provider: Edith Alvarado M.D.    Anesthesia Type: general ASA Status: 2          Final Anesthesia Type: general  Last vitals  BP   Blood Pressure: 114/77    Temp   36.1 °C (96.9 °F)    Pulse   73   Resp   18    SpO2   100 %      Anesthesia Post Evaluation    Patient location during evaluation: PACU  Patient participation: complete - patient participated  Level of consciousness: awake and alert  Pain score: 4    Airway patency: patent  Anesthetic complications: no  Cardiovascular status: adequate and hemodynamically stable  Respiratory status: acceptable and nasal cannula  Hydration status: acceptable    PONV: none          No notable events documented.     Nurse Pain Score: 4 (NPRS)

## 2023-09-19 NOTE — DISCHARGE INSTRUCTIONS
What to Expect Post Anesthesia    Rest and take it easy for the first 24 hours.  A responsible adult is recommended to remain with you during that time.  It is normal to feel sleepy.  We encourage you to not do anything that requires balance, judgment or coordination.    FOR 24 HOURS DO NOT:  Drive, operate machinery or run household appliances.  Drink beer or alcoholic beverages.  Make important decisions or sign legal documents.    To avoid nausea, slowly advance diet as tolerated, avoiding spicy or greasy foods for the first day.  Add more substantial food to your diet according to your provider's instructions.  Babies can be fed formula or breast milk as soon as they are hungry.  INCREASE FLUIDS AND FIBER TO AVOID CONSTIPATION.    MILD FLU-LIKE SYMPTOMS ARE NORMAL.  YOU MAY EXPERIENCE GENERALIZED MUSCLE ACHES, THROAT IRRITATION, HEADACHE AND/OR SOME NAUSEA.    If any questions arise, call your provider.  If your provider is not available, please feel free to call the Surgical Center at [phone].    MEDICATIONS: Resume taking daily medication.  Take prescribed pain medication with food.  If no medication is prescribed, you may take non-aspirin pain medication if needed.  PAIN MEDICATION CAN BE VERY CONSTIPATING.  Take a stool softener or laxative such as senokot, pericolace, or milk of magnesia if needed.      Tylenol given at 9:50 AM this morning.  Celebrex (ibuprofen-like medication) given at 9:50 AM.

## 2023-09-20 VITALS
BODY MASS INDEX: 31.13 KG/M2 | DIASTOLIC BLOOD PRESSURE: 66 MMHG | WEIGHT: 175.71 LBS | RESPIRATION RATE: 14 BRPM | HEIGHT: 63 IN | OXYGEN SATURATION: 94 % | SYSTOLIC BLOOD PRESSURE: 115 MMHG | TEMPERATURE: 96.7 F | HEART RATE: 68 BPM

## 2023-09-20 LAB
ERYTHROCYTE [DISTWIDTH] IN BLOOD BY AUTOMATED COUNT: 47.9 FL (ref 35.9–50)
HCT VFR BLD AUTO: 32.9 % (ref 37–47)
HGB BLD-MCNC: 11 G/DL (ref 12–16)
MCH RBC QN AUTO: 35 PG (ref 27–33)
MCHC RBC AUTO-ENTMCNC: 33.4 G/DL (ref 32.2–35.5)
MCV RBC AUTO: 104.8 FL (ref 81.4–97.8)
PLATELET # BLD AUTO: 145 K/UL (ref 164–446)
PMV BLD AUTO: 10 FL (ref 9–12.9)
RBC # BLD AUTO: 3.14 M/UL (ref 4.2–5.4)
WBC # BLD AUTO: 5.9 K/UL (ref 4.8–10.8)

## 2023-09-20 PROCEDURE — 85027 COMPLETE CBC AUTOMATED: CPT

## 2023-09-20 PROCEDURE — A9270 NON-COVERED ITEM OR SERVICE: HCPCS | Performed by: SURGERY

## 2023-09-20 PROCEDURE — 96376 TX/PRO/DX INJ SAME DRUG ADON: CPT

## 2023-09-20 PROCEDURE — 700102 HCHG RX REV CODE 250 W/ 637 OVERRIDE(OP): Performed by: SURGERY

## 2023-09-20 PROCEDURE — G0378 HOSPITAL OBSERVATION PER HR: HCPCS

## 2023-09-20 PROCEDURE — 700111 HCHG RX REV CODE 636 W/ 250 OVERRIDE (IP): Mod: JZ | Performed by: SURGERY

## 2023-09-20 RX ADMIN — MORPHINE SULFATE 2 MG: 4 INJECTION, SOLUTION INTRAMUSCULAR; INTRAVENOUS at 04:09

## 2023-09-20 RX ADMIN — OXYCODONE HYDROCHLORIDE 5 MG: 5 TABLET ORAL at 02:54

## 2023-09-20 ASSESSMENT — PAIN DESCRIPTION - PAIN TYPE
TYPE: ACUTE PAIN;SURGICAL PAIN
TYPE: ACUTE PAIN;SURGICAL PAIN

## 2023-09-20 NOTE — PROGRESS NOTES
Surgical Progress Note    Patient is doing well this morning with pain well controlled.  She does not have nausea or vomiting. There are no signs of bleeding.    VS  S afebrile  Lungs: clear  Cor RRR  Chest:  No obvious hematoma, visible flaps look viable and Prevena is in place.  ARACELI drains have serosanguinous fluid  95cc's since surgery    Abd: soft, non tender    H/H  11/33 which is stable for her    Imp:  POD #1 s/p bilateral mastectomies for left breast cancer. S/p neoadjuvant chemotherapy.    Plan:  Home today  Wean off O2.   Wound care instructions given to patient including Prevena teaching and ARACELI drain teaching  Dr. Monahan will see pt in one week to remove the Prevena and go over the pathology results.

## 2023-09-20 NOTE — PROGRESS NOTES
Received bedside report by night RN. Dr. Monahan at bedside, plan to d/c home with ARACELI drains and prevena. Patient aware that updates will be completed after morning rounds. Chart check completed.

## 2023-09-20 NOTE — PROGRESS NOTES
Assumed care of patient and heard report from Mare GOMEZ.  Admit profile and assessment completed, patient is A&Ox 4, reports 8/10 pain, medicated per MAR, on ERAS protocol until 2000 tonight, ambulated to the bathroom with steady gait, NADN.  Patient updated on plan of care and all needs addressed at this time.  Upper bed rails in place, bed in lowest locked setting, non slip socks on, belongings and call light in reach, falls precaution and safety education reinforced, patient verbalized understanding.

## 2023-09-20 NOTE — PROGRESS NOTES
Assumed care of pt. Call light in reach. Bed in lowest position. Care of plan discussed with pt with pt agreeing to care of plan. Communication board updated. All questions answered. Assessment completed.

## 2023-09-20 NOTE — CARE PLAN
The patient is Stable - Low risk of patient condition declining or worsening    Shift Goals  Clinical Goals: pain control, O2, sleep  Patient Goals: comfort  Family Goals: not present    Progress made toward(s) clinical / shift goals:  Pain management progressing through medication (see MAR), rest, positioning, and distraction.  Patient educated on plan of care, medications, side effects, non-pharmalogical pain control, and safety/fall precautions.        Problem: Pain - Standard  Goal: Alleviation of pain or a reduction in pain to the patient’s comfort goal  Description: Target End Date:  Prior to discharge or change in level of care    Document on Vitals flowsheet    1.  Document pain using the appropriate pain scale per order or unit policy  2.  Educate and implement non-pharmacologic comfort measures (i.e. relaxation, distraction, massage, cold/heat therapy, etc.)  3.  Pain management medications as ordered  4.  Reassess pain after pain med administration per policy  5.  If opiods administered assess patient's response to pain medication is appropriate per POSS sedation scale  6.  Follow pain management plan developed in collaboration with patient and interdisciplinary team (including palliative care or pain specialists if applicable)  Outcome: Progressing     Problem: Knowledge Deficit - Standard  Goal: Patient and family/care givers will demonstrate understanding of plan of care, disease process/condition, diagnostic tests and medications  Description: Target End Date:  1-3 days or as soon as patient condition allows    Document in Patient Education    1.  Patient and family/caregiver oriented to unit, equipment, visitation policy and means for communicating concern  2.  Complete/review Learning Assessment  3.  Assess knowledge level of disease process/condition, treatment plan, diagnostic tests and medications  4.  Explain disease process/condition, treatment plan, diagnostic tests and medications  Outcome:  Progressing          [place] Counseling: I discussed with the patient the risks of ixekizumab including but not limited to immunosuppression, serious infections, worsening of inflammatory bowel disease and drug reactions.  The patient understands that monitoring is required including a PPD at baseline and must alert us or the primary physician if symptoms of infection or other concerning signs are noted.

## 2023-09-20 NOTE — PROGRESS NOTES
Patient ready for discharge. Patient is dressed and confirmed ride for 0930. Provided handout and reviewed ARACELI drain education. Provided additional ACE bandage per surgeon request. Answered all questions and concerns.

## 2023-09-20 NOTE — PROGRESS NOTES
Patient discharged by wheelchair to private vehicle. Patient verbalized she had all her belongings. PIV Removed. Answered all her questions and concerns.

## 2023-09-20 NOTE — PROGRESS NOTES
4 Eyes Skin Assessment Completed by JASON Garvey and JASON Dickinson.    Head WDL  Ears WDL  Nose WDL  Mouth WDL  Neck WDL  Breast/Chest Incision  Shoulder Blades WDL  Spine WDL  (R) Arm/Elbow/Hand WDL  (L) Arm/Elbow/Hand WDL  Abdomen WDL  Groin WDL  Scrotum/Coccyx/Buttocks WDL  (R) Leg WDL  (L) Leg WDL  (R) Heel/Foot/Toe WDL  (L) Heel/Foot/Toe WDL          Devices In Places Blood Pressure Cuff and Pulse Ox      Interventions In Place Pillows    Possible Skin Injury No    Pictures Uploaded Into Epic N/A  Wound Consult Placed N/A  RN Wound Prevention Protocol Ordered No

## 2023-09-22 ENCOUNTER — HOSPITAL ENCOUNTER (EMERGENCY)
Facility: MEDICAL CENTER | Age: 58
End: 2023-09-23
Attending: STUDENT IN AN ORGANIZED HEALTH CARE EDUCATION/TRAINING PROGRAM
Payer: COMMERCIAL

## 2023-09-22 DIAGNOSIS — Z09 POSTOP CHECK: ICD-10-CM

## 2023-09-22 DIAGNOSIS — Z90.13 H/O BILATERAL MASTECTOMY: ICD-10-CM

## 2023-09-22 LAB
BASOPHILS # BLD AUTO: 0.5 % (ref 0–1.8)
BASOPHILS # BLD: 0.03 K/UL (ref 0–0.12)
EOSINOPHIL # BLD AUTO: 0.13 K/UL (ref 0–0.51)
EOSINOPHIL NFR BLD: 2.1 % (ref 0–6.9)
ERYTHROCYTE [DISTWIDTH] IN BLOOD BY AUTOMATED COUNT: 47.5 FL (ref 35.9–50)
HCT VFR BLD AUTO: 35.3 % (ref 37–47)
HGB BLD-MCNC: 12 G/DL (ref 12–16)
IMM GRANULOCYTES # BLD AUTO: 0.06 K/UL (ref 0–0.11)
IMM GRANULOCYTES NFR BLD AUTO: 1 % (ref 0–0.9)
LYMPHOCYTES # BLD AUTO: 2.12 K/UL (ref 1–4.8)
LYMPHOCYTES NFR BLD: 34.9 % (ref 22–41)
MCH RBC QN AUTO: 35.6 PG (ref 27–33)
MCHC RBC AUTO-ENTMCNC: 34 G/DL (ref 32.2–35.5)
MCV RBC AUTO: 104.7 FL (ref 81.4–97.8)
MONOCYTES # BLD AUTO: 0.41 K/UL (ref 0–0.85)
MONOCYTES NFR BLD AUTO: 6.7 % (ref 0–13.4)
NEUTROPHILS # BLD AUTO: 3.33 K/UL (ref 1.82–7.42)
NEUTROPHILS NFR BLD: 54.8 % (ref 44–72)
NRBC # BLD AUTO: 0 K/UL
NRBC BLD-RTO: 0 /100 WBC (ref 0–0.2)
PLATELET # BLD AUTO: 180 K/UL (ref 164–446)
PMV BLD AUTO: 9.9 FL (ref 9–12.9)
RBC # BLD AUTO: 3.37 M/UL (ref 4.2–5.4)
WBC # BLD AUTO: 6.1 K/UL (ref 4.8–10.8)

## 2023-09-22 PROCEDURE — 85025 COMPLETE CBC W/AUTO DIFF WBC: CPT

## 2023-09-22 PROCEDURE — 700102 HCHG RX REV CODE 250 W/ 637 OVERRIDE(OP): Performed by: STUDENT IN AN ORGANIZED HEALTH CARE EDUCATION/TRAINING PROGRAM

## 2023-09-22 PROCEDURE — 99284 EMERGENCY DEPT VISIT MOD MDM: CPT

## 2023-09-22 PROCEDURE — 80053 COMPREHEN METABOLIC PANEL: CPT

## 2023-09-22 PROCEDURE — A9270 NON-COVERED ITEM OR SERVICE: HCPCS | Performed by: STUDENT IN AN ORGANIZED HEALTH CARE EDUCATION/TRAINING PROGRAM

## 2023-09-22 PROCEDURE — 36415 COLL VENOUS BLD VENIPUNCTURE: CPT

## 2023-09-22 RX ORDER — OXYCODONE HYDROCHLORIDE 5 MG/1
5 TABLET ORAL ONCE
Status: COMPLETED | OUTPATIENT
Start: 2023-09-23 | End: 2023-09-22

## 2023-09-22 RX ADMIN — OXYCODONE HYDROCHLORIDE 5 MG: 5 TABLET ORAL at 23:56

## 2023-09-22 ASSESSMENT — FIBROSIS 4 INDEX: FIB4 SCORE: 3.79

## 2023-09-23 VITALS
BODY MASS INDEX: 32.62 KG/M2 | TEMPERATURE: 97.6 F | RESPIRATION RATE: 16 BRPM | WEIGHT: 184.08 LBS | OXYGEN SATURATION: 93 % | HEIGHT: 63 IN | DIASTOLIC BLOOD PRESSURE: 87 MMHG | HEART RATE: 90 BPM | SYSTOLIC BLOOD PRESSURE: 135 MMHG

## 2023-09-23 LAB
ALBUMIN SERPL BCP-MCNC: 4.1 G/DL (ref 3.2–4.9)
ALBUMIN/GLOB SERPL: 1.6 G/DL
ALP SERPL-CCNC: 155 U/L (ref 30–99)
ALT SERPL-CCNC: 52 U/L (ref 2–50)
ANION GAP SERPL CALC-SCNC: 13 MMOL/L (ref 7–16)
AST SERPL-CCNC: 70 U/L (ref 12–45)
BILIRUB SERPL-MCNC: 0.2 MG/DL (ref 0.1–1.5)
BUN SERPL-MCNC: 12 MG/DL (ref 8–22)
CALCIUM ALBUM COR SERPL-MCNC: 9.2 MG/DL (ref 8.5–10.5)
CALCIUM SERPL-MCNC: 9.3 MG/DL (ref 8.5–10.5)
CHLORIDE SERPL-SCNC: 103 MMOL/L (ref 96–112)
CO2 SERPL-SCNC: 25 MMOL/L (ref 20–33)
CREAT SERPL-MCNC: 0.42 MG/DL (ref 0.5–1.4)
GFR SERPLBLD CREATININE-BSD FMLA CKD-EPI: 112 ML/MIN/1.73 M 2
GLOBULIN SER CALC-MCNC: 2.6 G/DL (ref 1.9–3.5)
GLUCOSE SERPL-MCNC: 121 MG/DL (ref 65–99)
POTASSIUM SERPL-SCNC: 3.3 MMOL/L (ref 3.6–5.5)
PROT SERPL-MCNC: 6.7 G/DL (ref 6–8.2)
SODIUM SERPL-SCNC: 141 MMOL/L (ref 135–145)

## 2023-09-23 PROCEDURE — A9270 NON-COVERED ITEM OR SERVICE: HCPCS | Mod: JZ | Performed by: STUDENT IN AN ORGANIZED HEALTH CARE EDUCATION/TRAINING PROGRAM

## 2023-09-23 PROCEDURE — 700102 HCHG RX REV CODE 250 W/ 637 OVERRIDE(OP): Mod: JZ | Performed by: STUDENT IN AN ORGANIZED HEALTH CARE EDUCATION/TRAINING PROGRAM

## 2023-09-23 RX ORDER — POTASSIUM CHLORIDE 20 MEQ/1
40 TABLET, EXTENDED RELEASE ORAL ONCE
Status: COMPLETED | OUTPATIENT
Start: 2023-09-23 | End: 2023-09-23

## 2023-09-23 RX ADMIN — POTASSIUM CHLORIDE 40 MEQ: 1500 TABLET, EXTENDED RELEASE ORAL at 00:57

## 2023-09-23 NOTE — DISCHARGE INSTRUCTIONS
You were seen in the emergency room for evaluation of your ARACELI drain.  The surgeon on-call states that this can be seen after surgery.  Please continue to change your dressings as needed.  Please return to the emergency room if you have a fever, increased pain, no drainage from the ARACELI drain or any other concerns.

## 2023-09-23 NOTE — ED NOTES
Pt discharged to lobby with steady gait. GCS 15. IV discontinued and gauze placed, pt in possession of belongings. Pt provided discharge education and information pertaining to medications and follow up appointments. Pt received copy of discharge instructions and verbalized understanding.     Vitals:    09/23/23 0000   BP: 135/87   Pulse: 90   Resp: 16   Temp: 36.4 °C (97.6 °F)   SpO2: 93%

## 2023-09-23 NOTE — ED TRIAGE NOTES
"Chief Complaint   Patient presents with    Post-Op Complications     Pt had a double mastectomy on Tuesday. Pt reports that today her drain constantly filling and leaking around her incision. Pt has a wound vac around the incision.      BP (!) 141/90   Pulse (!) 104   Temp 36.3 °C (97.4 °F) (Temporal)   Resp 18   Ht 1.6 m (5' 3\")   Wt 83.5 kg (184 lb 1.4 oz)   SpO2 96%   BMI 32.61 kg/m²     Pt educated on triage process and thanked for patience. Pt placed in family room due to pt being treated for CA.     "

## 2023-09-23 NOTE — ED PROVIDER NOTES
ED Provider Note    CHIEF COMPLAINT  Chief Complaint   Patient presents with    Post-Op Complications     Pt had a double mastectomy on Tuesday. Pt reports that today her drain constantly filling and leaking around her incision. Pt has a wound vac around the incision.        EXTERNAL RECORDS REVIEWED  Inpatient Notes Patient had bilateral double mastectomy 09/19/2023. She was discharged from the hospital 09/20/2023 and there was no obvious hematoma, visible flaps look viable and Prevena in place with ARACELI drains with serosanginuous fluid with 95 ccs since surgery    HPI/ROS  LIMITATION TO HISTORY   Select: : None  OUTSIDE HISTORIAN(S):  Friend    Yolanda Roe is a 58 y.o. female who presents due to concern for postoperative complication.  She is postop day 3 from a double mastectomy with Dr. Monahan.  She was admitted overnight after her double mastectomy and went home Wednesday morning.  She states that on both Wednesday and Thursday she had under 50 cc of serosanguineous fluid from bilateral ARACELI drains.  This morning she woke up and the ARACELI drain on the left side had a wet dressing.  She noted that she had emptied the drain approximately 4 times today and estimates that there was probably 120 cc of serosanguineous fluid that she drained out.  This evening, she noted that the dressing was wet again therefore she decided come to the emergency room.  She states that her pain is well controlled with oxycodone.  She has no nausea or vomiting.  She has not had a fever.  She has a Prevena wound VAC in place that has been working well.  She has follow-up with Dr. Monahan on Wednesday.  She is on Keytruda and gets this infusion every 3 weeks.    PAST MEDICAL HISTORY   has a past medical history of Arthritis (03/09/2023), Cancer (HCC) (03/09/2023), Dental disorder (03/09/2023), High cholesterol (03/09/2023), Hypertension (03/09/2023), Malignant neoplasm of upper-outer quadrant of left breast in female, estrogen receptor  "positive (HCC) (03/21/2023), Pain, and Urinary incontinence.    SURGICAL HISTORY   has a past surgical history that includes other orthopedic surgery (03/09/2023); hysterectomy, vaginal (1991); lumpectomy (2003); cath placement (Right, 03/21/2023); tubal ligation; mastectomy (Bilateral, 9/19/2023); and node biopsy sentinel (Left, 9/19/2023).    FAMILY HISTORY  No family history on file.    SOCIAL HISTORY  Social History     Tobacco Use    Smoking status: Every Day     Current packs/day: 0.50     Average packs/day: 0.5 packs/day for 27.7 years (13.9 ttl pk-yrs)     Types: Cigarettes     Start date: 1996     Passive exposure: Never    Smokeless tobacco: Never    Tobacco comments:     1 pack lasts 3 days   Vaping Use    Vaping Use: Never used   Substance and Sexual Activity    Alcohol use: Yes     Alcohol/week: 4.2 oz     Types: 7 Standard drinks or equivalent per week     Comment: 1 drink daily after work    Drug use: Never    Sexual activity: Not on file       CURRENT MEDICATIONS  Home Medications    **Home medications have not yet been reviewed for this encounter**         ALLERGIES  No Known Allergies    PHYSICAL EXAM  VITAL SIGNS: BP (!) 141/90   Pulse (!) 104   Temp 36.3 °C (97.4 °F) (Temporal)   Resp 18   Ht 1.6 m (5' 3\")   Wt 83.5 kg (184 lb 1.4 oz)   SpO2 96%   BMI 32.61 kg/m²      Constitutional: Well developed, Well nourished, No acute respiratory distress, Non-toxic appearance.   HENT: Normocephalic, Atraumatic, Bilateral external ears normal, Oropharynx clear, mucous membranes are moist.  Eyes: Conjunctiva normal, No discharge. No icterus.  Neck: Normal range of motion. Supple.  Cardiovascular: Normal heart rate, Normal rhythm, No murmurs, No rubs, No gallops.   Thorax & Lungs: Clear to auscultation bilaterally, No respiratory distress, No wheezing.  Abdomen: Soft nontender normal bowel sounds no rebound masses or peritoneal signs  Skin: Warm, Dry, Prevena wound VAC to breast area with good seal, " left-sided ARACELI drain with saturated dressing but ARACELI drain site without erythema, sutures intact, serosanguineous fluid in ARACELI drain.  Right-sided ARACELI drain site with dry dressing, ARACELI drain site without erythema, sutures intact, serosanguineous fluid in ARACELI drain.  No erythema noted  Extremities: Intact distal pulses, No edema, No tenderness  Musculoskeletal: Good range of motion in all major joints. Normal gait.  Neurologic: Alert & oriented x 3. No focal deficits noted.   Psych: Alert normal affect       DIAGNOSTIC STUDIES / PROCEDURES    LABS  Results for orders placed or performed during the hospital encounter of 09/22/23   CBC WITH DIFFERENTIAL   Result Value Ref Range    WBC 6.1 4.8 - 10.8 K/uL    RBC 3.37 (L) 4.20 - 5.40 M/uL    Hemoglobin 12.0 12.0 - 16.0 g/dL    Hematocrit 35.3 (L) 37.0 - 47.0 %    .7 (H) 81.4 - 97.8 fL    MCH 35.6 (H) 27.0 - 33.0 pg    MCHC 34.0 32.2 - 35.5 g/dL    RDW 47.5 35.9 - 50.0 fL    Platelet Count 180 164 - 446 K/uL    MPV 9.9 9.0 - 12.9 fL    Neutrophils-Polys 54.80 44.00 - 72.00 %    Lymphocytes 34.90 22.00 - 41.00 %    Monocytes 6.70 0.00 - 13.40 %    Eosinophils 2.10 0.00 - 6.90 %    Basophils 0.50 0.00 - 1.80 %    Immature Granulocytes 1.00 (H) 0.00 - 0.90 %    Nucleated RBC 0.00 0.00 - 0.20 /100 WBC    Neutrophils (Absolute) 3.33 1.82 - 7.42 K/uL    Lymphs (Absolute) 2.12 1.00 - 4.80 K/uL    Monos (Absolute) 0.41 0.00 - 0.85 K/uL    Eos (Absolute) 0.13 0.00 - 0.51 K/uL    Baso (Absolute) 0.03 0.00 - 0.12 K/uL    Immature Granulocytes (abs) 0.06 0.00 - 0.11 K/uL    NRBC (Absolute) 0.00 K/uL   COMP METABOLIC PANEL   Result Value Ref Range    Sodium 141 135 - 145 mmol/L    Potassium 3.3 (L) 3.6 - 5.5 mmol/L    Chloride 103 96 - 112 mmol/L    Co2 25 20 - 33 mmol/L    Anion Gap 13.0 7.0 - 16.0    Glucose 121 (H) 65 - 99 mg/dL    Bun 12 8 - 22 mg/dL    Creatinine 0.42 (L) 0.50 - 1.40 mg/dL    Calcium 9.3 8.5 - 10.5 mg/dL    Correct Calcium 9.2 8.5 - 10.5 mg/dL    AST(SGOT) 70  (H) 12 - 45 U/L    ALT(SGPT) 52 (H) 2 - 50 U/L    Alkaline Phosphatase 155 (H) 30 - 99 U/L    Total Bilirubin 0.2 0.1 - 1.5 mg/dL    Albumin 4.1 3.2 - 4.9 g/dL    Total Protein 6.7 6.0 - 8.2 g/dL    Globulin 2.6 1.9 - 3.5 g/dL    A-G Ratio 1.6 g/dL   ESTIMATED GFR   Result Value Ref Range    GFR (CKD-EPI) 112 >60 mL/min/1.73 m 2         COURSE & MEDICAL DECISION MAKING    ED Observation Status? No; Patient does not meet criteria for ED Observation.     INITIAL ASSESSMENT, COURSE AND PLAN  Care Narrative: This is a 58-year-old female with history of breast cancer who is postop day 3 from double mastectomy with lymph node dissection on the left who is presenting for evaluation of possible postoperative complication as she has had increased ARACELI drain output as well as drainage from around the ARACELI drain has been going on today.  On arrival the patient is nontoxic in appearance.  She has no increased pain or fever.  She does not appear systemically ill.    She has an Ace wrap around her double mastectomy site.  This was removed and Prevena wound VAC is found to be in place and is clean, dry, intact.  Ace wrap was replaced to the site.  She has bilateral ARACELI drains on the lower chest.  Dressings of both ARACELI drain sites were removed.  The ARACELI drain sites appear clean, dry, intact.  There is no erythema.  The sutures are in place and are attached to her skin.  She does have some increased ARACELI drain output in comparison to the right side but the output is serosanguineous.  She denies any purulent drainage.  She estimates approximately 120 cc of ARACELI drain output to the left ARACELI drain with only 30 cc ARACELI drain output to the right ARACELI drain.  Her concern is that there is some drainage around the ARACELI drain site that is saturating in the dressing.    I discussed with the surgeon on-call for Dr. Monahan, Dr. Danielson, who states that this can be a normal finding after surgery and that he would not take her to the operating room for this tonight.   He does not recommend any additional imaging or any further evaluation at this time.  He recommends that she follows up with Dr. Monahan.  These recommendations were relayed to the patient.  I did also attempt to reach out to Dr. Monahan via text message however understandably did not receive a response as Dr. Monahan is not on-call and it is the middle of the night.    I do think that it is reassuring that the ARACELI drain is draining as this means that it is in place..  It is possible that there is just some drainage around the ARACELI drain.  Labs were obtained and there is no leukocytosis.  Her hemoglobin has actually improved from prior and is 12 today in comparison to 11.  She does have hypokalemia to 3.3 therefore this was repleted.  A new dressing was applied to bilateral ARACELI drain sites and there was no saturation of the dressing at the time of discharge.  I have advised her to follow-up with her surgeon as soon as possible.  She is instructed to return to the emergency room as needed.  Patient is agreeable to discharge plan with no further questions.          DISPOSITION AND DISCUSSIONS  I have discussed management of the patient with the following physicians and ALEXSANDER's:  General surgery, Dr. Danielson    Patient discharged home in stable condition with instructions to follow-up with her surgeon, Dr. Monahan.  Strict ER return precautions were discussed.  Patient is agreeable to discharge plan with no further questions.    FINAL DIAGNOSIS  1. H/O bilateral mastectomy    2. Postop check    3. Hypokalemia       Electronically signed by: Gifty Shen M.D., 9/22/2023 10:46 PM

## 2023-09-29 RX ORDER — ONDANSETRON 2 MG/ML
4 INJECTION INTRAMUSCULAR; INTRAVENOUS PRN
Status: CANCELLED | OUTPATIENT
Start: 2023-10-04

## 2023-09-29 RX ORDER — 0.9 % SODIUM CHLORIDE 0.9 %
10 VIAL (ML) INJECTION PRN
Status: CANCELLED | OUTPATIENT
Start: 2023-10-04

## 2023-09-29 RX ORDER — 0.9 % SODIUM CHLORIDE 0.9 %
10 VIAL (ML) INJECTION PRN
Status: CANCELLED | OUTPATIENT
Start: 2023-10-03

## 2023-09-29 RX ORDER — HEPARIN SODIUM (PORCINE) LOCK FLUSH IV SOLN 100 UNIT/ML 100 UNIT/ML
500 SOLUTION INTRAVENOUS PRN
Status: CANCELLED | OUTPATIENT
Start: 2023-10-04

## 2023-09-29 RX ORDER — ONDANSETRON 8 MG/1
8 TABLET, ORALLY DISINTEGRATING ORAL PRN
Status: CANCELLED | OUTPATIENT
Start: 2023-10-04

## 2023-09-29 RX ORDER — SODIUM CHLORIDE 9 MG/ML
INJECTION, SOLUTION INTRAVENOUS CONTINUOUS
Status: CANCELLED | OUTPATIENT
Start: 2023-10-04

## 2023-09-29 RX ORDER — 0.9 % SODIUM CHLORIDE 0.9 %
VIAL (ML) INJECTION PRN
Status: CANCELLED | OUTPATIENT
Start: 2023-10-04

## 2023-09-29 RX ORDER — 0.9 % SODIUM CHLORIDE 0.9 %
3 VIAL (ML) INJECTION PRN
Status: CANCELLED | OUTPATIENT
Start: 2023-10-03

## 2023-09-29 RX ORDER — 0.9 % SODIUM CHLORIDE 0.9 %
3 VIAL (ML) INJECTION PRN
Status: CANCELLED | OUTPATIENT
Start: 2023-10-04

## 2023-09-29 RX ORDER — HEPARIN SODIUM (PORCINE) LOCK FLUSH IV SOLN 100 UNIT/ML 100 UNIT/ML
500 SOLUTION INTRAVENOUS PRN
Status: CANCELLED | OUTPATIENT
Start: 2023-10-03

## 2023-09-29 RX ORDER — PROCHLORPERAZINE MALEATE 10 MG
10 TABLET ORAL EVERY 6 HOURS PRN
Status: CANCELLED | OUTPATIENT
Start: 2023-10-04

## 2023-09-29 RX ORDER — 0.9 % SODIUM CHLORIDE 0.9 %
VIAL (ML) INJECTION PRN
Status: CANCELLED | OUTPATIENT
Start: 2023-10-03

## 2023-10-01 NOTE — PROGRESS NOTES
"Pharmacy Chemotherapy Verification      Dx: Metastatic Breast CA (ER/CO/HER2 negative) IIb       Protocol: Pembrolizumab + PACLitaxel + CARBOplatin (x 4 cycles) followed by  Pembrolizumab + Cyclophosphamide + DOXOrubicin   (x4 cycles)  *Dosing Reference*  Pembrolizumab 200 mg IV over 30 minutes on Day 1  PACLitaxel 80 mg/m2 IV over 60 minutes on Days 1, 8 and 15  CARBOplatin AUC 1.5 IV over 30 minutes Days 1, 8, and 15  Repeat every 21 days x 4 cycles- completed 6/28/23  ~followed by~  Pembrolizumab 200 mg IV over 30 minutes on Day 1  Cyclophosphamide 600 mg/m2 IV over 30 minutes on Day 1  -Dose reduced to 480 mg/m2 starting Cycle 6 due to counts  DOXOrubicin 60 mg/m2 IV push on Day 1   -Dose reduced to 48 mg/m2 starting Cycle 6 due to counts  Repeat every 21 days for 4 cycles  Followed by **Course 2 ended a Cycle early per Dr. Gómez, maintenance pembro starting 8/23/23**  Pembrolizumab 200mg IV over 30 min on day 1  Q21 days x 9 cycles  NCCN Guidelines for Breast Cancer V.2.2022  Alexis P, et al N Engl J Med 2020; [phone]    Allergies:  Patient has no known allergies.      BP (!) 141/86   Pulse 95   Temp 36.1 °C (96.9 °F) (Temporal)   Resp 18   Ht 1.61 m (5' 3.39\")   Wt 80.6 kg (177 lb 11.1 oz)   SpO2 96%   BMI 31.09 kg/m²  Body surface area is 1.9 meters squared.    ECHO 3/16/23 [facility])  LVEF ~60%    All labs (10/4/23) and thyroid panel (pending) within treatment plan parameters.       Drug Order   (Drug name, dose, route, IV Fluid & volume, frequency, number of doses) Cycle 3 maintenance pembrolizumab (C9 overall)  Previous treatment: C2 on 9/13/23   Medication = Pembrolizumab (Keytruda)  Base Dose = 200 mg   Fixed dose; no calculation required  Final Dose = 200 mg  Route = IV  Fluid & Volume = NS 50 mL  Admin Duration = Over 30 minutes         No calculation required, okay to treat with final dose     By my signature below, I confirm this process was performed independently with the BSA and " all final chemotherapy dosing calculations congruent. I have reviewed the above chemotherapy order and that my calculation of the final dose and BSA (when applicable) corroborate those calculations of the  pharmacist. Discrepancies of 10% or greater in the written dose have been addressed and documented within the EPIC Progress notes.      Pratibha Demarco, PharmD

## 2023-10-03 NOTE — PROGRESS NOTES
"Pharmacy Chemotherapy Verification    DX: triple negative breast cancer    Cycle 9 total - Cycle 3 of pembrolizumab maintenance  Previous treatment = 9/13/23    Regimen: pembrolizumab + PACLitaxel/CARBOplatin --> pembrolizumab + cyclophosphamide/DOXrubicin or epirubicin --> Pembrolizmab  Pembrolizumab 200 mg IV over 30 min on day 1  PACLitaxel 80 mg/m2 IV over 60 min on days 1,8,15  CARBOplatin AUC 1.5 IV over 30 min on day 1,8,15  Q21 days x 4 cycles  Followed by  Pembrolizumab 200 mg IV over 30 min on day 1  Cyclophosphamide 600 mg/m2 IV over 30 min on day 1  *8/2/23- dose reduced by 20% to 480mg/m2 for cytopenias  DOXOrubicin 60 mg/m2 IVP on day 1 OR epirubicin 90 mg/m2 IVP on day 1  *8/2/23- dose reduced by 20% to 48mg/m2 for cytopenias  Q21 days x 4 cycles  Followed by  Pembrolizumab 200mg IV over 30 min on day 1  Q21 days x 9 cycles  NCCN Guidelines for Breast Cancer V.2.2022  Alexis P, et al- N Engl J Med. 2020 Feb 27;382(9):810-821.     Allergies:Patient has no known allergies.  BP (!) 141/86   Pulse 95   Temp 36.1 °C (96.9 °F) (Temporal)   Resp 18   Ht 1.61 m (5' 3.39\")   Wt 80.6 kg (177 lb 11.1 oz)   SpO2 96%   BMI 31.09 kg/m²   Body surface area is 1.9 meters squared.    Labs 10/4/23:  ANC~ 3260 Plt = 163k   Hgb = 11.7     SCr = 0.42 mg/dL CrCl ~ 109 mL/min   AST/ALT/AP = 27/30/140 TBili = 0.4  TSH/Free T4 = pending    Pembrolizumab 200 mg fixed dose, no calculation required  OK to treat with final dose = 200 mg IV     Misha Mckeon, PharmD  "

## 2023-10-04 ENCOUNTER — OUTPATIENT INFUSION SERVICES (OUTPATIENT)
Dept: ONCOLOGY | Facility: MEDICAL CENTER | Age: 58
End: 2023-10-04
Attending: INTERNAL MEDICINE
Payer: COMMERCIAL

## 2023-10-04 VITALS
OXYGEN SATURATION: 96 % | RESPIRATION RATE: 18 BRPM | HEART RATE: 95 BPM | BODY MASS INDEX: 31.48 KG/M2 | DIASTOLIC BLOOD PRESSURE: 86 MMHG | WEIGHT: 177.69 LBS | TEMPERATURE: 96.9 F | HEIGHT: 63 IN | SYSTOLIC BLOOD PRESSURE: 141 MMHG

## 2023-10-04 DIAGNOSIS — Z17.1 MALIGNANT NEOPLASM OF OVERLAPPING SITES OF LEFT BREAST IN FEMALE, ESTROGEN RECEPTOR NEGATIVE (HCC): ICD-10-CM

## 2023-10-04 DIAGNOSIS — C50.812 MALIGNANT NEOPLASM OF OVERLAPPING SITES OF LEFT BREAST IN FEMALE, ESTROGEN RECEPTOR NEGATIVE (HCC): ICD-10-CM

## 2023-10-04 LAB
ALBUMIN SERPL BCP-MCNC: 4.2 G/DL (ref 3.2–4.9)
ALBUMIN/GLOB SERPL: 1.8 G/DL
ALP SERPL-CCNC: 140 U/L (ref 30–99)
ALT SERPL-CCNC: 30 U/L (ref 2–50)
ANION GAP SERPL CALC-SCNC: 12 MMOL/L (ref 7–16)
AST SERPL-CCNC: 27 U/L (ref 12–45)
BASOPHILS # BLD AUTO: 0.2 % (ref 0–1.8)
BASOPHILS # BLD: 0.01 K/UL (ref 0–0.12)
BILIRUB SERPL-MCNC: 0.4 MG/DL (ref 0.1–1.5)
BUN SERPL-MCNC: 14 MG/DL (ref 8–22)
CALCIUM ALBUM COR SERPL-MCNC: 8.9 MG/DL (ref 8.5–10.5)
CALCIUM SERPL-MCNC: 9.1 MG/DL (ref 8.5–10.5)
CHLORIDE SERPL-SCNC: 104 MMOL/L (ref 96–112)
CO2 SERPL-SCNC: 23 MMOL/L (ref 20–33)
CREAT SERPL-MCNC: 0.42 MG/DL (ref 0.5–1.4)
EOSINOPHIL # BLD AUTO: 0.12 K/UL (ref 0–0.51)
EOSINOPHIL NFR BLD: 2.3 % (ref 0–6.9)
ERYTHROCYTE [DISTWIDTH] IN BLOOD BY AUTOMATED COUNT: 46.9 FL (ref 35.9–50)
GFR SERPLBLD CREATININE-BSD FMLA CKD-EPI: 112 ML/MIN/1.73 M 2
GLOBULIN SER CALC-MCNC: 2.4 G/DL (ref 1.9–3.5)
GLUCOSE SERPL-MCNC: 118 MG/DL (ref 65–99)
HCT VFR BLD AUTO: 33.7 % (ref 37–47)
HGB BLD-MCNC: 11.7 G/DL (ref 12–16)
IMM GRANULOCYTES # BLD AUTO: 0.01 K/UL (ref 0–0.11)
IMM GRANULOCYTES NFR BLD AUTO: 0.2 % (ref 0–0.9)
LYMPHOCYTES # BLD AUTO: 1.57 K/UL (ref 1–4.8)
LYMPHOCYTES NFR BLD: 29.5 % (ref 22–41)
MCH RBC QN AUTO: 35.1 PG (ref 27–33)
MCHC RBC AUTO-ENTMCNC: 34.7 G/DL (ref 32.2–35.5)
MCV RBC AUTO: 101.2 FL (ref 81.4–97.8)
MONOCYTES # BLD AUTO: 0.36 K/UL (ref 0–0.85)
MONOCYTES NFR BLD AUTO: 6.8 % (ref 0–13.4)
NEUTROPHILS # BLD AUTO: 3.26 K/UL (ref 1.82–7.42)
NEUTROPHILS NFR BLD: 61 % (ref 44–72)
NRBC # BLD AUTO: 0 K/UL
NRBC BLD-RTO: 0 /100 WBC (ref 0–0.2)
OUTPT INFUS CBC COMMENT OICOM: ABNORMAL
PLATELET # BLD AUTO: 163 K/UL (ref 164–446)
PMV BLD AUTO: 9.4 FL (ref 9–12.9)
POTASSIUM SERPL-SCNC: 3.5 MMOL/L (ref 3.6–5.5)
PROT SERPL-MCNC: 6.6 G/DL (ref 6–8.2)
RBC # BLD AUTO: 3.33 M/UL (ref 4.2–5.4)
SODIUM SERPL-SCNC: 139 MMOL/L (ref 135–145)
T4 FREE SERPL-MCNC: 1.07 NG/DL (ref 0.93–1.7)
TSH SERPL DL<=0.005 MIU/L-ACNC: 1.86 UIU/ML (ref 0.38–5.33)
WBC # BLD AUTO: 5.3 K/UL (ref 4.8–10.8)

## 2023-10-04 PROCEDURE — 700102 HCHG RX REV CODE 250 W/ 637 OVERRIDE(OP): Mod: JZ | Performed by: INTERNAL MEDICINE

## 2023-10-04 PROCEDURE — 96413 CHEMO IV INFUSION 1 HR: CPT

## 2023-10-04 PROCEDURE — A9270 NON-COVERED ITEM OR SERVICE: HCPCS | Mod: JZ | Performed by: INTERNAL MEDICINE

## 2023-10-04 PROCEDURE — 700105 HCHG RX REV CODE 258: Performed by: INTERNAL MEDICINE

## 2023-10-04 PROCEDURE — 80053 COMPREHEN METABOLIC PANEL: CPT

## 2023-10-04 PROCEDURE — A4212 NON CORING NEEDLE OR STYLET: HCPCS

## 2023-10-04 PROCEDURE — 700111 HCHG RX REV CODE 636 W/ 250 OVERRIDE (IP): Performed by: INTERNAL MEDICINE

## 2023-10-04 PROCEDURE — 85025 COMPLETE CBC W/AUTO DIFF WBC: CPT

## 2023-10-04 PROCEDURE — 84443 ASSAY THYROID STIM HORMONE: CPT

## 2023-10-04 PROCEDURE — 700105 HCHG RX REV CODE 258

## 2023-10-04 PROCEDURE — 84439 ASSAY OF FREE THYROXINE: CPT

## 2023-10-04 PROCEDURE — 700111 HCHG RX REV CODE 636 W/ 250 OVERRIDE (IP)

## 2023-10-04 RX ORDER — POTASSIUM CHLORIDE 20 MEQ/1
40 TABLET, EXTENDED RELEASE ORAL ONCE
Status: COMPLETED | OUTPATIENT
Start: 2023-10-04 | End: 2023-10-04

## 2023-10-04 RX ADMIN — POTASSIUM CHLORIDE 40 MEQ: 1500 TABLET, EXTENDED RELEASE ORAL at 09:40

## 2023-10-04 RX ADMIN — HEPARIN 500 UNITS: 100 SYRINGE at 10:25

## 2023-10-04 RX ADMIN — SODIUM CHLORIDE 200 MG: 9 INJECTION, SOLUTION INTRAVENOUS at 09:45

## 2023-10-04 ASSESSMENT — FIBROSIS 4 INDEX: FIB4 SCORE: 3.18

## 2023-10-04 ASSESSMENT — PAIN DESCRIPTION - PAIN TYPE: TYPE: ACUTE PAIN

## 2023-10-04 NOTE — PROGRESS NOTES
into [facility] for Cycle 3 of Keytruda maintenance / Labs for Breast cancer of upper-outer quadrant of left female breast. Pt denied having any new or acute complaints today, reports tolerating past treatments well. Port accessed in a sterile manner, had + blood return, flushed briskly. Pt given anticancer therapy as prescribed, tolerated well, denied having any complaints during or after infusion. Port had + blood return after, flushed per [facility] policy, de-accessed, needle intact, insertion site covered with sterile gauze and paper tape. Discharge home to self care in [facility]. Appointment confirm for next treatment.

## 2023-10-04 NOTE — PROGRESS NOTES
Chemotherapy Verification - SECONDARY RN       Height = 161 cm  Weight = 80.6 kg  BSA = 1.9 m2       Medication: Keytruda  Dose: 200 mg set dose  Calculated Dose: 200 mg                             (In mg/m2, AUC, mg/kg)     I confirm that this process was performed independently.

## 2023-10-18 ENCOUNTER — PHARMACY VISIT (OUTPATIENT)
Dept: PHARMACY | Facility: MEDICAL CENTER | Age: 58
End: 2023-10-18
Payer: COMMERCIAL

## 2023-10-18 PROCEDURE — RXMED WILLOW AMBULATORY MEDICATION CHARGE: Performed by: SURGERY

## 2023-10-18 RX ORDER — IBUPROFEN 600 MG/1
TABLET ORAL
Qty: 24 TABLET | Refills: 1 | Status: SHIPPED | OUTPATIENT
Start: 2023-10-18 | End: 2024-03-09

## 2023-10-23 NOTE — PROGRESS NOTES
"Pharmacy Chemotherapy Verification      Dx: Metastatic Breast CA (ER/MD/HER2 negative) IIb       Protocol: Pembrolizumab + PACLitaxel + CARBOplatin (x 4 cycles) followed by  Pembrolizumab + Cyclophosphamide + DOXOrubicin   (x4 cycles)  *Dosing Reference*  Pembrolizumab 200 mg IV over 30 minutes on Day 1  PACLitaxel 80 mg/m2 IV over 60 minutes on Days 1, 8 and 15  CARBOplatin AUC 1.5 IV over 30 minutes Days 1, 8, and 15  Repeat every 21 days x 4 cycles- completed 6/28/23  ~followed by~  Pembrolizumab 200 mg IV over 30 minutes on Day 1  Cyclophosphamide 600 mg/m2 IV over 30 minutes on Day 1  -Dose reduced to 480 mg/m2 starting Cycle 6 due to counts  DOXOrubicin 60 mg/m2 IV push on Day 1   -Dose reduced to 48 mg/m2 starting Cycle 6 due to counts  Repeat every 21 days for 4 cycles  Followed by **Course 2 ended a Cycle early per Dr. Gómez, maintenance pembro starting 8/23/23**  Pembrolizumab 200 mg IV over 30 min on day 1  Q21 days x 9 cycles  NCCN Guidelines for Breast Cancer V.2.2022  Alexis P, et al N Engl J Med 2020; 382(1) 651-707    Allergies:  Patient has no known allergies.      /81   Pulse 83   Temp 36.1 °C (97 °F) (Temporal)   Resp 18   Ht 1.61 m (5' 3.39\")   Wt 81.2 kg (179 lb 0.2 oz)   SpO2 99%   BMI 31.33 kg/m²  Body surface area is 1.91 meters squared.    Labs 10/25/23  ANC 1930 Hgb 12.1 Plt 209k  SCr 0.55 CrCl 110.2 mL/min   AST/ALT/AP = 28/31/112 Tbili = 0.4  THS/Free T4 in process    ECHO 3/16/23 ([facility])  LVEF ~60%     Drug Order   (Drug name, dose, route, IV Fluid & volume, frequency, number of doses) Cycle 4 maintenance pembrolizumab (C10 overall)  Previous treatment: C3 10/4/23   Medication = Pembrolizumab (Keytruda)  Base Dose = 200 mg   Fixed dose; no calculation required  Final Dose = 200 mg  Route = IV  Fluid & Volume = NS 50 mL  Admin Duration = Over 30 minutes         No calculation required, okay to treat with final dose     By my signature below, I confirm this process was " performed independently with the BSA and all final chemotherapy dosing calculations congruent. I have reviewed the above chemotherapy order and that my calculation of the final dose and BSA (when applicable) corroborate those calculations of the  pharmacist. Discrepancies of 10% or greater in the written dose have been addressed and documented within the EPIC Progress notes.    Jessica Jenkins, PharmD, BCOP

## 2023-10-24 RX ORDER — 0.9 % SODIUM CHLORIDE 0.9 %
VIAL (ML) INJECTION PRN
Status: CANCELLED | OUTPATIENT
Start: 2023-10-26

## 2023-10-24 RX ORDER — HEPARIN SODIUM (PORCINE) LOCK FLUSH IV SOLN 100 UNIT/ML 100 UNIT/ML
500 SOLUTION INTRAVENOUS PRN
Status: CANCELLED | OUTPATIENT
Start: 2023-10-24

## 2023-10-24 RX ORDER — ONDANSETRON 8 MG/1
8 TABLET, ORALLY DISINTEGRATING ORAL PRN
Status: CANCELLED | OUTPATIENT
Start: 2023-10-26

## 2023-10-24 RX ORDER — 0.9 % SODIUM CHLORIDE 0.9 %
VIAL (ML) INJECTION PRN
Status: CANCELLED | OUTPATIENT
Start: 2023-10-24

## 2023-10-24 RX ORDER — 0.9 % SODIUM CHLORIDE 0.9 %
10 VIAL (ML) INJECTION PRN
Status: CANCELLED | OUTPATIENT
Start: 2023-10-26

## 2023-10-24 RX ORDER — 0.9 % SODIUM CHLORIDE 0.9 %
3 VIAL (ML) INJECTION PRN
Status: CANCELLED | OUTPATIENT
Start: 2023-10-24

## 2023-10-24 RX ORDER — 0.9 % SODIUM CHLORIDE 0.9 %
10 VIAL (ML) INJECTION PRN
Status: CANCELLED | OUTPATIENT
Start: 2023-10-24

## 2023-10-24 RX ORDER — PROCHLORPERAZINE MALEATE 10 MG
10 TABLET ORAL EVERY 6 HOURS PRN
Status: CANCELLED | OUTPATIENT
Start: 2023-10-26

## 2023-10-24 RX ORDER — ONDANSETRON 2 MG/ML
4 INJECTION INTRAMUSCULAR; INTRAVENOUS PRN
Status: CANCELLED | OUTPATIENT
Start: 2023-10-26

## 2023-10-24 RX ORDER — HEPARIN SODIUM (PORCINE) LOCK FLUSH IV SOLN 100 UNIT/ML 100 UNIT/ML
500 SOLUTION INTRAVENOUS PRN
Status: CANCELLED | OUTPATIENT
Start: 2023-10-26

## 2023-10-24 RX ORDER — 0.9 % SODIUM CHLORIDE 0.9 %
3 VIAL (ML) INJECTION PRN
Status: CANCELLED | OUTPATIENT
Start: 2023-10-26

## 2023-10-24 RX ORDER — SODIUM CHLORIDE 9 MG/ML
INJECTION, SOLUTION INTRAVENOUS CONTINUOUS
Status: CANCELLED | OUTPATIENT
Start: 2023-10-26

## 2023-10-24 NOTE — PROGRESS NOTES
"Pharmacy Chemotherapy Verification    DX: triple negative breast cancer    Cycle 10 total - Cycle 4 of pembrolizumab maintenance  Previous treatment = 10/4/23    Regimen: pembrolizumab + PACLitaxel/CARBOplatin --> pembrolizumab + cyclophosphamide/DOXrubicin or epirubicin --> Pembrolizmab  Pembrolizumab 200 mg IV over 30 min on day 1  PACLitaxel 80 mg/m2 IV over 60 min on days 1,8,15  CARBOplatin AUC 1.5 IV over 30 min on day 1,8,15  Q21 days x 4 cycles  Followed by  Pembrolizumab 200 mg IV over 30 min on day 1  Cyclophosphamide 600 mg/m2 IV over 30 min on day 1  *8/2/23- dose reduced by 20% to 480mg/m2 for cytopenias  DOXOrubicin 60 mg/m2 IVP on day 1 OR epirubicin 90 mg/m2 IVP on day 1  *8/2/23- dose reduced by 20% to 48mg/m2 for cytopenias  Q21 days x 4 cycles  Followed by  Pembrolizumab 200mg IV over 30 min on day 1  Q21 days x 9 cycles  NCCN Guidelines for Breast Cancer V.2.2022  Alexis P, et al- N Engl J Med. 2020 Feb 27;382(9):810-821.     Allergies:Patient has no known allergies.  /81   Pulse 83   Temp 36.1 °C (97 °F) (Temporal)   Resp 18   Ht 1.61 m (5' 3.39\")   Wt 81.2 kg (179 lb 0.2 oz)   SpO2 99%   BMI 31.33 kg/m²   Body surface area is 1.91 meters squared.    All lab results 10/25/23 within treatment parameters.     Pembrolizumab 200 mg fixed dose, no calculation required  OK to treat with final dose = 200 mg IV     Garrett Cadena, PharmD  "

## 2023-10-25 ENCOUNTER — PHARMACY VISIT (OUTPATIENT)
Dept: PHARMACY | Facility: MEDICAL CENTER | Age: 58
End: 2023-10-25
Payer: COMMERCIAL

## 2023-10-25 ENCOUNTER — OUTPATIENT INFUSION SERVICES (OUTPATIENT)
Dept: ONCOLOGY | Facility: MEDICAL CENTER | Age: 58
End: 2023-10-25
Attending: INTERNAL MEDICINE
Payer: COMMERCIAL

## 2023-10-25 VITALS
RESPIRATION RATE: 18 BRPM | HEIGHT: 63 IN | OXYGEN SATURATION: 99 % | DIASTOLIC BLOOD PRESSURE: 81 MMHG | WEIGHT: 179.01 LBS | TEMPERATURE: 97 F | BODY MASS INDEX: 31.72 KG/M2 | SYSTOLIC BLOOD PRESSURE: 135 MMHG | HEART RATE: 83 BPM

## 2023-10-25 DIAGNOSIS — Z17.1 MALIGNANT NEOPLASM OF OVERLAPPING SITES OF LEFT BREAST IN FEMALE, ESTROGEN RECEPTOR NEGATIVE (HCC): ICD-10-CM

## 2023-10-25 DIAGNOSIS — C50.812 MALIGNANT NEOPLASM OF OVERLAPPING SITES OF LEFT BREAST IN FEMALE, ESTROGEN RECEPTOR NEGATIVE (HCC): ICD-10-CM

## 2023-10-25 LAB
ALBUMIN SERPL BCP-MCNC: 3.9 G/DL (ref 3.2–4.9)
ALBUMIN/GLOB SERPL: 1.6 G/DL
ALP SERPL-CCNC: 112 U/L (ref 30–99)
ALT SERPL-CCNC: 31 U/L (ref 2–50)
ANION GAP SERPL CALC-SCNC: 12 MMOL/L (ref 7–16)
AST SERPL-CCNC: 28 U/L (ref 12–45)
BASOPHILS # BLD AUTO: 0.5 % (ref 0–1.8)
BASOPHILS # BLD: 0.02 K/UL (ref 0–0.12)
BILIRUB SERPL-MCNC: 0.4 MG/DL (ref 0.1–1.5)
BUN SERPL-MCNC: 11 MG/DL (ref 8–22)
CALCIUM ALBUM COR SERPL-MCNC: 8.7 MG/DL (ref 8.5–10.5)
CALCIUM SERPL-MCNC: 8.6 MG/DL (ref 8.5–10.5)
CHLORIDE SERPL-SCNC: 106 MMOL/L (ref 96–112)
CO2 SERPL-SCNC: 23 MMOL/L (ref 20–33)
CREAT SERPL-MCNC: 0.55 MG/DL (ref 0.5–1.4)
EOSINOPHIL # BLD AUTO: 0.14 K/UL (ref 0–0.51)
EOSINOPHIL NFR BLD: 3.4 % (ref 0–6.9)
ERYTHROCYTE [DISTWIDTH] IN BLOOD BY AUTOMATED COUNT: 46 FL (ref 35.9–50)
GFR SERPLBLD CREATININE-BSD FMLA CKD-EPI: 105 ML/MIN/1.73 M 2
GLOBULIN SER CALC-MCNC: 2.5 G/DL (ref 1.9–3.5)
GLUCOSE SERPL-MCNC: 113 MG/DL (ref 65–99)
HCT VFR BLD AUTO: 34.5 % (ref 37–47)
HGB BLD-MCNC: 12.1 G/DL (ref 12–16)
IMM GRANULOCYTES # BLD AUTO: 0.01 K/UL (ref 0–0.11)
IMM GRANULOCYTES NFR BLD AUTO: 0.2 % (ref 0–0.9)
LYMPHOCYTES # BLD AUTO: 1.66 K/UL (ref 1–4.8)
LYMPHOCYTES NFR BLD: 40 % (ref 22–41)
MCH RBC QN AUTO: 35.5 PG (ref 27–33)
MCHC RBC AUTO-ENTMCNC: 35.1 G/DL (ref 32.2–35.5)
MCV RBC AUTO: 101.2 FL (ref 81.4–97.8)
MONOCYTES # BLD AUTO: 0.39 K/UL (ref 0–0.85)
MONOCYTES NFR BLD AUTO: 9.4 % (ref 0–13.4)
NEUTROPHILS # BLD AUTO: 1.93 K/UL (ref 1.82–7.42)
NEUTROPHILS NFR BLD: 46.5 % (ref 44–72)
NRBC # BLD AUTO: 0 K/UL
NRBC BLD-RTO: 0 /100 WBC (ref 0–0.2)
OUTPT INFUS CBC COMMENT OICOM: ABNORMAL
PLATELET # BLD AUTO: 209 K/UL (ref 164–446)
PMV BLD AUTO: 8.7 FL (ref 9–12.9)
POTASSIUM SERPL-SCNC: 3.7 MMOL/L (ref 3.6–5.5)
PROT SERPL-MCNC: 6.4 G/DL (ref 6–8.2)
RBC # BLD AUTO: 3.41 M/UL (ref 4.2–5.4)
SODIUM SERPL-SCNC: 141 MMOL/L (ref 135–145)
T4 FREE SERPL-MCNC: 1.01 NG/DL (ref 0.93–1.7)
TSH SERPL DL<=0.005 MIU/L-ACNC: 0.8 UIU/ML (ref 0.38–5.33)
WBC # BLD AUTO: 4.2 K/UL (ref 4.8–10.8)

## 2023-10-25 PROCEDURE — A4212 NON CORING NEEDLE OR STYLET: HCPCS

## 2023-10-25 PROCEDURE — 700111 HCHG RX REV CODE 636 W/ 250 OVERRIDE (IP): Performed by: INTERNAL MEDICINE

## 2023-10-25 PROCEDURE — 80053 COMPREHEN METABOLIC PANEL: CPT

## 2023-10-25 PROCEDURE — 84439 ASSAY OF FREE THYROXINE: CPT

## 2023-10-25 PROCEDURE — 700105 HCHG RX REV CODE 258: Performed by: INTERNAL MEDICINE

## 2023-10-25 PROCEDURE — 85025 COMPLETE CBC W/AUTO DIFF WBC: CPT

## 2023-10-25 PROCEDURE — RXMED WILLOW AMBULATORY MEDICATION CHARGE: Performed by: SURGERY

## 2023-10-25 PROCEDURE — 96413 CHEMO IV INFUSION 1 HR: CPT

## 2023-10-25 PROCEDURE — 84443 ASSAY THYROID STIM HORMONE: CPT

## 2023-10-25 PROCEDURE — 700111 HCHG RX REV CODE 636 W/ 250 OVERRIDE (IP)

## 2023-10-25 PROCEDURE — 700105 HCHG RX REV CODE 258

## 2023-10-25 RX ADMIN — SODIUM CHLORIDE 200 MG: 9 INJECTION, SOLUTION INTRAVENOUS at 11:10

## 2023-10-25 RX ADMIN — HEPARIN 500 UNITS: 100 SYRINGE at 11:59

## 2023-10-25 ASSESSMENT — PAIN DESCRIPTION - PAIN TYPE: TYPE: ACUTE PAIN

## 2023-10-25 ASSESSMENT — FIBROSIS 4 INDEX: FIB4 SCORE: 1.78

## 2023-10-25 NOTE — PROGRESS NOTES
Chemotherapy Verification - PRIMARY RN      Height =  161 cm   Weight = 81.2 kg    BSA =  1.91 m^2      Medication: Keytruda    Dose:  200 mg (set dose)  Calculated Dose:  200 mg (set dose)                            (In mg/m2, AUC, mg/kg)         I confirm this process was performed independently with the BSA and all final chemotherapy dosing calculations congruent.  Any discrepancies of 10% or greater have been addressed with the chemotherapy pharmacist. The resolution of the discrepancy has been documented in the EPIC progress notes.

## 2023-10-25 NOTE — PROGRESS NOTES
[facility] for Day [facility]/ Cycle 10 of Keytruda for L breast Cancer. Pt denied having any new or acute complaints today, reports tolerating past treatments well. Pt reports returning to work last week, starting PT of lymphedema next week. POC reviewed. Port accessed in a sterile manner, brisk blood return noted after repositioning, labs drawn off line, Yolanda tolerated well. IVF TKO. Lab results reviewed and within parameters for treatment. Pt given Keytruda as prescribed, filter intact/ Yolanda tolerated well, denied having any complaints during or after infusion. Port had + blood return after, flushed per [facility] policy, de-accessed, needle intact, insertion site covered with sterile gauze and paper tape. Confirmed next appointment and Yolanda was discharged home in no acute distress.

## 2023-10-25 NOTE — PROGRESS NOTES
Chemotherapy Verification - SECONDARY RN   C10 D1     Height = 161 cm  Weight = 81.2 kg  BSA = 1.91 m^2       Medication: pembrolizumab (KEYTRUDA)  Dose: 200 mg set dose  Calculated Dose: 200 mg set dose                             (In mg/m2, AUC, mg/kg)       I confirm that this process was performed independently.

## 2023-11-14 NOTE — PROGRESS NOTES
"Pharmacy Chemotherapy Verification    DX: triple negative breast cancer    Cycle 11 total - Cycle 5 of pembrolizumab maintenance  Previous treatment = 10/25/23    Regimen: pembrolizumab + PACLitaxel/CARBOplatin --> pembrolizumab + cyclophosphamide/DOXrubicin or epirubicin --> Pembrolizmab  Pembrolizumab 200 mg IV over 30 min on day 1  PACLitaxel 80 mg/m2 IV over 60 min on days 1,8,15  CARBOplatin AUC 1.5 IV over 30 min on day 1,8,15  Q21 days x 4 cycles  Followed by  Pembrolizumab 200 mg IV over 30 min on day 1  Cyclophosphamide 600 mg/m2 IV over 30 min on day 1  *8/2/23- dose reduced by 20% to 480mg/m2 for cytopenias  DOXOrubicin 60 mg/m2 IVP on day 1 OR epirubicin 90 mg/m2 IVP on day 1  *8/2/23- dose reduced by 20% to 48mg/m2 for cytopenias  Q21 days x 4 cycles  Followed by  Pembrolizumab 200mg IV over 30 min on day 1  Q21 days x 9 cycles  NCCN Guidelines for Breast Cancer V.2.2022  Alexis P, et al- N Engl J Med. 2020 Feb 27;382(9):810-821.     Allergies:Patient has no known allergies.  /87   Pulse 90   Temp 36.2 °C (97.2 °F) (Temporal)   Resp 18   Ht 1.61 m (5' 3.39\")   Wt 82 kg (180 lb 12.4 oz)   SpO2 98%   BMI 31.63 kg/m²   Body surface area is 1.92 meters squared.    All lab results 11/15/23 within treatment parameters. Abnormal free T4 to be reported to provider.      Pembrolizumab 200 mg fixed dose, no calculation required  OK to treat with final dose = 200 mg IV     Garrett Cadena, PharmD  "

## 2023-11-15 ENCOUNTER — OUTPATIENT INFUSION SERVICES (OUTPATIENT)
Dept: ONCOLOGY | Facility: MEDICAL CENTER | Age: 58
End: 2023-11-15
Attending: INTERNAL MEDICINE
Payer: COMMERCIAL

## 2023-11-15 VITALS
TEMPERATURE: 97.2 F | HEIGHT: 63 IN | HEART RATE: 90 BPM | RESPIRATION RATE: 18 BRPM | WEIGHT: 180.78 LBS | SYSTOLIC BLOOD PRESSURE: 138 MMHG | OXYGEN SATURATION: 98 % | BODY MASS INDEX: 32.03 KG/M2 | DIASTOLIC BLOOD PRESSURE: 87 MMHG

## 2023-11-15 DIAGNOSIS — C50.812 MALIGNANT NEOPLASM OF OVERLAPPING SITES OF LEFT BREAST IN FEMALE, ESTROGEN RECEPTOR NEGATIVE (HCC): ICD-10-CM

## 2023-11-15 DIAGNOSIS — Z17.1 MALIGNANT NEOPLASM OF OVERLAPPING SITES OF LEFT BREAST IN FEMALE, ESTROGEN RECEPTOR NEGATIVE (HCC): ICD-10-CM

## 2023-11-15 LAB
ALBUMIN SERPL BCP-MCNC: 4.1 G/DL (ref 3.2–4.9)
ALBUMIN/GLOB SERPL: 1.6 G/DL
ALP SERPL-CCNC: 113 U/L (ref 30–99)
ALT SERPL-CCNC: 29 U/L (ref 2–50)
ANION GAP SERPL CALC-SCNC: 11 MMOL/L (ref 7–16)
AST SERPL-CCNC: 22 U/L (ref 12–45)
BASOPHILS # BLD AUTO: 0.5 % (ref 0–1.8)
BASOPHILS # BLD: 0.02 K/UL (ref 0–0.12)
BILIRUB SERPL-MCNC: 0.3 MG/DL (ref 0.1–1.5)
BUN SERPL-MCNC: 9 MG/DL (ref 8–22)
CALCIUM ALBUM COR SERPL-MCNC: 8.7 MG/DL (ref 8.5–10.5)
CALCIUM SERPL-MCNC: 8.8 MG/DL (ref 8.5–10.5)
CHLORIDE SERPL-SCNC: 104 MMOL/L (ref 96–112)
CO2 SERPL-SCNC: 26 MMOL/L (ref 20–33)
CREAT SERPL-MCNC: 0.45 MG/DL (ref 0.5–1.4)
EOSINOPHIL # BLD AUTO: 0.15 K/UL (ref 0–0.51)
EOSINOPHIL NFR BLD: 3.7 % (ref 0–6.9)
ERYTHROCYTE [DISTWIDTH] IN BLOOD BY AUTOMATED COUNT: 44.9 FL (ref 35.9–50)
GFR SERPLBLD CREATININE-BSD FMLA CKD-EPI: 110 ML/MIN/1.73 M 2
GLOBULIN SER CALC-MCNC: 2.5 G/DL (ref 1.9–3.5)
GLUCOSE SERPL-MCNC: 119 MG/DL (ref 65–99)
HCT VFR BLD AUTO: 34.1 % (ref 37–47)
HGB BLD-MCNC: 11.3 G/DL (ref 12–16)
IMM GRANULOCYTES # BLD AUTO: 0.01 K/UL (ref 0–0.11)
IMM GRANULOCYTES NFR BLD AUTO: 0.2 % (ref 0–0.9)
LYMPHOCYTES # BLD AUTO: 1.84 K/UL (ref 1–4.8)
LYMPHOCYTES NFR BLD: 45.1 % (ref 22–41)
MCH RBC QN AUTO: 33.1 PG (ref 27–33)
MCHC RBC AUTO-ENTMCNC: 33.1 G/DL (ref 32.2–35.5)
MCV RBC AUTO: 100 FL (ref 81.4–97.8)
MONOCYTES # BLD AUTO: 0.3 K/UL (ref 0–0.85)
MONOCYTES NFR BLD AUTO: 7.4 % (ref 0–13.4)
NEUTROPHILS # BLD AUTO: 1.76 K/UL (ref 1.82–7.42)
NEUTROPHILS NFR BLD: 43.1 % (ref 44–72)
NRBC # BLD AUTO: 0 K/UL
NRBC BLD-RTO: 0 /100 WBC (ref 0–0.2)
OUTPT INFUS CBC COMMENT OICOM: ABNORMAL
PLATELET # BLD AUTO: 225 K/UL (ref 164–446)
PMV BLD AUTO: 8.6 FL (ref 9–12.9)
POTASSIUM SERPL-SCNC: 3.6 MMOL/L (ref 3.6–5.5)
PROT SERPL-MCNC: 6.6 G/DL (ref 6–8.2)
RBC # BLD AUTO: 3.41 M/UL (ref 4.2–5.4)
SODIUM SERPL-SCNC: 141 MMOL/L (ref 135–145)
T4 FREE SERPL-MCNC: 0.89 NG/DL (ref 0.93–1.7)
TSH SERPL DL<=0.005 MIU/L-ACNC: 1.2 UIU/ML (ref 0.38–5.33)
WBC # BLD AUTO: 4.1 K/UL (ref 4.8–10.8)

## 2023-11-15 PROCEDURE — 700111 HCHG RX REV CODE 636 W/ 250 OVERRIDE (IP): Performed by: INTERNAL MEDICINE

## 2023-11-15 PROCEDURE — 84443 ASSAY THYROID STIM HORMONE: CPT

## 2023-11-15 PROCEDURE — 96413 CHEMO IV INFUSION 1 HR: CPT

## 2023-11-15 PROCEDURE — 700111 HCHG RX REV CODE 636 W/ 250 OVERRIDE (IP)

## 2023-11-15 PROCEDURE — 84439 ASSAY OF FREE THYROXINE: CPT

## 2023-11-15 PROCEDURE — 80053 COMPREHEN METABOLIC PANEL: CPT

## 2023-11-15 PROCEDURE — 85025 COMPLETE CBC W/AUTO DIFF WBC: CPT

## 2023-11-15 PROCEDURE — 700105 HCHG RX REV CODE 258

## 2023-11-15 PROCEDURE — 700105 HCHG RX REV CODE 258: Performed by: INTERNAL MEDICINE

## 2023-11-15 PROCEDURE — A4212 NON CORING NEEDLE OR STYLET: HCPCS

## 2023-11-15 RX ORDER — 0.9 % SODIUM CHLORIDE 0.9 %
3 VIAL (ML) INJECTION PRN
Status: CANCELLED | OUTPATIENT
Start: 2023-11-16

## 2023-11-15 RX ORDER — 0.9 % SODIUM CHLORIDE 0.9 %
3 VIAL (ML) INJECTION PRN
Status: CANCELLED | OUTPATIENT
Start: 2023-11-15

## 2023-11-15 RX ORDER — 0.9 % SODIUM CHLORIDE 0.9 %
VIAL (ML) INJECTION PRN
Status: CANCELLED | OUTPATIENT
Start: 2023-11-16

## 2023-11-15 RX ORDER — 0.9 % SODIUM CHLORIDE 0.9 %
10 VIAL (ML) INJECTION PRN
Status: CANCELLED | OUTPATIENT
Start: 2023-11-15

## 2023-11-15 RX ORDER — ONDANSETRON 8 MG/1
8 TABLET, ORALLY DISINTEGRATING ORAL PRN
Status: CANCELLED | OUTPATIENT
Start: 2023-11-16

## 2023-11-15 RX ORDER — 0.9 % SODIUM CHLORIDE 0.9 %
10 VIAL (ML) INJECTION PRN
Status: CANCELLED | OUTPATIENT
Start: 2023-11-16

## 2023-11-15 RX ORDER — HEPARIN SODIUM (PORCINE) LOCK FLUSH IV SOLN 100 UNIT/ML 100 UNIT/ML
500 SOLUTION INTRAVENOUS PRN
Status: CANCELLED | OUTPATIENT
Start: 2023-11-16

## 2023-11-15 RX ORDER — ONDANSETRON 2 MG/ML
4 INJECTION INTRAMUSCULAR; INTRAVENOUS PRN
Status: CANCELLED | OUTPATIENT
Start: 2023-11-16

## 2023-11-15 RX ORDER — PROCHLORPERAZINE MALEATE 10 MG
10 TABLET ORAL EVERY 6 HOURS PRN
Status: CANCELLED | OUTPATIENT
Start: 2023-11-16

## 2023-11-15 RX ORDER — 0.9 % SODIUM CHLORIDE 0.9 %
VIAL (ML) INJECTION PRN
Status: CANCELLED | OUTPATIENT
Start: 2023-11-15

## 2023-11-15 RX ORDER — SODIUM CHLORIDE 9 MG/ML
INJECTION, SOLUTION INTRAVENOUS CONTINUOUS
Status: CANCELLED | OUTPATIENT
Start: 2023-11-16

## 2023-11-15 RX ORDER — HEPARIN SODIUM (PORCINE) LOCK FLUSH IV SOLN 100 UNIT/ML 100 UNIT/ML
500 SOLUTION INTRAVENOUS PRN
Status: CANCELLED | OUTPATIENT
Start: 2023-11-15

## 2023-11-15 RX ADMIN — SODIUM CHLORIDE 200 MG: 9 INJECTION, SOLUTION INTRAVENOUS at 11:35

## 2023-11-15 RX ADMIN — HEPARIN 500 UNITS: 100 SYRINGE at 12:10

## 2023-11-15 ASSESSMENT — FIBROSIS 4 INDEX: FIB4 SCORE: 1.42

## 2023-11-15 NOTE — PROGRESS NOTES
Chemotherapy Verification - SECONDARY RN   C11 D1    Height = 161 cm  Weight = 82 kg  BSA = 1.91 m^2       Medication: pembrolizumab (KEYTRUDA)  Dose: 200 mg set dose  Calculated Dose: 200 mg set dose                             (In mg/m2, AUC, mg/kg)       I confirm that this process was performed independently.

## 2023-11-15 NOTE — PROGRESS NOTES
"Pharmacy Chemotherapy Verification:      Dx: Metastatic Breast CA (ER/IN/HER2 negative) IIb       Protocol: Pembrolizumab + PACLitaxel + CARBOplatin (x 4 cycles) followed by  Pembrolizumab + Cyclophosphamide + DOXOrubicin   (x4 cycles)  *Dosing Reference*  Pembrolizumab 200 mg IV over 30 minutes on Day 1  PACLitaxel 80 mg/m2 IV over 60 minutes on Days 1, 8 and 15  CARBOplatin AUC 1.5 IV over 30 minutes Days 1, 8, and 15  Repeat every 21 days x 4 cycles- completed 6/28/23  ~followed by~  Pembrolizumab 200 mg IV over 30 minutes on Day 1  Cyclophosphamide 600 mg/m2 IV over 30 minutes on Day 1  -Dose reduced to 480 mg/m2 starting Cycle 6 due to counts  DOXOrubicin 60 mg/m2 IV push on Day 1   -Dose reduced to 48 mg/m2 starting Cycle 6 due to counts  Repeat every 21 days for 4 cycles  Followed by **Course 2 ended a Cycle early per Dr. Gómez, maintenance pembro starting 8/23/23**  Pembrolizumab 200 mg IV over 30 min on day 1  Q21 days x 9 cycles    NCCN Guidelines for Breast Cancer V.2.2022  Alexis P, et al. N Engl J Med 2020; 382(5) 378-592    Allergies:  Patient has no known allergies.      /87   Pulse 90   Temp 36.2 °C (97.2 °F) (Temporal)   Resp 18   Ht 1.61 m (5' 3.39\")   Wt 82 kg (180 lb 12.4 oz)   SpO2 98%   BMI 31.63 kg/m²  Body surface area is 1.92 meters squared.    Labs 11/15/23:  ANC~ 1760 Plt = 225k   Hgb = 11.3     SCr = 0.45 mg/dL CrCl ~ 111.2 mL/min (min SCr 0.7 used)  AST/ALT/AP = 22/29/113 TBili = 0.3    TSH = 1.20   Free T4 = 0.89        ECHO 3/16/23 ([facility])  LVEF ~60%     Drug Order   (Drug name, dose, route, IV Fluid & volume, frequency, number of doses) Cycle 5 maintenance pembrolizumab (C11 overall)  Previous treatment: C4 10/25/23   Medication = Pembrolizumab (Keytruda)  Base Dose = 200 mg   Fixed dose; no calc required  Final Dose = 200 mg  Route = IV  Fluid & Volume = NS 50 mL  Admin Duration = Over 30 minutes         No calculation required, okay to treat with final dose     By " my signature below, I confirm this process was performed independently with the BSA and all final chemotherapy dosing calculations congruent. I have reviewed the above chemotherapy order and that my calculation of the final dose and BSA (when applicable) corroborate those calculations of the  pharmacist. Discrepancies of 10% or greater in the written dose have been addressed and documented within the EPIC Progress notes.    Janice GoveaD

## 2023-11-15 NOTE — PROGRESS NOTES
Chemotherapy Verification - PRIMARY RN       Height = 161 cm  Weight = 82 kg  BSA = 1.91 m2       Medication: Keytruda  Dose: 200 mg (set dose)  Calculated Dose: 200 mg                             (In mg/m2, AUC, mg/kg)       I confirm this process was performed independently with the BSA and all final chemotherapy dosing calculations congruent.  Any discrepancies of 10% or greater have been addressed with the chemotherapy pharmacist. The resolution of the discrepancy has been documented in the EPIC progress notes.

## 2023-12-04 NOTE — PROGRESS NOTES
"Pharmacy Chemotherapy Verification    DX: triple negative breast cancer    Cycle 12 total - Cycle 6 of pembrolizumab maintenance  Previous treatment = 11/15/23    Regimen: pembrolizumab + PACLitaxel/CARBOplatin --> pembrolizumab + cyclophosphamide/DOXrubicin or epirubicin --> Pembrolizmab  Pembrolizumab 200 mg IV over 30 min on day 1  PACLitaxel 80 mg/m2 IV over 60 min on days 1,8,15  CARBOplatin AUC 1.5 IV over 30 min on day 1,8,15  Q21 days x 4 cycles  Followed by  Pembrolizumab 200 mg IV over 30 min on day 1  Cyclophosphamide 600 mg/m2 IV over 30 min on day 1  *8/2/23- dose reduced by 20% to 480mg/m2 for cytopenias  DOXOrubicin 60 mg/m2 IVP on day 1 OR epirubicin 90 mg/m2 IVP on day 1  *8/2/23- dose reduced by 20% to 48mg/m2 for cytopenias  Q21 days x 4 cycles  Followed by  Pembrolizumab 200mg IV over 30 min on day 1  Q21 days x 9 cycles  NCCN Guidelines for Breast Cancer V.2.2022  Alexis P, et al- N Engl J Med. 2020 Feb 27;382(9):810-821.     Allergies:Patient has no known allergies.  BP (!) 162/90   Pulse 73   Temp 36.3 °C (97.4 °F) (Temporal)   Resp 18   Ht 1.61 m (5' 3.39\")   Wt 82.7 kg (182 lb 5.1 oz)   SpO2 97%   BMI 31.90 kg/m²   Body surface area is 1.92 meters squared.    Labs 12/6/23:  ANC~ 2170 Plt = 220k   Hgb = 11.2     SCr = 0.38 mg/dL CrCl ~ 112 mL/min   AST/ALT/AP = 23/26/104 TBili = 0.3  TSH = 0.83 Free T4 = 0.92 - RN to inform MD    Pembrolizumab 200 mg fixed dose, no calculation required  OK to treat with final dose = 200 mg IV     Misha Mckeon, PharmD  "

## 2023-12-05 NOTE — PROGRESS NOTES
"Pharmacy Chemotherapy Verification:      Dx: Metastatic Breast CA (ER/NE/HER2 negative) IIb       Protocol: Pembrolizumab + PACLitaxel + CARBOplatin (x 4 cycles) followed by  Pembrolizumab + Cyclophosphamide + DOXOrubicin   (x4 cycles)  *Dosing Reference*  Pembrolizumab 200 mg IV over 30 minutes on Day 1  PACLitaxel 80 mg/m2 IV over 60 minutes on Days 1, 8 and 15  CARBOplatin AUC 1.5 IV over 30 minutes Days 1, 8, and 15  Repeat every 21 days x 4 cycles- completed 6/28/23  ~followed by~  Pembrolizumab 200 mg IV over 30 minutes on Day 1  Cyclophosphamide 600 mg/m2 IV over 30 minutes on Day 1  -Dose reduced to 480 mg/m2 starting Cycle 6 due to counts  DOXOrubicin 60 mg/m2 IV push on Day 1   -Dose reduced to 48 mg/m2 starting Cycle 6 due to counts  Repeat every 21 days for 4 cycles  Followed by **Course 2 ended a Cycle early per Dr. Gómez, maintenance pembro starting 8/23/23**  Pembrolizumab 200 mg IV over 30 min on day 1  Q21 days x 9 cycles    NCCN Guidelines for Breast Cancer V.2.2022  Alexis P, et al. N Engl J Med 2020; 382(3) 053-530    Allergies:  Patient has no known allergies.      BP (!) 162/90   Pulse 73   Temp 36.3 °C (97.4 °F) (Temporal)   Resp 18   Ht 1.61 m (5' 3.39\")   Wt 82.7 kg (182 lb 5.1 oz)   SpO2 97%   BMI 31.90 kg/m²  Body surface area is 1.92 meters squared.    Labs 12/06/23:  ANC~ 2170 Plt = 220k   Hgb = 11.2     SCr = 0.38 mg/dL CrCl ~ 112.1 mL/min (min SCr 0.7 used)  AST/ALT/AP = 23/26/104 TBili = 0.3    TSH = 0.830   Free T4 = 0.92        ECHO 3/16/23 ([facility])  LVEF ~60     Drug Order   (Drug name, dose, route, IV Fluid & volume, frequency, number of doses) Cycle 6 maintenance pembrolizumab (C12 overall)  Previous treatment: C5 11/15/23   Medication = Pembrolizumab (Keytruda)  Base Dose = 200 mg   Fixed dose; no calc required  Final Dose = 200 mg  Route = IV  Fluid & Volume = NS 50 mL  Admin Duration = Over 30 minutes         Fixed dose, okay to treat with final dose     By my " signature below, I confirm this process was performed independently with the BSA and all final chemotherapy dosing calculations congruent. I have reviewed the above chemotherapy order and that my calculation of the final dose and BSA (when applicable) corroborate those calculations of the  pharmacist. Discrepancies of 10% or greater in the written dose have been addressed and documented within the EPIC Progress notes.    Janice GoveaD

## 2023-12-06 ENCOUNTER — OUTPATIENT INFUSION SERVICES (OUTPATIENT)
Dept: ONCOLOGY | Facility: MEDICAL CENTER | Age: 58
End: 2023-12-06
Attending: INTERNAL MEDICINE
Payer: COMMERCIAL

## 2023-12-06 VITALS
HEIGHT: 63 IN | WEIGHT: 182.32 LBS | SYSTOLIC BLOOD PRESSURE: 162 MMHG | OXYGEN SATURATION: 97 % | BODY MASS INDEX: 32.3 KG/M2 | TEMPERATURE: 97.4 F | HEART RATE: 73 BPM | RESPIRATION RATE: 18 BRPM | DIASTOLIC BLOOD PRESSURE: 90 MMHG

## 2023-12-06 DIAGNOSIS — Z17.1 MALIGNANT NEOPLASM OF OVERLAPPING SITES OF LEFT BREAST IN FEMALE, ESTROGEN RECEPTOR NEGATIVE (HCC): ICD-10-CM

## 2023-12-06 DIAGNOSIS — C50.812 MALIGNANT NEOPLASM OF OVERLAPPING SITES OF LEFT BREAST IN FEMALE, ESTROGEN RECEPTOR NEGATIVE (HCC): ICD-10-CM

## 2023-12-06 LAB
ALBUMIN SERPL BCP-MCNC: 3.8 G/DL (ref 3.2–4.9)
ALBUMIN/GLOB SERPL: 1.5 G/DL
ALP SERPL-CCNC: 104 U/L (ref 30–99)
ALT SERPL-CCNC: 26 U/L (ref 2–50)
ANION GAP SERPL CALC-SCNC: 10 MMOL/L (ref 7–16)
AST SERPL-CCNC: 23 U/L (ref 12–45)
BASOPHILS # BLD AUTO: 0.5 % (ref 0–1.8)
BASOPHILS # BLD: 0.02 K/UL (ref 0–0.12)
BILIRUB SERPL-MCNC: 0.3 MG/DL (ref 0.1–1.5)
BUN SERPL-MCNC: 10 MG/DL (ref 8–22)
CALCIUM ALBUM COR SERPL-MCNC: 8.8 MG/DL (ref 8.5–10.5)
CALCIUM SERPL-MCNC: 8.6 MG/DL (ref 8.5–10.5)
CHLORIDE SERPL-SCNC: 106 MMOL/L (ref 96–112)
CO2 SERPL-SCNC: 25 MMOL/L (ref 20–33)
CREAT SERPL-MCNC: 0.38 MG/DL (ref 0.5–1.4)
EOSINOPHIL # BLD AUTO: 0.16 K/UL (ref 0–0.51)
EOSINOPHIL NFR BLD: 3.8 % (ref 0–6.9)
ERYTHROCYTE [DISTWIDTH] IN BLOOD BY AUTOMATED COUNT: 44.4 FL (ref 35.9–50)
GFR SERPLBLD CREATININE-BSD FMLA CKD-EPI: 115 ML/MIN/1.73 M 2
GLOBULIN SER CALC-MCNC: 2.6 G/DL (ref 1.9–3.5)
GLUCOSE SERPL-MCNC: 114 MG/DL (ref 65–99)
HCT VFR BLD AUTO: 32.2 % (ref 37–47)
HGB BLD-MCNC: 11.2 G/DL (ref 12–16)
IMM GRANULOCYTES # BLD AUTO: 0.01 K/UL (ref 0–0.11)
IMM GRANULOCYTES NFR BLD AUTO: 0.2 % (ref 0–0.9)
LYMPHOCYTES # BLD AUTO: 1.55 K/UL (ref 1–4.8)
LYMPHOCYTES NFR BLD: 36.9 % (ref 22–41)
MCH RBC QN AUTO: 33.8 PG (ref 27–33)
MCHC RBC AUTO-ENTMCNC: 34.8 G/DL (ref 32.2–35.5)
MCV RBC AUTO: 97.3 FL (ref 81.4–97.8)
MONOCYTES # BLD AUTO: 0.29 K/UL (ref 0–0.85)
MONOCYTES NFR BLD AUTO: 6.9 % (ref 0–13.4)
NEUTROPHILS # BLD AUTO: 2.17 K/UL (ref 1.82–7.42)
NEUTROPHILS NFR BLD: 51.7 % (ref 44–72)
NRBC # BLD AUTO: 0 K/UL
NRBC BLD-RTO: 0 /100 WBC (ref 0–0.2)
OUTPT INFUS CBC COMMENT OICOM: ABNORMAL
PLATELET # BLD AUTO: 220 K/UL (ref 164–446)
PMV BLD AUTO: 8.7 FL (ref 9–12.9)
POTASSIUM SERPL-SCNC: 3.1 MMOL/L (ref 3.6–5.5)
PROT SERPL-MCNC: 6.4 G/DL (ref 6–8.2)
RBC # BLD AUTO: 3.31 M/UL (ref 4.2–5.4)
SODIUM SERPL-SCNC: 141 MMOL/L (ref 135–145)
T4 FREE SERPL-MCNC: 0.92 NG/DL (ref 0.93–1.7)
TSH SERPL DL<=0.005 MIU/L-ACNC: 0.83 UIU/ML (ref 0.38–5.33)
WBC # BLD AUTO: 4.2 K/UL (ref 4.8–10.8)

## 2023-12-06 PROCEDURE — 84443 ASSAY THYROID STIM HORMONE: CPT

## 2023-12-06 PROCEDURE — A9270 NON-COVERED ITEM OR SERVICE: HCPCS | Mod: JZ | Performed by: INTERNAL MEDICINE

## 2023-12-06 PROCEDURE — 85025 COMPLETE CBC W/AUTO DIFF WBC: CPT

## 2023-12-06 PROCEDURE — 700111 HCHG RX REV CODE 636 W/ 250 OVERRIDE (IP): Performed by: INTERNAL MEDICINE

## 2023-12-06 PROCEDURE — 96367 TX/PROPH/DG ADDL SEQ IV INF: CPT

## 2023-12-06 PROCEDURE — 84439 ASSAY OF FREE THYROXINE: CPT

## 2023-12-06 PROCEDURE — 700111 HCHG RX REV CODE 636 W/ 250 OVERRIDE (IP)

## 2023-12-06 PROCEDURE — A4212 NON CORING NEEDLE OR STYLET: HCPCS

## 2023-12-06 PROCEDURE — 80053 COMPREHEN METABOLIC PANEL: CPT

## 2023-12-06 PROCEDURE — 700105 HCHG RX REV CODE 258

## 2023-12-06 PROCEDURE — 96413 CHEMO IV INFUSION 1 HR: CPT

## 2023-12-06 PROCEDURE — 700102 HCHG RX REV CODE 250 W/ 637 OVERRIDE(OP): Mod: JZ | Performed by: INTERNAL MEDICINE

## 2023-12-06 PROCEDURE — 700105 HCHG RX REV CODE 258: Performed by: INTERNAL MEDICINE

## 2023-12-06 RX ORDER — POTASSIUM CHLORIDE 20 MEQ/1
40 TABLET, EXTENDED RELEASE ORAL ONCE
Status: COMPLETED | OUTPATIENT
Start: 2023-12-06 | End: 2023-12-06

## 2023-12-06 RX ORDER — POTASSIUM CHLORIDE 7.45 MG/ML
10 INJECTION INTRAVENOUS
Status: DISCONTINUED | OUTPATIENT
Start: 2023-12-06 | End: 2023-12-06

## 2023-12-06 RX ORDER — 0.9 % SODIUM CHLORIDE 0.9 %
10 VIAL (ML) INJECTION PRN
Status: CANCELLED | OUTPATIENT
Start: 2023-12-06

## 2023-12-06 RX ORDER — 0.9 % SODIUM CHLORIDE 0.9 %
VIAL (ML) INJECTION PRN
Status: CANCELLED | OUTPATIENT
Start: 2023-12-07

## 2023-12-06 RX ORDER — 0.9 % SODIUM CHLORIDE 0.9 %
3 VIAL (ML) INJECTION PRN
Status: CANCELLED | OUTPATIENT
Start: 2023-12-06

## 2023-12-06 RX ORDER — 0.9 % SODIUM CHLORIDE 0.9 %
10 VIAL (ML) INJECTION PRN
Status: CANCELLED | OUTPATIENT
Start: 2023-12-07

## 2023-12-06 RX ORDER — HEPARIN SODIUM (PORCINE) LOCK FLUSH IV SOLN 100 UNIT/ML 100 UNIT/ML
500 SOLUTION INTRAVENOUS PRN
Status: CANCELLED | OUTPATIENT
Start: 2023-12-07

## 2023-12-06 RX ORDER — PROCHLORPERAZINE MALEATE 10 MG
10 TABLET ORAL EVERY 6 HOURS PRN
Status: CANCELLED | OUTPATIENT
Start: 2023-12-07

## 2023-12-06 RX ORDER — 0.9 % SODIUM CHLORIDE 0.9 %
3 VIAL (ML) INJECTION PRN
Status: CANCELLED | OUTPATIENT
Start: 2023-12-07

## 2023-12-06 RX ORDER — 0.9 % SODIUM CHLORIDE 0.9 %
VIAL (ML) INJECTION PRN
Status: CANCELLED | OUTPATIENT
Start: 2023-12-06

## 2023-12-06 RX ORDER — ONDANSETRON 2 MG/ML
4 INJECTION INTRAMUSCULAR; INTRAVENOUS PRN
Status: CANCELLED | OUTPATIENT
Start: 2023-12-07

## 2023-12-06 RX ORDER — ONDANSETRON 8 MG/1
8 TABLET, ORALLY DISINTEGRATING ORAL PRN
Status: CANCELLED | OUTPATIENT
Start: 2023-12-07

## 2023-12-06 RX ORDER — SODIUM CHLORIDE 9 MG/ML
INJECTION, SOLUTION INTRAVENOUS CONTINUOUS
Status: CANCELLED | OUTPATIENT
Start: 2023-12-07

## 2023-12-06 RX ORDER — POTASSIUM CHLORIDE 7.45 MG/ML
10 INJECTION INTRAVENOUS
Status: COMPLETED | OUTPATIENT
Start: 2023-12-06 | End: 2023-12-06

## 2023-12-06 RX ORDER — HEPARIN SODIUM (PORCINE) LOCK FLUSH IV SOLN 100 UNIT/ML 100 UNIT/ML
500 SOLUTION INTRAVENOUS PRN
Status: CANCELLED | OUTPATIENT
Start: 2023-12-06

## 2023-12-06 RX ADMIN — POTASSIUM CHLORIDE 10 MEQ: 7.46 INJECTION, SOLUTION INTRAVENOUS at 11:46

## 2023-12-06 RX ADMIN — POTASSIUM CHLORIDE 10 MEQ: 7.46 INJECTION, SOLUTION INTRAVENOUS at 10:45

## 2023-12-06 RX ADMIN — HEPARIN 500 UNITS: 100 SYRINGE at 13:43

## 2023-12-06 RX ADMIN — POTASSIUM CHLORIDE 40 MEQ: 1500 TABLET, EXTENDED RELEASE ORAL at 11:46

## 2023-12-06 RX ADMIN — SODIUM CHLORIDE 200 MG: 9 INJECTION, SOLUTION INTRAVENOUS at 12:59

## 2023-12-06 ASSESSMENT — FIBROSIS 4 INDEX: FIB4 SCORE: 1.07

## 2023-12-06 NOTE — PROGRESS NOTES
Chemotherapy Verification - SECONDARY RN       Height = 161 cm  Weight = 82.7 kg  BSA = 1.92 m^2       Medication: pembrolizumab (KEYTRUDA)  Dose: 200 mg set dose  Calculated Dose: 200 mg set dose                             (In mg/m2, AUC, mg/kg)       I confirm that this process was performed independently.

## 2023-12-06 NOTE — PROGRESS NOTES
Chemotherapy Verification - PRIMARY RN      Height = 161 cm  Weight = 82.7 kg  BSA = 1.92 m2       Medication: Pembrolizumab  Dose: 200 mg set dose  Calculated Dose: 200 mg                             (In mg/m2, AUC, mg/kg)       I confirm this process was performed independently with the BSA and all final chemotherapy dosing calculations congruent.  Any discrepancies of 10% or greater have been addressed with the chemotherapy pharmacist. The resolution of the discrepancy has been documented in the EPIC progress notes.

## 2023-12-06 NOTE — PROGRESS NOTES
Pt arrived ambulatory to [facility] for D1C12 OP Breast Cancer Pembrolizumab. Pt denied fever, signs or symptoms of infection or acute illness today. POC discussed and pt verbalized understanding.     Port accessed without difficulty and labs drawn at this time; pt tolerated well. Labs reviewed. K+ 3.1. Order received from Dr. Gómez for two,10 meQ K riders IV infused per protocol and 40 meQ oral K-DUR, as per pt request for length of visit in lieu of 4 K riders. Potassium and Pembrolizumab administered per MAR; pt tolerated well. No signs or symptoms of reaction or complications noted. Port flushed per policy and de-accessed with needle intact; pt tolerated well. Gauze and paper tape applied to site.     Follow-up care discussed and  emailed for next appts; pt verbalized understanding and confirmed MyChart access to view appts. Pt discharged home to self care in no apparent distress at this time.

## 2023-12-22 NOTE — PROGRESS NOTES
"Pharmacy Chemotherapy Verification      Dx: Metastatic Breast CA (ER/MS/HER2 negative) IIb       Protocol: Pembrolizumab + PACLitaxel + CARBOplatin (x 4 cycles) followed by  Pembrolizumab + Cyclophosphamide + DOXOrubicin   (x4 cycles)*  Pembrolizumab 200 mg IV over 30 minutes on Day 1  PACLitaxel 80 mg/m2 IV over 60 minutes on Days 1, 8 and 15  CARBOplatin AUC 1.5 IV over 30 minutes Days 1, 8, and 15  Repeat every 21 days x 4 cycles- completed 6/28/23  ~followed by~  Pembrolizumab 200 mg IV over 30 minutes on Day 1  Cyclophosphamide 600 mg/m2 IV over 30 minutes on Day 1  -Dose reduced to 480 mg/m2 starting Cycle 6 due to counts  DOXOrubicin 60 mg/m2 IV push on Day 1   -Dose reduced to 48 mg/m2 starting Cycle 6 due to counts  Repeat every 21 days for 4 cycles  Followed by **Course 2 ended a Cycle early per Dr. Gómez, maintenance pembro starting 8/23/23**  Pembrolizumab 200 mg IV over 30 min on day 1  Q21 days x 9 cycles    NCCN Guidelines for Breast Cancer V.2.2022  Alexis P, et al. N Engl J Med 2020; 382(8) 390-860    Allergies:  Patient has no known allergies.      BP (!) 146/76   Pulse 84   Temp 36.1 °C (96.9 °F) (Temporal)   Resp 17   Ht 1.61 m (5' 3.39\")   Wt 82.4 kg (181 lb 10.5 oz)   SpO2 96%   BMI 31.79 kg/m²  Body surface area is 1.92 meters squared.    Labs 12/27/23  ANC 2960 Hgb 11.7 Plt 210k  SCr 0.48 CrCl 111.6 mL/min   AST/ALT/AP = 18/19/110 Tbili = 0.4  TSH 0.64 Free T4 0.81    ECHO 3/16/23 ([facility])  LVEF ~60     Drug Order   (Drug name, dose, route, IV Fluid & volume, frequency, number of doses) Cycle 7 maintenance pembrolizumab (C13 overall)  Previous treatment: C6 12/6/23   Medication = Pembrolizumab (Keytruda)  Base Dose = 200 mg   Fixed dose; no calculation required  Final Dose = 200 mg  Route = IV  Fluid & Volume = NS 50 mL  Admin Duration = Over 30 minutes         Fixed dose, okay to treat with final dose     By my signature below, I confirm this process was performed independently " with the BSA and all final chemotherapy dosing calculations congruent. I have reviewed the above chemotherapy order and that my calculation of the final dose and BSA (when applicable) corroborate those calculations of the  pharmacist. Discrepancies of 10% or greater in the written dose have been addressed and documented within the EPIC Progress notes.    Jessica Jenkins, PharmD, BCOP

## 2023-12-25 NOTE — PROGRESS NOTES
"Pharmacy Chemotherapy Verification    DX: triple negative breast cancer    Cycle 13 total - Cycle 7 of pembrolizumab maintenance  Previous treatment = 12/6/23    Regimen: pembrolizumab + PACLitaxel/CARBOplatin --> pembrolizumab + cyclophosphamide/DOXrubicin or epirubicin --> Pembrolizmab  Pembrolizumab 200 mg IV over 30 min on day 1  PACLitaxel 80 mg/m2 IV over 60 min on days 1,8,15  CARBOplatin AUC 1.5 IV over 30 min on day 1,8,15  Q21 days x 4 cycles  Followed by  Pembrolizumab 200 mg IV over 30 min on day 1  Cyclophosphamide 600 mg/m2 IV over 30 min on day 1  *8/2/23- dose reduced by 20% to 480mg/m2 for cytopenias  DOXOrubicin 60 mg/m2 IVP on day 1 OR epirubicin 90 mg/m2 IVP on day 1  *8/2/23- dose reduced by 20% to 48mg/m2 for cytopenias  Q21 days x 4 cycles  Followed by  Pembrolizumab 200mg IV over 30 min on day 1  Q21 days x 9 cycles  NCCN Guidelines for Breast Cancer V.2.2022  Alexis P, et al- N Engl J Med. 2020 Feb 27;382(9):810-821.     Allergies:Patient has no known allergies.  BP (!) 146/76   Pulse 84   Temp 36.1 °C (96.9 °F) (Temporal)   Resp 17   Ht 1.61 m (5' 3.39\")   Wt 82.4 kg (181 lb 10.5 oz)   SpO2 96%   BMI 31.79 kg/m²   Body surface area is 1.92 meters squared.    All lab results 12/27/23 within treatment parameters.  Thyroid panel pending, MD to be notified if abnormal.    Pembrolizumab 200 mg fixed dose, no calculation required  OK to treat with final dose = 200 mg IV     Garrett Cadena, PharmD  "

## 2023-12-27 ENCOUNTER — OUTPATIENT INFUSION SERVICES (OUTPATIENT)
Dept: ONCOLOGY | Facility: MEDICAL CENTER | Age: 58
End: 2023-12-27
Attending: INTERNAL MEDICINE
Payer: COMMERCIAL

## 2023-12-27 VITALS
BODY MASS INDEX: 32.19 KG/M2 | SYSTOLIC BLOOD PRESSURE: 146 MMHG | RESPIRATION RATE: 17 BRPM | HEART RATE: 84 BPM | HEIGHT: 63 IN | OXYGEN SATURATION: 96 % | DIASTOLIC BLOOD PRESSURE: 76 MMHG | WEIGHT: 181.66 LBS | TEMPERATURE: 96.9 F

## 2023-12-27 DIAGNOSIS — C50.812 MALIGNANT NEOPLASM OF OVERLAPPING SITES OF LEFT BREAST IN FEMALE, ESTROGEN RECEPTOR NEGATIVE (HCC): ICD-10-CM

## 2023-12-27 DIAGNOSIS — Z17.1 MALIGNANT NEOPLASM OF OVERLAPPING SITES OF LEFT BREAST IN FEMALE, ESTROGEN RECEPTOR NEGATIVE (HCC): ICD-10-CM

## 2023-12-27 LAB
ALBUMIN SERPL BCP-MCNC: 4.1 G/DL (ref 3.2–4.9)
ALBUMIN/GLOB SERPL: 1.6 G/DL
ALP SERPL-CCNC: 110 U/L (ref 30–99)
ALT SERPL-CCNC: 19 U/L (ref 2–50)
ANION GAP SERPL CALC-SCNC: 11 MMOL/L (ref 7–16)
AST SERPL-CCNC: 18 U/L (ref 12–45)
BASOPHILS # BLD AUTO: 0.4 % (ref 0–1.8)
BASOPHILS # BLD: 0.02 K/UL (ref 0–0.12)
BILIRUB SERPL-MCNC: 0.4 MG/DL (ref 0.1–1.5)
BUN SERPL-MCNC: 13 MG/DL (ref 8–22)
CALCIUM ALBUM COR SERPL-MCNC: 8.5 MG/DL (ref 8.5–10.5)
CALCIUM SERPL-MCNC: 8.6 MG/DL (ref 8.5–10.5)
CHLORIDE SERPL-SCNC: 105 MMOL/L (ref 96–112)
CO2 SERPL-SCNC: 23 MMOL/L (ref 20–33)
CREAT SERPL-MCNC: 0.48 MG/DL (ref 0.5–1.4)
EOSINOPHIL # BLD AUTO: 0.07 K/UL (ref 0–0.51)
EOSINOPHIL NFR BLD: 1.4 % (ref 0–6.9)
ERYTHROCYTE [DISTWIDTH] IN BLOOD BY AUTOMATED COUNT: 48 FL (ref 35.9–50)
GFR SERPLBLD CREATININE-BSD FMLA CKD-EPI: 109 ML/MIN/1.73 M 2
GLOBULIN SER CALC-MCNC: 2.5 G/DL (ref 1.9–3.5)
GLUCOSE SERPL-MCNC: 141 MG/DL (ref 65–99)
HCT VFR BLD AUTO: 34.8 % (ref 37–47)
HGB BLD-MCNC: 11.7 G/DL (ref 12–16)
IMM GRANULOCYTES # BLD AUTO: 0.03 K/UL (ref 0–0.11)
IMM GRANULOCYTES NFR BLD AUTO: 0.6 % (ref 0–0.9)
LYMPHOCYTES # BLD AUTO: 1.48 K/UL (ref 1–4.8)
LYMPHOCYTES NFR BLD: 30.1 % (ref 22–41)
MCH RBC QN AUTO: 33.9 PG (ref 27–33)
MCHC RBC AUTO-ENTMCNC: 33.6 G/DL (ref 32.2–35.5)
MCV RBC AUTO: 100.9 FL (ref 81.4–97.8)
MONOCYTES # BLD AUTO: 0.35 K/UL (ref 0–0.85)
MONOCYTES NFR BLD AUTO: 7.1 % (ref 0–13.4)
NEUTROPHILS # BLD AUTO: 2.96 K/UL (ref 1.82–7.42)
NEUTROPHILS NFR BLD: 60.4 % (ref 44–72)
NRBC # BLD AUTO: 0 K/UL
NRBC BLD-RTO: 0 /100 WBC (ref 0–0.2)
OUTPT INFUS CBC COMMENT OICOM: ABNORMAL
PLATELET # BLD AUTO: 210 K/UL (ref 164–446)
PMV BLD AUTO: 8.7 FL (ref 9–12.9)
POTASSIUM SERPL-SCNC: 3.9 MMOL/L (ref 3.6–5.5)
PROT SERPL-MCNC: 6.6 G/DL (ref 6–8.2)
RBC # BLD AUTO: 3.45 M/UL (ref 4.2–5.4)
SODIUM SERPL-SCNC: 139 MMOL/L (ref 135–145)
T4 FREE SERPL-MCNC: 0.81 NG/DL (ref 0.93–1.7)
TSH SERPL DL<=0.005 MIU/L-ACNC: 0.64 UIU/ML (ref 0.38–5.33)
WBC # BLD AUTO: 4.9 K/UL (ref 4.8–10.8)

## 2023-12-27 PROCEDURE — 80053 COMPREHEN METABOLIC PANEL: CPT

## 2023-12-27 PROCEDURE — 96413 CHEMO IV INFUSION 1 HR: CPT

## 2023-12-27 PROCEDURE — 84439 ASSAY OF FREE THYROXINE: CPT

## 2023-12-27 PROCEDURE — 84443 ASSAY THYROID STIM HORMONE: CPT

## 2023-12-27 PROCEDURE — A4212 NON CORING NEEDLE OR STYLET: HCPCS

## 2023-12-27 PROCEDURE — 700111 HCHG RX REV CODE 636 W/ 250 OVERRIDE (IP)

## 2023-12-27 PROCEDURE — 700105 HCHG RX REV CODE 258: Performed by: INTERNAL MEDICINE

## 2023-12-27 PROCEDURE — 85025 COMPLETE CBC W/AUTO DIFF WBC: CPT

## 2023-12-27 PROCEDURE — 700105 HCHG RX REV CODE 258

## 2023-12-27 PROCEDURE — 700111 HCHG RX REV CODE 636 W/ 250 OVERRIDE (IP): Mod: JZ | Performed by: INTERNAL MEDICINE

## 2023-12-27 RX ORDER — 0.9 % SODIUM CHLORIDE 0.9 %
3 VIAL (ML) INJECTION PRN
Status: CANCELLED | OUTPATIENT
Start: 2023-12-28

## 2023-12-27 RX ORDER — 0.9 % SODIUM CHLORIDE 0.9 %
10 VIAL (ML) INJECTION PRN
Status: CANCELLED | OUTPATIENT
Start: 2023-12-28

## 2023-12-27 RX ORDER — 0.9 % SODIUM CHLORIDE 0.9 %
10 VIAL (ML) INJECTION PRN
Status: CANCELLED | OUTPATIENT
Start: 2023-12-27

## 2023-12-27 RX ORDER — 0.9 % SODIUM CHLORIDE 0.9 %
3 VIAL (ML) INJECTION PRN
Status: CANCELLED | OUTPATIENT
Start: 2023-12-27

## 2023-12-27 RX ORDER — 0.9 % SODIUM CHLORIDE 0.9 %
VIAL (ML) INJECTION PRN
Status: CANCELLED | OUTPATIENT
Start: 2023-12-27

## 2023-12-27 RX ORDER — SODIUM CHLORIDE 9 MG/ML
INJECTION, SOLUTION INTRAVENOUS CONTINUOUS
Status: CANCELLED | OUTPATIENT
Start: 2023-12-28

## 2023-12-27 RX ORDER — ONDANSETRON 2 MG/ML
4 INJECTION INTRAMUSCULAR; INTRAVENOUS PRN
Status: CANCELLED | OUTPATIENT
Start: 2023-12-28

## 2023-12-27 RX ORDER — 0.9 % SODIUM CHLORIDE 0.9 %
VIAL (ML) INJECTION PRN
Status: CANCELLED | OUTPATIENT
Start: 2023-12-28

## 2023-12-27 RX ORDER — PROCHLORPERAZINE MALEATE 10 MG
10 TABLET ORAL EVERY 6 HOURS PRN
Status: CANCELLED | OUTPATIENT
Start: 2023-12-28

## 2023-12-27 RX ORDER — HEPARIN SODIUM (PORCINE) LOCK FLUSH IV SOLN 100 UNIT/ML 100 UNIT/ML
500 SOLUTION INTRAVENOUS PRN
Status: CANCELLED | OUTPATIENT
Start: 2023-12-27

## 2023-12-27 RX ORDER — HEPARIN SODIUM (PORCINE) LOCK FLUSH IV SOLN 100 UNIT/ML 100 UNIT/ML
500 SOLUTION INTRAVENOUS PRN
Status: CANCELLED | OUTPATIENT
Start: 2023-12-28

## 2023-12-27 RX ORDER — ONDANSETRON 8 MG/1
8 TABLET, ORALLY DISINTEGRATING ORAL PRN
Status: CANCELLED | OUTPATIENT
Start: 2023-12-28

## 2023-12-27 RX ADMIN — SODIUM CHLORIDE 200 MG: 9 INJECTION, SOLUTION INTRAVENOUS at 10:39

## 2023-12-27 RX ADMIN — HEPARIN 500 UNITS: 100 SYRINGE at 11:14

## 2023-12-27 ASSESSMENT — FIBROSIS 4 INDEX: FIB4 SCORE: 1.21

## 2023-12-27 NOTE — PROGRESS NOTES
Chemotherapy Verification - SECONDARY RN   C13 D1    Height = 161 cm  Weight = 82.4 kg  BSA = 1.92 m^2       Medication: pembrolizumab (KEYTRUDA)  Dose: 200 mg set dose  Calculated Dose: 200 mg set dose                             (In mg/m2, AUC, mg/kg)         I confirm that this process was performed independently.

## 2023-12-27 NOTE — PROGRESS NOTES
Ms Roe is here today for day 1 cycle 13 of keytruda.    Medi port to the right upper chest was flushed, good blood return. Labs drawn and are within parameters for treatment today.    Keytruda given over 30 minutes and was tolerated well.     Medi port flushed and HL. Garrido needle removed and gauze and tape applied to the site.     Ms Roe was discharged in stable condition.

## 2023-12-27 NOTE — PROGRESS NOTES
Chemotherapy Verification - PRIMARY RN      Height = 1.61 m  Weight = 82.4 kg  BSA = 1.92 m2       Medication: keytruda  Dose: 200 mg  Calculated Dose: 200 mg set dose                             (In mg/m2, AUC, mg/kg)         I confirm this process was performed independently with the BSA and all final chemotherapy dosing calculations congruent.  Any discrepancies of 10% or greater have been addressed with the chemotherapy pharmacist. The resolution of the discrepancy has been documented in the EPIC progress notes.

## 2024-01-09 RX ORDER — 0.9 % SODIUM CHLORIDE 0.9 %
VIAL (ML) INJECTION PRN
Status: CANCELLED | OUTPATIENT
Start: 2024-01-17

## 2024-01-09 RX ORDER — 0.9 % SODIUM CHLORIDE 0.9 %
3 VIAL (ML) INJECTION PRN
Status: CANCELLED | OUTPATIENT
Start: 2024-01-17

## 2024-01-09 RX ORDER — ONDANSETRON 2 MG/ML
4 INJECTION INTRAMUSCULAR; INTRAVENOUS PRN
Status: CANCELLED | OUTPATIENT
Start: 2024-01-18

## 2024-01-09 RX ORDER — 0.9 % SODIUM CHLORIDE 0.9 %
VIAL (ML) INJECTION PRN
Status: CANCELLED | OUTPATIENT
Start: 2024-01-18

## 2024-01-09 RX ORDER — SODIUM CHLORIDE 9 MG/ML
INJECTION, SOLUTION INTRAVENOUS CONTINUOUS
Status: CANCELLED | OUTPATIENT
Start: 2024-01-18

## 2024-01-09 RX ORDER — 0.9 % SODIUM CHLORIDE 0.9 %
10 VIAL (ML) INJECTION PRN
Status: CANCELLED | OUTPATIENT
Start: 2024-01-18

## 2024-01-09 RX ORDER — 0.9 % SODIUM CHLORIDE 0.9 %
10 VIAL (ML) INJECTION PRN
Status: CANCELLED | OUTPATIENT
Start: 2024-01-17

## 2024-01-09 RX ORDER — HEPARIN SODIUM (PORCINE) LOCK FLUSH IV SOLN 100 UNIT/ML 100 UNIT/ML
500 SOLUTION INTRAVENOUS PRN
Status: CANCELLED | OUTPATIENT
Start: 2024-01-18

## 2024-01-09 RX ORDER — PROCHLORPERAZINE MALEATE 10 MG
10 TABLET ORAL EVERY 6 HOURS PRN
Status: CANCELLED | OUTPATIENT
Start: 2024-01-18

## 2024-01-09 RX ORDER — ONDANSETRON 8 MG/1
8 TABLET, ORALLY DISINTEGRATING ORAL PRN
Status: CANCELLED | OUTPATIENT
Start: 2024-01-18

## 2024-01-09 RX ORDER — 0.9 % SODIUM CHLORIDE 0.9 %
3 VIAL (ML) INJECTION PRN
Status: CANCELLED | OUTPATIENT
Start: 2024-01-18

## 2024-01-09 RX ORDER — HEPARIN SODIUM (PORCINE) LOCK FLUSH IV SOLN 100 UNIT/ML 100 UNIT/ML
500 SOLUTION INTRAVENOUS PRN
Status: CANCELLED | OUTPATIENT
Start: 2024-01-17

## 2024-01-17 ENCOUNTER — OUTPATIENT INFUSION SERVICES (OUTPATIENT)
Dept: ONCOLOGY | Facility: MEDICAL CENTER | Age: 59
End: 2024-01-17
Attending: INTERNAL MEDICINE
Payer: COMMERCIAL

## 2024-01-17 VITALS
OXYGEN SATURATION: 98 % | DIASTOLIC BLOOD PRESSURE: 68 MMHG | TEMPERATURE: 96.8 F | SYSTOLIC BLOOD PRESSURE: 132 MMHG | WEIGHT: 183.86 LBS | HEIGHT: 63 IN | RESPIRATION RATE: 17 BRPM | HEART RATE: 79 BPM | BODY MASS INDEX: 32.58 KG/M2

## 2024-01-17 DIAGNOSIS — C50.812 MALIGNANT NEOPLASM OF OVERLAPPING SITES OF LEFT BREAST IN FEMALE, ESTROGEN RECEPTOR NEGATIVE (HCC): ICD-10-CM

## 2024-01-17 DIAGNOSIS — Z17.1 MALIGNANT NEOPLASM OF OVERLAPPING SITES OF LEFT BREAST IN FEMALE, ESTROGEN RECEPTOR NEGATIVE (HCC): ICD-10-CM

## 2024-01-17 LAB
ALBUMIN SERPL BCP-MCNC: 4.2 G/DL (ref 3.2–4.9)
ALBUMIN/GLOB SERPL: 1.6 G/DL
ALP SERPL-CCNC: 120 U/L (ref 30–99)
ALT SERPL-CCNC: 33 U/L (ref 2–50)
ANION GAP SERPL CALC-SCNC: 12 MMOL/L (ref 7–16)
AST SERPL-CCNC: 31 U/L (ref 12–45)
BASOPHILS # BLD AUTO: 0.4 % (ref 0–1.8)
BASOPHILS # BLD: 0.02 K/UL (ref 0–0.12)
BILIRUB SERPL-MCNC: 0.3 MG/DL (ref 0.1–1.5)
BUN SERPL-MCNC: 13 MG/DL (ref 8–22)
CALCIUM ALBUM COR SERPL-MCNC: 8.5 MG/DL (ref 8.5–10.5)
CALCIUM SERPL-MCNC: 8.7 MG/DL (ref 8.5–10.5)
CHLORIDE SERPL-SCNC: 104 MMOL/L (ref 96–112)
CO2 SERPL-SCNC: 23 MMOL/L (ref 20–33)
CREAT SERPL-MCNC: 0.38 MG/DL (ref 0.5–1.4)
EOSINOPHIL # BLD AUTO: 0.11 K/UL (ref 0–0.51)
EOSINOPHIL NFR BLD: 2.4 % (ref 0–6.9)
ERYTHROCYTE [DISTWIDTH] IN BLOOD BY AUTOMATED COUNT: 47.7 FL (ref 35.9–50)
GFR SERPLBLD CREATININE-BSD FMLA CKD-EPI: 115 ML/MIN/1.73 M 2
GLOBULIN SER CALC-MCNC: 2.7 G/DL (ref 1.9–3.5)
GLUCOSE SERPL-MCNC: 126 MG/DL (ref 65–99)
HCT VFR BLD AUTO: 33.6 % (ref 37–47)
HGB BLD-MCNC: 11.6 G/DL (ref 12–16)
IMM GRANULOCYTES # BLD AUTO: 0.03 K/UL (ref 0–0.11)
IMM GRANULOCYTES NFR BLD AUTO: 0.6 % (ref 0–0.9)
LYMPHOCYTES # BLD AUTO: 1.88 K/UL (ref 1–4.8)
LYMPHOCYTES NFR BLD: 40.3 % (ref 22–41)
MCH RBC QN AUTO: 34.3 PG (ref 27–33)
MCHC RBC AUTO-ENTMCNC: 34.5 G/DL (ref 32.2–35.5)
MCV RBC AUTO: 99.4 FL (ref 81.4–97.8)
MONOCYTES # BLD AUTO: 0.37 K/UL (ref 0–0.85)
MONOCYTES NFR BLD AUTO: 7.9 % (ref 0–13.4)
NEUTROPHILS # BLD AUTO: 2.25 K/UL (ref 1.82–7.42)
NEUTROPHILS NFR BLD: 48.4 % (ref 44–72)
NRBC # BLD AUTO: 0 K/UL
NRBC BLD-RTO: 0 /100 WBC (ref 0–0.2)
OUTPT INFUS CBC COMMENT OICOM: ABNORMAL
PLATELET # BLD AUTO: 234 K/UL (ref 164–446)
PMV BLD AUTO: 8.4 FL (ref 9–12.9)
POTASSIUM SERPL-SCNC: 3.5 MMOL/L (ref 3.6–5.5)
PROT SERPL-MCNC: 6.9 G/DL (ref 6–8.2)
RBC # BLD AUTO: 3.38 M/UL (ref 4.2–5.4)
SODIUM SERPL-SCNC: 139 MMOL/L (ref 135–145)
T4 FREE SERPL-MCNC: 0.92 NG/DL (ref 0.93–1.7)
TSH SERPL DL<=0.005 MIU/L-ACNC: 1.18 UIU/ML (ref 0.38–5.33)
WBC # BLD AUTO: 4.7 K/UL (ref 4.8–10.8)

## 2024-01-17 PROCEDURE — 700111 HCHG RX REV CODE 636 W/ 250 OVERRIDE (IP): Performed by: INTERNAL MEDICINE

## 2024-01-17 PROCEDURE — A9270 NON-COVERED ITEM OR SERVICE: HCPCS | Mod: JZ | Performed by: INTERNAL MEDICINE

## 2024-01-17 PROCEDURE — 700102 HCHG RX REV CODE 250 W/ 637 OVERRIDE(OP): Mod: JZ | Performed by: INTERNAL MEDICINE

## 2024-01-17 PROCEDURE — 700111 HCHG RX REV CODE 636 W/ 250 OVERRIDE (IP)

## 2024-01-17 PROCEDURE — 84439 ASSAY OF FREE THYROXINE: CPT

## 2024-01-17 PROCEDURE — 700105 HCHG RX REV CODE 258: Performed by: INTERNAL MEDICINE

## 2024-01-17 PROCEDURE — 85025 COMPLETE CBC W/AUTO DIFF WBC: CPT

## 2024-01-17 PROCEDURE — 700105 HCHG RX REV CODE 258

## 2024-01-17 PROCEDURE — 84443 ASSAY THYROID STIM HORMONE: CPT

## 2024-01-17 PROCEDURE — A4212 NON CORING NEEDLE OR STYLET: HCPCS

## 2024-01-17 PROCEDURE — 80053 COMPREHEN METABOLIC PANEL: CPT

## 2024-01-17 PROCEDURE — 96413 CHEMO IV INFUSION 1 HR: CPT

## 2024-01-17 RX ORDER — POTASSIUM CHLORIDE 20 MEQ/1
40 TABLET, EXTENDED RELEASE ORAL ONCE
Status: COMPLETED | OUTPATIENT
Start: 2024-01-17 | End: 2024-01-17

## 2024-01-17 RX ADMIN — POTASSIUM CHLORIDE 40 MEQ: 1500 TABLET, EXTENDED RELEASE ORAL at 11:17

## 2024-01-17 RX ADMIN — SODIUM CHLORIDE 200 MG: 9 INJECTION, SOLUTION INTRAVENOUS at 11:28

## 2024-01-17 RX ADMIN — HEPARIN 500 UNITS: 100 SYRINGE at 12:27

## 2024-01-17 ASSESSMENT — FIBROSIS 4 INDEX: FIB4 SCORE: 1.16

## 2024-01-17 NOTE — PROGRESS NOTES
"Pharmacy Chemotherapy Verification      Dx: Metastatic Breast CA (ER/AK/HER2 negative) IIb       Protocol: Pembrolizumab + PACLitaxel + CARBOplatin (x 4 cycles) followed by  Pembrolizumab + Cyclophosphamide + DOXOrubicin   (x4 cycles)*  Pembrolizumab 200 mg IV over 30 minutes on Day 1  PACLitaxel 80 mg/m2 IV over 60 minutes on Days 1, 8 and 15  CARBOplatin AUC 1.5 IV over 30 minutes Days 1, 8, and 15  Repeat every 21 days x 4 cycles- completed 6/28/23  ~followed by~  Pembrolizumab 200 mg IV over 30 minutes on Day 1  Cyclophosphamide 600 mg/m2 IV over 30 minutes on Day 1  -Dose reduced to 480 mg/m2 starting Cycle 6 due to counts  DOXOrubicin 60 mg/m2 IV push on Day 1   -Dose reduced to 48 mg/m2 starting Cycle 6 due to counts  Repeat every 21 days for 4 cycles  Followed by **Course 2 ended a Cycle early per Dr. Gómez, maintenance pembro starting 8/23/23**  Pembrolizumab 200 mg IV over 30 min on day 1  Q21 days x 9 cycles    NCCN Guidelines for Breast Cancer V.2.2022  Alexis P, et al. N Engl J Med 2020; 382(2) 258-720    Allergies:  Patient has no known allergies.      /68   Pulse 79   Temp 36 °C (96.8 °F) (Temporal)   Resp 17   Ht 1.61 m (5' 3.39\")   Wt 83.4 kg (183 lb 13.8 oz)   SpO2 98%   BMI 32.17 kg/m²  Body surface area is 1.93 meters squared.    Labs 1/17/24  ANC 2250 Hgb 11.6 Plt 234k  SCr 0.38 CrCl 113 mL/min   AST/ALT/AP = 31/33/120 Tbili = 0.3  TSH 1.18 Free T4 0.92    ECHO 3/16/23 ([facility])  LVEF ~60     Drug Order   (Drug name, dose, route, IV Fluid & volume, frequency, number of doses) Cycle 8 maintenance pembrolizumab (C14 overall)  Previous treatment: C7 12/27/23   Medication = Pembrolizumab (Keytruda)  Base Dose = 200 mg   Fixed dose; no calculation required  Final Dose = 200 mg  Route = IV  Fluid & Volume = NS 50 mL  Admin Duration = Over 30 minutes         Fixed dose, okay to treat with final dose     By my signature below, I confirm this process was performed independently with the " BSA and all final chemotherapy dosing calculations congruent. I have reviewed the above chemotherapy order and that my calculation of the final dose and BSA (when applicable) corroborate those calculations of the  pharmacist. Discrepancies of 10% or greater in the written dose have been addressed and documented within the EPIC Progress notes.    Jessica Jenkins, PharmD, BCOP

## 2024-01-17 NOTE — PROGRESS NOTES
"Pharmacy Chemotherapy Verification    DX: triple negative breast cancer    Cycle 14 total - Cycle 8 of pembrolizumab maintenance  Previous treatment = 12/27/23    Regimen: pembrolizumab + PACLitaxel/CARBOplatin --> pembrolizumab + cyclophosphamide/DOXrubicin or epirubicin --> Pembrolizmab  Pembrolizumab 200 mg IV over 30 min on day 1  PACLitaxel 80 mg/m2 IV over 60 min on days 1,8,15  CARBOplatin AUC 1.5 IV over 30 min on day 1,8,15  Q21 days x 4 cycles  Followed by  Pembrolizumab 200 mg IV over 30 min on day 1  Cyclophosphamide 600 mg/m2 IV over 30 min on day 1  *8/2/23- dose reduced by 20% to 480mg/m2 for cytopenias  DOXOrubicin 60 mg/m2 IVP on day 1 OR epirubicin 90 mg/m2 IVP on day 1  *8/2/23- dose reduced by 20% to 48mg/m2 for cytopenias  Q21 days x 4 cycles  Followed by  Pembrolizumab 200mg IV over 30 min on day 1  Q21 days x 9 cycles  NCCN Guidelines for Breast Cancer V.2.2022  Alexis P, et al- N Engl J Med. 2020 Feb 27;382(9):810-821.     Allergies:Patient has no known allergies.  /68   Pulse 79   Temp 36 °C (96.8 °F) (Temporal)   Resp 17   Ht 1.61 m (5' 3.39\")   Wt 83.4 kg (183 lb 13.8 oz)   SpO2 98%   BMI 32.17 kg/m²   Body surface area is 1.93 meters squared.    All lab results 1/17/24 within treatment parameters. Potassium replaced per protocol.  Abnormal Free T4 to be reported to MD.     Pembrolizumab 200 mg fixed dose, no calculation required  OK to treat with final dose = 200 mg IV     Garrett Cadena, PharmD  "

## 2024-01-17 NOTE — PROGRESS NOTES
Chemotherapy Verification - SECONDARY RN       Height = 1.61m  Weight = 83.4kg  BSA = 1.93m2       Medication: pembrolizumab  Dose: flat dose  Calculated Dose: 200mg                             (In mg/m2, AUC, mg/kg)       I confirm that this process was performed independently.

## 2024-01-17 NOTE — PROGRESS NOTES
Pt presented to Infusion for D1C14 OP Breast cancer Keytruda. POC discussed and pt verbalized understanding.Port accessed per [facility] policy, brisk blood return noted, line flushes with ease, and labs drawn. Pt tolerated well. Labs reviewed. Oral potassium administered per electrolyte replacement protocol for k+ of 3.5; pt verbalized understanding. Chemotherapy administered per MAR. Pt tolerated well. No s/s of adverse reactions or complications noted. Port flushed per [facility] policy, and de-accessed with needle intact; site covered with sterile gauze/paper tape and pt tolerated well. Port was heparin locked. Educated pt on when to contact provider including fever, s/s of infection, worsening symptoms and/or defer judgment to ED for provider evaluation. Pt verbalized understanding. Pt confirmed next appt and discharged to self care. Pt in NAD at time of discharge.

## 2024-01-17 NOTE — PROGRESS NOTES
Chemotherapy Verification - PRIMARY RN      Height = 1.61 m  Weight = 83.4 kg  BSA = 1.93m2       Medication: pembrolizumab (Keytruda)  Dose: 200 mg  Calculated Dose: 200 mg                             (In mg/m2, AUC, mg/kg)     I confirm this process was performed independently with the BSA and all final chemotherapy dosing calculations congruent.  Any discrepancies of 10% or greater have been addressed with the chemotherapy pharmacist. The resolution of the discrepancy has been documented in the EPIC progress notes.

## 2024-02-05 NOTE — PROGRESS NOTES
"Pharmacy Chemotherapy Verification    DX: triple negative breast cancer    Cycle 15 total - Cycle 9 of pembrolizumab maintenance  Previous treatment = 1/17/24    Regimen: pembrolizumab + PACLitaxel/CARBOplatin --> pembrolizumab + cyclophosphamide/DOXrubicin or epirubicin --> Pembrolizmab  Pembrolizumab 200 mg IV over 30 min on day 1  PACLitaxel 80 mg/m2 IV over 60 min on days 1,8,15  CARBOplatin AUC 1.5 IV over 30 min on day 1,8,15  Q21 days x 4 cycles  Followed by  Pembrolizumab 200 mg IV over 30 min on day 1  Cyclophosphamide 600 mg/m2 IV over 30 min on day 1  *8/2/23- dose reduced by 20% to 480mg/m2 for cytopenias  DOXOrubicin 60 mg/m2 IVP on day 1 OR epirubicin 90 mg/m2 IVP on day 1  *8/2/23- dose reduced by 20% to 48mg/m2 for cytopenias  Q21 days x 4 cycles  Followed by  Pembrolizumab 200mg IV over 30 min on day 1  Q21 days x 9 cycles  NCCN Guidelines for Breast Cancer V.2.2022  Alexis P, et al- N Engl J Med. 2020 Feb 27;382(9):810-821.     Allergies:Patient has no known allergies.  /68   Pulse 99   Temp 36.6 °C (97.8 °F) (Temporal)   Resp 18   Ht 1.61 m (5' 3.39\")   Wt 82.6 kg (182 lb 1.6 oz)   SpO2 94%   BMI 31.87 kg/m²   Body surface area is 1.92 meters squared.    All lab results 2/7/24 within treatment parameters. Provider to be notified of abnormal Free T4    Pembrolizumab 200 mg fixed dose, no calculation required  OK to treat with final dose = 200 mg IV     Garrett Cadena, PharmD  "

## 2024-02-06 NOTE — PROGRESS NOTES
"Pharmacy Chemotherapy Verification      Dx: Metastatic Breast CA (ER/PA/HER2 negative) IIb       Protocol: Pembrolizumab + PACLitaxel + CARBOplatin (x 4 cycles) followed by  Pembrolizumab + Cyclophosphamide + DOXOrubicin   (x4 cycles)*  Pembrolizumab 200 mg IV over 30 minutes on Day 1  PACLitaxel 80 mg/m2 IV over 60 minutes on Days 1, 8 and 15  CARBOplatin AUC 1.5 IV over 30 minutes Days 1, 8, and 15  Repeat every 21 days x 4 cycles- completed 6/28/23  ~followed by~  Pembrolizumab 200 mg IV over 30 minutes on Day 1  Cyclophosphamide 600 mg/m2 IV over 30 minutes on Day 1  -Dose reduced to 480 mg/m2 starting Cycle 6 due to counts  DOXOrubicin 60 mg/m2 IV push on Day 1   -Dose reduced to 48 mg/m2 starting Cycle 6 due to counts  Repeat every 21 days for 4 cycles  Followed by **Course 2 ended a Cycle early per Dr. Gómez, maintenance pembro starting 8/23/23**  Pembrolizumab 200 mg IV over 30 min on day 1  Q21 days x 9 cycles    NCCN Guidelines for Breast Cancer V.2.2022  Alexis P, et al. N Engl J Med 2020; 382(8) 522-790    Allergies:  Patient has no known allergies.      /68   Pulse 99   Temp 36.6 °C (97.8 °F) (Temporal)   Resp 18   Ht 1.61 m (5' 3.39\")   Wt 82.6 kg (182 lb 1.6 oz)   SpO2 94%   BMI 31.87 kg/m²  Body surface area is 1.92 meters squared.    ECHO 3/16/23 ([facility] [place])  LVEF ~60    Labs 2/7/24:  ANC~ 1920 Plt = 265k   Hgb = 11.2     SCr = 0.4 mg/dL CrCl ~ 112 mL/min   AST/ALT/AP = 44/33/180 TBili = 0.3  TSH = 1.93 Free T4 = 0.85     Drug Order   (Drug name, dose, route, IV Fluid & volume, frequency, number of doses) Cycle 9 maintenance pembrolizumab (C15 overall)  Previous treatment: C8 1/17/24   Medication = Pembrolizumab (Keytruda)  Base Dose = 200 mg   Fixed dose; no calculation required  Final Dose = 200 mg  Route = IV  Fluid & Volume = NS 50 mL  Admin Duration = Over 30 minutes         Fixed dose, okay to treat with final dose     By my signature below, I confirm this process was performed " independently with the BSA and all final chemotherapy dosing calculations congruent. I have reviewed the above chemotherapy order and that my calculation of the final dose and BSA (when applicable) corroborate those calculations of the  pharmacist. Discrepancies of 10% or greater in the written dose have been addressed and documented within the EPIC Progress notes.    Misha Mckeon, PharmD

## 2024-02-07 ENCOUNTER — OUTPATIENT INFUSION SERVICES (OUTPATIENT)
Dept: ONCOLOGY | Facility: MEDICAL CENTER | Age: 59
End: 2024-02-07
Attending: INTERNAL MEDICINE
Payer: COMMERCIAL

## 2024-02-07 VITALS
BODY MASS INDEX: 32.27 KG/M2 | OXYGEN SATURATION: 94 % | SYSTOLIC BLOOD PRESSURE: 102 MMHG | WEIGHT: 182.1 LBS | HEART RATE: 99 BPM | RESPIRATION RATE: 18 BRPM | TEMPERATURE: 97.8 F | HEIGHT: 63 IN | DIASTOLIC BLOOD PRESSURE: 68 MMHG

## 2024-02-07 DIAGNOSIS — Z17.1 MALIGNANT NEOPLASM OF OVERLAPPING SITES OF LEFT BREAST IN FEMALE, ESTROGEN RECEPTOR NEGATIVE (HCC): ICD-10-CM

## 2024-02-07 DIAGNOSIS — C50.812 MALIGNANT NEOPLASM OF OVERLAPPING SITES OF LEFT BREAST IN FEMALE, ESTROGEN RECEPTOR NEGATIVE (HCC): ICD-10-CM

## 2024-02-07 LAB
ALBUMIN SERPL BCP-MCNC: 3.6 G/DL (ref 3.2–4.9)
ALBUMIN/GLOB SERPL: 1.3 G/DL
ALP SERPL-CCNC: 180 U/L (ref 30–99)
ALT SERPL-CCNC: 33 U/L (ref 2–50)
ANION GAP SERPL CALC-SCNC: 11 MMOL/L (ref 7–16)
AST SERPL-CCNC: 44 U/L (ref 12–45)
BASOPHILS # BLD AUTO: 0.2 % (ref 0–1.8)
BASOPHILS # BLD: 0.01 K/UL (ref 0–0.12)
BILIRUB SERPL-MCNC: 0.3 MG/DL (ref 0.1–1.5)
BUN SERPL-MCNC: 9 MG/DL (ref 8–22)
CALCIUM ALBUM COR SERPL-MCNC: 9 MG/DL (ref 8.5–10.5)
CALCIUM SERPL-MCNC: 8.7 MG/DL (ref 8.5–10.5)
CHLORIDE SERPL-SCNC: 101 MMOL/L (ref 96–112)
CO2 SERPL-SCNC: 24 MMOL/L (ref 20–33)
CREAT SERPL-MCNC: 0.4 MG/DL (ref 0.5–1.4)
EOSINOPHIL # BLD AUTO: 0.12 K/UL (ref 0–0.51)
EOSINOPHIL NFR BLD: 2.8 % (ref 0–6.9)
ERYTHROCYTE [DISTWIDTH] IN BLOOD BY AUTOMATED COUNT: 43.5 FL (ref 35.9–50)
GFR SERPLBLD CREATININE-BSD FMLA CKD-EPI: 113 ML/MIN/1.73 M 2
GLOBULIN SER CALC-MCNC: 2.8 G/DL (ref 1.9–3.5)
GLUCOSE SERPL-MCNC: 105 MG/DL (ref 65–99)
HCT VFR BLD AUTO: 31.4 % (ref 37–47)
HGB BLD-MCNC: 11.2 G/DL (ref 12–16)
IMM GRANULOCYTES # BLD AUTO: 0.01 K/UL (ref 0–0.11)
IMM GRANULOCYTES NFR BLD AUTO: 0.2 % (ref 0–0.9)
LYMPHOCYTES # BLD AUTO: 1.74 K/UL (ref 1–4.8)
LYMPHOCYTES NFR BLD: 41 % (ref 22–41)
MCH RBC QN AUTO: 34 PG (ref 27–33)
MCHC RBC AUTO-ENTMCNC: 35.7 G/DL (ref 32.2–35.5)
MCV RBC AUTO: 95.4 FL (ref 81.4–97.8)
MONOCYTES # BLD AUTO: 0.44 K/UL (ref 0–0.85)
MONOCYTES NFR BLD AUTO: 10.4 % (ref 0–13.4)
NEUTROPHILS # BLD AUTO: 1.92 K/UL (ref 1.82–7.42)
NEUTROPHILS NFR BLD: 45.4 % (ref 44–72)
NRBC # BLD AUTO: 0 K/UL
NRBC BLD-RTO: 0 /100 WBC (ref 0–0.2)
OUTPT INFUS CBC COMMENT OICOM: ABNORMAL
PLATELET # BLD AUTO: 265 K/UL (ref 164–446)
PMV BLD AUTO: 9.3 FL (ref 9–12.9)
POTASSIUM SERPL-SCNC: 3.6 MMOL/L (ref 3.6–5.5)
PROT SERPL-MCNC: 6.4 G/DL (ref 6–8.2)
RBC # BLD AUTO: 3.29 M/UL (ref 4.2–5.4)
SODIUM SERPL-SCNC: 136 MMOL/L (ref 135–145)
T4 FREE SERPL-MCNC: 0.85 NG/DL (ref 0.93–1.7)
TSH SERPL DL<=0.005 MIU/L-ACNC: 1.93 UIU/ML (ref 0.38–5.33)
WBC # BLD AUTO: 4.2 K/UL (ref 4.8–10.8)

## 2024-02-07 PROCEDURE — 96413 CHEMO IV INFUSION 1 HR: CPT

## 2024-02-07 PROCEDURE — 84439 ASSAY OF FREE THYROXINE: CPT

## 2024-02-07 PROCEDURE — 700105 HCHG RX REV CODE 258

## 2024-02-07 PROCEDURE — 700111 HCHG RX REV CODE 636 W/ 250 OVERRIDE (IP)

## 2024-02-07 PROCEDURE — 700111 HCHG RX REV CODE 636 W/ 250 OVERRIDE (IP): Mod: JZ | Performed by: INTERNAL MEDICINE

## 2024-02-07 PROCEDURE — 700105 HCHG RX REV CODE 258: Performed by: INTERNAL MEDICINE

## 2024-02-07 PROCEDURE — 85025 COMPLETE CBC W/AUTO DIFF WBC: CPT

## 2024-02-07 PROCEDURE — 80053 COMPREHEN METABOLIC PANEL: CPT

## 2024-02-07 PROCEDURE — 84443 ASSAY THYROID STIM HORMONE: CPT

## 2024-02-07 RX ORDER — ONDANSETRON 8 MG/1
8 TABLET, ORALLY DISINTEGRATING ORAL PRN
Status: CANCELLED | OUTPATIENT
Start: 2024-02-08

## 2024-02-07 RX ORDER — 0.9 % SODIUM CHLORIDE 0.9 %
3 VIAL (ML) INJECTION PRN
Status: CANCELLED | OUTPATIENT
Start: 2024-02-07

## 2024-02-07 RX ORDER — 0.9 % SODIUM CHLORIDE 0.9 %
10 VIAL (ML) INJECTION PRN
Status: CANCELLED | OUTPATIENT
Start: 2024-02-07

## 2024-02-07 RX ORDER — SODIUM CHLORIDE 9 MG/ML
INJECTION, SOLUTION INTRAVENOUS CONTINUOUS
Status: CANCELLED | OUTPATIENT
Start: 2024-02-08

## 2024-02-07 RX ORDER — 0.9 % SODIUM CHLORIDE 0.9 %
VIAL (ML) INJECTION PRN
Status: CANCELLED | OUTPATIENT
Start: 2024-02-07

## 2024-02-07 RX ORDER — HEPARIN SODIUM (PORCINE) LOCK FLUSH IV SOLN 100 UNIT/ML 100 UNIT/ML
500 SOLUTION INTRAVENOUS PRN
Status: CANCELLED | OUTPATIENT
Start: 2024-02-08

## 2024-02-07 RX ORDER — 0.9 % SODIUM CHLORIDE 0.9 %
VIAL (ML) INJECTION PRN
Status: CANCELLED | OUTPATIENT
Start: 2024-02-08

## 2024-02-07 RX ORDER — ONDANSETRON 2 MG/ML
4 INJECTION INTRAMUSCULAR; INTRAVENOUS PRN
Status: CANCELLED | OUTPATIENT
Start: 2024-02-08

## 2024-02-07 RX ORDER — PROCHLORPERAZINE MALEATE 10 MG
10 TABLET ORAL EVERY 6 HOURS PRN
Status: CANCELLED | OUTPATIENT
Start: 2024-02-08

## 2024-02-07 RX ORDER — 0.9 % SODIUM CHLORIDE 0.9 %
10 VIAL (ML) INJECTION PRN
Status: CANCELLED | OUTPATIENT
Start: 2024-02-08

## 2024-02-07 RX ORDER — 0.9 % SODIUM CHLORIDE 0.9 %
3 VIAL (ML) INJECTION PRN
Status: CANCELLED | OUTPATIENT
Start: 2024-02-08

## 2024-02-07 RX ORDER — HEPARIN SODIUM (PORCINE) LOCK FLUSH IV SOLN 100 UNIT/ML 100 UNIT/ML
500 SOLUTION INTRAVENOUS PRN
Status: CANCELLED | OUTPATIENT
Start: 2024-02-07

## 2024-02-07 RX ADMIN — SODIUM CHLORIDE 200 MG: 9 INJECTION, SOLUTION INTRAVENOUS at 12:09

## 2024-02-07 RX ADMIN — HEPARIN 500 UNITS: 100 SYRINGE at 13:03

## 2024-02-07 ASSESSMENT — FIBROSIS 4 INDEX: FIB4 SCORE: 1.36

## 2024-02-07 ASSESSMENT — PAIN DESCRIPTION - PAIN TYPE: TYPE: ACUTE PAIN

## 2024-02-07 NOTE — PROGRESS NOTES
Yolanda into [facility] for Day 1/ Cycle 15 of Keytruda for Breast Ca.  Pt reports tolerating past treatments well.  Pt c/o recent drain L side breast, removed and checked by MD, Pt denies s/sx of infection but c/o feeling N/ feeling cold following drain insertion.   POC reviewed. Port accessed in a sterile manner, brisk blood return noted, labs drawn off line, Yolanda tolerated well. IVF TKO.   Lab results reviewed and within parameters for treatment.   Pt given Keytruda as prescribed, filter intact, Yolanda  tolerated well, denied having any complaints during or after infusion. Port had + blood return after, flushed per [facility] policy, de-accessed, needle intact, insertion site covered with sterile gauze and paper tape. Confirmed next appointment and Yolanda was discharged home in no acute distress.

## 2024-02-07 NOTE — PROGRESS NOTES
Chemotherapy Verification - PRIMARY RN      Height = 161 cm    Weight =  82.6 kg   BSA =  1.92 m^2      Medication:  Keytruda   Dose: 200 mg (set dose)     Calculated Dose:  200 mg (set dose)                            (In mg/m2, AUC, mg/kg)           I confirm this process was performed independently with the BSA and all final chemotherapy dosing calculations congruent.  Any discrepancies of 10% or greater have been addressed with the chemotherapy pharmacist. The resolution of the discrepancy has been documented in the EPIC progress notes.

## 2024-02-07 NOTE — PROGRESS NOTES
Chemotherapy Verification - SECONDARY RN       Height = 161 cm  Weight = 82.6 kg  BSA = 1.92 m2       Medication: Keytruda  Dose: 200 mg set dose  Calculated Dose: 200 mg                             (In mg/m2, AUC, mg/kg)       I confirm that this process was performed independently.

## 2024-02-21 RX ORDER — PROCHLORPERAZINE MALEATE 10 MG
10 TABLET ORAL EVERY 6 HOURS PRN
Status: CANCELLED | OUTPATIENT
Start: 2024-02-29

## 2024-02-21 RX ORDER — 0.9 % SODIUM CHLORIDE 0.9 %
3 VIAL (ML) INJECTION PRN
Status: CANCELLED | OUTPATIENT
Start: 2024-02-28

## 2024-02-21 RX ORDER — 0.9 % SODIUM CHLORIDE 0.9 %
10 VIAL (ML) INJECTION PRN
Status: CANCELLED | OUTPATIENT
Start: 2024-02-29

## 2024-02-21 RX ORDER — 0.9 % SODIUM CHLORIDE 0.9 %
VIAL (ML) INJECTION PRN
Status: CANCELLED | OUTPATIENT
Start: 2024-02-28

## 2024-02-21 RX ORDER — 0.9 % SODIUM CHLORIDE 0.9 %
10 VIAL (ML) INJECTION PRN
Status: CANCELLED | OUTPATIENT
Start: 2024-02-28

## 2024-02-21 RX ORDER — HEPARIN SODIUM (PORCINE) LOCK FLUSH IV SOLN 100 UNIT/ML 100 UNIT/ML
500 SOLUTION INTRAVENOUS PRN
Status: CANCELLED | OUTPATIENT
Start: 2024-02-29

## 2024-02-21 RX ORDER — ONDANSETRON 2 MG/ML
4 INJECTION INTRAMUSCULAR; INTRAVENOUS PRN
Status: CANCELLED | OUTPATIENT
Start: 2024-02-29

## 2024-02-21 RX ORDER — ONDANSETRON 8 MG/1
8 TABLET, ORALLY DISINTEGRATING ORAL PRN
Status: CANCELLED | OUTPATIENT
Start: 2024-02-29

## 2024-02-21 RX ORDER — 0.9 % SODIUM CHLORIDE 0.9 %
VIAL (ML) INJECTION PRN
Status: CANCELLED | OUTPATIENT
Start: 2024-02-29

## 2024-02-21 RX ORDER — 0.9 % SODIUM CHLORIDE 0.9 %
3 VIAL (ML) INJECTION PRN
Status: CANCELLED | OUTPATIENT
Start: 2024-02-29

## 2024-02-21 RX ORDER — SODIUM CHLORIDE 9 MG/ML
INJECTION, SOLUTION INTRAVENOUS CONTINUOUS
Status: CANCELLED | OUTPATIENT
Start: 2024-02-29

## 2024-02-21 RX ORDER — HEPARIN SODIUM (PORCINE) LOCK FLUSH IV SOLN 100 UNIT/ML 100 UNIT/ML
500 SOLUTION INTRAVENOUS PRN
Status: CANCELLED | OUTPATIENT
Start: 2024-02-28

## 2024-02-26 NOTE — PROGRESS NOTES
"Pharmacy Chemotherapy Verification    DX: triple negative breast cancer    Cycle 16 total - Cycle 10 of pembrolizumab maintenance  **will complete 11 Cycles total of pembrolizumab maint- pembrolizumab monotherapy started early due to previous antracycline exposure  Previous treatment = 2/7/24    Regimen: pembrolizumab + PACLitaxel/CARBOplatin --> pembrolizumab + cyclophosphamide/DOXrubicin or epirubicin --> Pembrolizmab  Pembrolizumab 200 mg IV over 30 min on day 1  PACLitaxel 80 mg/m2 IV over 60 min on days 1,8,15  CARBOplatin AUC 1.5 IV over 30 min on day 1,8,15  Q21 days x 4 cycles  Followed by  Pembrolizumab 200 mg IV over 30 min on day 1  Cyclophosphamide 600 mg/m2 IV over 30 min on day 1  *8/2/23- dose reduced by 20% to 480mg/m2 for cytopenias  DOXOrubicin 60 mg/m2 IVP on day 1 OR epirubicin 90 mg/m2 IVP on day 1  *8/2/23- dose reduced by 20% to 48mg/m2 for cytopenias  Q21 days x 4 cycles  Followed by  Pembrolizumab 200mg IV over 30 min on day 1  Q21 days x 9 cycles  NCCN Guidelines for Breast Cancer V.2.2022  Alexis P, et al- N Engl J Med. 2020 Feb 27;382(9):810-821.     Allergies:Patient has no known allergies.  BP 97/58   Pulse (!) 112   Temp 36.7 °C (98 °F) (Temporal)   Resp 18   Ht 1.61 m (5' 3.39\")   Wt 78.4 kg (172 lb 13.5 oz)   SpO2 92%   BMI 30.25 kg/m²   Body surface area is 1.87 meters squared.    All lab results 2/28/24 within treatment parameters.  Thyroid panel pending, MD to be notified if abnormal.      Pembrolizumab 200 mg fixed dose, no calculation required  OK to treat with final dose = 200 mg IV     Garrett Cadena, PharmD  "

## 2024-02-26 NOTE — PROGRESS NOTES
"Pharmacy Chemotherapy Verification      Dx: Metastatic Breast CA (ER/NH/HER2 negative) IIb       Protocol: Pembrolizumab + PACLitaxel + CARBOplatin (x 4 cycles) followed by  Pembrolizumab + Cyclophosphamide + DOXOrubicin   (x4 cycles)*  Pembrolizumab 200 mg IV over 30 minutes on Day 1  PACLitaxel 80 mg/m2 IV over 60 minutes on Days 1, 8 and 15  CARBOplatin AUC 1.5 IV over 30 minutes Days 1, 8, and 15  Repeat every 21 days x 4 cycles- completed 6/28/23  ~followed by~  Pembrolizumab 200 mg IV over 30 minutes on Day 1  Cyclophosphamide 600 mg/m2 IV over 30 minutes on Day 1  -Dose reduced to 480 mg/m2 starting Cycle 6 due to counts  DOXOrubicin 60 mg/m2 IV push on Day 1   -Dose reduced to 48 mg/m2 starting Cycle 6 due to counts  Repeat every 21 days for 4 cycles  Followed by **Course 2 ended a Cycle early per Dr. Gómez, maintenance pembro starting 8/23/23**  Pembrolizumab 200 mg IV over 30 min on day 1  Q21 days x 9 cycles    NCCN Guidelines for Breast Cancer V.2.2022  Alexis P, et al. N Engl J Med 2020; 382(3) [phone]    Allergies:  Patient has no known allergies.      BP 97/58   Pulse (!) 112   Temp 36.7 °C (98 °F) (Temporal)   Resp 18   Ht 1.61 m (5' 3.39\")   Wt 78.4 kg (172 lb 13.5 oz)   SpO2 92%   BMI 30.25 kg/m²  Body surface area is 1.87 meters squared.    ECHO 3/16/23 ([facility])  LVEF ~60    Labs 2/28/24:  ANC~ 1380 Plt = 310k   Hgb = 11.6     SCr = 0.58mg/dL CrCl ~ 105.9 mL/min (min Scr 0.7)   AST/ALT/AP = 33/21/166 TBili = 0.5    TSH = 2.030   Free T4 = 1.11           Drug Order   (Drug name, dose, route, IV Fluid & volume, frequency, number of doses) Cycle 8 maintenance pembrolizumab (s/p mastectomy 9/19/23)  Previous treatment: C7 2/7/24   Medication = Pembrolizumab (Keytruda)  Base Dose = 200 mg   Fixed dose; no calculation required  Final Dose = 200 mg  Route = IV  Fluid & Volume = NS 50 mL  Admin Duration = Over 30 minutes         Fixed dose, okay to treat with final dose     By my signature " below, I confirm this process was performed independently with the BSA and all final chemotherapy dosing calculations congruent. I have reviewed the above chemotherapy order and that my calculation of the final dose and BSA (when applicable) corroborate those calculations of the  pharmacist. Discrepancies of 10% or greater in the written dose have been addressed and documented within the EPIC Progress notes.    Judith Bishop, JaniceD

## 2024-02-28 ENCOUNTER — OUTPATIENT INFUSION SERVICES (OUTPATIENT)
Dept: ONCOLOGY | Facility: MEDICAL CENTER | Age: 59
End: 2024-02-28
Attending: INTERNAL MEDICINE
Payer: COMMERCIAL

## 2024-02-28 VITALS
HEIGHT: 63 IN | WEIGHT: 172.84 LBS | SYSTOLIC BLOOD PRESSURE: 97 MMHG | RESPIRATION RATE: 18 BRPM | DIASTOLIC BLOOD PRESSURE: 58 MMHG | HEART RATE: 105 BPM | TEMPERATURE: 98 F | OXYGEN SATURATION: 92 % | BODY MASS INDEX: 30.62 KG/M2

## 2024-02-28 DIAGNOSIS — Z17.1 MALIGNANT NEOPLASM OF OVERLAPPING SITES OF LEFT BREAST IN FEMALE, ESTROGEN RECEPTOR NEGATIVE (HCC): ICD-10-CM

## 2024-02-28 DIAGNOSIS — C50.812 MALIGNANT NEOPLASM OF OVERLAPPING SITES OF LEFT BREAST IN FEMALE, ESTROGEN RECEPTOR NEGATIVE (HCC): ICD-10-CM

## 2024-02-28 LAB
ALBUMIN SERPL BCP-MCNC: 3.5 G/DL (ref 3.2–4.9)
ALBUMIN/GLOB SERPL: 1.3 G/DL
ALP SERPL-CCNC: 166 U/L (ref 30–99)
ALT SERPL-CCNC: 21 U/L (ref 2–50)
ANION GAP SERPL CALC-SCNC: 17 MMOL/L (ref 7–16)
AST SERPL-CCNC: 33 U/L (ref 12–45)
BASOPHILS # BLD AUTO: 0.5 % (ref 0–1.8)
BASOPHILS # BLD: 0.02 K/UL (ref 0–0.12)
BILIRUB SERPL-MCNC: 0.5 MG/DL (ref 0.1–1.5)
BUN SERPL-MCNC: 7 MG/DL (ref 8–22)
CALCIUM ALBUM COR SERPL-MCNC: 9.9 MG/DL (ref 8.5–10.5)
CALCIUM SERPL-MCNC: 9.5 MG/DL (ref 8.5–10.5)
CHLORIDE SERPL-SCNC: 92 MMOL/L (ref 96–112)
CO2 SERPL-SCNC: 24 MMOL/L (ref 20–33)
CREAT SERPL-MCNC: 0.58 MG/DL (ref 0.5–1.4)
EOSINOPHIL # BLD AUTO: 0.21 K/UL (ref 0–0.51)
EOSINOPHIL NFR BLD: 5.5 % (ref 0–6.9)
ERYTHROCYTE [DISTWIDTH] IN BLOOD BY AUTOMATED COUNT: 42.8 FL (ref 35.9–50)
GFR SERPLBLD CREATININE-BSD FMLA CKD-EPI: 104 ML/MIN/1.73 M 2
GLOBULIN SER CALC-MCNC: 2.7 G/DL (ref 1.9–3.5)
GLUCOSE SERPL-MCNC: 78 MG/DL (ref 65–99)
HCT VFR BLD AUTO: 33.7 % (ref 37–47)
HGB BLD-MCNC: 11.6 G/DL (ref 12–16)
IMM GRANULOCYTES # BLD AUTO: 0.01 K/UL (ref 0–0.11)
IMM GRANULOCYTES NFR BLD AUTO: 0.3 % (ref 0–0.9)
LYMPHOCYTES # BLD AUTO: 1.73 K/UL (ref 1–4.8)
LYMPHOCYTES NFR BLD: 45.1 % (ref 22–41)
MCH RBC QN AUTO: 33 PG (ref 27–33)
MCHC RBC AUTO-ENTMCNC: 34.4 G/DL (ref 32.2–35.5)
MCV RBC AUTO: 95.7 FL (ref 81.4–97.8)
MONOCYTES # BLD AUTO: 0.49 K/UL (ref 0–0.85)
MONOCYTES NFR BLD AUTO: 12.8 % (ref 0–13.4)
NEUTROPHILS # BLD AUTO: 1.38 K/UL (ref 1.82–7.42)
NEUTROPHILS NFR BLD: 35.8 % (ref 44–72)
NRBC # BLD AUTO: 0 K/UL
NRBC BLD-RTO: 0 /100 WBC (ref 0–0.2)
OUTPT INFUS CBC COMMENT OICOM: ABNORMAL
PLATELET # BLD AUTO: 310 K/UL (ref 164–446)
PMV BLD AUTO: 9 FL (ref 9–12.9)
POTASSIUM SERPL-SCNC: 3 MMOL/L (ref 3.6–5.5)
PROT SERPL-MCNC: 6.2 G/DL (ref 6–8.2)
RBC # BLD AUTO: 3.52 M/UL (ref 4.2–5.4)
SODIUM SERPL-SCNC: 133 MMOL/L (ref 135–145)
T4 FREE SERPL-MCNC: 1.11 NG/DL (ref 0.93–1.7)
TSH SERPL DL<=0.005 MIU/L-ACNC: 2.03 UIU/ML (ref 0.38–5.33)
WBC # BLD AUTO: 3.8 K/UL (ref 4.8–10.8)

## 2024-02-28 PROCEDURE — 96361 HYDRATE IV INFUSION ADD-ON: CPT

## 2024-02-28 PROCEDURE — 84443 ASSAY THYROID STIM HORMONE: CPT

## 2024-02-28 PROCEDURE — A4212 NON CORING NEEDLE OR STYLET: HCPCS

## 2024-02-28 PROCEDURE — 84439 ASSAY OF FREE THYROXINE: CPT

## 2024-02-28 PROCEDURE — 700105 HCHG RX REV CODE 258: Performed by: INTERNAL MEDICINE

## 2024-02-28 PROCEDURE — 96367 TX/PROPH/DG ADDL SEQ IV INF: CPT

## 2024-02-28 PROCEDURE — 700111 HCHG RX REV CODE 636 W/ 250 OVERRIDE (IP): Performed by: INTERNAL MEDICINE

## 2024-02-28 PROCEDURE — 85025 COMPLETE CBC W/AUTO DIFF WBC: CPT

## 2024-02-28 PROCEDURE — 80053 COMPREHEN METABOLIC PANEL: CPT

## 2024-02-28 PROCEDURE — 700111 HCHG RX REV CODE 636 W/ 250 OVERRIDE (IP)

## 2024-02-28 PROCEDURE — 96413 CHEMO IV INFUSION 1 HR: CPT

## 2024-02-28 PROCEDURE — 700105 HCHG RX REV CODE 258

## 2024-02-28 RX ORDER — POTASSIUM CHLORIDE 7.45 MG/ML
10 INJECTION INTRAVENOUS
Qty: 400 ML | Refills: 0 | Status: COMPLETED | OUTPATIENT
Start: 2024-02-28 | End: 2024-02-28

## 2024-02-28 RX ORDER — CYCLOBENZAPRINE HCL 5 MG
5 TABLET ORAL
COMMUNITY
Start: 2024-02-22 | End: 2024-03-09

## 2024-02-28 RX ORDER — ONDANSETRON 4 MG/1
4 TABLET, ORALLY DISINTEGRATING ORAL EVERY 6 HOURS PRN
COMMUNITY
End: 2024-03-25

## 2024-02-28 RX ADMIN — SODIUM CHLORIDE 200 MG: 9 INJECTION, SOLUTION INTRAVENOUS at 12:15

## 2024-02-28 RX ADMIN — HEPARIN 500 UNITS: 100 SYRINGE at 17:19

## 2024-02-28 RX ADMIN — POTASSIUM CHLORIDE 10 MEQ: 7.46 INJECTION, SOLUTION INTRAVENOUS at 12:58

## 2024-02-28 RX ADMIN — POTASSIUM CHLORIDE 10 MEQ: 7.46 INJECTION, SOLUTION INTRAVENOUS at 16:09

## 2024-02-28 RX ADMIN — POTASSIUM CHLORIDE 10 MEQ: 7.46 INJECTION, SOLUTION INTRAVENOUS at 15:07

## 2024-02-28 RX ADMIN — POTASSIUM CHLORIDE 10 MEQ: 7.46 INJECTION, SOLUTION INTRAVENOUS at 14:05

## 2024-02-28 ASSESSMENT — PAIN DESCRIPTION - PAIN TYPE: TYPE: ACUTE PAIN

## 2024-02-28 ASSESSMENT — FIBROSIS 4 INDEX: FIB4 SCORE: 1.71

## 2024-02-28 NOTE — PROGRESS NOTES
[facility]olanda into [facility] for Day 1/ Cycle 16 of Keytruda for Breast Ca.  Pt reports tolerating past treatments well.  Pt c/o recent drain L side breast, tenderness/ shooting pain at times, poor appetite, N/V and a pinched nerve. MD aware, med rec updated.   POC reviewed. Port accessed in a sterile manner, brisk blood return noted, labs drawn off line, Yolanda tolerated well. IVF TKO.     Lab results reviewed and within parameters for treatment.  K+ 3; replacement needed.     Pt given Keytruda as prescribed, filter intact, Yolanda  tolerated well, denied having any complaints during or after infusion.     KCL infused per MD order, Pt tolerated well.     Port had + blood return after, flushed per [facility] policy, de-accessed, needle intact, insertion site covered with sterile gauze and paper tape. Next appointment pending scheduling, e-mail sent and Yolanda was discharged home in no acute distress.

## 2024-02-28 NOTE — PROGRESS NOTES
Chemotherapy Verification - PRIMARY RN      Height = 161 cm    Weight = 78.4 kg   BSA =  1.87 m^2      Medication: Keytruda   Dose: 200 mg (set dose)  Calculated Dose:  200 mg (set dose)                            (In mg/m2, AUC, mg/kg)           I confirm this process was performed independently with the BSA and all final chemotherapy dosing calculations congruent.  Any discrepancies of 10% or greater have been addressed with the chemotherapy pharmacist. The resolution of the discrepancy has been documented in the EPIC progress notes.

## 2024-02-28 NOTE — PROGRESS NOTES
Chemotherapy Verification - SECONDARY RN       Height = 161 cm  Weight = 78.4 kg  BSA = 1.87 m^2       Medication: pembrolizumab (Keytruda)  Dose: 200 mg set dose  Calculated Dose: 200 mg set dose                             (In mg/m2, AUC, mg/kg)     I confirm that this process was performed independently.

## 2024-03-01 ENCOUNTER — PATIENT OUTREACH (OUTPATIENT)
Dept: ONCOLOGY | Facility: MEDICAL CENTER | Age: 59
End: 2024-03-01
Payer: COMMERCIAL

## 2024-03-01 ENCOUNTER — PATIENT MESSAGE (OUTPATIENT)
Dept: ONCOLOGY | Facility: MEDICAL CENTER | Age: 59
End: 2024-03-01

## 2024-03-01 NOTE — PROGRESS NOTES
ONCOLOGY NURSE NAVIGATION (ONN):  ELLEN Mckoy reached out to patient to follow up on provider referral.  No answer.  NN left voice message with my contact information requesting a call back at patient's earliest convenience.  My last note in patient's chart is from 8.22.23.  NN resending my letter of introduction and the current [facility] cancer support services calendar via [employer].    INTERVENTION:  Patient previously enrolled in [facility].

## 2024-03-09 ENCOUNTER — HOSPITAL ENCOUNTER (INPATIENT)
Facility: MEDICAL CENTER | Age: 59
LOS: 11 days | DRG: 175 | End: 2024-03-20
Attending: EMERGENCY MEDICINE | Admitting: HOSPITALIST
Payer: COMMERCIAL

## 2024-03-09 ENCOUNTER — APPOINTMENT (OUTPATIENT)
Dept: RADIOLOGY | Facility: MEDICAL CENTER | Age: 59
DRG: 175 | End: 2024-03-09
Payer: COMMERCIAL

## 2024-03-09 ENCOUNTER — APPOINTMENT (OUTPATIENT)
Dept: RADIOLOGY | Facility: MEDICAL CENTER | Age: 59
DRG: 175 | End: 2024-03-09
Attending: EMERGENCY MEDICINE
Payer: COMMERCIAL

## 2024-03-09 DIAGNOSIS — E87.6 HYPOKALEMIA: ICD-10-CM

## 2024-03-09 DIAGNOSIS — R11.2 NAUSEA AND VOMITING, UNSPECIFIED VOMITING TYPE: ICD-10-CM

## 2024-03-09 DIAGNOSIS — C50.412 MALIGNANT NEOPLASM OF UPPER-OUTER QUADRANT OF LEFT BREAST IN FEMALE, ESTROGEN RECEPTOR NEGATIVE (HCC): ICD-10-CM

## 2024-03-09 DIAGNOSIS — E87.20 LACTIC ACIDOSIS: ICD-10-CM

## 2024-03-09 DIAGNOSIS — I26.93 SINGLE SUBSEGMENTAL PULMONARY EMBOLISM WITHOUT ACUTE COR PULMONALE (HCC): ICD-10-CM

## 2024-03-09 DIAGNOSIS — Z17.1 MALIGNANT NEOPLASM OF UPPER-OUTER QUADRANT OF LEFT BREAST IN FEMALE, ESTROGEN RECEPTOR NEGATIVE (HCC): ICD-10-CM

## 2024-03-09 PROBLEM — I26.99 PULMONARY EMBOLISM AND INFARCTION (HCC): Status: ACTIVE | Noted: 2024-03-09

## 2024-03-09 PROBLEM — R11.10 INTRACTABLE VOMITING: Status: ACTIVE | Noted: 2024-03-09

## 2024-03-09 PROBLEM — E87.1 HYPONATREMIA: Status: ACTIVE | Noted: 2024-03-09

## 2024-03-09 PROBLEM — I10 PRIMARY HYPERTENSION: Status: ACTIVE | Noted: 2024-03-09

## 2024-03-09 LAB
ALBUMIN SERPL BCP-MCNC: 4 G/DL (ref 3.2–4.9)
ALBUMIN/GLOB SERPL: 1.1 G/DL
ALP SERPL-CCNC: 146 U/L (ref 30–99)
ALT SERPL-CCNC: 16 U/L (ref 2–50)
ANION GAP SERPL CALC-SCNC: 28 MMOL/L (ref 7–16)
APPEARANCE UR: CLEAR
AST SERPL-CCNC: 40 U/L (ref 12–45)
BACTERIA #/AREA URNS HPF: NEGATIVE /HPF
BASOPHILS # BLD AUTO: 0.5 % (ref 0–1.8)
BASOPHILS # BLD: 0.04 K/UL (ref 0–0.12)
BILIRUB SERPL-MCNC: 0.7 MG/DL (ref 0.1–1.5)
BILIRUB UR QL STRIP.AUTO: NEGATIVE
BUN SERPL-MCNC: 6 MG/DL (ref 8–22)
CALCIUM ALBUM COR SERPL-MCNC: 10.9 MG/DL (ref 8.5–10.5)
CALCIUM SERPL-MCNC: 10.9 MG/DL (ref 8.5–10.5)
CHLORIDE SERPL-SCNC: 89 MMOL/L (ref 96–112)
CO2 SERPL-SCNC: 16 MMOL/L (ref 20–33)
COLOR UR: YELLOW
CREAT SERPL-MCNC: 0.95 MG/DL (ref 0.5–1.4)
EKG IMPRESSION: NORMAL
EOSINOPHIL # BLD AUTO: 0.27 K/UL (ref 0–0.51)
EOSINOPHIL NFR BLD: 3.5 % (ref 0–6.9)
EPI CELLS #/AREA URNS HPF: ABNORMAL /HPF
ERYTHROCYTE [DISTWIDTH] IN BLOOD BY AUTOMATED COUNT: 43 FL (ref 35.9–50)
FLUAV RNA SPEC QL NAA+PROBE: NEGATIVE
FLUBV RNA SPEC QL NAA+PROBE: NEGATIVE
GFR SERPLBLD CREATININE-BSD FMLA CKD-EPI: 69 ML/MIN/1.73 M 2
GLOBULIN SER CALC-MCNC: 3.6 G/DL (ref 1.9–3.5)
GLUCOSE SERPL-MCNC: 104 MG/DL (ref 65–99)
GLUCOSE UR STRIP.AUTO-MCNC: NEGATIVE MG/DL
HCT VFR BLD AUTO: 40.8 % (ref 37–47)
HGB BLD-MCNC: 14.3 G/DL (ref 12–16)
HYALINE CASTS #/AREA URNS LPF: ABNORMAL /LPF
IMM GRANULOCYTES # BLD AUTO: 0.05 K/UL (ref 0–0.11)
IMM GRANULOCYTES NFR BLD AUTO: 0.6 % (ref 0–0.9)
KETONES UR STRIP.AUTO-MCNC: 80 MG/DL
LACTATE SERPL-SCNC: 1.2 MMOL/L (ref 0.5–2)
LACTATE SERPL-SCNC: 1.3 MMOL/L (ref 0.5–2)
LACTATE SERPL-SCNC: 3.9 MMOL/L (ref 0.5–2)
LEUKOCYTE ESTERASE UR QL STRIP.AUTO: NEGATIVE
LYMPHOCYTES # BLD AUTO: 2.39 K/UL (ref 1–4.8)
LYMPHOCYTES NFR BLD: 31 % (ref 22–41)
MCH RBC QN AUTO: 33.3 PG (ref 27–33)
MCHC RBC AUTO-ENTMCNC: 35 G/DL (ref 32.2–35.5)
MCV RBC AUTO: 94.9 FL (ref 81.4–97.8)
MICRO URNS: ABNORMAL
MONOCYTES # BLD AUTO: 0.67 K/UL (ref 0–0.85)
MONOCYTES NFR BLD AUTO: 8.7 % (ref 0–13.4)
NEUTROPHILS # BLD AUTO: 4.28 K/UL (ref 1.82–7.42)
NEUTROPHILS NFR BLD: 55.7 % (ref 44–72)
NITRITE UR QL STRIP.AUTO: NEGATIVE
NRBC # BLD AUTO: 0 K/UL
NRBC BLD-RTO: 0 /100 WBC (ref 0–0.2)
PH UR STRIP.AUTO: 5 [PH] (ref 5–8)
PLATELET # BLD AUTO: 332 K/UL (ref 164–446)
PMV BLD AUTO: 8.7 FL (ref 9–12.9)
POTASSIUM SERPL-SCNC: 3.1 MMOL/L (ref 3.6–5.5)
PROT SERPL-MCNC: 7.6 G/DL (ref 6–8.2)
PROT UR QL STRIP: 30 MG/DL
RBC # BLD AUTO: 4.3 M/UL (ref 4.2–5.4)
RBC # URNS HPF: ABNORMAL /HPF
RBC UR QL AUTO: ABNORMAL
RSV RNA SPEC QL NAA+PROBE: NEGATIVE
SARS-COV-2 RNA RESP QL NAA+PROBE: NOTDETECTED
SODIUM SERPL-SCNC: 133 MMOL/L (ref 135–145)
SP GR UR STRIP.AUTO: 1.04
UROBILINOGEN UR STRIP.AUTO-MCNC: 1 MG/DL
WBC # BLD AUTO: 7.7 K/UL (ref 4.8–10.8)
WBC #/AREA URNS HPF: ABNORMAL /HPF

## 2024-03-09 PROCEDURE — 93005 ELECTROCARDIOGRAM TRACING: CPT | Performed by: EMERGENCY MEDICINE

## 2024-03-09 PROCEDURE — 700105 HCHG RX REV CODE 258: Performed by: EMERGENCY MEDICINE

## 2024-03-09 PROCEDURE — 96372 THER/PROPH/DIAG INJ SC/IM: CPT

## 2024-03-09 PROCEDURE — 700117 HCHG RX CONTRAST REV CODE 255: Performed by: EMERGENCY MEDICINE

## 2024-03-09 PROCEDURE — 87040 BLOOD CULTURE FOR BACTERIA: CPT

## 2024-03-09 PROCEDURE — 96375 TX/PRO/DX INJ NEW DRUG ADDON: CPT

## 2024-03-09 PROCEDURE — 700102 HCHG RX REV CODE 250 W/ 637 OVERRIDE(OP): Performed by: EMERGENCY MEDICINE

## 2024-03-09 PROCEDURE — 87086 URINE CULTURE/COLONY COUNT: CPT

## 2024-03-09 PROCEDURE — 85025 COMPLETE CBC W/AUTO DIFF WBC: CPT

## 2024-03-09 PROCEDURE — A9270 NON-COVERED ITEM OR SERVICE: HCPCS | Performed by: HOSPITALIST

## 2024-03-09 PROCEDURE — 700111 HCHG RX REV CODE 636 W/ 250 OVERRIDE (IP): Mod: JZ | Performed by: EMERGENCY MEDICINE

## 2024-03-09 PROCEDURE — 71045 X-RAY EXAM CHEST 1 VIEW: CPT

## 2024-03-09 PROCEDURE — 96365 THER/PROPH/DIAG IV INF INIT: CPT

## 2024-03-09 PROCEDURE — 36415 COLL VENOUS BLD VENIPUNCTURE: CPT

## 2024-03-09 PROCEDURE — 74177 CT ABD & PELVIS W/CONTRAST: CPT

## 2024-03-09 PROCEDURE — 700105 HCHG RX REV CODE 258: Performed by: HOSPITALIST

## 2024-03-09 PROCEDURE — 700111 HCHG RX REV CODE 636 W/ 250 OVERRIDE (IP): Mod: JZ | Performed by: HOSPITALIST

## 2024-03-09 PROCEDURE — 99285 EMERGENCY DEPT VISIT HI MDM: CPT

## 2024-03-09 PROCEDURE — 81001 URINALYSIS AUTO W/SCOPE: CPT

## 2024-03-09 PROCEDURE — 770020 HCHG ROOM/CARE - TELE (206)

## 2024-03-09 PROCEDURE — A9270 NON-COVERED ITEM OR SERVICE: HCPCS | Performed by: EMERGENCY MEDICINE

## 2024-03-09 PROCEDURE — 700102 HCHG RX REV CODE 250 W/ 637 OVERRIDE(OP): Performed by: HOSPITALIST

## 2024-03-09 PROCEDURE — 99223 1ST HOSP IP/OBS HIGH 75: CPT | Performed by: HOSPITALIST

## 2024-03-09 PROCEDURE — 93005 ELECTROCARDIOGRAM TRACING: CPT

## 2024-03-09 PROCEDURE — 71275 CT ANGIOGRAPHY CHEST: CPT

## 2024-03-09 PROCEDURE — 80053 COMPREHEN METABOLIC PANEL: CPT

## 2024-03-09 PROCEDURE — 83605 ASSAY OF LACTIC ACID: CPT | Mod: 91

## 2024-03-09 PROCEDURE — 0241U HCHG SARS-COV-2 COVID-19 NFCT DS RESP RNA 4 TRGT ED POC: CPT

## 2024-03-09 RX ORDER — ACETAMINOPHEN 325 MG/1
650 TABLET ORAL EVERY 6 HOURS PRN
Status: DISCONTINUED | OUTPATIENT
Start: 2024-03-09 | End: 2024-03-20 | Stop reason: HOSPADM

## 2024-03-09 RX ORDER — AMOXICILLIN 250 MG
2 CAPSULE ORAL EVERY EVENING
Status: DISCONTINUED | OUTPATIENT
Start: 2024-03-09 | End: 2024-03-10

## 2024-03-09 RX ORDER — PROMETHAZINE HYDROCHLORIDE 25 MG/1
12.5-25 TABLET ORAL EVERY 4 HOURS PRN
Status: DISCONTINUED | OUTPATIENT
Start: 2024-03-09 | End: 2024-03-20 | Stop reason: HOSPADM

## 2024-03-09 RX ORDER — PROCHLORPERAZINE EDISYLATE 5 MG/ML
5-10 INJECTION INTRAMUSCULAR; INTRAVENOUS EVERY 4 HOURS PRN
Status: DISCONTINUED | OUTPATIENT
Start: 2024-03-09 | End: 2024-03-20 | Stop reason: HOSPADM

## 2024-03-09 RX ORDER — POTASSIUM CHLORIDE 1.5 G/1.58G
20 POWDER, FOR SOLUTION ORAL DAILY
Status: ON HOLD | COMMUNITY
End: 2024-03-20

## 2024-03-09 RX ORDER — MORPHINE SULFATE 4 MG/ML
4 INJECTION INTRAVENOUS
Status: DISCONTINUED | OUTPATIENT
Start: 2024-03-09 | End: 2024-03-19

## 2024-03-09 RX ORDER — POLYETHYLENE GLYCOL 3350 17 G/17G
1 POWDER, FOR SOLUTION ORAL
Status: DISCONTINUED | OUTPATIENT
Start: 2024-03-09 | End: 2024-03-10

## 2024-03-09 RX ORDER — ONDANSETRON 2 MG/ML
4 INJECTION INTRAMUSCULAR; INTRAVENOUS EVERY 4 HOURS PRN
Status: DISCONTINUED | OUTPATIENT
Start: 2024-03-09 | End: 2024-03-09

## 2024-03-09 RX ORDER — OXYCODONE HYDROCHLORIDE 5 MG/1
5-10 TABLET ORAL
Status: DISCONTINUED | OUTPATIENT
Start: 2024-03-09 | End: 2024-03-19

## 2024-03-09 RX ORDER — ONDANSETRON 4 MG/1
4 TABLET, ORALLY DISINTEGRATING ORAL EVERY 4 HOURS PRN
Status: DISCONTINUED | OUTPATIENT
Start: 2024-03-09 | End: 2024-03-09

## 2024-03-09 RX ORDER — ENOXAPARIN SODIUM 100 MG/ML
1 INJECTION SUBCUTANEOUS EVERY 12 HOURS
Status: DISCONTINUED | OUTPATIENT
Start: 2024-03-09 | End: 2024-03-10

## 2024-03-09 RX ORDER — ROSUVASTATIN CALCIUM 10 MG/1
10 TABLET, COATED ORAL NIGHTLY
Status: DISCONTINUED | OUTPATIENT
Start: 2024-03-09 | End: 2024-03-20 | Stop reason: HOSPADM

## 2024-03-09 RX ORDER — SENNOSIDES 8.6 MG
1300 CAPSULE ORAL EVERY 6 HOURS PRN
COMMUNITY
End: 2024-03-25

## 2024-03-09 RX ORDER — CEFTRIAXONE 2 G/1
2000 INJECTION, POWDER, FOR SOLUTION INTRAMUSCULAR; INTRAVENOUS ONCE
Status: COMPLETED | OUTPATIENT
Start: 2024-03-09 | End: 2024-03-09

## 2024-03-09 RX ORDER — PROMETHAZINE HYDROCHLORIDE 25 MG/1
12.5-25 SUPPOSITORY RECTAL EVERY 4 HOURS PRN
Status: DISCONTINUED | OUTPATIENT
Start: 2024-03-09 | End: 2024-03-20 | Stop reason: HOSPADM

## 2024-03-09 RX ORDER — AMLODIPINE BESYLATE 5 MG/1
5 TABLET ORAL DAILY
Status: DISCONTINUED | OUTPATIENT
Start: 2024-03-10 | End: 2024-03-14

## 2024-03-09 RX ORDER — SODIUM CHLORIDE 9 MG/ML
30 INJECTION, SOLUTION INTRAVENOUS ONCE
Status: COMPLETED | OUTPATIENT
Start: 2024-03-09 | End: 2024-03-09

## 2024-03-09 RX ORDER — ACETAMINOPHEN 325 MG/1
650 TABLET ORAL ONCE
Status: COMPLETED | OUTPATIENT
Start: 2024-03-09 | End: 2024-03-09

## 2024-03-09 RX ADMIN — DOCUSATE SODIUM 50 MG AND SENNOSIDES 8.6 MG 2 TABLET: 8.6; 5 TABLET, FILM COATED ORAL at 19:02

## 2024-03-09 RX ADMIN — ACETAMINOPHEN 650 MG: 325 TABLET, FILM COATED ORAL at 11:58

## 2024-03-09 RX ADMIN — ENOXAPARIN SODIUM 80 MG: 100 INJECTION SUBCUTANEOUS at 14:54

## 2024-03-09 RX ADMIN — CEFTRIAXONE SODIUM 2000 MG: 2 INJECTION, POWDER, FOR SOLUTION INTRAMUSCULAR; INTRAVENOUS at 11:44

## 2024-03-09 RX ADMIN — MORPHINE SULFATE 4 MG: 4 INJECTION, SOLUTION INTRAMUSCULAR; INTRAVENOUS at 20:29

## 2024-03-09 RX ADMIN — SODIUM CHLORIDE 1599 ML: 9 INJECTION, SOLUTION INTRAVENOUS at 11:51

## 2024-03-09 RX ADMIN — POTASSIUM CHLORIDE: 2 INJECTION, SOLUTION, CONCENTRATE INTRAVENOUS at 14:49

## 2024-03-09 RX ADMIN — ROSUVASTATIN 10 MG: 10 TABLET, FILM COATED ORAL at 20:27

## 2024-03-09 RX ADMIN — OXYCODONE 5 MG: 5 TABLET ORAL at 19:02

## 2024-03-09 RX ADMIN — IOHEXOL 100 ML: 350 INJECTION, SOLUTION INTRAVENOUS at 11:35

## 2024-03-09 ASSESSMENT — ENCOUNTER SYMPTOMS
ABDOMINAL PAIN: 1
NAUSEA: 1
DIARRHEA: 0
VOMITING: 1
FEVER: 0
CHILLS: 0
SHORTNESS OF BREATH: 1

## 2024-03-09 ASSESSMENT — LIFESTYLE VARIABLES
DOES PATIENT WANT TO STOP DRINKING: NO
CONSUMPTION TOTAL: NEGATIVE
HAVE PEOPLE ANNOYED YOU BY CRITICIZING YOUR DRINKING: NO
AVERAGE NUMBER OF DAYS PER WEEK YOU HAVE A DRINK CONTAINING ALCOHOL: 0
TOTAL SCORE: 0
EVER HAD A DRINK FIRST THING IN THE MORNING TO STEADY YOUR NERVES TO GET RID OF A HANGOVER: NO
EVER FELT BAD OR GUILTY ABOUT YOUR DRINKING: NO
TOTAL SCORE: 0
ALCOHOL_USE: YES
HOW MANY TIMES IN THE PAST YEAR HAVE YOU HAD 5 OR MORE DRINKS IN A DAY: 0
TOTAL SCORE: 0
HAVE YOU EVER FELT YOU SHOULD CUT DOWN ON YOUR DRINKING: NO
ON A TYPICAL DAY WHEN YOU DRINK ALCOHOL HOW MANY DRINKS DO YOU HAVE: 0

## 2024-03-09 ASSESSMENT — PAIN DESCRIPTION - PAIN TYPE
TYPE: ACUTE PAIN
TYPE: ACUTE PAIN;CHRONIC PAIN
TYPE: ACUTE PAIN
TYPE: ACUTE PAIN

## 2024-03-09 ASSESSMENT — COGNITIVE AND FUNCTIONAL STATUS - GENERAL
DAILY ACTIVITIY SCORE: 24
SUGGESTED CMS G CODE MODIFIER DAILY ACTIVITY: CH
CLIMB 3 TO 5 STEPS WITH RAILING: A LITTLE
WALKING IN HOSPITAL ROOM: A LITTLE
SUGGESTED CMS G CODE MODIFIER MOBILITY: CJ
TURNING FROM BACK TO SIDE WHILE IN FLAT BAD: A LITTLE
MOBILITY SCORE: 20
MOVING FROM LYING ON BACK TO SITTING ON SIDE OF FLAT BED: A LITTLE

## 2024-03-09 ASSESSMENT — FIBROSIS 4 INDEX
FIB4 SCORE: 1.37
FIB4 SCORE: 1.78

## 2024-03-09 ASSESSMENT — PATIENT HEALTH QUESTIONNAIRE - PHQ9
2. FEELING DOWN, DEPRESSED, IRRITABLE, OR HOPELESS: NOT AT ALL
1. LITTLE INTEREST OR PLEASURE IN DOING THINGS: NOT AT ALL
SUM OF ALL RESPONSES TO PHQ9 QUESTIONS 1 AND 2: 0

## 2024-03-09 NOTE — ED TRIAGE NOTES
.  Chief Complaint   Patient presents with    Shortness of Breath    Flu Like Symptoms     X 3 weeks   Chills, body aches,      Pt to room for EKG on arrival reports worsening symptoms x 3 weeks. When pt attempted to go to work today employer suggested she go to the er.  on arrival initial bp hypotensive however cuff was small. Cuff changed and bp normal. Sepsis protocol entered. Pt also receiving chemo for breast ca.

## 2024-03-09 NOTE — ED PROVIDER NOTES
Emergency Physician Note    Chief Concern:  Chief Complaint   Patient presents with    Shortness of Breath    Flu Like Symptoms     X 3 weeks   Chills, body aches,      HPI/ROS     External Records Reviewed:  [facility] Care Everywhere: Epic Care Everywhere was utilized to review general surgeon note from [facility].  Patient had an outpatient visit to that facility 2/6/2024.  She was seen by Dr. Guerra.  She has a 2 to 3-month history of left breast mass, history of left breast cancer in 2003 treated by lumpectomy.  Biopsy was performed 2/15/2023 and revealed invasive ductal cancer, grade 3.  2 biopsies of the axillary nodes were negative.  Last chemotherapy was 8/2/2023.  She underwent left total mastectomy 9/19/2023, as well as prophylactic right mastectomy.  She was followed at this visit for postoperative seroma.    HPI:  Yolanda Roe is a 58 y.o. female who presents to the emergency department for evaluation of generalized malaise, and chills.  She has a past medical history significant for breast cancer, with left total mastectomy to remove the cancer, as well as prophylactic right mastectomy.  She developed a postoperative seroma, most recently drained in clinic 2/6/2024.  Since that time, she reports progressively worsening fatigue, as well as multiple vague symptoms that have steadily worsened.  She followed up with her oncologist, Dr. Gómez, who repeated screening lab work with no abnormalities found.  Over the past 2 weeks she has developed nausea, and chills.  She also reports ongoing shortness of breath, much worse with exertion.  She has lightheadedness as well.  She also reports associated constipation, though no abdominal pain.  She has noted chills worsening over the last few days, but has not noticed any fevers at home.  She does not report associated chest pain.  She reports no skin changes around the site of the seroma drainage.  She has no new leg pain or leg swelling.    PAST  "MEDICAL HISTORY  Past Medical History:   Diagnosis Date    Arthritis 03/09/2023    left knee    Cancer (HCC) 03/09/2023    breasty cancer initially diagnosed in 2003 and underwent chemo and radiation, recurrence of cancer in left breast    Dental disorder 03/09/2023    upper and lower partials    High cholesterol 03/09/2023    on medication    Hypertension 03/09/2023    no medication    Malignant neoplasm of upper-outer quadrant of left breast in female, estrogen receptor positive (HCC) 03/21/2023    Pain     Back pain    Urinary incontinence     Frequency       SURGICAL HISTORY  Past Surgical History:   Procedure Laterality Date    MASTECTOMY Bilateral 9/19/2023    Procedure: LEFT TOTAL MASTECTOMY, LEFT AXILLARY SENTINEL NODE BIOPSY, RIGHT PROPHYLACTIC TOTAL MASTECTOMY;  Surgeon: Peña Monahan M.D.;  Location: SURGERY SAME DAY [facility];  Service: General    NODE BIOPSY SENTINEL Left 9/19/2023    Procedure: BIOPSY, LYMPH NODE, SENTINEL;  Surgeon: Peña oMnahan M.D.;  Location: SURGERY SAME DAY [facility];  Service: General    CATH PLACEMENT Right 03/21/2023    Procedure: PLACEMENT OF A RIGHT POWER PORT;  Surgeon: Peña Monahan M.D.;  Location: SURGERY SAME DAY [facility];  Service: General    OTHER ORTHOPEDIC SURGERY  03/09/2023    back surgery L4-L5 1998    LUMPECTOMY  2003    2 surgeries - 10 days apart    HYSTERECTOMY, VAGINAL  1991    \"2 partial hysterectomies\" still has one ovary    TUBAL LIGATION         FAMILY HISTORY  No family history on file.    SOCIAL HISTORY   reports that she has been smoking cigarettes. She started smoking about 28 years ago. She has a 14.1 pack-year smoking history. She has never been exposed to tobacco smoke. She has never used smokeless tobacco. She reports current alcohol use of about 4.2 oz of alcohol per week. She reports that she does not use drugs.    CURRENT MEDICATIONS  Previous Medications    ACETAMINOPHEN (TYLENOL ARTHRITIS PAIN PO)    Take 1 Tablet by mouth every 6 hours " as needed.    AMLODIPINE (NORVASC) 5 MG TAB    Take 1 Tablet by mouth every day.    CYCLOBENZAPRINE (FLEXERIL) 5 MG TABLET    Take 5 mg by mouth 1 time a day as needed.    IBUPROFEN (MOTRIN) 600 MG TAB    Take 1 tablet orally every 8 hours as needed for pain    LIDOCAINE-PRILOCAINE (EMLA) 2.5-2.5 % CREAM    Apply  topically as needed.    ONDANSETRON (ZOFRAN ODT) 4 MG TABLET DISPERSIBLE    Take 4 mg by mouth every 6 hours as needed for Nausea/Vomiting.    ONDANSETRON (ZOFRAN) 8 MG TAB    4 mg every 8 hours as needed for Nausea/Vomiting.    POTASSIUM CHLORIDE PO    Take 20 mEq by mouth every day.    ROSUVASTATIN (CRESTOR) 10 MG TAB    Take 10 mg by mouth every evening.       ALLERGIES  Patient has no known allergies.    PHYSICAL EXAM  Vital Signs: BP (!) 129/97   Pulse (!) 124   Temp 37.9 °C (100.2 °F) (Oral)   Resp (!) 47   Wt 78 kg (172 lb)   SpO2 92%   BMI 30.10 kg/m²   Constitutional: Alert, no acute distress, afebrile  HENT: Normocephalic  Cardiovascular: Tachycardic rate, regular rhythm  Pulmonary: Normal work of breathing, no respiratory distress, breath sounds quiet and equal bilaterally  Abdomen: Soft, non tender, no peritoneal signs, no rebound, no guarding, nondistended  Skin: Warm, dry, no rashes or lesions  Musculoskeletal: Normal range of motion in all extremities, no swelling or deformity noted, no unilateral lower extremity edema  Neurologic: Alert, oriented, normal motor function, no speech deficits    Diagnostic Studies & Procedures    Labs:  All labs reviewed by me as noted below.    EK Lead EKG interpreted by me as noted below  Results for orders placed or performed during the hospital encounter of 24   EKG   Result Value Ref Range    Report       [facility] Emergency Dept.    Test Date:  2024  Pt Name:    ORQUIDEA WOODSON                  Department: ER  MRN:        [id number]                      Room:  Gender:     Female                       Technician:  [id number]  :        [id number]                   Requested By:ER TRIAGE PROTOCOL  Order #:    [id number]                    Reading MD: ROCIO UMAÑA MD    Measurements  Intervals                                Axis  Rate:       133                          P:          0  PA:         0                            QRS:        73  QRSD:       89                           T:          268  QT:         355  QTc:        529    Interpretive Statements  Rate 133, artifact present limiting evaluation, P waves appear to be present,  no ST segment elevation though she does have some ST depressions in V4  through V6.  Electronically Signed On 2024 11:02:44 PST by ROCIO UMAÑA MD           Radiology:  The attending Emergency Physician has independently interpreted the following imaging:  I independently interpreted the CTA of the chest, no large central pulmonary embolism visualized.    CT-ABDOMEN-PELVIS WITH   Final Result      1.  No evidence of bowel obstruction or focal inflammatory change.      2.  Sigmoid diverticulosis.      3.  Fatty liver.      4.  Prior hysterectomy.      CT-CTA CHEST PULMONARY ARTERY W/ RECONS   Final Result      1.  Small right lower lobe pulmonary embolism.   2.  Mild groundglass and interstitial opacities in the lower lungs which may be due to edema and/or atelectasis/mild infiltrate.   3.  3 mm right upper lobe lung nodule.   4.  Oval complex collections in the bilateral breasts, left greater than right. This may be seroma/hematoma versus abscess not excluded. Correlate clinically.      VOALTE message of critical findings sent to Dr. Umaña at 3/9/2024 11:55 AM. I also received confirmation of receipt of VOALTE message back from the provider.      DX-CHEST-PORTABLE (1 VIEW)   Final Result      No evidence of acute cardiopulmonary process.          Course and Medical Decision Making    Initial Assessment and Plan:  Ms. Roe presents to the emergency department today for nausea and  vomiting, malaise, and chills, progressively worsening over the past 2 to 3 weeks.  On arrival to the emergency department she is afebrile, though tachycardic.  While in the emergency department temperature increased to 100.2, raising concern for febrile illness.  Additionally, she reports she has had loss of appetite, and ongoing vomiting with inability to tolerate food or fluids.  She states at this point vomiting is primarily a small amount of stomach acid, she is not able to keep food in her stomach at this time.    On laboratory evaluation she does have mild hyponatremia at 133, and hypokalemia at 3.1.  Anion gap is elevated to 28.  Lactic acid is 3.9.  She has a normal white blood count, normal hemoglobin, and normal platelet count.  CMP abnormalities are consistent with ongoing vomiting illness.    CT abdomen pelvis demonstrates no inflammatory changes, no evidence of intra-abdominal infection.    CT of the chest demonstrates small right lower lobe pulmonary embolism.  Mild groundglass and interstitial opacities in the lower lungs present, which may be edema, atelectasis, or infiltrates.  Complex collections in bilateral breasts, possibly seroma/hematoma.    I received a call from Dr. Millan, Radiologist, regarding critical finding of right lower lobe pulmonary embolism.    She was treated with a liter of IV fluids.  Due to tachycardia and high normal temperature she was treated with Rocephin out of concern for bacterial infection, including possible urinary tract infection, or bacteremia.  She is currently on Keytruda for treatment of breast cancer, is not on chemotherapy.  IV fluid bolus was administered with improvement in heart rate.    Plan at this time is for admission to hospitalist for supportive care and treatment for nausea and vomiting.  She has not had any episodes of vomiting in the emergency department.  Additionally plan is to initiate treatment for pulmonary embolism.  She will also require  monitoring and correction of electrolyte abnormalities.    Additional Problems and Disposition    I have discussed management of the patient with the following physicians:   Dr. Millan, Radiologist  Dr. Marroquin, Hospitalist    Disposition:  Admit in guarded condition    FINAL IMPRESSION   1. Nausea and vomiting, unspecified vomiting type    2. Hypokalemia    3. Lactic acidosis    4. Single subsegmental pulmonary embolism without acute cor pulmonale (HCC)

## 2024-03-09 NOTE — ASSESSMENT & PLAN NOTE
Vomiting resolved still with residual nausea but overall improved continue Zofran as needed and bowel protocol

## 2024-03-09 NOTE — ASSESSMENT & PLAN NOTE
Given orthostatic symptoms amlodipine discontinued  Monitor blood pressure adjust accordingly  3/19.  Continue to monitor

## 2024-03-09 NOTE — ASSESSMENT & PLAN NOTE
-Right lower lobe pulmonary embolism, in the setting of known cancer.  No signs of right heart strain.    -Continue anticoagulation with Eliquis.  -Continue respiratory support with RT protocol, weaned off  from supplemental oxygen .  3/19-continue to wean off oxygen.  Consider Lasix later

## 2024-03-09 NOTE — ED NOTES
Patient wheeled to and from the restroom. Unable to obtain urine collection. No distress noted. Family at bedside.

## 2024-03-09 NOTE — ED NOTES
Pt wheeled to the room  Pt changed into a gown  Pt placed on the monitor and chart up for ERP  Call light within reach

## 2024-03-09 NOTE — ASSESSMENT & PLAN NOTE
Improved sodium 132  DC IV fluids start salt tablets  3/19-stop salt tablet.  Patient is volume overloaded.

## 2024-03-09 NOTE — ED NOTES
Med Rec completed per patient   Allergies reviewed  No ORAL antibiotics in last 30 days    Patient is receiving Keytruda every 3 weeks   Last infusion was about 2 weeks ago

## 2024-03-09 NOTE — H&P
Hospital Medicine History & Physical Note    Date of Service  3/9/2024    Primary Care Physician  Yuko Lala P.A.-C.    Consultants  none      Code Status  Full Code    Chief Complaint  Chief Complaint   Patient presents with    Shortness of Breath    Flu Like Symptoms     X 3 weeks   Chills, body aches,        History of Presenting Illness  Yolanda Roe is a 58 y.o. female who presented 3/9/2024 with shortness of breath and vomiting.  Ms. Roe has a past medical history of dyslipidemia, hypertension, triple negative breast cancer status postmastectomy by Dr. Monahan.  She is undergoing chemotherapy by Dr. Gómez  She states fore past 3 weeks she is effectively no ability to any food because of intractable vomiting and has not had a bowel movements as she has not been on keep anything down.  She has felt short of breath though not had a fever and no chest pain.  In the emergency room labs revealed a significant metabolic acidosis with hypokalemia and a CT scan reveals right lower lobe pulmonary embolism.  CT abdomen pelvis is unremarkable.  She will be admitted for anticoagulation, IV fluids, IV antiemetics and further workup.    I discussed the plan of care with Dr. Umaña.    Review of Systems  Review of Systems   Constitutional:  Negative for chills and fever.   Respiratory:  Positive for shortness of breath.    Cardiovascular:  Negative for chest pain.   Gastrointestinal:  Positive for abdominal pain, nausea and vomiting. Negative for diarrhea.   All other systems reviewed and are negative.      Past Medical History   has a past medical history of Arthritis (03/09/2023), Cancer (HCC) (03/09/2023), Dental disorder (03/09/2023), High cholesterol (03/09/2023), Hypertension (03/09/2023), Malignant neoplasm of upper-outer quadrant of left breast in female, estrogen receptor positive (HCC) (03/21/2023), Pain, and Urinary incontinence.    Surgical History   has a past surgical history that includes other  orthopedic surgery (03/09/2023); hysterectomy, vaginal (1991); lumpectomy (2003); cath placement (Right, 03/21/2023); tubal ligation; mastectomy (Bilateral, 9/19/2023); and node biopsy sentinel (Left, 9/19/2023).     Family History  family history is not on file.   Family history reviewed with patient. There is no family history that is pertinent to the chief complaint.     Social History   reports that she has been smoking cigarettes. She started smoking about 28 years ago. She has a 14.1 pack-year smoking history. She has never been exposed to tobacco smoke. She has never used smokeless tobacco. She reports current alcohol use of about 4.2 oz of alcohol per week. She reports that she does not use drugs.    Allergies  No Known Allergies    Medications  Prior to Admission Medications   Prescriptions Last Dose Informant Patient Reported? Taking?   acetaminophen (TYLENOL 8 HOUR) 650 MG CR tablet ABOUT 1 WEEK at PRN Patient Yes Yes   Sig: Take 1,300 mg by mouth every 6 hours as needed. Indications: Pain   amLODIPine (NORVASC) 5 MG Tab 3/9/2024 at AM Patient No Yes   Sig: Take 1 Tablet by mouth every day.   lidocaine-prilocaine (EMLA) 2.5-2.5 % Cream PRN at PRN Patient Yes No   Sig: Apply 1 Application topically as needed. Indications: Anesthesia to a Specific Part of the Body   ondansetron (ZOFRAN ODT) 4 MG TABLET DISPERSIBLE ABOUT 1 WEEK at PRN Patient Yes No   Sig: Take 4 mg by mouth every 6 hours as needed for Nausea/Vomiting.   potassium chloride (KLOR-CON) 20 MEQ Pack 3/8/2024 at PM Patient Yes Yes   Sig: Take 20 mEq by mouth every day.   rosuvastatin (CRESTOR) 10 MG Tab 3/8/2024 at PM Patient Yes Yes   Sig: Take 10 mg by mouth every evening.      Facility-Administered Medications: None       Physical Exam  Temp:  [36.7 °C (98 °F)-37.9 °C (100.2 °F)] 37.9 °C (100.2 °F)  Pulse:  [124-133] 124  Resp:  [22-47] 47  BP: ()/(60-97) 129/97  SpO2:  [92 %-96 %] 92 %  Blood Pressure: (!) 129/97   Temperature: 37.9 °C  "(100.2 °F)   Pulse: (!) 124   Respiration: (!) 47   Pulse Oximetry: 92 %       Physical Exam  Vitals and nursing note reviewed.   Constitutional:       Appearance: She is ill-appearing.   Cardiovascular:      Rate and Rhythm: Regular rhythm. Tachycardia present.      Comments: Left chest mastectomy site without infection  Pulmonary:      Effort: Pulmonary effort is normal.      Breath sounds: Normal breath sounds.   Abdominal:      General: There is no distension.      Tenderness: There is no abdominal tenderness.   Musculoskeletal:      Right lower leg: No edema.      Left lower leg: No edema.   Skin:     General: Skin is warm and dry.   Neurological:      General: No focal deficit present.      Mental Status: She is alert and oriented to person, place, and time.   Psychiatric:         Mood and Affect: Mood normal.         Behavior: Behavior normal.         Laboratory:  Recent Labs     03/09/24  0941   WBC 7.7   RBC 4.30   HEMOGLOBIN 14.3   HEMATOCRIT 40.8   MCV 94.9   MCH 33.3*   MCHC 35.0   RDW 43.0   PLATELETCT 332   MPV 8.7*     Recent Labs     03/09/24  0941   SODIUM 133*   POTASSIUM 3.1*   CHLORIDE 89*   CO2 16*   GLUCOSE 104*   BUN 6*   CREATININE 0.95   CALCIUM 10.9*     Recent Labs     03/09/24  0941   ALTSGPT 16   ASTSGOT 40   ALKPHOSPHAT 146*   TBILIRUBIN 0.7   GLUCOSE 104*         No results for input(s): \"NTPROBNP\" in the last 72 hours.      No results for input(s): \"TROPONINT\" in the last 72 hours.    Imaging:  CT-ABDOMEN-PELVIS WITH   Final Result      1.  No evidence of bowel obstruction or focal inflammatory change.      2.  Sigmoid diverticulosis.      3.  Fatty liver.      4.  Prior hysterectomy.      CT-CTA CHEST PULMONARY ARTERY W/ RECONS   Final Result      1.  Small right lower lobe pulmonary embolism.   2.  Mild groundglass and interstitial opacities in the lower lungs which may be due to edema and/or atelectasis/mild infiltrate.   3.  3 mm right upper lobe lung nodule.   4.  Oval complex " collections in the bilateral breasts, left greater than right. This may be seroma/hematoma versus abscess not excluded. Correlate clinically.      VOALTE message of critical findings sent to Dr. Umaña at 3/9/2024 11:55 AM. I also received confirmation of receipt of VOALTE message back from the provider.      DX-CHEST-PORTABLE (1 VIEW)   Final Result      No evidence of acute cardiopulmonary process.              Assessment/Plan:  Justification for Admission Status  I anticipate this patient will require at least two midnights for appropriate medical management, necessitating inpatient admission because IV fluids and IV anti-emetics due to intractable vomiting as well as anticoagulation for acute pulmonary embolism    Patient will need a Telemetry bed on MEDICAL service .  The need is secondary to pulmonary embolism.    * Pulmonary embolism and infarction (HCC)- (present on admission)  Assessment & Plan  Right lower lobe pulmonary embolism  In the setting of known cancer  Anticoagulation will be initiated (Lovenox as she has been vomiting) as she is at significant risk of developing further thrombotic events    Intractable vomiting- (present on admission)  Assessment & Plan  This has been going on for 3 weeks.  She has significant dehydration and is not able to tolerate oral intake.  This may be due to her cancer treatment which will be discussed with oncology after the weekend  CT abdomen pelvis is negative  IV fluids, IV antiemetics    Breast cancer of upper-outer quadrant of left female breast (HCC)- (present on admission)  Assessment & Plan  Triple negative  S/p mastectomy  On treatment by Dr. Gómez  Regimen: : Pembrolizumab + PACLitaxel + CARBOplatin (x 4 cycles) followed by  Pembrolizumab + Cyclophosphamide + DOXOrubicin   (x4 cycles)*    Metabolic acidosis- (present on admission)  Assessment & Plan  Bicarb is low at 16 and anion gap is high at 28  Secondary to dehydration  IV fluids ordered  Follow urine  output  BMP ordered for the morning    Hypokalemia- (present on admission)  Assessment & Plan  Potassium is low at 3.1  IV fluids with K   Check BMP in the morning  Magnesium ordered    Hyponatremia- (present on admission)  Assessment & Plan  Na low at 133  hypovolemic    Primary hypertension- (present on admission)  Assessment & Plan  Continue Norvasc with holding parameters        VTE prophylaxis: therapeutic anticoagulation with Lovenox

## 2024-03-09 NOTE — PROGRESS NOTES
Report received from ER nurse. Assumed care of pt. PT transferred to T704-2 on ZOLL. Pt awake, laying in bed. A/Ox4, VSS.Pt oriented to unit and educated to call before getting out of bed. POC reviewed and white board updated. Tele box on. . Call light in reach. Bed locked in lowest position with 2 upper bed rails up. Bed alarm on. Pt is a moderate fall risk with assistance of one person necessary.

## 2024-03-09 NOTE — ED NOTES
Bedside report received from JASON Henry. Assumed care of patient and she is resting, denies any pain or needs. Family at bedside. Bed locked and in lowest position. Call light available and within reach. No oxygen requirements at this time. Appropriate fall precautions in place. Patient A&Ox4, GCS 15. Patient on cardiac monitoring, automatic BP and pulse oximeter. See MAR for medications and infusions. No distress noted.

## 2024-03-09 NOTE — ASSESSMENT & PLAN NOTE
-Triple negative  -S/p bilateral total mastectomy  -On treatment by Dr. Gómez. Regimen: : Pembrolizumab + PACLitaxel + CARBOplatin (x 4 cycles) followed by Pembrolizumab + Cyclophosphamide + DOXOrubicin   (x4 cycles)  -Continue outpatient follow-up with oncology.

## 2024-03-10 ENCOUNTER — APPOINTMENT (OUTPATIENT)
Dept: CARDIOLOGY | Facility: MEDICAL CENTER | Age: 59
DRG: 175 | End: 2024-03-10
Attending: INTERNAL MEDICINE
Payer: COMMERCIAL

## 2024-03-10 PROBLEM — E83.42 HYPOMAGNESEMIA: Status: ACTIVE | Noted: 2024-03-10

## 2024-03-10 PROBLEM — K59.00 CONSTIPATION: Status: ACTIVE | Noted: 2024-03-10

## 2024-03-10 LAB
ANION GAP SERPL CALC-SCNC: 21 MMOL/L (ref 7–16)
BASOPHILS # BLD AUTO: 0.6 % (ref 0–1.8)
BASOPHILS # BLD: 0.02 K/UL (ref 0–0.12)
BUN SERPL-MCNC: 5 MG/DL (ref 8–22)
CALCIUM SERPL-MCNC: 10 MG/DL (ref 8.5–10.5)
CHLORIDE SERPL-SCNC: 97 MMOL/L (ref 96–112)
CO2 SERPL-SCNC: 15 MMOL/L (ref 20–33)
CREAT SERPL-MCNC: 0.74 MG/DL (ref 0.5–1.4)
EOSINOPHIL # BLD AUTO: 0.15 K/UL (ref 0–0.51)
EOSINOPHIL NFR BLD: 4.2 % (ref 0–6.9)
ERYTHROCYTE [DISTWIDTH] IN BLOOD BY AUTOMATED COUNT: 43.8 FL (ref 35.9–50)
GFR SERPLBLD CREATININE-BSD FMLA CKD-EPI: 93 ML/MIN/1.73 M 2
GLUCOSE BLD STRIP.AUTO-MCNC: 100 MG/DL (ref 65–99)
GLUCOSE SERPL-MCNC: 77 MG/DL (ref 65–99)
HCT VFR BLD AUTO: 33.2 % (ref 37–47)
HGB BLD-MCNC: 11.5 G/DL (ref 12–16)
IMM GRANULOCYTES # BLD AUTO: 0.01 K/UL (ref 0–0.11)
IMM GRANULOCYTES NFR BLD AUTO: 0.3 % (ref 0–0.9)
LV EJECT FRACT  99904: 70
LYMPHOCYTES # BLD AUTO: 1.51 K/UL (ref 1–4.8)
LYMPHOCYTES NFR BLD: 41.8 % (ref 22–41)
MAGNESIUM SERPL-MCNC: 1.4 MG/DL (ref 1.5–2.5)
MCH RBC QN AUTO: 33 PG (ref 27–33)
MCHC RBC AUTO-ENTMCNC: 34.6 G/DL (ref 32.2–35.5)
MCV RBC AUTO: 95.1 FL (ref 81.4–97.8)
MONOCYTES # BLD AUTO: 0.48 K/UL (ref 0–0.85)
MONOCYTES NFR BLD AUTO: 13.3 % (ref 0–13.4)
NEUTROPHILS # BLD AUTO: 1.44 K/UL (ref 1.82–7.42)
NEUTROPHILS NFR BLD: 39.8 % (ref 44–72)
NRBC # BLD AUTO: 0 K/UL
NRBC BLD-RTO: 0 /100 WBC (ref 0–0.2)
PHOSPHATE SERPL-MCNC: 3.4 MG/DL (ref 2.5–4.5)
PLATELET # BLD AUTO: 247 K/UL (ref 164–446)
PMV BLD AUTO: 9.2 FL (ref 9–12.9)
POTASSIUM SERPL-SCNC: 3.2 MMOL/L (ref 3.6–5.5)
PROCALCITONIN SERPL-MCNC: 0.11 NG/ML
RBC # BLD AUTO: 3.49 M/UL (ref 4.2–5.4)
SODIUM SERPL-SCNC: 133 MMOL/L (ref 135–145)
WBC # BLD AUTO: 3.6 K/UL (ref 4.8–10.8)

## 2024-03-10 PROCEDURE — 700102 HCHG RX REV CODE 250 W/ 637 OVERRIDE(OP): Performed by: HOSPITALIST

## 2024-03-10 PROCEDURE — 700105 HCHG RX REV CODE 258: Performed by: INTERNAL MEDICINE

## 2024-03-10 PROCEDURE — 85025 COMPLETE CBC W/AUTO DIFF WBC: CPT

## 2024-03-10 PROCEDURE — 700117 HCHG RX CONTRAST REV CODE 255: Performed by: INTERNAL MEDICINE

## 2024-03-10 PROCEDURE — 700105 HCHG RX REV CODE 258: Performed by: HOSPITALIST

## 2024-03-10 PROCEDURE — 700111 HCHG RX REV CODE 636 W/ 250 OVERRIDE (IP): Performed by: HOSPITALIST

## 2024-03-10 PROCEDURE — 82962 GLUCOSE BLOOD TEST: CPT

## 2024-03-10 PROCEDURE — 83735 ASSAY OF MAGNESIUM: CPT

## 2024-03-10 PROCEDURE — 99233 SBSQ HOSP IP/OBS HIGH 50: CPT | Performed by: INTERNAL MEDICINE

## 2024-03-10 PROCEDURE — A9270 NON-COVERED ITEM OR SERVICE: HCPCS | Performed by: INTERNAL MEDICINE

## 2024-03-10 PROCEDURE — 80048 BASIC METABOLIC PNL TOTAL CA: CPT

## 2024-03-10 PROCEDURE — 770020 HCHG ROOM/CARE - TELE (206)

## 2024-03-10 PROCEDURE — 93306 TTE W/DOPPLER COMPLETE: CPT | Mod: 26 | Performed by: INTERNAL MEDICINE

## 2024-03-10 PROCEDURE — 93306 TTE W/DOPPLER COMPLETE: CPT

## 2024-03-10 PROCEDURE — A9270 NON-COVERED ITEM OR SERVICE: HCPCS | Performed by: HOSPITALIST

## 2024-03-10 PROCEDURE — 84145 PROCALCITONIN (PCT): CPT

## 2024-03-10 PROCEDURE — 700102 HCHG RX REV CODE 250 W/ 637 OVERRIDE(OP): Performed by: INTERNAL MEDICINE

## 2024-03-10 PROCEDURE — 36415 COLL VENOUS BLD VENIPUNCTURE: CPT

## 2024-03-10 PROCEDURE — 700111 HCHG RX REV CODE 636 W/ 250 OVERRIDE (IP): Performed by: INTERNAL MEDICINE

## 2024-03-10 PROCEDURE — 84100 ASSAY OF PHOSPHORUS: CPT

## 2024-03-10 PROCEDURE — 87040 BLOOD CULTURE FOR BACTERIA: CPT

## 2024-03-10 RX ORDER — SODIUM BICARBONATE IN D5W 150/1000ML
PLASTIC BAG, INJECTION (ML) INTRAVENOUS CONTINUOUS
Status: DISCONTINUED | OUTPATIENT
Start: 2024-03-10 | End: 2024-03-11

## 2024-03-10 RX ORDER — AMOXICILLIN 250 MG
2 CAPSULE ORAL 2 TIMES DAILY
Status: DISCONTINUED | OUTPATIENT
Start: 2024-03-10 | End: 2024-03-20 | Stop reason: HOSPADM

## 2024-03-10 RX ORDER — MAGNESIUM SULFATE HEPTAHYDRATE 40 MG/ML
2 INJECTION, SOLUTION INTRAVENOUS ONCE
Status: COMPLETED | OUTPATIENT
Start: 2024-03-10 | End: 2024-03-10

## 2024-03-10 RX ORDER — POTASSIUM CHLORIDE 20 MEQ/1
40 TABLET, EXTENDED RELEASE ORAL ONCE
Status: COMPLETED | OUTPATIENT
Start: 2024-03-10 | End: 2024-03-10

## 2024-03-10 RX ORDER — POLYETHYLENE GLYCOL 3350 17 G/17G
1 POWDER, FOR SOLUTION ORAL 2 TIMES DAILY
Status: DISCONTINUED | OUTPATIENT
Start: 2024-03-10 | End: 2024-03-20 | Stop reason: HOSPADM

## 2024-03-10 RX ADMIN — POTASSIUM CHLORIDE 40 MEQ: 1500 TABLET, EXTENDED RELEASE ORAL at 12:37

## 2024-03-10 RX ADMIN — ACETAMINOPHEN 650 MG: 325 TABLET, FILM COATED ORAL at 23:27

## 2024-03-10 RX ADMIN — SODIUM BICARBONATE: 84 INJECTION, SOLUTION INTRAVENOUS at 12:44

## 2024-03-10 RX ADMIN — POLYETHYLENE GLYCOL 3350 1 PACKET: 17 POWDER, FOR SOLUTION ORAL at 10:27

## 2024-03-10 RX ADMIN — OXYCODONE 5 MG: 5 TABLET ORAL at 17:39

## 2024-03-10 RX ADMIN — MAGNESIUM SULFATE HEPTAHYDRATE 2 G: 2 INJECTION, SOLUTION INTRAVENOUS at 08:38

## 2024-03-10 RX ADMIN — POTASSIUM CHLORIDE: 2 INJECTION, SOLUTION, CONCENTRATE INTRAVENOUS at 04:32

## 2024-03-10 RX ADMIN — OXYCODONE 5 MG: 5 TABLET ORAL at 07:22

## 2024-03-10 RX ADMIN — AMLODIPINE BESYLATE 5 MG: 5 TABLET ORAL at 05:04

## 2024-03-10 RX ADMIN — HUMAN ALBUMIN MICROSPHERES AND PERFLUTREN 3 ML: 10; .22 INJECTION, SOLUTION INTRAVENOUS at 12:44

## 2024-03-10 RX ADMIN — APIXABAN 10 MG: 5 TABLET, FILM COATED ORAL at 16:50

## 2024-03-10 RX ADMIN — ENOXAPARIN SODIUM 80 MG: 100 INJECTION SUBCUTANEOUS at 05:04

## 2024-03-10 RX ADMIN — DOCUSATE SODIUM 50 MG AND SENNOSIDES 8.6 MG 2 TABLET: 8.6; 5 TABLET, FILM COATED ORAL at 17:33

## 2024-03-10 ASSESSMENT — PAIN DESCRIPTION - PAIN TYPE
TYPE: ACUTE PAIN

## 2024-03-10 ASSESSMENT — PATIENT HEALTH QUESTIONNAIRE - PHQ9
2. FEELING DOWN, DEPRESSED, IRRITABLE, OR HOPELESS: NOT AT ALL
SUM OF ALL RESPONSES TO PHQ9 QUESTIONS 1 AND 2: 0
1. LITTLE INTEREST OR PLEASURE IN DOING THINGS: NOT AT ALL

## 2024-03-10 ASSESSMENT — FIBROSIS 4 INDEX: FIB4 SCORE: 2.39

## 2024-03-10 NOTE — CARE PLAN
The patient is Stable - Low risk of patient condition declining or worsening    Shift Goals  Clinical Goals: VSS, Comfort, Pain management  Patient Goals: control pain, rest  Family Goals: NED    Progress made toward(s) clinical / shift goals:    Problem: Pain - Standard  Goal: Alleviation of pain or a reduction in pain to the patient’s comfort goal  Outcome: Progressing     Problem: Knowledge Deficit - Standard  Goal: Patient and family/care givers will demonstrate understanding of plan of care, disease process/condition, diagnostic tests and medications  Outcome: Progressing     Problem: Skin Integrity  Goal: Skin integrity is maintained or improved  Outcome: Progressing     Problem: Fall Risk  Goal: Patient will remain free from falls  Outcome: Progressing       Patient is not progressing towards the following goals:

## 2024-03-10 NOTE — PROGRESS NOTES
Received bedside report from RN, pt care assumed, VSS, pt assessment complete. Pt AAOx4, with 5/10 pain at this time. No signs of acute distress noted at this time. Plan of care discussed with pt and verbalizes no questions. Pt denies any additional needs at this time. Bed locked/in lowest position, bed alarm on, pt educated on fall risk and verbalized understanding, call light within reach, hourly rounding initiated.

## 2024-03-10 NOTE — DIETARY
Nutrition services: Day 1 of admit.  Yolanda Roe is a 58 y.o. female with admitting DX of pulmonary embolism.    Consult received for wt loss (2-13 lbs in 1 month), poor PO per admit screen. Attempted to speak with pt at bedside x 2, however pt was busy with staff. Pt appeared adequately nourished. Pt reported abdominal pain, nausea and vomiting per H&P. She has not been able to keep food down for ~3 weeks.     Assessment:  Weight: 74.1 kg (163 lb 5.8 oz)  Body mass index is 28.59 kg/m²., BMI classification: overweight  Diet/Intake: Regular; PO 25-50% for one meal thus far    Evaluation:   Admitted with shortness of breath, flu like symptoms.  PMH: Arthritis, Cancer, High cholesterol, Hypertension, Malignant neoplasm of upper-outer quadrant of left breast in female, estrogen receptor positive, Pain, and Urinary incontinence.  CT showed right lower lobe pulmonary embolism.  Currently undergoing chemotherapy.  Wt hx per chart review: 172 lbs 2/28/24, 182 lbs 2/7/24, 183 lbs 1/17/24, 181 lbs 12/27/23, 184 lbs 9/22/23. Wt loss of 10% in one month is severe.    Malnutrition Risk: Pt with severe, acute malnutrition related to diminished PO intake with vomiting (currently undergoing chemotherapy), AEB severe wt loss of 10% in one month and <50% of estimated energy intake >5 days.     Recommendations/Plan:  Encourage intake of meals.  Document intake of all meals as % taken in ADLs to provide interdisciplinary communication across all shifts.   Monitor weight.  Nutrition rep will continue to see patient for ongoing meal and snack preferences.   Oral nutrition supplements as needed to bolster intake and optimize nutrition.    RD will follow.

## 2024-03-10 NOTE — PROGRESS NOTES
[facility] Medicine Daily Progress Note    Date of Service  3/10/2024    Chief Complaint  Shortness of breath, flulike symptoms    Hospital Course  Yolanda Roe is a 58 y.o. female with a past medical history of dyslipidemia, hypertension, triple negative breast cancer status postmastectomy by Dr. Monahan and is undergoing chemotherapy by Dr. Gómez, who presented 3/9/2024 with shortness of breath and vomiting.  She states for the past 3 weeks she is effectively not able to take any food because of intractable vomiting and has not had a bowel movements as she has not been on keep anything down.  She has felt short of breath though not had a fever and no chest pain.  On evaluation, labs revealed a significant metabolic acidosis with hypokalemia and a CT scan reveals right lower lobe pulmonary embolism.  CT abdomen pelvis was unremarkable.  She is started on anticoagulation with therapeutic Lovenox.  Her nausea/vomiting was felt to be related to her chemotherapy, and she was started on IV fluids and antiemetics.        Interval Problem Update  3/10/2024 - I reviewed the patient's chart today. Uneventful night. VSS. Afebrile.  Tachycardic in the 100s.  Saturating well on RA.  No leukocytosis with increased lymphocytosis.  Hemoglobin 11.5.  Platelet count is normal.  Sodium 133, potassium 3.2.  Creatinine is normal.  CO2 still 15 and anion gap 21.  Magnesium is 1.4.  Lactates are normal.  Urinalysis only showed 5-10 WBC with negative leukocyte esterase or nitrate.  Influenza, RSV, and COVID-19 PCR's were negative.  Maintaining sinus rhythm on telemetry.    > I have personally seen and examined the patient today.  Nausea and vomiting has improved.  However, states she has not had a bowel movement in 3 weeks.  Tolerating oral intake.  Not short of breath.  No chest pain.  No fevers or chills.    I personally reviewed all lab results mentioned above. Prior medical records from this institution and outside facilities were  independently reviewed as noted. I also personally reviewed all ER physician and consultant recommendations and plans as documented above. History was independently obtained by myself. I have discussed this patient's plan of care and discharge plan at IDT rounds today with Case Management, Nursing, Nursing leadership, and other members of the IDT team.    Consultants/Specialty  None    Code Status  Full Code    Disposition  The patient is not medically cleared for discharge to home or a post-acute facility.      Discharge plan TBD.  Anticipate discharge to home once medically cleared.  I have placed the appropriate orders for post-discharge needs.    Review of Systems  ROS     Pertinent positives/negatives as mentioned above.     A complete review of systems was personally done by me. All other systems were negative.       Physical Exam  Temp:  [36.7 °C (98.1 °F)-37.9 °C (100.2 °F)] 37 °C (98.6 °F)  Pulse:  [107-124] 111  Resp:  [14-47] 16  BP: (110-153)/(60-89) 113/67  SpO2:  [89 %-95 %] 90 %    Physical Exam  Vitals reviewed.   Constitutional:       General: She is not in acute distress.     Appearance: Normal appearance. She is normal weight. She is not ill-appearing or diaphoretic.   HENT:      Head: Normocephalic and atraumatic.      Right Ear: External ear normal.      Left Ear: External ear normal.      Mouth/Throat:      Mouth: Mucous membranes are moist.      Pharynx: No oropharyngeal exudate or posterior oropharyngeal erythema.   Eyes:      General: No scleral icterus.     Extraocular Movements: Extraocular movements intact.      Conjunctiva/sclera: Conjunctivae normal.      Pupils: Pupils are equal, round, and reactive to light.   Cardiovascular:      Rate and Rhythm: Normal rate and regular rhythm.      Heart sounds: Normal heart sounds. No murmur heard.  Pulmonary:      Effort: Pulmonary effort is normal. No respiratory distress.      Breath sounds: Normal breath sounds. No stridor. No wheezing, rhonchi  or rales.   Chest:      Chest wall: No tenderness.   Abdominal:      General: Bowel sounds are normal. There is no distension.      Palpations: Abdomen is soft. There is no mass.      Tenderness: There is no abdominal tenderness. There is no guarding or rebound.   Musculoskeletal:         General: No swelling. Normal range of motion.      Cervical back: Normal range of motion and neck supple. No rigidity. No muscular tenderness.      Right lower leg: No edema.      Left lower leg: No edema.   Lymphadenopathy:      Cervical: No cervical adenopathy.   Skin:     General: Skin is warm and dry.      Coloration: Skin is not jaundiced.      Findings: No rash.   Neurological:      General: No focal deficit present.      Mental Status: She is alert and oriented to person, place, and time. Mental status is at baseline.      Cranial Nerves: No cranial nerve deficit.   Psychiatric:         Mood and Affect: Mood normal.         Behavior: Behavior normal.         Thought Content: Thought content normal.         Judgment: Judgment normal.         Fluids    Intake/Output Summary (Last 24 hours) at 3/10/2024 1051  Last data filed at 3/9/2024 1600  Gross per 24 hour   Intake 100 ml   Output 300 ml   Net -200 ml       Laboratory  Recent Labs     03/09/24  0941 03/10/24  0319   WBC 7.7 3.6*   RBC 4.30 3.49*   HEMOGLOBIN 14.3 11.5*   HEMATOCRIT 40.8 33.2*   MCV 94.9 95.1   MCH 33.3* 33.0   MCHC 35.0 34.6   RDW 43.0 43.8   PLATELETCT 332 247   MPV 8.7* 9.2     Recent Labs     03/09/24  0941 03/10/24  0100   SODIUM 133* 133*   POTASSIUM 3.1* 3.2*   CHLORIDE 89* 97   CO2 16* 15*   GLUCOSE 104* 77   BUN 6* 5*   CREATININE 0.95 0.74   CALCIUM 10.9* 10.0                   Imaging  CT-ABDOMEN-PELVIS WITH   Final Result      1.  No evidence of bowel obstruction or focal inflammatory change.      2.  Sigmoid diverticulosis.      3.  Fatty liver.      4.  Prior hysterectomy.      CT-CTA CHEST PULMONARY ARTERY W/ RECONS   Final Result      1.   Small right lower lobe pulmonary embolism.   2.  Mild groundglass and interstitial opacities in the lower lungs which may be due to edema and/or atelectasis/mild infiltrate.   3.  3 mm right upper lobe lung nodule.   4.  Oval complex collections in the bilateral breasts, left greater than right. This may be seroma/hematoma versus abscess not excluded. Correlate clinically.      VOALTE message of critical findings sent to Dr. Umaña at 3/9/2024 11:55 AM. I also received confirmation of receipt of VOALTE message back from the provider.      DX-CHEST-PORTABLE (1 VIEW)   Final Result      No evidence of acute cardiopulmonary process.      EC-ECHOCARDIOGRAM COMPLETE W/O CONT    (Results Pending)        Assessment/Plan  * Pulmonary embolism and infarction (HCC)- (present on admission)  Assessment & Plan  -Right lower lobe pulmonary embolism, in the setting of known cancer  -Able to tolerate oral intake better.  Will transition to oral Eliquis.  -Will obtain an echocardiogram to evaluate ventricular function, but does not appear to have right heart strain.  Not hypoxic.  -Continue to monitor on telemetry.    Intractable vomiting- (present on admission)  Assessment & Plan  -This has been going on for 3 weeks.  She has significant dehydration and was not able to tolerate oral intake.  Most likely due to cancer treatment/chemotherapy. CT abdomen pelvis was negative.  -Symptoms better.  -Continue to hydrate with IV fluids.  Continue as needed antiemetics.    Constipation- (present on admission)  Assessment & Plan  -Reportedly has not had a bowel movement in 3 weeks.  Will place on aggressive bowel regimen with twice daily MiraLAX and scheduled Colace.  Encouraged to ambulate.    Hypomagnesemia- (present on admission)  Assessment & Plan  -Replace with 2 g of IV magnesium sulfate.  Repeat BMP in the morning.    Hypokalemia- (present on admission)  Assessment & Plan  - Potassium remains low at 3.2.  Continue replacement with IV  potassium with IV fluids, LR+20mEq KCl.  Will give an additional oral K-Dur 40 mEq.  Replace low magnesium.  BMP in the morning.    Hyponatremia- (present on admission)  Assessment & Plan  -Na low at 133, due to hypovolemia.  -Continue IV fluid rehydration.  Trend sodium levels.    Metabolic acidosis- (present on admission)  Assessment & Plan  -Bicarb is low at 16 and anion gap is high at 28  -Secondary to dehydration  -Continue IV fluids with LR.   -Monitor for improvement in CO2 and gap.  BMP in the morning.    Breast cancer of upper-outer quadrant of left female breast (HCC)- (present on admission)  Assessment & Plan  -Triple negative  -S/p mastectomy  -On treatment by Dr. Gómez. Regimen: : Pembrolizumab + PACLitaxel + CARBOplatin (x 4 cycles) followed by Pembrolizumab + Cyclophosphamide + DOXOrubicin   (x4 cycles)  -Continue outpatient follow-up with oncology.    Primary hypertension- (present on admission)  Assessment & Plan  -Maintaining good control.  Continue Norvasc.  Optimize blood pressure control.         VTE prophylaxis: Eliquis      My total time spent caring for the patient on the day of the encounter was 52 minutes. This does not include time spent on separately billable procedures/tests.

## 2024-03-10 NOTE — PROGRESS NOTES
Monitor Summary  Rhythm: ST  Rate: 104-108  Ectopy: (R) PVC  Measurements: .14/.06/.42  ---12 hr Chart Review---  Attachment unavailable

## 2024-03-10 NOTE — PROGRESS NOTES
4 Eyes Skin Assessment Completed by JASON Louis and JASON Simeon.    Head WDL  Ears Redness and Blanching  Nose WDL  Mouth WDL  Neck WDL  Breast/Chest Scar (double mastectomy)  Shoulder Blades WDL  Spine WDL  (R) Arm/Elbow/Hand WDL  (L) Arm/Elbow/Hand WDL  Abdomen WDL  Groin WDL  Scrotum/Coccyx/Buttocks WDL  (R) Leg WDL  (L) Leg WDL  (R) Heel/Foot/Toe WDL  (L) Heel/Foot/Toe WDL          Devices In Places Tele Box, Blood Pressure Cuff, and Pulse Ox      Interventions In Place Pressure Redistribution Mattress    Possible Skin Injury No    Pictures Uploaded Into Epic No, needs to be completed  Wound Consult Placed N/A  RN Wound Prevention Protocol Ordered No

## 2024-03-10 NOTE — CARE PLAN
The patient is Stable - Low risk of patient condition declining or worsening         Progress made toward(s) clinical / shift goals:    Problem: Skin Integrity  Goal: Skin integrity is maintained or improved  Outcome: Progressing   Pt has no signs of pressure injury and has turned in bed  Problem: Fall Risk  Goal: Patient will remain free from falls  Outcome: Progressing   Pt has upper bed rails up, bed locked in lowest position, belongings within reach, bed alarm on, and call light within reach    Patient is not progressing towards the following goals:      Problem: Pain - Standard  Goal: Alleviation of pain or a reduction in pain to the patient’s comfort goal  Outcome: Not Progressing   Pt stated that pain has not improved with use of heat packs and rest; also stated that Tylenol has not managed pain in the past

## 2024-03-10 NOTE — PROGRESS NOTES
Received bedside report from RN. Patient is A&Ox4. Physical assessment completed. Patient updated on plan of care and verbalized an understanding. Call light and belongings are within reach. Fall prevention teaching reinforced. Plan of care is ongoing.

## 2024-03-10 NOTE — PROGRESS NOTES
Monitor summary:        Rhythm: ST  Rate: 109-120  Ectopy: N/A  Measurements: 13./.09/.30        12hr chart check

## 2024-03-11 PROBLEM — T88.8XXA FLUID COLLECTION AT SURGICAL SITE: Status: ACTIVE | Noted: 2024-03-11

## 2024-03-11 PROBLEM — F17.200 TOBACCO DEPENDENCE: Status: RESOLVED | Noted: 2024-03-11 | Resolved: 2024-03-11

## 2024-03-11 PROBLEM — F17.200 TOBACCO DEPENDENCE: Status: ACTIVE | Noted: 2024-03-11

## 2024-03-11 LAB
ANION GAP SERPL CALC-SCNC: 14 MMOL/L (ref 7–16)
ANION GAP SERPL CALC-SCNC: 15 MMOL/L (ref 7–16)
BACTERIA UR CULT: NORMAL
BUN SERPL-MCNC: 3 MG/DL (ref 8–22)
BUN SERPL-MCNC: 4 MG/DL (ref 8–22)
CALCIUM SERPL-MCNC: 9.6 MG/DL (ref 8.5–10.5)
CALCIUM SERPL-MCNC: 9.9 MG/DL (ref 8.5–10.5)
CHLORIDE SERPL-SCNC: 95 MMOL/L (ref 96–112)
CHLORIDE SERPL-SCNC: 97 MMOL/L (ref 96–112)
CO2 SERPL-SCNC: 24 MMOL/L (ref 20–33)
CO2 SERPL-SCNC: 25 MMOL/L (ref 20–33)
CREAT SERPL-MCNC: 0.8 MG/DL (ref 0.5–1.4)
CREAT SERPL-MCNC: 0.89 MG/DL (ref 0.5–1.4)
ERYTHROCYTE [DISTWIDTH] IN BLOOD BY AUTOMATED COUNT: 41.1 FL (ref 35.9–50)
GFR SERPLBLD CREATININE-BSD FMLA CKD-EPI: 74 ML/MIN/1.73 M 2
GFR SERPLBLD CREATININE-BSD FMLA CKD-EPI: 84 ML/MIN/1.73 M 2
GLUCOSE SERPL-MCNC: 123 MG/DL (ref 65–99)
GLUCOSE SERPL-MCNC: 138 MG/DL (ref 65–99)
HCT VFR BLD AUTO: 30.5 % (ref 37–47)
HGB BLD-MCNC: 10.8 G/DL (ref 12–16)
MAGNESIUM SERPL-MCNC: 1.5 MG/DL (ref 1.5–2.5)
MCH RBC QN AUTO: 32.3 PG (ref 27–33)
MCHC RBC AUTO-ENTMCNC: 35.4 G/DL (ref 32.2–35.5)
MCV RBC AUTO: 91.3 FL (ref 81.4–97.8)
PLATELET # BLD AUTO: 228 K/UL (ref 164–446)
PMV BLD AUTO: 8.9 FL (ref 9–12.9)
POTASSIUM SERPL-SCNC: 2.3 MMOL/L (ref 3.6–5.5)
POTASSIUM SERPL-SCNC: 2.6 MMOL/L (ref 3.6–5.5)
RBC # BLD AUTO: 3.34 M/UL (ref 4.2–5.4)
SIGNIFICANT IND 70042: NORMAL
SITE SITE: NORMAL
SODIUM SERPL-SCNC: 134 MMOL/L (ref 135–145)
SODIUM SERPL-SCNC: 136 MMOL/L (ref 135–145)
SOURCE SOURCE: NORMAL
WBC # BLD AUTO: 3.5 K/UL (ref 4.8–10.8)

## 2024-03-11 PROCEDURE — 85027 COMPLETE CBC AUTOMATED: CPT

## 2024-03-11 PROCEDURE — 99221 1ST HOSP IP/OBS SF/LOW 40: CPT | Performed by: SURGERY

## 2024-03-11 PROCEDURE — 700111 HCHG RX REV CODE 636 W/ 250 OVERRIDE (IP): Mod: JZ | Performed by: INTERNAL MEDICINE

## 2024-03-11 PROCEDURE — 36415 COLL VENOUS BLD VENIPUNCTURE: CPT

## 2024-03-11 PROCEDURE — 83735 ASSAY OF MAGNESIUM: CPT

## 2024-03-11 PROCEDURE — 700101 HCHG RX REV CODE 250: Performed by: INTERNAL MEDICINE

## 2024-03-11 PROCEDURE — 700105 HCHG RX REV CODE 258: Performed by: INTERNAL MEDICINE

## 2024-03-11 PROCEDURE — 700102 HCHG RX REV CODE 250 W/ 637 OVERRIDE(OP): Mod: JZ

## 2024-03-11 PROCEDURE — 700111 HCHG RX REV CODE 636 W/ 250 OVERRIDE (IP)

## 2024-03-11 PROCEDURE — 700111 HCHG RX REV CODE 636 W/ 250 OVERRIDE (IP): Performed by: HOSPITALIST

## 2024-03-11 PROCEDURE — 700102 HCHG RX REV CODE 250 W/ 637 OVERRIDE(OP): Performed by: INTERNAL MEDICINE

## 2024-03-11 PROCEDURE — A9270 NON-COVERED ITEM OR SERVICE: HCPCS | Performed by: INTERNAL MEDICINE

## 2024-03-11 PROCEDURE — A9270 NON-COVERED ITEM OR SERVICE: HCPCS | Performed by: HOSPITALIST

## 2024-03-11 PROCEDURE — A9270 NON-COVERED ITEM OR SERVICE: HCPCS | Mod: JZ

## 2024-03-11 PROCEDURE — 80048 BASIC METABOLIC PNL TOTAL CA: CPT

## 2024-03-11 PROCEDURE — 700102 HCHG RX REV CODE 250 W/ 637 OVERRIDE(OP): Performed by: HOSPITALIST

## 2024-03-11 PROCEDURE — 770020 HCHG ROOM/CARE - TELE (206)

## 2024-03-11 PROCEDURE — 99233 SBSQ HOSP IP/OBS HIGH 50: CPT | Performed by: INTERNAL MEDICINE

## 2024-03-11 RX ORDER — SODIUM CHLORIDE 9 MG/ML
INJECTION, SOLUTION INTRAVENOUS CONTINUOUS
Status: DISCONTINUED | OUTPATIENT
Start: 2024-03-11 | End: 2024-03-11

## 2024-03-11 RX ORDER — MAGNESIUM SULFATE HEPTAHYDRATE 40 MG/ML
2 INJECTION, SOLUTION INTRAVENOUS ONCE
Status: COMPLETED | OUTPATIENT
Start: 2024-03-11 | End: 2024-03-11

## 2024-03-11 RX ORDER — POTASSIUM CHLORIDE 20 MEQ/1
40 TABLET, EXTENDED RELEASE ORAL EVERY 4 HOURS
Status: COMPLETED | OUTPATIENT
Start: 2024-03-11 | End: 2024-03-11

## 2024-03-11 RX ORDER — POTASSIUM CHLORIDE 20 MEQ/1
60 TABLET, EXTENDED RELEASE ORAL ONCE
Status: DISCONTINUED | OUTPATIENT
Start: 2024-03-11 | End: 2024-03-12

## 2024-03-11 RX ORDER — POTASSIUM CHLORIDE 7.45 MG/ML
10 INJECTION INTRAVENOUS
Status: COMPLETED | OUTPATIENT
Start: 2024-03-11 | End: 2024-03-11

## 2024-03-11 RX ORDER — POTASSIUM CHLORIDE 20 MEQ/1
60 TABLET, EXTENDED RELEASE ORAL ONCE
Status: COMPLETED | OUTPATIENT
Start: 2024-03-11 | End: 2024-03-11

## 2024-03-11 RX ORDER — SODIUM CHLORIDE AND POTASSIUM CHLORIDE 300; 900 MG/100ML; MG/100ML
INJECTION, SOLUTION INTRAVENOUS CONTINUOUS
Status: DISCONTINUED | OUTPATIENT
Start: 2024-03-11 | End: 2024-03-13

## 2024-03-11 RX ADMIN — AMPICILLIN AND SULBACTAM 3 G: 1; 2 INJECTION, POWDER, FOR SOLUTION INTRAMUSCULAR; INTRAVENOUS at 16:43

## 2024-03-11 RX ADMIN — POTASSIUM CHLORIDE 10 MEQ: 7.46 INJECTION, SOLUTION INTRAVENOUS at 02:27

## 2024-03-11 RX ADMIN — POTASSIUM CHLORIDE 40 MEQ: 1500 TABLET, EXTENDED RELEASE ORAL at 05:11

## 2024-03-11 RX ADMIN — AMPICILLIN AND SULBACTAM 3 G: 1; 2 INJECTION, POWDER, FOR SOLUTION INTRAMUSCULAR; INTRAVENOUS at 08:30

## 2024-03-11 RX ADMIN — AMPICILLIN AND SULBACTAM 3 G: 1; 2 INJECTION, POWDER, FOR SOLUTION INTRAMUSCULAR; INTRAVENOUS at 13:38

## 2024-03-11 RX ADMIN — SODIUM CHLORIDE: 9 INJECTION, SOLUTION INTRAVENOUS at 08:30

## 2024-03-11 RX ADMIN — APIXABAN 10 MG: 5 TABLET, FILM COATED ORAL at 05:11

## 2024-03-11 RX ADMIN — MAGNESIUM SULFATE HEPTAHYDRATE 2 G: 2 INJECTION, SOLUTION INTRAVENOUS at 03:41

## 2024-03-11 RX ADMIN — POTASSIUM CHLORIDE AND SODIUM CHLORIDE: 900; 300 INJECTION, SOLUTION INTRAVENOUS at 18:56

## 2024-03-11 RX ADMIN — MAGNESIUM SULFATE HEPTAHYDRATE 2 G: 2 INJECTION, SOLUTION INTRAVENOUS at 18:52

## 2024-03-11 RX ADMIN — POTASSIUM CHLORIDE 40 MEQ: 1500 TABLET, EXTENDED RELEASE ORAL at 02:23

## 2024-03-11 RX ADMIN — OXYCODONE 5 MG: 5 TABLET ORAL at 08:18

## 2024-03-11 RX ADMIN — ACETAMINOPHEN 650 MG: 325 TABLET, FILM COATED ORAL at 13:41

## 2024-03-11 RX ADMIN — APIXABAN 10 MG: 5 TABLET, FILM COATED ORAL at 17:01

## 2024-03-11 RX ADMIN — OXYCODONE 10 MG: 5 TABLET ORAL at 13:41

## 2024-03-11 RX ADMIN — POLYETHYLENE GLYCOL 3350 1 PACKET: 17 POWDER, FOR SOLUTION ORAL at 05:11

## 2024-03-11 RX ADMIN — POTASSIUM CHLORIDE 10 MEQ: 7.46 INJECTION, SOLUTION INTRAVENOUS at 03:37

## 2024-03-11 RX ADMIN — DOCUSATE SODIUM 50 MG AND SENNOSIDES 8.6 MG 2 TABLET: 8.6; 5 TABLET, FILM COATED ORAL at 05:11

## 2024-03-11 RX ADMIN — PROCHLORPERAZINE EDISYLATE 5 MG: 5 INJECTION INTRAMUSCULAR; INTRAVENOUS at 15:26

## 2024-03-11 RX ADMIN — ROSUVASTATIN 10 MG: 10 TABLET, FILM COATED ORAL at 20:37

## 2024-03-11 RX ADMIN — AMPICILLIN AND SULBACTAM 3 G: 1; 2 INJECTION, POWDER, FOR SOLUTION INTRAMUSCULAR; INTRAVENOUS at 23:22

## 2024-03-11 RX ADMIN — POTASSIUM CHLORIDE 60 MEQ: 1500 TABLET, EXTENDED RELEASE ORAL at 23:15

## 2024-03-11 ASSESSMENT — PATIENT HEALTH QUESTIONNAIRE - PHQ9
1. LITTLE INTEREST OR PLEASURE IN DOING THINGS: NOT AT ALL
2. FEELING DOWN, DEPRESSED, IRRITABLE, OR HOPELESS: NOT AT ALL
SUM OF ALL RESPONSES TO PHQ9 QUESTIONS 1 AND 2: 0

## 2024-03-11 ASSESSMENT — PAIN DESCRIPTION - PAIN TYPE
TYPE: ACUTE PAIN

## 2024-03-11 ASSESSMENT — FIBROSIS 4 INDEX: FIB4 SCORE: 2.59

## 2024-03-11 NOTE — ASSESSMENT & PLAN NOTE
- CT showed oval fluid collections in the breasts surgical sites left greater than the right.    It has been several months since her surgery (September 2023),     Evaluated by surgery with recommendation for conservative management  Continued pain management    Cymbalta added  Outpatient follow-up

## 2024-03-11 NOTE — CARE PLAN
The patient is Stable - Low risk of patient condition declining or worsening    Shift Goals  Clinical Goals: monitor vitals and labs, pain management, increase appetite, BM  Patient Goals: pain management, rest  Family Goals: NED    Progress made toward(s) clinical / shift goals:    Problem: Skin Integrity  Goal: Skin integrity is maintained or improved  Outcome: Progressing   Pt does not have any detrimental skin changes  Problem: Fall Risk  Goal: Patient will remain free from falls  Outcome: Progressing   Pt successfully ambulated to commode and returned to bed locked in lowest position, bed alarm on, call light and belongings within reach. Pt calls for assistance    Patient is not progressing towards the following goals:      Problem: Pain - Standard  Goal: Alleviation of pain or a reduction in pain to the patient’s comfort goal  Outcome: Not Met   Pt complains of pain before and after medication administration and comfort efforts, still not down to preferred pain goal

## 2024-03-11 NOTE — PROGRESS NOTES
Monitor summary:        Rhythm: ST  Rate: 106-119  Ectopy: R (PVC)  Measurements: .15/.09/.34        12hr chart check

## 2024-03-11 NOTE — PROGRESS NOTES
Report received from john RN, pt care assumed, tele box on. VSS, pt assessment complete. Pt AAOx2, no signs of distress noted at this time. POC discussed with pt and verbalizes no questions. Pt denies any additional needs at this time. Bed in lowest position, bed alarm on, pt educated on fall risk and verbalized understanding, call light within reach, hourly rounding initiated.

## 2024-03-11 NOTE — PROGRESS NOTES
Bedside report received from NOC RN. Assumed care of pt. Pt awake, but fatigued, laying in bed. A/Ox4, VSS. No concerns, complaints or distress. Pt educated to call before getting out of bed. POC reviewed and white board updated. Tele box on.  on the monitor. Call light in reach. Bed locked in lowest position with 2 upper bed rails up. Bed alarm on.

## 2024-03-11 NOTE — ASSESSMENT & PLAN NOTE
History of B mastectomies  L side complicated with hematoma  Mult aspirations and ARACELI placed  Now with 8 x 5 cm fluid collection - clinically similar to outpat.  Would just monitor  Discussed surgical intervention if pain persists since already tried aspirations and drain  3/13 Further discussion and I don't feel surgical intervention indicated. Would add cymbalta as pain appears more lateral rib than the seroma

## 2024-03-11 NOTE — CARE PLAN
The patient is Stable - Low risk of patient condition declining or worsening    Shift Goals  Clinical Goals: monitor VS, labs  Patient Goals: rest, comfort  Family Goals: NED    Progress made toward(s) clinical / shift goals:    Problem: Pain - Standard  Goal: Alleviation of pain or a reduction in pain to the patient’s comfort goal  Outcome: Progressing     Problem: Psychosocial  Goal: Patient's level of anxiety will decrease  Outcome: Progressing     Problem: Hemodynamics  Goal: Patient's hemodynamics, fluid balance and neurologic status will be stable or improve  Outcome: Progressing     Problem: Urinary Elimination  Goal: Establish and maintain regular urinary output  Outcome: Progressing       Patient is not progressing towards the following goals:

## 2024-03-11 NOTE — CARE PLAN
The patient is Watcher - Medium risk of patient condition declining or worsening    Shift Goals  Clinical Goals: Increase PO intake, monitor labs/vitals, BM  Patient Goals: rest  Family Goals: NED    Progress made toward(s) clinical / shift goals:      Problem: Knowledge Deficit - Standard  Goal: Patient and family/care givers will demonstrate understanding of plan of care, disease process/condition, diagnostic tests and medications  Outcome: Progressing     Problem: Pain - Standard  Goal: Alleviation of pain or a reduction in pain to the patient’s comfort goal  Outcome: Progressing     Problem: Fall Risk  Goal: Patient will remain free from falls  Outcome: Progressing       Patient is not progressing towards the following goals:

## 2024-03-11 NOTE — DISCHARGE PLANNING
Case Management Discharge Planning    Admission Date: 3/9/2024  GMLOS: 2.5  ALOS: 2    6-Clicks ADL Score: 24  6-Clicks Mobility Score: 20      Anticipated Discharge Dispo: Discharge Disposition: Discharged to home/self care (01)    DME Needed: No    Action(s) Taken: Updated Provider/Nurse on Discharge Plan    Escalations Completed: None    Medically Clear: No    Next Steps: Pt lives alone in [place]. Plans to return to home when medically cleared.    Barriers to Discharge: Medical clearance    Is the patient up for discharge tomorrow: No

## 2024-03-11 NOTE — PROGRESS NOTES
Hospital Medicine Daily Progress Note    Date of Service  3/11/2024    Chief Complaint  Shortness of breath, flulike symptoms    Hospital Course  Yolanda Roe is a 58 y.o. female with a past medical history of dyslipidemia, hypertension, triple negative breast cancer status postmastectomy by Dr. Monahan and is undergoing chemotherapy by Dr. Gómez, who presented 3/9/2024 with shortness of breath and vomiting. She states for the past 3 weeks she is effectively not able to take any food because of intractable vomiting and has not had a bowel movements as she has not been on keep anything down.  She has felt short of breath though not had a fever and no chest pain.  On evaluation, labs revealed a significant metabolic acidosis with hypokalemia and a CT scan reveals right lower lobe pulmonary embolism with mild groundglass and interstitial opacities in the lower lungs, along with oval fluid collections in the breasts surgical sites left greater than the right.  CT abdomen pelvis was unremarkable.  Urinalysis only showed 5-10 WBC with negative leukocyte esterase or nitrate.  Influenza, RSV, and COVID-19 PCR's were negative.  Lactate was normal.  She was started on anticoagulation with therapeutic Lovenox.  Her nausea/vomiting was felt to be related to her chemotherapy, and she was started on IV fluids and antiemetics.  She tolerated oral intake, and she was transitioned to oral Eliquis.  She had low magnesium which was replaced.  She maintained sinus rhythm.      Interval Problem Update  3/11/2024 - I reviewed the patient's chart. There were no significant overnight events. Remains hemodynamically stable and afebrile.  On 2 L of oxygen by nasal cannula.  Had fever of 39.8 °C last night, but none since.  WBC 2500.  Potassium 2.3.  CO2 and anion gap have normalized.  Renal function remain normal.  Magnesium 1.5.  Procalcitonin 0.11.  Echocardiogram showed EF of 70%, with aortic valve sclerosis, normal right ventricular  size and systolic function.    > I have personally seen and examined the patient today.  Complaining of pain on the surgical sites.  Tolerating oral diet.  No nausea or vomiting.  Feeling fatigued and tired today.  > I have personally reached out and consulted surgery, Dr. Guerra, for further evaluation of fluid collection on the surgical incision site on the breasts.      I personally reviewed all lab results mentioned above. Prior medical records from this institution and outside facilities were independently reviewed as noted. I also personally reviewed all ER physician and consultant recommendations and plans as documented above. History was independently obtained by myself. I have discussed this patient's plan of care and discharge plan at IDT rounds today with Case Management, Nursing, Nursing leadership, and other members of the IDT team.    Consultants/Specialty  None    Code Status  Full Code    Disposition  The patient is not medically cleared for discharge to home or a post-acute facility.      Discharge plan TBD.  Anticipate discharge to home once medically cleared.  I have placed the appropriate orders for post-discharge needs.    Review of Systems  ROS     Pertinent positives/negatives as mentioned above.     A complete review of systems was personally done by me. All other systems were negative.       Physical Exam  Temp:  [36.8 °C (98.2 °F)-39.8 °C (103.7 °F)] 37.5 °C (99.5 °F)  Pulse:  [100-129] 109  Resp:  [14-19] 16  BP: (112-132)/(64-80) 126/74  SpO2:  [82 %-94 %] 88 %    Physical Exam  Vitals reviewed.   Constitutional:       General: She is not in acute distress.     Appearance: Normal appearance. She is normal weight. She is not ill-appearing or diaphoretic.   HENT:      Head: Normocephalic and atraumatic.      Right Ear: External ear normal.      Left Ear: External ear normal.      Mouth/Throat:      Mouth: Mucous membranes are moist.      Pharynx: No oropharyngeal exudate or posterior  oropharyngeal erythema.   Eyes:      General: No scleral icterus.     Extraocular Movements: Extraocular movements intact.      Conjunctiva/sclera: Conjunctivae normal.      Pupils: Pupils are equal, round, and reactive to light.   Cardiovascular:      Rate and Rhythm: Normal rate and regular rhythm.      Heart sounds: Normal heart sounds. No murmur heard.  Pulmonary:      Effort: Pulmonary effort is normal. No respiratory distress.      Breath sounds: Normal breath sounds. No stridor. No wheezing, rhonchi or rales.   Chest:      Chest wall: Tenderness (Breast surgical sites left greater than the right) present.   Abdominal:      General: Bowel sounds are normal. There is no distension.      Palpations: Abdomen is soft. There is no mass.      Tenderness: There is no abdominal tenderness. There is no guarding or rebound.   Musculoskeletal:         General: No swelling. Normal range of motion.      Cervical back: Normal range of motion and neck supple. No rigidity. No muscular tenderness.      Right lower leg: No edema.      Left lower leg: No edema.   Lymphadenopathy:      Cervical: No cervical adenopathy.   Skin:     General: Skin is warm and dry.      Coloration: Skin is not jaundiced.      Findings: No rash.   Neurological:      General: No focal deficit present.      Mental Status: She is alert and oriented to person, place, and time. Mental status is at baseline.      Cranial Nerves: No cranial nerve deficit.   Psychiatric:         Mood and Affect: Mood normal.         Behavior: Behavior normal.         Thought Content: Thought content normal.         Judgment: Judgment normal.         Fluids    Intake/Output Summary (Last 24 hours) at 3/11/2024 0953  Last data filed at 3/11/2024 0830  Gross per 24 hour   Intake 520 ml   Output 570 ml   Net -50 ml       Laboratory  Recent Labs     03/09/24  0941 03/10/24  0319 03/11/24  0051   WBC 7.7 3.6* 3.5*   RBC 4.30 3.49* 3.34*   HEMOGLOBIN 14.3 11.5* 10.8*   HEMATOCRIT  40.8 33.2* 30.5*   MCV 94.9 95.1 91.3   MCH 33.3* 33.0 32.3   MCHC 35.0 34.6 35.4   RDW 43.0 43.8 41.1   PLATELETCT 332 247 228   MPV 8.7* 9.2 8.9*     Recent Labs     03/09/24  0941 03/10/24  0100 03/11/24  0051   SODIUM 133* 133* 134*   POTASSIUM 3.1* 3.2* 2.3*   CHLORIDE 89* 97 95*   CO2 16* 15* 24   GLUCOSE 104* 77 138*   BUN 6* 5* 4*   CREATININE 0.95 0.74 0.89   CALCIUM 10.9* 10.0 9.9                   Imaging  EC-ECHOCARDIOGRAM COMPLETE W/ CONT   Final Result      CT-ABDOMEN-PELVIS WITH   Final Result      1.  No evidence of bowel obstruction or focal inflammatory change.      2.  Sigmoid diverticulosis.      3.  Fatty liver.      4.  Prior hysterectomy.      CT-CTA CHEST PULMONARY ARTERY W/ RECONS   Final Result      1.  Small right lower lobe pulmonary embolism.   2.  Mild groundglass and interstitial opacities in the lower lungs which may be due to edema and/or atelectasis/mild infiltrate.   3.  3 mm right upper lobe lung nodule.   4.  Oval complex collections in the bilateral breasts, left greater than right. This may be seroma/hematoma versus abscess not excluded. Correlate clinically.      VOALTE message of critical findings sent to Dr. Umaña at 3/9/2024 11:55 AM. I also received confirmation of receipt of VOALTE message back from the provider.      DX-CHEST-PORTABLE (1 VIEW)   Final Result      No evidence of acute cardiopulmonary process.           Assessment/Plan  * Pulmonary embolism and infarction (HCC)- (present on admission)  Assessment & Plan  -Right lower lobe pulmonary embolism, in the setting of known cancer.  No signs of right heart strain.  On 2 L of oxygen.  -Continue anticoagulation with Eliquis.  -Continue respiratory support with RT protocol, wean from supplemental oxygen as able, keep saturations above 88%.  -Continue to monitor on telemetry.    Fluid collection at surgical site- (present on admission)  Assessment & Plan  - CT showed oval fluid collections in the breasts surgical  sites left greater than the right.  Also spiking fevers.  It has been several months since her surgery (September 2023), concerning for abscess.  -IV Unasyn.  -I have reached out and consulted surgery, Dr. Guerra, for further evaluation.  -Monitor for ongoing fevers.  Trend WBC count.  -Continue pain control with oral oxycodone.    Intractable vomiting- (present on admission)  Assessment & Plan  -This has been going on for 3 weeks.  She has significant dehydration and was not able to tolerate oral intake.  Most likely due to cancer treatment/chemotherapy. CT abdomen pelvis was negative.  -Symptoms better.  -Continue to hydrate with IV fluids.  Continue as needed antiemetics.    Constipation- (present on admission)  Assessment & Plan  -Reportedly has not had a bowel movement in 3 weeks.  Continue bowel regimen with twice daily MiraLAX and scheduled Colace.  Encouraged to ambulate.    Hypomagnesemia- (present on admission)  Assessment & Plan  -Remains low (1.5).  Replace with 2 g of IV magnesium sulfate today.  Repeat magnesium in the morning.    Hypokalemia- (present on admission)  Assessment & Plan  - Potassium remains low at 2.3.  Replace with IV KCl along with oral K-Dur.  Replace low magnesium. BMP in the morning.    Hyponatremia- (present on admission)  Assessment & Plan  -Na low at 134, due to hypovolemia.  -Continue IV fluid rehydration.  Trend sodium levels.    Metabolic acidosis- (present on admission)  Assessment & Plan  -Bicarb and anion gap have normalized.    -Secondary to dehydration  -Continue IV fluids with NS.   -Continue to monitor.  BMP in the morning.    Breast cancer of upper-outer quadrant of left female breast (HCC)- (present on admission)  Assessment & Plan  -Triple negative  -S/p bilateral total mastectomy  -On treatment by Dr. Gómez. Regimen: : Pembrolizumab + PACLitaxel + CARBOplatin (x 4 cycles) followed by Pembrolizumab + Cyclophosphamide + DOXOrubicin   (x4 cycles)  -Continue outpatient  follow-up with oncology.    Primary hypertension- (present on admission)  Assessment & Plan  -Maintaining good control.  Continue Norvasc.  Optimize blood pressure control.         VTE prophylaxis: Eliquis      My total time spent caring for the patient on the day of the encounter was 53 minutes. This does not include time spent on separately billable procedures/tests.

## 2024-03-11 NOTE — DISCHARGE PLANNING
Care Transition Team Assessment  In the case of an emergency, pt's legal NOK is daughter, Tanesha Yi     RNCM met with pt at bedside and obtained the information used in this assessment. Pt verified accuracy of facesheet. Pt lives in a single story apt alone.  Pt uses [facility]. Prior to current hospitalization, pt was completely independent in ADLS/IADLS. Pt has a limited support system. Pt denies any hx of substance use and denies any dx of mh. Owns no DME. Limited support at home.  Information Source:pt  Orientation Level: Oriented X4  Information Given By: Patient  Informant's Name: Yolanda  Who is responsible for making decisions for patient? : Patient         Elopement Risk  Legal Hold: No  Ambulatory or Self Mobile in Wheelchair: Yes  Disoriented: No  Psychiatric Symptoms: None  History of Wandering: No  Elopement this Admit: No  Vocalizing Wanting to Leave: No  Displays Behaviors, Body Language Wanting to Leave: No-Not at Risk for Elopement  Elopement Risk: Not at Risk for Elopement    Interdisciplinary Discharge Planning  Does Admitting Nurse Feel This Could be a Complex Discharge?: No  Primary Care Physician: doesn't remember the name  Lives with - Patient's Self Care Capacity: Alone and Able to Care For Self  Patient or legal guardian wants to designate a caregiver: Yes  Caregiver name: Chrissy Leach  Caregiver contact info: phone number #: [phone]  Support Systems: Family Member(s)  Housing / Facility: 1 Story Apartment / Condo  Do You Take your Prescribed Medications Regularly: Yes  Mobility Issues: No  Prior Services: None  Durable Medical Equipment: Not Applicable    Discharge Preparedness  What is your plan after discharge?: Home with help  What are your discharge supports?: Child  Prior Functional Level: Ambulatory, Independent with Activities of Daily Living, Independent with Medication Management  Difficulity with ADLs: None  Difficulity with IADLs: None    Functional  Assesment  Prior Functional Level: Ambulatory, Independent with Activities of Daily Living, Independent with Medication Management    Finances  Prescription Coverage: Yes    Vision / Hearing Impairment  Vision Impairment : Yes  Right Eye Vision: Impaired, Wears Glasses  Left Eye Vision: Impaired, Wears Glasses  Hearing Impairment : No              Domestic Abuse  Have you ever been the victim of abuse or violence?: No  Is this happening now?: No  Has the violence increased in frequency and severity?: No  Are you afraid to go home today?: No  Did you have pets at the time of Abuse?: No  Do you know Where to get Help?: No  Physical Abuse or Sexual Abuse: No  Verbal Abuse or Emotional Abuse: Yes, Past. Comment.  Possible Abuse/Neglect Reported to:: Not Applicable    Psychological Assessment  History of Substance Abuse: None  History of Psychiatric Problems: No         Anticipated Discharge Information  Discharge Disposition: Discharged to home/self care (01)

## 2024-03-11 NOTE — DOCUMENTATION QUERY
[facility]                                                                       Query Response Note      PATIENT:               ORQUIDEA WOODSON  ACCT #:                  [id number]  MRN:                     [id number]  :                      1965  ADMIT DATE:       3/9/2024 9:48 AM  DISCH DATE:          RESPONDING  PROVIDER #:        [id number]           QUERY TEXT:    Malnutrition is documented in the Medical Record.  Please specify the severity.      The patient's Clinical Indicators include:  Findings: 3/10 Dietary: severe, acute malnutrition related to diminished PO intake with vomiting (currently undergoing chemotherapy), AEB severe wt loss of 10% in one month and <50% of estimated energy intake >5 days. Body mass index is 28.59    Treatment: dietary consult, Oral nutrition supplements as needed    Risk Factors: breast cancer  has  not been able to keep food down for ~3 weeks. currently on chemo    Silvia Dan RN BSN  Clinical Documentation   [email or web address]  Connect via Voalte Messenger  Options provided:   -- Severe protein calorie malnutrition   -- Mild protein calorie malnutrition   -- Moderate protein calorie malnutrition   -- Other explanation, (please specify other explanation)      Query created by: Silvia Steele on 3/11/2024 7:00 AM    RESPONSE TEXT:    Severe protein calorie malnutrition          Electronically signed by:  HAYLEE CHAN MD 3/11/2024 8:00 AM

## 2024-03-11 NOTE — CONSULTS
DATE OF CONSULTATION:  3/11/2024     REFERRING PHYSICIAN:   El Pelaez M.D.     CONSULTING PHYSICIAN:  Pamela Guerra M.D.     REASON FOR CONSULTATION:  I have been asked by Dr. Pelaez to see the patient in surgical consultation for evaluation of Lchest wall seroma.    HISTORY OF PRESENT ILLNESS: The patient is a 58 year-old White woman who presents to the Emergency Department with a 3-weeks history of flu like syndrome and anorexia.  Has history of bilateral mastectomy and currently on chemotherapy. Pt well known to me. Left sided mastectomy complicated with post op hematoma requiring mult aspirations and ARACELI drain placement. Now diagnosed with PE.     PAST MEDICAL HISTORY:  has a past medical history of Arthritis (03/09/2023), Cancer (HCC) (03/09/2023), Dental disorder (03/09/2023), High cholesterol (03/09/2023), Hypertension (03/09/2023), Malignant neoplasm of upper-outer quadrant of left breast in female, estrogen receptor positive (HCC) (03/21/2023), Pain, and Urinary incontinence.    PAST SURGICAL HISTORY:  has a past surgical history that includes other orthopedic surgery (03/09/2023); hysterectomy, vaginal (1991); lumpectomy (2003); cath placement (Right, 03/21/2023); tubal ligation; mastectomy (Bilateral, 9/19/2023); and node biopsy sentinel (Left, 9/19/2023).    ALLERGIES: No Known Allergies    CURRENT MEDICATIONS:    Home Medications       Reviewed by Jemma Mares (Pharmacy Tech) on 03/09/24 at 1320  Med List Status: Complete     Medication Last Dose Status   acetaminophen (TYLENOL 8 HOUR) 650 MG CR tablet ABOUT 1 WEEK Active   amLODIPine (NORVASC) 5 MG Tab 3/9/2024 Active   lidocaine-prilocaine (EMLA) 2.5-2.5 % Cream PRN Active   ondansetron (ZOFRAN ODT) 4 MG TABLET DISPERSIBLE ABOUT 1 WEEK Active   potassium chloride (KLOR-CON) 20 MEQ Pack 3/8/2024 Active   rosuvastatin (CRESTOR) 10 MG Tab 3/8/2024 Active                    FAMILY HISTORY: family history is not on file.    SOCIAL HISTORY:   reports that she has been smoking cigarettes. She started smoking about 28 years ago. She has a 14.1 pack-year smoking history. She has never been exposed to tobacco smoke. She has never used smokeless tobacco. She reports current alcohol use of about 4.2 oz of alcohol per week. She reports that she does not use drugs.    REVIEW OF SYSTEMS: Comprehensive review of systems is negative with the exception of the aforementioned HPI, PMH, and PSH bullets in accordance with CMS guidelines.    PHYSICAL EXAMINATION:    Physical Exam  Constitutional:       Appearance: She is ill-appearing.   Cardiovascular:      Rate and Rhythm: Normal rate.   Pulmonary:      Effort: Pulmonary effort is normal.      Comments: 8x5 cm fluid collection in left chest area   No significant change from last office visit  Chest:      Chest wall: Tenderness present.   Musculoskeletal:         General: Normal range of motion.   Skin:     General: Skin is warm.   Neurological:      General: No focal deficit present.      Mental Status: She is alert.         LABORATORY VALUES:   Recent Labs     03/09/24  0941 03/10/24  0319 03/11/24  0051   WBC 7.7 3.6* 3.5*   RBC 4.30 3.49* 3.34*   HEMOGLOBIN 14.3 11.5* 10.8*   HEMATOCRIT 40.8 33.2* 30.5*   MCV 94.9 95.1 91.3   MCH 33.3* 33.0 32.3   MCHC 35.0 34.6 35.4   RDW 43.0 43.8 41.1   PLATELETCT 332 247 228   MPV 8.7* 9.2 8.9*     Recent Labs     03/09/24  0941 03/10/24  0100 03/11/24  0051   SODIUM 133* 133* 134*   POTASSIUM 3.1* 3.2* 2.3*   CHLORIDE 89* 97 95*   CO2 16* 15* 24   GLUCOSE 104* 77 138*   BUN 6* 5* 4*   CREATININE 0.95 0.74 0.89   CALCIUM 10.9* 10.0 9.9     Recent Labs     03/09/24  0941   ASTSGOT 40   ALTSGPT 16   TBILIRUBIN 0.7   ALKPHOSPHAT 146*   GLOBULIN 3.6*            IMAGING:   EC-ECHOCARDIOGRAM COMPLETE W/ CONT   Final Result      CT-ABDOMEN-PELVIS WITH   Final Result      1.  No evidence of bowel obstruction or focal inflammatory change.      2.  Sigmoid diverticulosis.      3.  Fatty  liver.      4.  Prior hysterectomy.      CT-CTA CHEST PULMONARY ARTERY W/ RECONS   Final Result      1.  Small right lower lobe pulmonary embolism.   2.  Mild groundglass and interstitial opacities in the lower lungs which may be due to edema and/or atelectasis/mild infiltrate.   3.  3 mm right upper lobe lung nodule.   4.  Oval complex collections in the bilateral breasts, left greater than right. This may be seroma/hematoma versus abscess not excluded. Correlate clinically.      VOALTE message of critical findings sent to Dr. Umaña at 3/9/2024 11:55 AM. I also received confirmation of receipt of VOALTE message back from the provider.      DX-CHEST-PORTABLE (1 VIEW)   Final Result      No evidence of acute cardiopulmonary process.          ASSESSMENT AND PLAN:     Fluid collection at surgical site- (present on admission)  Assessment & Plan  History of B mastectomies  L side complicated with hematoma  Mult aspirations and ARACELI placed  Now with 8 x 5 cm fluid collection - clinically similar to outpat.  Would just monitor        DISPOSITION: Medical evaluation and admission. The patient was admitted to the Medical Service prior to surgical consultation. [facility] will follow.     ____________________________________     Pamela Guerra M.D.    DD: 3/11/2024  10:44 AM    AAST Grading System for EGS Conditions  ACS NSQIP Surgical Risk Calculator

## 2024-03-12 LAB
ANION GAP SERPL CALC-SCNC: 10 MMOL/L (ref 7–16)
ANION GAP SERPL CALC-SCNC: 12 MMOL/L (ref 7–16)
BUN SERPL-MCNC: 2 MG/DL (ref 8–22)
BUN SERPL-MCNC: 3 MG/DL (ref 8–22)
CALCIUM SERPL-MCNC: 9.5 MG/DL (ref 8.5–10.5)
CALCIUM SERPL-MCNC: 9.8 MG/DL (ref 8.5–10.5)
CHLORIDE SERPL-SCNC: 102 MMOL/L (ref 96–112)
CHLORIDE SERPL-SCNC: 99 MMOL/L (ref 96–112)
CO2 SERPL-SCNC: 26 MMOL/L (ref 20–33)
CO2 SERPL-SCNC: 27 MMOL/L (ref 20–33)
CREAT SERPL-MCNC: 0.71 MG/DL (ref 0.5–1.4)
CREAT SERPL-MCNC: 0.76 MG/DL (ref 0.5–1.4)
ERYTHROCYTE [DISTWIDTH] IN BLOOD BY AUTOMATED COUNT: 44 FL (ref 35.9–50)
GFR SERPLBLD CREATININE-BSD FMLA CKD-EPI: 90 ML/MIN/1.73 M 2
GFR SERPLBLD CREATININE-BSD FMLA CKD-EPI: 97 ML/MIN/1.73 M 2
GLUCOSE SERPL-MCNC: 117 MG/DL (ref 65–99)
GLUCOSE SERPL-MCNC: 146 MG/DL (ref 65–99)
HCT VFR BLD AUTO: 34.2 % (ref 37–47)
HGB BLD-MCNC: 11.9 G/DL (ref 12–16)
MAGNESIUM SERPL-MCNC: 1.6 MG/DL (ref 1.5–2.5)
MCH RBC QN AUTO: 33.1 PG (ref 27–33)
MCHC RBC AUTO-ENTMCNC: 34.8 G/DL (ref 32.2–35.5)
MCV RBC AUTO: 95 FL (ref 81.4–97.8)
PLATELET # BLD AUTO: 226 K/UL (ref 164–446)
PMV BLD AUTO: 9.1 FL (ref 9–12.9)
POTASSIUM SERPL-SCNC: 2.9 MMOL/L (ref 3.6–5.5)
POTASSIUM SERPL-SCNC: 3.1 MMOL/L (ref 3.6–5.5)
RBC # BLD AUTO: 3.6 M/UL (ref 4.2–5.4)
SODIUM SERPL-SCNC: 138 MMOL/L (ref 135–145)
SODIUM SERPL-SCNC: 138 MMOL/L (ref 135–145)
WBC # BLD AUTO: 3.3 K/UL (ref 4.8–10.8)

## 2024-03-12 PROCEDURE — 83735 ASSAY OF MAGNESIUM: CPT

## 2024-03-12 PROCEDURE — 99232 SBSQ HOSP IP/OBS MODERATE 35: CPT | Performed by: HOSPITALIST

## 2024-03-12 PROCEDURE — 700111 HCHG RX REV CODE 636 W/ 250 OVERRIDE (IP): Mod: JZ | Performed by: INTERNAL MEDICINE

## 2024-03-12 PROCEDURE — A9270 NON-COVERED ITEM OR SERVICE: HCPCS | Performed by: INTERNAL MEDICINE

## 2024-03-12 PROCEDURE — 700111 HCHG RX REV CODE 636 W/ 250 OVERRIDE (IP): Mod: JZ | Performed by: HOSPITALIST

## 2024-03-12 PROCEDURE — 700102 HCHG RX REV CODE 250 W/ 637 OVERRIDE(OP): Mod: JZ

## 2024-03-12 PROCEDURE — 770020 HCHG ROOM/CARE - TELE (206)

## 2024-03-12 PROCEDURE — 700102 HCHG RX REV CODE 250 W/ 637 OVERRIDE(OP): Mod: JZ | Performed by: HOSPITALIST

## 2024-03-12 PROCEDURE — 700105 HCHG RX REV CODE 258: Performed by: INTERNAL MEDICINE

## 2024-03-12 PROCEDURE — 80048 BASIC METABOLIC PNL TOTAL CA: CPT

## 2024-03-12 PROCEDURE — A9270 NON-COVERED ITEM OR SERVICE: HCPCS | Mod: JZ

## 2024-03-12 PROCEDURE — 700101 HCHG RX REV CODE 250: Performed by: INTERNAL MEDICINE

## 2024-03-12 PROCEDURE — A9270 NON-COVERED ITEM OR SERVICE: HCPCS | Mod: JZ | Performed by: HOSPITALIST

## 2024-03-12 PROCEDURE — 700102 HCHG RX REV CODE 250 W/ 637 OVERRIDE(OP): Performed by: INTERNAL MEDICINE

## 2024-03-12 PROCEDURE — 36415 COLL VENOUS BLD VENIPUNCTURE: CPT

## 2024-03-12 PROCEDURE — 700102 HCHG RX REV CODE 250 W/ 637 OVERRIDE(OP): Performed by: HOSPITALIST

## 2024-03-12 PROCEDURE — 85027 COMPLETE CBC AUTOMATED: CPT

## 2024-03-12 PROCEDURE — A9270 NON-COVERED ITEM OR SERVICE: HCPCS | Performed by: HOSPITALIST

## 2024-03-12 PROCEDURE — 99232 SBSQ HOSP IP/OBS MODERATE 35: CPT | Performed by: SURGERY

## 2024-03-12 PROCEDURE — 700111 HCHG RX REV CODE 636 W/ 250 OVERRIDE (IP): Mod: JZ

## 2024-03-12 RX ORDER — POTASSIUM CHLORIDE 20 MEQ/1
40 TABLET, EXTENDED RELEASE ORAL EVERY 4 HOURS
Qty: 4 TABLET | Refills: 0 | Status: COMPLETED | OUTPATIENT
Start: 2024-03-12 | End: 2024-03-12

## 2024-03-12 RX ORDER — MAGNESIUM SULFATE HEPTAHYDRATE 40 MG/ML
2 INJECTION, SOLUTION INTRAVENOUS ONCE
Status: COMPLETED | OUTPATIENT
Start: 2024-03-12 | End: 2024-03-12

## 2024-03-12 RX ORDER — POTASSIUM CHLORIDE 20 MEQ/1
20 TABLET, EXTENDED RELEASE ORAL 2 TIMES DAILY
Qty: 3 TABLET | Refills: 0 | Status: COMPLETED | OUTPATIENT
Start: 2024-03-12 | End: 2024-03-13

## 2024-03-12 RX ADMIN — OXYCODONE 10 MG: 5 TABLET ORAL at 09:22

## 2024-03-12 RX ADMIN — APIXABAN 10 MG: 5 TABLET, FILM COATED ORAL at 18:31

## 2024-03-12 RX ADMIN — POTASSIUM CHLORIDE 40 MEQ: 1500 TABLET, EXTENDED RELEASE ORAL at 03:41

## 2024-03-12 RX ADMIN — AMPICILLIN AND SULBACTAM 3 G: 1; 2 INJECTION, POWDER, FOR SOLUTION INTRAMUSCULAR; INTRAVENOUS at 18:30

## 2024-03-12 RX ADMIN — APIXABAN 10 MG: 5 TABLET, FILM COATED ORAL at 06:30

## 2024-03-12 RX ADMIN — AMPICILLIN AND SULBACTAM 3 G: 1; 2 INJECTION, POWDER, FOR SOLUTION INTRAMUSCULAR; INTRAVENOUS at 12:54

## 2024-03-12 RX ADMIN — POTASSIUM CHLORIDE 20 MEQ: 1500 TABLET, EXTENDED RELEASE ORAL at 21:14

## 2024-03-12 RX ADMIN — MAGNESIUM SULFATE HEPTAHYDRATE 2 G: 2 INJECTION, SOLUTION INTRAVENOUS at 03:44

## 2024-03-12 RX ADMIN — ROSUVASTATIN 10 MG: 10 TABLET, FILM COATED ORAL at 21:14

## 2024-03-12 RX ADMIN — AMPICILLIN AND SULBACTAM 3 G: 1; 2 INJECTION, POWDER, FOR SOLUTION INTRAMUSCULAR; INTRAVENOUS at 06:30

## 2024-03-12 RX ADMIN — POTASSIUM CHLORIDE 20 MEQ: 1500 TABLET, EXTENDED RELEASE ORAL at 12:54

## 2024-03-12 RX ADMIN — AMPICILLIN AND SULBACTAM 3 G: 1; 2 INJECTION, POWDER, FOR SOLUTION INTRAMUSCULAR; INTRAVENOUS at 23:22

## 2024-03-12 RX ADMIN — DOCUSATE SODIUM 50 MG AND SENNOSIDES 8.6 MG 2 TABLET: 8.6; 5 TABLET, FILM COATED ORAL at 06:31

## 2024-03-12 RX ADMIN — AMLODIPINE BESYLATE 5 MG: 5 TABLET ORAL at 06:31

## 2024-03-12 RX ADMIN — OXYCODONE 10 MG: 5 TABLET ORAL at 18:31

## 2024-03-12 RX ADMIN — POLYETHYLENE GLYCOL 3350 1 PACKET: 17 POWDER, FOR SOLUTION ORAL at 06:32

## 2024-03-12 RX ADMIN — POTASSIUM CHLORIDE AND SODIUM CHLORIDE: 900; 300 INJECTION, SOLUTION INTRAVENOUS at 16:11

## 2024-03-12 RX ADMIN — MAGNESIUM SULFATE HEPTAHYDRATE 2 G: 2 INJECTION, SOLUTION INTRAVENOUS at 08:59

## 2024-03-12 RX ADMIN — POTASSIUM CHLORIDE 40 MEQ: 1500 TABLET, EXTENDED RELEASE ORAL at 06:30

## 2024-03-12 ASSESSMENT — PAIN DESCRIPTION - PAIN TYPE
TYPE: ACUTE PAIN
TYPE: ACUTE PAIN;CHRONIC PAIN
TYPE: ACUTE PAIN

## 2024-03-12 ASSESSMENT — FIBROSIS 4 INDEX: FIB4 SCORE: 2.61

## 2024-03-12 ASSESSMENT — PATIENT HEALTH QUESTIONNAIRE - PHQ9
SUM OF ALL RESPONSES TO PHQ9 QUESTIONS 1 AND 2: 0
2. FEELING DOWN, DEPRESSED, IRRITABLE, OR HOPELESS: NOT AT ALL
1. LITTLE INTEREST OR PLEASURE IN DOING THINGS: NOT AT ALL

## 2024-03-12 ASSESSMENT — ENCOUNTER SYMPTOMS
NAUSEA: 1
SHORTNESS OF BREATH: 0

## 2024-03-12 NOTE — PROGRESS NOTES
Report received from john RN, pt care assumed, tele box on. VSS, pt assessment complete. Pt AAOx4, no signs of distress noted at this time. POC discussed with pt and verbalizes no questions. Pt denies any additional needs at this time. Bed in lowest position, bed alarm on, pt educated on fall risk and verbalized understanding, call light within reach, hourly rounding initiated.

## 2024-03-12 NOTE — CARE PLAN
The patient is Stable - Low risk of patient condition declining or worsening    Shift Goals  Clinical Goals: Monitor VSS/temp, monitor K levels, fluids  Patient Goals: sleep, comfort  Family Goals: NED    Progress made toward(s) clinical / shift goals:      Problem: Hemodynamics  Goal: Patient's hemodynamics, fluid balance and neurologic status will be stable or improve  Outcome: Progressing     Problem: Nutrition  Goal: Patient's nutritional and fluid intake will be adequate or improve  Outcome: Progressing     Problem: Bowel Elimination  Goal: Establish and maintain regular bowel function  Outcome: Progressing       Patient is not progressing towards the following goals:

## 2024-03-12 NOTE — PROGRESS NOTES
Received bedside report from RN, pt care assumed, VSS, pt assessment complete. Pt AAOx4, with 6/10 pain at this time. No signs of acute distress noted at this time. Plan of care discussed with pt and verbalizes no questions. Pt denies any additional needs at this time. Bed locked/in lowest position, bed alarm off, pt educated on fall risk and verbalized understanding, call light within reach, hourly rounding initiated.

## 2024-03-12 NOTE — PROGRESS NOTES
Andreia from Lab called with critical result of K2.6 at 1715. Critical lab result read back to Andreia. .   Dr. Pelaez notified of critical lab result at 1719.  Critical lab result read back by Dr. Pelaez.

## 2024-03-12 NOTE — PROGRESS NOTES
Hospital Medicine Daily Progress Note    Date of Service  3/12/2024    Chief Complaint  Shortness of breath, flulike symptoms    Hospital Course  Yolanda Roe is a 58 y.o. female with a past medical history of dyslipidemia, hypertension, triple negative breast cancer status postmastectomy by Dr. Monahan and is undergoing chemotherapy by Dr. Gómez, who presented 3/9/2024 with shortness of breath and vomiting. She states for the past 3 weeks she is effectively not able to take any food because of intractable vomiting and has not had a bowel movements as she has not been on keep anything down.  She has felt short of breath though not had a fever and no chest pain.  On evaluation, labs revealed a significant metabolic acidosis with hypokalemia and a CT scan reveals right lower lobe pulmonary embolism with mild groundglass and interstitial opacities in the lower lungs, along with oval fluid collections in the breasts surgical sites left greater than the right.  CT abdomen pelvis was unremarkable.  Urinalysis only showed 5-10 WBC with negative leukocyte esterase or nitrate.  Influenza, RSV, and COVID-19 PCR's were negative.  Lactate was normal.  She was started on anticoagulation with therapeutic Lovenox.  Her nausea/vomiting was felt to be related to her chemotherapy, and she was started on IV fluids and antiemetics.  She tolerated oral intake, and she was transitioned to oral Eliquis.  She had low magnesium which was replaced.  She maintained sinus rhythm.      Interval Problem Update    Patient complains of generalized malaise.  No further vomiting.  Tolerating p.o. intake with poor appetite.  I reviewed chemistry panel potassium 2.9 magnesium 1.6 repletion ordered and repeat levels ordered repeat potassium 3.1.  I reviewed echocardiogram with normal EF    Evaluated by general surgery and recommendation for conservative management at this time    I have discussed this patient's plan of care and discharge plan at  IDT rounds today with Case Management, Nursing, Nursing leadership, and other members of the IDT team.    Consultants/Specialty  None    Code Status  Full Code    Disposition  Medically Cleared  Discharge plan TBD.  Anticipate discharge to home once medically cleared.  I have placed the appropriate orders for post-discharge needs.    Review of Systems  Review of Systems   Constitutional:  Positive for malaise/fatigue.   Respiratory:  Negative for shortness of breath.    Gastrointestinal:  Positive for nausea.   Musculoskeletal:  Positive for joint pain.        Pertinent positives/negatives as mentioned above.     A complete review of systems was personally done by me. All other systems were negative.       Physical Exam  Temp:  [36.8 °C (98.2 °F)-38.1 °C (100.6 °F)] 37.3 °C (99.1 °F)  Pulse:  [100-123] 100  Resp:  [15-20] 15  BP: ()/(58-81) 95/58  SpO2:  [92 %-97 %] 93 %    Physical Exam  Vitals and nursing note reviewed.   Constitutional:       General: She is not in acute distress.  HENT:      Head: Normocephalic and atraumatic.      Nose: Nose normal. No rhinorrhea.      Mouth/Throat:      Pharynx: No oropharyngeal exudate or posterior oropharyngeal erythema.   Eyes:      General:         Right eye: No discharge.         Left eye: No discharge.   Cardiovascular:      Rate and Rhythm: Normal rate and regular rhythm.   Pulmonary:      Effort: Pulmonary effort is normal. No respiratory distress.      Breath sounds: No stridor. Decreased breath sounds present. No wheezing, rhonchi or rales.   Chest:      Chest wall: Tenderness present.   Abdominal:      General: Bowel sounds are normal. There is no distension.      Palpations: Abdomen is soft. There is no mass.      Tenderness: There is no abdominal tenderness. There is no rebound.   Musculoskeletal:         General: No swelling or tenderness.      Cervical back: Neck supple. No rigidity.   Skin:     General: Skin is warm and dry.      Coloration: Skin is not  cyanotic or jaundiced.      Nails: There is no clubbing.   Neurological:      General: No focal deficit present.      Mental Status: She is alert and oriented to person, place, and time.      Cranial Nerves: No cranial nerve deficit.      Motor: No weakness.   Psychiatric:         Mood and Affect: Mood normal.         Behavior: Behavior normal.         Fluids    Intake/Output Summary (Last 24 hours) at 3/12/2024 1310  Last data filed at 3/12/2024 0903  Gross per 24 hour   Intake 120 ml   Output 700 ml   Net -580 ml       Laboratory  Recent Labs     03/10/24  0319 03/11/24  0051 03/12/24  0031   WBC 3.6* 3.5* 3.3*   RBC 3.49* 3.34* 3.60*   HEMOGLOBIN 11.5* 10.8* 11.9*   HEMATOCRIT 33.2* 30.5* 34.2*   MCV 95.1 91.3 95.0   MCH 33.0 32.3 33.1*   MCHC 34.6 35.4 34.8   RDW 43.8 41.1 44.0   PLATELETCT 247 228 226   MPV 9.2 8.9* 9.1     Recent Labs     03/11/24  1627 03/12/24  0031 03/12/24  0743   SODIUM 136 138 138   POTASSIUM 2.6* 2.9* 3.1*   CHLORIDE 97 99 102   CO2 25 27 26   GLUCOSE 123* 117* 146*   BUN 3* 3* 2*   CREATININE 0.80 0.76 0.71   CALCIUM 9.6 9.8 9.5                   Imaging  EC-ECHOCARDIOGRAM COMPLETE W/ CONT   Final Result      CT-ABDOMEN-PELVIS WITH   Final Result      1.  No evidence of bowel obstruction or focal inflammatory change.      2.  Sigmoid diverticulosis.      3.  Fatty liver.      4.  Prior hysterectomy.      CT-CTA CHEST PULMONARY ARTERY W/ RECONS   Final Result      1.  Small right lower lobe pulmonary embolism.   2.  Mild groundglass and interstitial opacities in the lower lungs which may be due to edema and/or atelectasis/mild infiltrate.   3.  3 mm right upper lobe lung nodule.   4.  Oval complex collections in the bilateral breasts, left greater than right. This may be seroma/hematoma versus abscess not excluded. Correlate clinically.      VOALTE message of critical findings sent to Dr. Umaña at 3/9/2024 11:55 AM. I also received confirmation of receipt of VOALTE message back from  the provider.      DX-CHEST-PORTABLE (1 VIEW)   Final Result      No evidence of acute cardiopulmonary process.           Assessment/Plan  * Pulmonary embolism and infarction (HCC)- (present on admission)  Assessment & Plan  -Right lower lobe pulmonary embolism, in the setting of known cancer.  No signs of right heart strain.    -Continue anticoagulation with Eliquis.  -Continue respiratory support with RT protocol, weaned off  from supplemental oxygen .  -Continue to monitor on telemetry.    Fluid collection at surgical site- (present on admission)  Assessment & Plan  - CT showed oval fluid collections in the breasts surgical sites left greater than the right.  Also spiking fevers.  It has been several months since her surgery (September 2023),   Given fever patient empirically started on IV Unasyn  surgery following    Constipation- (present on admission)  Assessment & Plan  Continue bowel regimen    Hypomagnesemia- (present on admission)  Assessment & Plan  I ordered IV magnesium sulfate and repeat levels    Intractable vomiting- (present on admission)  Assessment & Plan  Improved  Continue IV hydration and antiemetics as needed and monitor p.o. intake    Hypokalemia- (present on admission)  Assessment & Plan  Continue IV and p.o. repletion and monitor levels    Hyponatremia- (present on admission)  Assessment & Plan  Resolved with hydration    Metabolic acidosis- (present on admission)  Assessment & Plan  Secondary to dehydration  Resolved    Primary hypertension- (present on admission)  Assessment & Plan  -Maintaining good control.  Continue Norvasc.  Optimize blood pressure control.    Breast cancer of upper-outer quadrant of left female breast (HCC)- (present on admission)  Assessment & Plan  -Triple negative  -S/p bilateral total mastectomy  -On treatment by Dr. Gómez. Regimen: : Pembrolizumab + PACLitaxel + CARBOplatin (x 4 cycles) followed by Pembrolizumab + Cyclophosphamide + DOXOrubicin   (x4  cycles)  -Continue outpatient follow-up with oncology.         VTE prophylaxis: Eliquis

## 2024-03-12 NOTE — PROGRESS NOTES
DATE: 3/12/2024    Hospital Day 2  chest wall seroma .    INTERVAL EVENTS:  Pain similar. No evidence of infection. Discussed possible surgical drainage if chest wall pain not tolerable. .    PHYSICAL EXAMINATION:  Vital Signs: /81   Pulse (!) 115   Temp 37.3 °C (99.1 °F) (Temporal)   Resp 15   Wt 77.2 kg (170 lb 3.1 oz)   SpO2 92%     Physical Exam  Pulmonary:      Effort: Pulmonary effort is normal.   Chest:      Chest wall: Tenderness present.   Musculoskeletal:         General: Normal range of motion.   Neurological:      Mental Status: She is alert.       .    LABORATORY VALUES:  Recent Labs     03/10/24  0319 03/11/24  0051 03/12/24  0031   WBC 3.6* 3.5* 3.3*   RBC 3.49* 3.34* 3.60*   HEMOGLOBIN 11.5* 10.8* 11.9*   HEMATOCRIT 33.2* 30.5* 34.2*   MCV 95.1 91.3 95.0   MCH 33.0 32.3 33.1*   MCHC 34.6 35.4 34.8   RDW 43.8 41.1 44.0   PLATELETCT 247 228 226   MPV 9.2 8.9* 9.1     Recent Labs     03/11/24  1627 03/12/24  0031 03/12/24  0743   SODIUM 136 138 138   POTASSIUM 2.6* 2.9* 3.1*   CHLORIDE 97 99 102   CO2 25 27 26   GLUCOSE 123* 117* 146*   BUN 3* 3* 2*   CREATININE 0.80 0.76 0.71   CALCIUM 9.6 9.8 9.5                IMAGING:  EC-ECHOCARDIOGRAM COMPLETE W/ CONT   Final Result      CT-ABDOMEN-PELVIS WITH   Final Result      1.  No evidence of bowel obstruction or focal inflammatory change.      2.  Sigmoid diverticulosis.      3.  Fatty liver.      4.  Prior hysterectomy.      CT-CTA CHEST PULMONARY ARTERY W/ RECONS   Final Result      1.  Small right lower lobe pulmonary embolism.   2.  Mild groundglass and interstitial opacities in the lower lungs which may be due to edema and/or atelectasis/mild infiltrate.   3.  3 mm right upper lobe lung nodule.   4.  Oval complex collections in the bilateral breasts, left greater than right. This may be seroma/hematoma versus abscess not excluded. Correlate clinically.      VOALTE message of critical findings sent to Dr. Umaña at 3/9/2024 11:55 AM. I  also received confirmation of receipt of VOALTE message back from the provider.      DX-CHEST-PORTABLE (1 VIEW)   Final Result      No evidence of acute cardiopulmonary process.          ASSESSMENT AND PLAN:  Fluid collection at surgical site- (present on admission)  Assessment & Plan  History of B mastectomies  L side complicated with hematoma  Mult aspirations and ARACELI placed  Now with 8 x 5 cm fluid collection - clinically similar to outpat.  Would just monitor  Discussed surgical intervention if pain persists since already tried aspirations and drain           ____________________________________     Pamela Guerra M.D.    DD: 3/12/2024  11:09 AM

## 2024-03-12 NOTE — PROGRESS NOTES
Patient was given 60mEq oral potassium chloride from previous RN. K was given to pt crushed and mixed with water. Pt accidentally spilled drink before receiving any. Notified LISA Lowry for reorder of potassium.

## 2024-03-13 PROBLEM — E87.20 METABOLIC ACIDOSIS: Status: RESOLVED | Noted: 2024-03-09 | Resolved: 2024-03-13

## 2024-03-13 LAB
ANION GAP SERPL CALC-SCNC: 8 MMOL/L (ref 7–16)
BASOPHILS # BLD AUTO: 0.3 % (ref 0–1.8)
BASOPHILS # BLD: 0.01 K/UL (ref 0–0.12)
BUN SERPL-MCNC: 2 MG/DL (ref 8–22)
CALCIUM SERPL-MCNC: 9.4 MG/DL (ref 8.5–10.5)
CHLORIDE SERPL-SCNC: 104 MMOL/L (ref 96–112)
CO2 SERPL-SCNC: 25 MMOL/L (ref 20–33)
CREAT SERPL-MCNC: 0.52 MG/DL (ref 0.5–1.4)
EOSINOPHIL # BLD AUTO: 0.23 K/UL (ref 0–0.51)
EOSINOPHIL NFR BLD: 7.1 % (ref 0–6.9)
ERYTHROCYTE [DISTWIDTH] IN BLOOD BY AUTOMATED COUNT: 45.7 FL (ref 35.9–50)
GFR SERPLBLD CREATININE-BSD FMLA CKD-EPI: 106 ML/MIN/1.73 M 2
GLUCOSE SERPL-MCNC: 113 MG/DL (ref 65–99)
HCT VFR BLD AUTO: 32.5 % (ref 37–47)
HGB BLD-MCNC: 11 G/DL (ref 12–16)
IMM GRANULOCYTES # BLD AUTO: 0.01 K/UL (ref 0–0.11)
IMM GRANULOCYTES NFR BLD AUTO: 0.3 % (ref 0–0.9)
LYMPHOCYTES # BLD AUTO: 1.32 K/UL (ref 1–4.8)
LYMPHOCYTES NFR BLD: 40.6 % (ref 22–41)
MAGNESIUM SERPL-MCNC: 2 MG/DL (ref 1.5–2.5)
MCH RBC QN AUTO: 32.8 PG (ref 27–33)
MCHC RBC AUTO-ENTMCNC: 33.8 G/DL (ref 32.2–35.5)
MCV RBC AUTO: 97 FL (ref 81.4–97.8)
MONOCYTES # BLD AUTO: 0.36 K/UL (ref 0–0.85)
MONOCYTES NFR BLD AUTO: 11.1 % (ref 0–13.4)
NEUTROPHILS # BLD AUTO: 1.32 K/UL (ref 1.82–7.42)
NEUTROPHILS NFR BLD: 40.6 % (ref 44–72)
NRBC # BLD AUTO: 0 K/UL
NRBC BLD-RTO: 0 /100 WBC (ref 0–0.2)
PHOSPHATE SERPL-MCNC: 1.9 MG/DL (ref 2.5–4.5)
PLATELET # BLD AUTO: 218 K/UL (ref 164–446)
PMV BLD AUTO: 9 FL (ref 9–12.9)
POTASSIUM SERPL-SCNC: 3.6 MMOL/L (ref 3.6–5.5)
RBC # BLD AUTO: 3.35 M/UL (ref 4.2–5.4)
SODIUM SERPL-SCNC: 137 MMOL/L (ref 135–145)
WBC # BLD AUTO: 3.3 K/UL (ref 4.8–10.8)

## 2024-03-13 PROCEDURE — 99232 SBSQ HOSP IP/OBS MODERATE 35: CPT | Performed by: HOSPITALIST

## 2024-03-13 PROCEDURE — 700102 HCHG RX REV CODE 250 W/ 637 OVERRIDE(OP): Performed by: HOSPITALIST

## 2024-03-13 PROCEDURE — 84100 ASSAY OF PHOSPHORUS: CPT

## 2024-03-13 PROCEDURE — 700101 HCHG RX REV CODE 250: Performed by: HOSPITALIST

## 2024-03-13 PROCEDURE — 80048 BASIC METABOLIC PNL TOTAL CA: CPT

## 2024-03-13 PROCEDURE — A9270 NON-COVERED ITEM OR SERVICE: HCPCS | Performed by: HOSPITALIST

## 2024-03-13 PROCEDURE — 700102 HCHG RX REV CODE 250 W/ 637 OVERRIDE(OP): Performed by: INTERNAL MEDICINE

## 2024-03-13 PROCEDURE — 700105 HCHG RX REV CODE 258: Performed by: HOSPITALIST

## 2024-03-13 PROCEDURE — 770020 HCHG ROOM/CARE - TELE (206)

## 2024-03-13 PROCEDURE — 85025 COMPLETE CBC W/AUTO DIFF WBC: CPT

## 2024-03-13 PROCEDURE — A9270 NON-COVERED ITEM OR SERVICE: HCPCS | Performed by: INTERNAL MEDICINE

## 2024-03-13 PROCEDURE — 700105 HCHG RX REV CODE 258: Performed by: INTERNAL MEDICINE

## 2024-03-13 PROCEDURE — 99232 SBSQ HOSP IP/OBS MODERATE 35: CPT | Performed by: SURGERY

## 2024-03-13 PROCEDURE — 83735 ASSAY OF MAGNESIUM: CPT

## 2024-03-13 PROCEDURE — A9270 NON-COVERED ITEM OR SERVICE: HCPCS | Mod: JZ | Performed by: HOSPITALIST

## 2024-03-13 PROCEDURE — 700101 HCHG RX REV CODE 250: Performed by: INTERNAL MEDICINE

## 2024-03-13 PROCEDURE — 700111 HCHG RX REV CODE 636 W/ 250 OVERRIDE (IP): Mod: JZ | Performed by: INTERNAL MEDICINE

## 2024-03-13 PROCEDURE — 700102 HCHG RX REV CODE 250 W/ 637 OVERRIDE(OP): Mod: JZ | Performed by: HOSPITALIST

## 2024-03-13 PROCEDURE — 36415 COLL VENOUS BLD VENIPUNCTURE: CPT

## 2024-03-13 RX ORDER — GAUZE BANDAGE 2" X 2"
100 BANDAGE TOPICAL DAILY
Status: DISCONTINUED | OUTPATIENT
Start: 2024-03-13 | End: 2024-03-20 | Stop reason: HOSPADM

## 2024-03-13 RX ORDER — DULOXETIN HYDROCHLORIDE 30 MG/1
30 CAPSULE, DELAYED RELEASE ORAL DAILY
Status: DISCONTINUED | OUTPATIENT
Start: 2024-03-13 | End: 2024-03-20 | Stop reason: HOSPADM

## 2024-03-13 RX ORDER — SODIUM CHLORIDE 9 MG/ML
INJECTION, SOLUTION INTRAVENOUS CONTINUOUS
Status: DISCONTINUED | OUTPATIENT
Start: 2024-03-13 | End: 2024-03-16

## 2024-03-13 RX ADMIN — SODIUM CHLORIDE: 9 INJECTION, SOLUTION INTRAVENOUS at 08:51

## 2024-03-13 RX ADMIN — POTASSIUM CHLORIDE AND SODIUM CHLORIDE: 900; 300 INJECTION, SOLUTION INTRAVENOUS at 02:38

## 2024-03-13 RX ADMIN — OXYCODONE 10 MG: 5 TABLET ORAL at 21:42

## 2024-03-13 RX ADMIN — OXYCODONE 10 MG: 5 TABLET ORAL at 05:28

## 2024-03-13 RX ADMIN — APIXABAN 10 MG: 5 TABLET, FILM COATED ORAL at 05:29

## 2024-03-13 RX ADMIN — AMPICILLIN AND SULBACTAM 3 G: 1; 2 INJECTION, POWDER, FOR SOLUTION INTRAMUSCULAR; INTRAVENOUS at 05:47

## 2024-03-13 RX ADMIN — APIXABAN 10 MG: 5 TABLET, FILM COATED ORAL at 18:09

## 2024-03-13 RX ADMIN — DULOXETINE HYDROCHLORIDE 30 MG: 30 CAPSULE, DELAYED RELEASE ORAL at 10:01

## 2024-03-13 RX ADMIN — POTASSIUM PHOSPHATE, MONOBASIC AND POTASSIUM PHOSPHATE, DIBASIC 30 MMOL: 224; 236 INJECTION, SOLUTION, CONCENTRATE INTRAVENOUS at 08:51

## 2024-03-13 RX ADMIN — ROSUVASTATIN 10 MG: 10 TABLET, FILM COATED ORAL at 21:42

## 2024-03-13 RX ADMIN — Medication 100 MG: at 13:06

## 2024-03-13 RX ADMIN — POTASSIUM CHLORIDE 20 MEQ: 1500 TABLET, EXTENDED RELEASE ORAL at 05:29

## 2024-03-13 ASSESSMENT — ENCOUNTER SYMPTOMS: NAUSEA: 1

## 2024-03-13 ASSESSMENT — FIBROSIS 4 INDEX: FIB4 SCORE: 2.71

## 2024-03-13 ASSESSMENT — PAIN DESCRIPTION - PAIN TYPE
TYPE: ACUTE PAIN
TYPE: ACUTE PAIN

## 2024-03-13 NOTE — DIETARY
Nutrition Update:    Day 4 of admit.  Yolanda Roe is a 58 y.o. female with admitting DX of Pulmonary embolism and infarction (HCC) [I26.99].  Patient being followed to optimize nutrition, pt noted to have severe acute malnutrition per RD note on 3/10.     Current Diet: Regular.  Limited Po documentation in chart, last four meals documented with <25% of intake.  RD met with pt who reports only eating bites. Interview limited as pt losing focus at times. Pt reports poor intake for past 3-4 weeks but only able to provide a few options of foods she likes Discussed importance of adequate intake and tried to review different food options available to help increase intake; pt not receptive to many foods but agreeable to try Ensure Plus vanilla supplements with all meals.   Pertinent labs: Phos 1.9. Pt high risk for refeeding  Pertinent Meds: Potassium Phosphate.     Problem: Nutritional:  Goal: Achieve adequate nutritional intake  Description: Patient will consume >50% of meals  Outcome: Not Met.      Plan/rec:   Will add Ensure Plus vanilla supplements.   Added cottage cheese and peaches to lunch meal.   Monitor for refeeding: Order BMP w/ Mg and Phos x 7 days. Replete K, Phos and Mg prn. Supplement 100 mg Thiamine x 7 days to reduce risk of refeeding. Communicated to MD.   MINDY to monitor for adequate intake.

## 2024-03-13 NOTE — PROGRESS NOTES
Handoff report received from night shift nurse and pt care assumed. Pt is currently resting in bed, A&Ox4 with no visible/reported signs of distress noted at this time, on RA, and VSS. Tele box on, rate and rhythm verified. Call light and belongings within reach, bed in lowest and locked position, fall precautions in place. Pt educated on use of call light, all needs addressed. Hourly rounding in place.

## 2024-03-13 NOTE — CARE PLAN
The patient is Stable - Low risk of patient condition declining or worsening    Shift Goals  Clinical Goals: VSS, monitor labs, IV abx  Patient Goals: pain control, rest  Family Goals: NED    Progress made toward(s) clinical / shift goals:      Problem: Knowledge Deficit - Standard  Goal: Patient and family/care givers will demonstrate understanding of plan of care, disease process/condition, diagnostic tests and medications  Outcome: Progressing     Problem: Discharge Barriers/Planning  Goal: Patient's continuum of care needs are met  Outcome: Progressing     Problem: Psychosocial  Goal: Patient's level of anxiety will decrease  Outcome: Progressing     Problem: Nutrition  Goal: Patient's nutritional and fluid intake will be adequate or improve  Outcome: Progressing       Patient is not progressing towards the following goals:

## 2024-03-13 NOTE — CARE PLAN
The patient is Stable - Low risk of patient condition declining or worsening    Shift Goals  Clinical Goals: Manage pain, Monitor labs  Patient Goals: Pain management, Rest  Family Goals: NED    Progress made toward(s) clinical / shift goals:        Problem: Pain - Standard  Goal: Alleviation of pain or a reduction in pain to the patient’s comfort goal  Outcome: Progressing     Problem: Knowledge Deficit - Standard  Goal: Patient and family/care givers will demonstrate understanding of plan of care, disease process/condition, diagnostic tests and medications  Outcome: Progressing     Problem: Fall Risk  Goal: Patient will remain free from falls  Outcome: Progressing     Problem: Communication  Goal: The ability to communicate needs accurately and effectively will improve  Outcome: Progressing     Problem: Hemodynamics  Goal: Patient's hemodynamics, fluid balance and neurologic status will be stable or improve  Outcome: Progressing       Patient is not progressing towards the following goals:

## 2024-03-13 NOTE — PROGRESS NOTES
Hospital Medicine Daily Progress Note    Date of Service  3/13/2024    Chief Complaint  Shortness of breath, flulike symptoms    Hospital Course  Yolanda Roe is a 58 y.o. female with a past medical history of dyslipidemia, hypertension, triple negative breast cancer status postmastectomy by Dr. Monahan and is undergoing chemotherapy by Dr. Gómez, who presented 3/9/2024 with shortness of breath and vomiting. She states for the past 3 weeks she is effectively not able to take any food because of intractable vomiting and has not had a bowel movements as she has not been on keep anything down.  She has felt short of breath though not had a fever and no chest pain.  On evaluation, labs revealed a significant metabolic acidosis with hypokalemia and a CT scan reveals right lower lobe pulmonary embolism with mild groundglass and interstitial opacities in the lower lungs, along with oval fluid collections in the breasts surgical sites left greater than the right.  CT abdomen pelvis was unremarkable.  Urinalysis only showed 5-10 WBC with negative leukocyte esterase or nitrate.  Influenza, RSV, and COVID-19 PCR's were negative.  Lactate was normal.  She was started on anticoagulation with therapeutic Lovenox.  Her nausea/vomiting was felt to be related to her chemotherapy, and she was started on IV fluids and antiemetics.  She tolerated oral intake, and she was transitioned to oral Eliquis.  She had low magnesium which was replaced.  She maintained sinus rhythm.      Interval Problem Update    Nausea is improved today  Patient is afebrile on room air  I discussed with surgery no plans for surgical intervention at this time  I reviewed chemistry panel potassium 3.6 pulse was 1.9 I ordered IV repletion  Reviewed CBC WBC 3.3 hemoglobin 11 platelets 118    I have discussed this patient's plan of care and discharge plan at IDT rounds today with Case Management, Nursing, Nursing leadership, and other members of the IDT  team.    Consultants/Specialty  Surgery    Code Status  Full Code    Disposition  The patient is not medically cleared for discharge to home or a post-acute facility.      Discharge plan TBD.  Anticipate discharge to home once medically cleared.  I have placed the appropriate orders for post-discharge needs.    Review of Systems  Review of Systems   Constitutional:  Positive for malaise/fatigue.   Gastrointestinal:  Positive for nausea.   Musculoskeletal:  Positive for joint pain.        Pertinent positives/negatives as mentioned above.     A complete review of systems was personally done by me. All other systems were negative.       Physical Exam  Temp:  [36.1 °C (97 °F)-37.5 °C (99.5 °F)] 36.9 °C (98.4 °F)  Pulse:  [] 100  Resp:  [15-19] 18  BP: ()/(57-74) 103/63  SpO2:  [90 %-95 %] 95 %    Physical Exam  Vitals and nursing note reviewed.   Constitutional:       General: She is not in acute distress.  HENT:      Head: Normocephalic and atraumatic.      Nose: Nose normal. No rhinorrhea.      Mouth/Throat:      Pharynx: No oropharyngeal exudate or posterior oropharyngeal erythema.   Eyes:      General: No scleral icterus.        Right eye: No discharge.         Left eye: No discharge.   Cardiovascular:      Rate and Rhythm: Normal rate and regular rhythm.      Heart sounds: Normal heart sounds. No murmur heard.     No friction rub. No gallop.   Pulmonary:      Effort: Pulmonary effort is normal. No respiratory distress.      Breath sounds: No stridor. Decreased breath sounds present. No wheezing or rhonchi.   Chest:      Chest wall: Tenderness present.   Abdominal:      General: Bowel sounds are normal. There is no distension.      Palpations: Abdomen is soft. There is no mass.      Tenderness: There is no abdominal tenderness. There is no rebound.   Musculoskeletal:         General: No swelling or tenderness.      Cervical back: Neck supple. No rigidity.   Skin:     General: Skin is warm and dry.       Coloration: Skin is not cyanotic or jaundiced.      Nails: There is no clubbing.   Neurological:      General: No focal deficit present.      Mental Status: She is alert and oriented to person, place, and time.      Cranial Nerves: No cranial nerve deficit.      Motor: No weakness.   Psychiatric:         Mood and Affect: Mood normal.         Behavior: Behavior normal.         Fluids    Intake/Output Summary (Last 24 hours) at 3/13/2024 1429  Last data filed at 3/12/2024 2322  Gross per 24 hour   Intake --   Output 350 ml   Net -350 ml       Laboratory  Recent Labs     03/11/24  0051 03/12/24  0031 03/13/24  0122   WBC 3.5* 3.3* 3.3*   RBC 3.34* 3.60* 3.35*   HEMOGLOBIN 10.8* 11.9* 11.0*   HEMATOCRIT 30.5* 34.2* 32.5*   MCV 91.3 95.0 97.0   MCH 32.3 33.1* 32.8   MCHC 35.4 34.8 33.8   RDW 41.1 44.0 45.7   PLATELETCT 228 226 218   MPV 8.9* 9.1 9.0     Recent Labs     03/12/24  0031 03/12/24  0743 03/13/24  0122   SODIUM 138 138 137   POTASSIUM 2.9* 3.1* 3.6   CHLORIDE 99 102 104   CO2 27 26 25   GLUCOSE 117* 146* 113*   BUN 3* 2* 2*   CREATININE 0.76 0.71 0.52   CALCIUM 9.8 9.5 9.4                   Imaging  EC-ECHOCARDIOGRAM COMPLETE W/ CONT   Final Result      CT-ABDOMEN-PELVIS WITH   Final Result      1.  No evidence of bowel obstruction or focal inflammatory change.      2.  Sigmoid diverticulosis.      3.  Fatty liver.      4.  Prior hysterectomy.      CT-CTA CHEST PULMONARY ARTERY W/ RECONS   Final Result      1.  Small right lower lobe pulmonary embolism.   2.  Mild groundglass and interstitial opacities in the lower lungs which may be due to edema and/or atelectasis/mild infiltrate.   3.  3 mm right upper lobe lung nodule.   4.  Oval complex collections in the bilateral breasts, left greater than right. This may be seroma/hematoma versus abscess not excluded. Correlate clinically.      VOALTE message of critical findings sent to Dr. Umaña at 3/9/2024 11:55 AM. I also received confirmation of receipt of VOALTE  message back from the provider.      DX-CHEST-PORTABLE (1 VIEW)   Final Result      No evidence of acute cardiopulmonary process.           Assessment/Plan  * Pulmonary embolism and infarction (HCC)- (present on admission)  Assessment & Plan  -Right lower lobe pulmonary embolism, in the setting of known cancer.  No signs of right heart strain.    -Continue anticoagulation with Eliquis.  -Continue respiratory support with RT protocol, weaned off  from supplemental oxygen .      Fluid collection at surgical site- (present on admission)  Assessment & Plan  - CT showed oval fluid collections in the breasts surgical sites left greater than the right.    It has been several months since her surgery (September 2023),     I discussed with surgery no clinical signs of infection otherwise at this time will DC antibiotics and monitor  Continued pain management will add Cymbalta    Constipation- (present on admission)  Assessment & Plan  Continue bowel regimen    Intractable vomiting- (present on admission)  Assessment & Plan  Improved  Continue IV hydration and antiemetics as needed and monitor p.o. intake    Hypokalemia- (present on admission)  Assessment & Plan  IV K-Phos and multiple levels    Hyponatremia- (present on admission)  Assessment & Plan  Resolved with hydration    Primary hypertension- (present on admission)  Assessment & Plan  Stable on Norvasc    Breast cancer of upper-outer quadrant of left female breast (HCC)- (present on admission)  Assessment & Plan  -Triple negative  -S/p bilateral total mastectomy  -On treatment by Dr. Gómez. Regimen: : Pembrolizumab + PACLitaxel + CARBOplatin (x 4 cycles) followed by Pembrolizumab + Cyclophosphamide + DOXOrubicin   (x4 cycles)  -Continue outpatient follow-up with oncology.         VTE prophylaxis: Eliquis

## 2024-03-13 NOTE — CARE PLAN
The patient is Stable - Low risk of patient condition declining or worsening    Shift Goals  Clinical Goals: monitor for infection, vitals and labs, pain management  Patient Goals: pain management  Family Goals: NED    Progress made toward(s) clinical / shift goals:        Problem: Pain - Standard  Goal: Alleviation of pain or a reduction in pain to the patient’s comfort goal  Outcome: Progressing     Problem: Knowledge Deficit - Standard  Goal: Patient and family/care givers will demonstrate understanding of plan of care, disease process/condition, diagnostic tests and medications  Outcome: Progressing     Problem: Fall Risk  Goal: Patient will remain free from falls  Outcome: Progressing     Problem: Communication  Goal: The ability to communicate needs accurately and effectively will improve  Outcome: Progressing     Problem: Hemodynamics  Goal: Patient's hemodynamics, fluid balance and neurologic status will be stable or improve  Outcome: Progressing       Patient is not progressing towards the following goals:

## 2024-03-13 NOTE — PROGRESS NOTES
DATE: 3/13/2024    Hospital Day 3  chest wall seroma .    INTERVAL EVENTS:  Pain more focus on left lateral chest and not seroma area. Would recommend adding Cymbalta and minimize narcs.  No indication for abx and would stop. Consider appetite stimulant.    PHYSICAL EXAMINATION:  Vital Signs: /64   Pulse 99   Temp 36.4 °C (97.6 °F) (Temporal)   Resp 18   Wt 79 kg (174 lb 2.6 oz)   SpO2 95%     Physical Exam  Pulmonary:      Effort: Pulmonary effort is normal.   Chest:      Chest wall: Tenderness present.   Musculoskeletal:         General: Normal range of motion.   Neurological:      Mental Status: She is alert.       .    LABORATORY VALUES:  Recent Labs     03/11/24  0051 03/12/24  0031 03/13/24  0122   WBC 3.5* 3.3* 3.3*   RBC 3.34* 3.60* 3.35*   HEMOGLOBIN 10.8* 11.9* 11.0*   HEMATOCRIT 30.5* 34.2* 32.5*   MCV 91.3 95.0 97.0   MCH 32.3 33.1* 32.8   MCHC 35.4 34.8 33.8   RDW 41.1 44.0 45.7   PLATELETCT 228 226 218   MPV 8.9* 9.1 9.0     Recent Labs     03/12/24  0031 03/12/24  0743 03/13/24  0122   SODIUM 138 138 137   POTASSIUM 2.9* 3.1* 3.6   CHLORIDE 99 102 104   CO2 27 26 25   GLUCOSE 117* 146* 113*   BUN 3* 2* 2*   CREATININE 0.76 0.71 0.52   CALCIUM 9.8 9.5 9.4                IMAGING:  EC-ECHOCARDIOGRAM COMPLETE W/ CONT   Final Result      CT-ABDOMEN-PELVIS WITH   Final Result      1.  No evidence of bowel obstruction or focal inflammatory change.      2.  Sigmoid diverticulosis.      3.  Fatty liver.      4.  Prior hysterectomy.      CT-CTA CHEST PULMONARY ARTERY W/ RECONS   Final Result      1.  Small right lower lobe pulmonary embolism.   2.  Mild groundglass and interstitial opacities in the lower lungs which may be due to edema and/or atelectasis/mild infiltrate.   3.  3 mm right upper lobe lung nodule.   4.  Oval complex collections in the bilateral breasts, left greater than right. This may be seroma/hematoma versus abscess not excluded. Correlate clinically.      VOALTE message of  critical findings sent to Dr. Umaña at 3/9/2024 11:55 AM. I also received confirmation of receipt of VOALTE message back from the provider.      DX-CHEST-PORTABLE (1 VIEW)   Final Result      No evidence of acute cardiopulmonary process.          ASSESSMENT AND PLAN:  Fluid collection at surgical site- (present on admission)  Assessment & Plan  History of B mastectomies  L side complicated with hematoma  Mult aspirations and ARACELI placed  Now with 8 x 5 cm fluid collection - clinically similar to outpat.  Would just monitor  Discussed surgical intervention if pain persists since already tried aspirations and drain  3/13 Further discussion and I don't feel surgical intervention indicated. Would add cymbalta as pain appears more lateral rib than the seroma           ____________________________________     Pamela Guerra M.D.    DD: 3/12/2024  11:09 AM

## 2024-03-14 LAB
ANION GAP SERPL CALC-SCNC: 13 MMOL/L (ref 7–16)
BACTERIA BLD CULT: NORMAL
BACTERIA BLD CULT: NORMAL
BUN SERPL-MCNC: 2 MG/DL (ref 8–22)
CALCIUM SERPL-MCNC: 9.4 MG/DL (ref 8.5–10.5)
CHLORIDE SERPL-SCNC: 102 MMOL/L (ref 96–112)
CO2 SERPL-SCNC: 21 MMOL/L (ref 20–33)
CREAT SERPL-MCNC: 0.4 MG/DL (ref 0.5–1.4)
GFR SERPLBLD CREATININE-BSD FMLA CKD-EPI: 113 ML/MIN/1.73 M 2
GLUCOSE SERPL-MCNC: 100 MG/DL (ref 65–99)
MAGNESIUM SERPL-MCNC: 1.5 MG/DL (ref 1.5–2.5)
PHOSPHATE SERPL-MCNC: 3.7 MG/DL (ref 2.5–4.5)
POTASSIUM SERPL-SCNC: 3.7 MMOL/L (ref 3.6–5.5)
SIGNIFICANT IND 70042: NORMAL
SIGNIFICANT IND 70042: NORMAL
SITE SITE: NORMAL
SITE SITE: NORMAL
SODIUM SERPL-SCNC: 136 MMOL/L (ref 135–145)
SOURCE SOURCE: NORMAL
SOURCE SOURCE: NORMAL

## 2024-03-14 PROCEDURE — 80048 BASIC METABOLIC PNL TOTAL CA: CPT

## 2024-03-14 PROCEDURE — 99232 SBSQ HOSP IP/OBS MODERATE 35: CPT | Performed by: HOSPITALIST

## 2024-03-14 PROCEDURE — A9270 NON-COVERED ITEM OR SERVICE: HCPCS | Performed by: HOSPITALIST

## 2024-03-14 PROCEDURE — 700111 HCHG RX REV CODE 636 W/ 250 OVERRIDE (IP): Performed by: HOSPITALIST

## 2024-03-14 PROCEDURE — 97535 SELF CARE MNGMENT TRAINING: CPT

## 2024-03-14 PROCEDURE — 700102 HCHG RX REV CODE 250 W/ 637 OVERRIDE(OP): Performed by: HOSPITALIST

## 2024-03-14 PROCEDURE — 84100 ASSAY OF PHOSPHORUS: CPT

## 2024-03-14 PROCEDURE — 700102 HCHG RX REV CODE 250 W/ 637 OVERRIDE(OP): Performed by: INTERNAL MEDICINE

## 2024-03-14 PROCEDURE — 97162 PT EVAL MOD COMPLEX 30 MIN: CPT

## 2024-03-14 PROCEDURE — A9270 NON-COVERED ITEM OR SERVICE: HCPCS | Performed by: INTERNAL MEDICINE

## 2024-03-14 PROCEDURE — 83735 ASSAY OF MAGNESIUM: CPT

## 2024-03-14 PROCEDURE — 97166 OT EVAL MOD COMPLEX 45 MIN: CPT

## 2024-03-14 PROCEDURE — 700105 HCHG RX REV CODE 258: Performed by: HOSPITALIST

## 2024-03-14 PROCEDURE — 36415 COLL VENOUS BLD VENIPUNCTURE: CPT

## 2024-03-14 PROCEDURE — 770020 HCHG ROOM/CARE - TELE (206)

## 2024-03-14 RX ORDER — MAGNESIUM SULFATE HEPTAHYDRATE 40 MG/ML
4 INJECTION, SOLUTION INTRAVENOUS ONCE
Status: COMPLETED | OUTPATIENT
Start: 2024-03-14 | End: 2024-03-14

## 2024-03-14 RX ORDER — SODIUM CHLORIDE, SODIUM LACTATE, POTASSIUM CHLORIDE, AND CALCIUM CHLORIDE .6; .31; .03; .02 G/100ML; G/100ML; G/100ML; G/100ML
1000 INJECTION, SOLUTION INTRAVENOUS ONCE
Status: COMPLETED | OUTPATIENT
Start: 2024-03-14 | End: 2024-03-14

## 2024-03-14 RX ADMIN — AMLODIPINE BESYLATE 5 MG: 5 TABLET ORAL at 05:47

## 2024-03-14 RX ADMIN — APIXABAN 10 MG: 5 TABLET, FILM COATED ORAL at 05:47

## 2024-03-14 RX ADMIN — APIXABAN 10 MG: 5 TABLET, FILM COATED ORAL at 18:15

## 2024-03-14 RX ADMIN — OXYCODONE 10 MG: 5 TABLET ORAL at 05:55

## 2024-03-14 RX ADMIN — SODIUM CHLORIDE, POTASSIUM CHLORIDE, SODIUM LACTATE AND CALCIUM CHLORIDE 1000 ML: 600; 310; 30; 20 INJECTION, SOLUTION INTRAVENOUS at 13:02

## 2024-03-14 RX ADMIN — DULOXETINE HYDROCHLORIDE 30 MG: 30 CAPSULE, DELAYED RELEASE ORAL at 05:47

## 2024-03-14 RX ADMIN — ROSUVASTATIN 10 MG: 10 TABLET, FILM COATED ORAL at 21:18

## 2024-03-14 RX ADMIN — MAGNESIUM SULFATE HEPTAHYDRATE 4 G: 4 INJECTION, SOLUTION INTRAVENOUS at 09:01

## 2024-03-14 RX ADMIN — SODIUM CHLORIDE: 9 INJECTION, SOLUTION INTRAVENOUS at 05:46

## 2024-03-14 RX ADMIN — OXYCODONE 10 MG: 5 TABLET ORAL at 21:27

## 2024-03-14 RX ADMIN — Medication 100 MG: at 05:47

## 2024-03-14 ASSESSMENT — COGNITIVE AND FUNCTIONAL STATUS - GENERAL
CLIMB 3 TO 5 STEPS WITH RAILING: A LITTLE
MOVING TO AND FROM BED TO CHAIR: A LITTLE
DRESSING REGULAR UPPER BODY CLOTHING: A LITTLE
STANDING UP FROM CHAIR USING ARMS: A LITTLE
PERSONAL GROOMING: A LITTLE
DAILY ACTIVITIY SCORE: 18
TOILETING: A LITTLE
MOBILITY SCORE: 18
WALKING IN HOSPITAL ROOM: A LITTLE
SUGGESTED CMS G CODE MODIFIER MOBILITY: CK
DRESSING REGULAR LOWER BODY CLOTHING: A LOT
TURNING FROM BACK TO SIDE WHILE IN FLAT BAD: A LITTLE
HELP NEEDED FOR BATHING: A LITTLE
SUGGESTED CMS G CODE MODIFIER DAILY ACTIVITY: CK
MOVING FROM LYING ON BACK TO SITTING ON SIDE OF FLAT BED: A LITTLE

## 2024-03-14 ASSESSMENT — ENCOUNTER SYMPTOMS
DIZZINESS: 1
NAUSEA: 1

## 2024-03-14 ASSESSMENT — ACTIVITIES OF DAILY LIVING (ADL): TOILETING: INDEPENDENT

## 2024-03-14 ASSESSMENT — FIBROSIS 4 INDEX: FIB4 SCORE: 2.71

## 2024-03-14 ASSESSMENT — PAIN DESCRIPTION - PAIN TYPE: TYPE: ACUTE PAIN

## 2024-03-14 ASSESSMENT — GAIT ASSESSMENTS: GAIT LEVEL OF ASSIST: UNABLE TO PARTICIPATE

## 2024-03-14 NOTE — THERAPY
"Physical Therapy   Initial Evaluation     Patient Name: Yolanda Roe  Age:  58 y.o., Sex:  female  Medical Record #: [id number]  Today's Date: 3/14/2024     Precautions  Precautions: Fall Risk    Assessment  Patient is 58 y.o. female admitted with SOB, flu like symptoms; found right lower lobe PE, chest wall seroma (non-op). PMHx of DLD, HTN, breast CA, s/p mastectomy, chemo, and reports back surgery in 1998. Pt is presenting below her functional baseline. Pt not able to attempt ambulation this session due so lightheadedness in standing, BLE weakness. See details regarding mobility performed below. Recommend HH PT once pt able to demonstrate ambulation without assistance as pt lives alone with little local support. Will continue to follow    Plan    Physical Therapy Initial Treatment Plan   Treatment Plan : Equipment, Gait Training, Neuro Re-Education / Balance, Self Care / Home Evaluation, Stair Training, Therapeutic Activities, Therapeutic Exercise  Treatment Frequency: 4 Times per Week  Duration: Until Therapy Goals Met    DC Equipment Recommendations:  (may potentially benefit from FWW, will trial next visit if appropriate)  Discharge Recommendations: Recommend home health for continued physical therapy services (Once able to demonstrate hallway distance ambulation and transfers without physical assist, pt unable to do so at this time)       Subjective    \"I feel lightheaded.\"      Objective     03/14/24 0819   Vitals   Blood Pressure   (supine 112/68, HR 99; sitting 127/80, , BP not assessed in standing)   O2 Delivery Device None - Room Air   Pain 0 - 10 Group   Therapist Pain Assessment Post Activity Pain Same as Prior to Activity;Nurse Notified;0   Prior Living Situation   Prior Services None   Housing / Facility 1 Story Apartment / Condo   Steps Into Home 0   Steps In Home 0   Bathroom Set up Bathtub / Shower Combination   Equipment Owned None   Lives with - Patient's Self Care Capacity Alone and Able " to Care For Self   Comments Reports some friends in area that may be able to help   Prior Level of Functional Mobility   Bed Mobility Independent   Transfer Status Independent   Ambulation Independent   Ambulation Distance   (community)   Assistive Devices Used None   Stairs Independent   Cognition    Cognition / Consciousness WDL   Level of Consciousness Alert   Comments Pleasant and cooperative   Active ROM Upper Body   Active ROM Upper Body  WDL   Strength Upper Body   Upper Body Strength  WDL   Active ROM Lower Body    Active ROM Lower Body  WDL   Strength Lower Body   Lower Body Strength  X   Comments BLE tremulous in standing, 3+/5   Balance Assessment   Sitting Balance (Static) Fair   Sitting Balance (Dynamic) Fair   Standing Balance (Static) Poor +   Standing Balance (Dynamic) Poor   Weight Shift Sitting Fair   Weight Shift Standing Poor   Comments via HHA   Bed Mobility    Supine to Sit Supervised   Sit to Supine Supervised   Scooting Supervised   Gait Analysis   Gait Level Of Assist Unable to Participate   Comments due to lightheadedness in standing, only able to take a few side steps to HOB with Jose   Functional Mobility   Sit to Stand Contact Guard Assist   Bed, Chair, Wheelchair Transfer Unable to Participate   Mobility STS, side steps, return supine   Comments Pt with decreased level of responsiveness in standing, delayed responses, c/o increasing lightheadedness from supine/sitting. Unable to get BP reading   6 Clicks Assessment - How much HELP from from another person do you currently need... (If the patient hasn't done an activity recently, how much help from another person do you think he/she would need if he/she tried?)   Turning from your back to your side while in a flat bed without using bedrails? 3   Moving from lying on your back to sitting on the side of a flat bed without using bedrails? 3   Moving to and from a bed to a chair (including a wheelchair)? 3   Standing up from a chair using  your arms (e.g., wheelchair, or bedside chair)? 3   Walking in hospital room? 3   Climbing 3-5 steps with a railing? 3   6 clicks Mobility Score 18   Activity Tolerance   Comments limited by lightheadedness   Patient / Family Goals    Patient / Family Goal #1 to go home   Short Term Goals    Short Term Goal # 1 Pt will perform stand step transfers with no AD and SPV in 6 visits to get in/out of a chair   Short Term Goal # 2 Pt will ambulate 150ft with LRAD and SPV in 6 visits to access household environement   Education Group   Education Provided Role of Physical Therapist   Role of Physical Therapist Patient Response Patient;Acceptance;Explanation;Verbal Demonstration   Additional Comments Receptive to self management, compensatory strategies, importance of continued OOB mobility as tolerated, PT POC, d/c recommendations   Physical Therapy Initial Treatment Plan    Treatment Plan  Equipment;Gait Training;Neuro Re-Education / Balance;Self Care / Home Evaluation;Stair Training;Therapeutic Activities;Therapeutic Exercise   Treatment Frequency 4 Times per Week   Duration Until Therapy Goals Met   Problem List    Problems Impaired Transfers;Impaired Ambulation;Functional Strength Deficit;Impaired Balance;Decreased Activity Tolerance   Anticipated Discharge Equipment and Recommendations   DC Equipment Recommendations   (may potentially benefit from FWW, will trial next visit as appropriate)   Discharge Recommendations Recommend home health for continued physical therapy services  (Once able to demonstrate hallway distance ambulation and transfers without physical assist, pt unable to do so at this time)   Interdisciplinary Plan of Care Collaboration   IDT Collaboration with  Nursing;Occupational Therapist   Patient Position at End of Therapy In Bed;Bed Alarm On;Call Light within Reach;Tray Table within Reach;Phone within Reach   Collaboration Comments RN updated   Session Information   Date / Session Number  3/14- 1  (1/4, 3/20)   Priority 4  (likely [place], potential dc home)

## 2024-03-14 NOTE — DISCHARGE PLANNING
1201  Received Choice form at 1152  Agency/Facility Name: [facility]   Referral sent per Choice form @ [facility] unable to send, need HH order.

## 2024-03-14 NOTE — DISCHARGE PLANNING
Case Management Discharge Planning    Admission Date: 3/9/2024  GMLOS: 4  ALOS: 5    6-Clicks ADL Score: 24  6-Clicks Mobility Score: 20      Anticipated Discharge Dispo: Discharge Disposition: Discharged to home/self care (01)    DME Needed: No    Action(s) Taken: Updated Provider/Nurse on Discharge Plan    Escalations Completed: None    Medically Clear: No    Next Steps: Pt has order for HH. Discussed with pt, gave choice.     Barriers to Discharge: Medical clearance    Is the patient up for discharge tomorrow: No

## 2024-03-14 NOTE — PROGRESS NOTES
Hospital Medicine Daily Progress Note    Date of Service  3/14/2024    Chief Complaint  Shortness of breath, flulike symptoms    Hospital Course  Yolanda Roe is a 58 y.o. female with a past medical history of dyslipidemia, hypertension, triple negative breast cancer status postmastectomy by Dr. Monahan and is undergoing chemotherapy by Dr. Gómez, who presented 3/9/2024 with shortness of breath and vomiting. She states for the past 3 weeks she is effectively not able to take any food because of intractable vomiting and has not had a bowel movements as she has not been on keep anything down.  She has felt short of breath though not had a fever and no chest pain.  On evaluation, labs revealed a significant metabolic acidosis with hypokalemia and a CT scan reveals right lower lobe pulmonary embolism with mild groundglass and interstitial opacities in the lower lungs, along with oval fluid collections in the breasts surgical sites left greater than the right.  CT abdomen pelvis was unremarkable.  Urinalysis only showed 5-10 WBC with negative leukocyte esterase or nitrate.  Influenza, RSV, and COVID-19 PCR's were negative.  Lactate was normal.  She was started on anticoagulation with therapeutic Lovenox.  Her nausea/vomiting was felt to be related to her chemotherapy, and she was started on IV fluids and antiemetics.  She tolerated oral intake, and she was transitioned to oral Eliquis.  She had low magnesium which was replaced.  She maintained sinus rhythm.      Interval Problem Update    Patient is orthostatic with mobilization I ordered IV fluid bolus  Reports an episode of emesis yesterday but tolerating breakfast  She is afebrile on room air  I reviewed chemistry panel potassium 3.7 phosphorus 3.7 magnesium 1.5 I ordered magnesium repletion  Blood cultures from 3/ 9 negative for 5 days  We discussed with Dr. Guerra, she recommends outpatient follow-up        I have discussed this patient's plan of care and  discharge plan at IDT rounds today with Case Management, Nursing, Nursing leadership, and other members of the IDT team.    Consultants/Specialty  Surgery    Code Status  Full Code    Disposition  The patient is not medically cleared for discharge to home or a post-acute facility.  Anticipate discharge to: home with organized home healthcare and close outpatient follow-up    Discharge plan TBD.  Anticipate discharge to home once medically cleared.  I have placed the appropriate orders for post-discharge needs.    Review of Systems  Review of Systems   Constitutional:  Positive for malaise/fatigue.   Gastrointestinal:  Positive for nausea.   Musculoskeletal:  Positive for joint pain.   Neurological:  Positive for dizziness.        Pertinent positives/negatives as mentioned above.     A complete review of systems was personally done by me. All other systems were negative.       Physical Exam  Temp:  [36.1 °C (97 °F)-37.1 °C (98.8 °F)] 36.1 °C (97 °F)  Pulse:  [] 99  Resp:  [18] 18  BP: (101-115)/(58-82) 103/58  SpO2:  [90 %-95 %] 95 %    Physical Exam  Vitals and nursing note reviewed.   Constitutional:       General: She is not in acute distress.  HENT:      Head: Normocephalic and atraumatic.      Nose: Nose normal. No rhinorrhea.      Mouth/Throat:      Pharynx: No oropharyngeal exudate or posterior oropharyngeal erythema.   Eyes:      General:         Right eye: No discharge.         Left eye: No discharge.   Cardiovascular:      Rate and Rhythm: Normal rate and regular rhythm.      Heart sounds: Normal heart sounds. No murmur heard.     No friction rub. No gallop.   Pulmonary:      Effort: Pulmonary effort is normal. No respiratory distress.      Breath sounds: Normal breath sounds. No stridor. No rhonchi or rales.   Chest:      Chest wall: Tenderness present.   Abdominal:      General: There is no distension.      Palpations: Abdomen is soft. There is no mass.      Tenderness: There is no abdominal  tenderness. There is no rebound.   Musculoskeletal:         General: No swelling or tenderness.      Cervical back: Neck supple. No rigidity.   Skin:     General: Skin is warm and dry.      Coloration: Skin is not cyanotic or jaundiced.      Nails: There is no clubbing.   Neurological:      General: No focal deficit present.      Mental Status: She is alert and oriented to person, place, and time.      Cranial Nerves: No cranial nerve deficit.      Motor: No weakness.   Psychiatric:         Mood and Affect: Mood normal.         Behavior: Behavior normal.         Fluids    Intake/Output Summary (Last 24 hours) at 3/14/2024 1435  Last data filed at 3/14/2024 0800  Gross per 24 hour   Intake 700 ml   Output --   Net 700 ml       Laboratory  Recent Labs     03/12/24  0031 03/13/24  0122   WBC 3.3* 3.3*   RBC 3.60* 3.35*   HEMOGLOBIN 11.9* 11.0*   HEMATOCRIT 34.2* 32.5*   MCV 95.0 97.0   MCH 33.1* 32.8   MCHC 34.8 33.8   RDW 44.0 45.7   PLATELETCT 226 218   MPV 9.1 9.0     Recent Labs     03/12/24  0743 03/13/24  0122 03/14/24  0149   SODIUM 138 137 136   POTASSIUM 3.1* 3.6 3.7   CHLORIDE 102 104 102   CO2 26 25 21   GLUCOSE 146* 113* 100*   BUN 2* 2* 2*   CREATININE 0.71 0.52 0.40*   CALCIUM 9.5 9.4 9.4                   Imaging  EC-ECHOCARDIOGRAM COMPLETE W/ CONT   Final Result      CT-ABDOMEN-PELVIS WITH   Final Result      1.  No evidence of bowel obstruction or focal inflammatory change.      2.  Sigmoid diverticulosis.      3.  Fatty liver.      4.  Prior hysterectomy.      CT-CTA CHEST PULMONARY ARTERY W/ RECONS   Final Result      1.  Small right lower lobe pulmonary embolism.   2.  Mild groundglass and interstitial opacities in the lower lungs which may be due to edema and/or atelectasis/mild infiltrate.   3.  3 mm right upper lobe lung nodule.   4.  Oval complex collections in the bilateral breasts, left greater than right. This may be seroma/hematoma versus abscess not excluded. Correlate clinically.       VOALTE message of critical findings sent to Dr. Umaña at 3/9/2024 11:55 AM. I also received confirmation of receipt of VOALTE message back from the provider.      DX-CHEST-PORTABLE (1 VIEW)   Final Result      No evidence of acute cardiopulmonary process.           Assessment/Plan  * Pulmonary embolism and infarction (HCC)- (present on admission)  Assessment & Plan  -Right lower lobe pulmonary embolism, in the setting of known cancer.  No signs of right heart strain.    -Continue anticoagulation with Eliquis.  -Continue respiratory support with RT protocol, weaned off  from supplemental oxygen .      Fluid collection at surgical site- (present on admission)  Assessment & Plan  - CT showed oval fluid collections in the breasts surgical sites left greater than the right.    It has been several months since her surgery (September 2023),     I discussed with surgery no clinical signs of infection otherwise at this time will DC antibiotics and monitor  Continued pain management will add Cymbalta    Constipation- (present on admission)  Assessment & Plan  Continue bowel regimen    Hypomagnesemia- (present on admission)  Assessment & Plan  I ordered IV magnesium sulfate and repeat levels    Intractable vomiting- (present on admission)  Assessment & Plan  Improved  Continue IV hydration and antiemetics as needed and monitor p.o. intake    Hypokalemia- (present on admission)  Assessment & Plan  Replete and monitor    Hyponatremia- (present on admission)  Assessment & Plan  Resolved with hydration    Primary hypertension- (present on admission)  Assessment & Plan  Given orthostatic symptoms we will hold amlodipine and monitor    Breast cancer of upper-outer quadrant of left female breast (HCC)- (present on admission)  Assessment & Plan  -Triple negative  -S/p bilateral total mastectomy  -On treatment by Dr. Gómez. Regimen: : Pembrolizumab + PACLitaxel + CARBOplatin (x 4 cycles) followed by Pembrolizumab +  Cyclophosphamide + DOXOrubicin   (x4 cycles)  -Continue outpatient follow-up with oncology.         VTE prophylaxis: Eliquis

## 2024-03-14 NOTE — CARE PLAN
The patient is Stable - Low risk of patient condition declining or worsening    Shift Goals  Clinical Goals: manage pain, PT/OT eval, monitor labs/VSS  Patient Goals: control pain  Family Goals: NED    Progress made toward(s) clinical / shift goals:  PT/OT did eval today. Orthostasis with OT, fluid bolus currently running (1L LR). Will continue to monitor VSS.     Patient is not progressing towards the following goals: NA      Problem: Pain - Standard  Goal: Alleviation of pain or a reduction in pain to the patient’s comfort goal  Description: Target End Date:  Prior to discharge or change in level of care    Document on Vitals flowsheet    1.  Document pain using the appropriate pain scale per order or unit policy  2.  Educate and implement non-pharmacologic comfort measures (i.e. relaxation, distraction, massage, cold/heat therapy, etc.)  3.  Pain management medications as ordered  4.  Reassess pain after pain med administration per policy  5.  If opiods administered assess patient's response to pain medication is appropriate per POSS sedation scale  6.  Follow pain management plan developed in collaboration with patient and interdisciplinary team (including palliative care or pain specialists if applicable)  Outcome: Progressing     Problem: Skin Integrity  Goal: Skin integrity is maintained or improved  Description: Target End Date:  Prior to discharge or change in level of care    Document interventions on Skin Risk/Jasiel flowsheet groups and corresponding LDA    1.  Assess and monitor skin integrity, appearance and/or temperature  2.  Assess risk factors for impaired skin integrity and/or pressures ulcers  3.  Implement precautions to protect skin integrity in collaboration with interdisciplinary team  4.  Implement pressure ulcer prevention protocol if at risk for skin breakdown  5.  Confirm wound care consult if at risk for skin breakdown  6.  Ensure patient use of pressure relieving devices  (Low air loss  bed, waffle overlay, heel protectors, ROHO cushion, etc)  Outcome: Progressing     Problem: Fall Risk  Goal: Patient will remain free from falls  Description: Target End Date:  Prior to discharge or change in level of care    Document interventions on the [facility] Fall Risk Assessment    1.  Assess for fall risk factors  2.  Implement fall precautions  Outcome: Progressing     Problem: Hemodynamics  Goal: Patient's hemodynamics, fluid balance and neurologic status will be stable or improve  Description: Target End Date:  Prior to discharge or change in level of care    Document on Assessment and I/O flowsheet templates    1.  Monitor vital signs, pulse oximetry and cardiac monitor per provider order and/or policy  2.  Maintain blood pressure per provider order  3.  Hemodynamic monitoring per provider order  4.  Manage IV fluids and IV infusions  5.  Monitor intake and output  6.  Daily weights per unit policy or provider order  7.  Assess peripheral pulses and capillary refill  8.  Assess color and body temperature  9.  Position patient for maximum circulation/cardiac output  10. Monitor for signs/symptoms of excessive bleeding  11. Assess mental status, restlessness and changes in level of consciousness  12. Monitor temperature and report fever or hypothermia to provider immediately. Consideration of targeted temperature management.  Outcome: Progressing

## 2024-03-14 NOTE — CARE PLAN
The patient is Stable - Low risk of patient condition declining or worsening    Shift Goals  Clinical Goals: Manage pain, VSS  Patient Goals: Manage pain, sleep  Family Goals: NED    Problem: Pain - Standard  Goal: Alleviation of pain or a reduction in pain to the patient’s comfort goal  Outcome: Progressing     Problem: Knowledge Deficit - Standard  Goal: Patient and family/care givers will demonstrate understanding of plan of care, disease process/condition, diagnostic tests and medications  Outcome: Progressing     Problem: Skin Integrity  Goal: Skin integrity is maintained or improved  Outcome: Progressing     Problem: Fall Risk  Goal: Patient will remain free from falls  Outcome: Progressing     Problem: Psychosocial  Goal: Patient's level of anxiety will decrease  Outcome: Progressing  Goal: Patient's ability to verbalize feelings about condition will improve  Outcome: Progressing  Goal: Patient's ability to re-evaluate and adapt role responsibilities will improve  Outcome: Progressing  Goal: Patient and family will demonstrate ability to cope with life altering diagnosis and/or procedure  Outcome: Progressing  Goal: Spiritual and cultural needs incorporated into hospitalization  Outcome: Progressing     Problem: Communication  Goal: The ability to communicate needs accurately and effectively will improve  Outcome: Progressing     Problem: Discharge Barriers/Planning  Goal: Patient's continuum of care needs are met  Outcome: Progressing     Problem: Hemodynamics  Goal: Patient's hemodynamics, fluid balance and neurologic status will be stable or improve  Outcome: Progressing     Problem: Respiratory  Goal: Patient will achieve/maintain optimum respiratory ventilation and gas exchange  Outcome: Progressing     Problem: Fluid Volume  Goal: Fluid volume balance will be maintained  Outcome: Progressing     Problem: Mechanical Ventilation  Goal: Safe management of artificial airway and ventilation  Outcome:  Progressing  Goal: Successful weaning off mechanical ventilator, spontaneously maintains adequate gas exchange  Outcome: Progressing  Goal: Patient will be able to express needs and understand communication  Outcome: Progressing     Problem: Dysphagia  Goal: Dysphagia will improve  Outcome: Progressing     Problem: Risk for Aspiration  Goal: Patient's risk for aspiration will be absent or decrease  Outcome: Progressing     Problem: Nutrition  Goal: Patient's nutritional and fluid intake will be adequate or improve  Outcome: Progressing  Goal: Enteral nutrition will be maintained or improve  Outcome: Progressing  Goal: Enteral nutrition will be maintained or improve  Outcome: Progressing     Problem: Urinary Elimination  Goal: Establish and maintain regular urinary output  Outcome: Progressing     Problem: Bowel Elimination  Goal: Establish and maintain regular bowel function  Outcome: Progressing     Problem: Gastrointestinal Irritability  Goal: Nausea and vomiting will be absent or improve  Outcome: Progressing  Goal: Diarrhea will be absent or improved  Outcome: Progressing     Problem: Rectal Tube  Goal: Fecal output will be contained and skin will remain free from irritation  Outcome: Progressing     Problem: Mobility  Goal: Patient's capacity to carry out activities will improve  Outcome: Progressing     Problem: Self Care  Goal: Patient will have the ability to perform ADLs independently or with assistance (bathe, groom, dress, toilet and feed)  Outcome: Progressing     Problem: Infection - Standard  Goal: Patient will remain free from infection  Outcome: Progressing     Problem: Wound/ / Incision Healing  Goal: Patient's wound/surgical incision will decrease in size and heals properly  Outcome: Progressing

## 2024-03-14 NOTE — PROGRESS NOTES
Bedside report received and patient care assumed. Pt is resting in bed, A&O4, with complaints of pain of 3/10, and is on room air. Tele box on. All fall precautions are in place, belongings at bedside table.  Pt was updated on POC, no questions or concerns. Pt educated on use of call light for assistance.

## 2024-03-15 LAB
ANION GAP SERPL CALC-SCNC: 15 MMOL/L (ref 7–16)
BACTERIA BLD CULT: NORMAL
BACTERIA BLD CULT: NORMAL
BUN SERPL-MCNC: 2 MG/DL (ref 8–22)
CALCIUM SERPL-MCNC: 8.9 MG/DL (ref 8.5–10.5)
CHLORIDE SERPL-SCNC: 99 MMOL/L (ref 96–112)
CO2 SERPL-SCNC: 21 MMOL/L (ref 20–33)
CREAT SERPL-MCNC: 0.41 MG/DL (ref 0.5–1.4)
ERYTHROCYTE [DISTWIDTH] IN BLOOD BY AUTOMATED COUNT: 45.3 FL (ref 35.9–50)
GFR SERPLBLD CREATININE-BSD FMLA CKD-EPI: 113 ML/MIN/1.73 M 2
GLUCOSE SERPL-MCNC: 90 MG/DL (ref 65–99)
HCT VFR BLD AUTO: 32.5 % (ref 37–47)
HGB BLD-MCNC: 10.9 G/DL (ref 12–16)
MAGNESIUM SERPL-MCNC: 1.9 MG/DL (ref 1.5–2.5)
MCH RBC QN AUTO: 32.6 PG (ref 27–33)
MCHC RBC AUTO-ENTMCNC: 33.5 G/DL (ref 32.2–35.5)
MCV RBC AUTO: 97.3 FL (ref 81.4–97.8)
PHOSPHATE SERPL-MCNC: 4 MG/DL (ref 2.5–4.5)
PLATELET # BLD AUTO: 219 K/UL (ref 164–446)
PMV BLD AUTO: 9.5 FL (ref 9–12.9)
POTASSIUM SERPL-SCNC: 3.3 MMOL/L (ref 3.6–5.5)
RBC # BLD AUTO: 3.34 M/UL (ref 4.2–5.4)
SIGNIFICANT IND 70042: NORMAL
SIGNIFICANT IND 70042: NORMAL
SITE SITE: NORMAL
SITE SITE: NORMAL
SODIUM SERPL-SCNC: 135 MMOL/L (ref 135–145)
SOURCE SOURCE: NORMAL
SOURCE SOURCE: NORMAL
WBC # BLD AUTO: 3.3 K/UL (ref 4.8–10.8)

## 2024-03-15 PROCEDURE — 97535 SELF CARE MNGMENT TRAINING: CPT

## 2024-03-15 PROCEDURE — 99232 SBSQ HOSP IP/OBS MODERATE 35: CPT | Performed by: HOSPITALIST

## 2024-03-15 PROCEDURE — 85027 COMPLETE CBC AUTOMATED: CPT

## 2024-03-15 PROCEDURE — 700111 HCHG RX REV CODE 636 W/ 250 OVERRIDE (IP): Performed by: HOSPITALIST

## 2024-03-15 PROCEDURE — 83735 ASSAY OF MAGNESIUM: CPT

## 2024-03-15 PROCEDURE — 770020 HCHG ROOM/CARE - TELE (206)

## 2024-03-15 PROCEDURE — 700102 HCHG RX REV CODE 250 W/ 637 OVERRIDE(OP): Performed by: INTERNAL MEDICINE

## 2024-03-15 PROCEDURE — 97530 THERAPEUTIC ACTIVITIES: CPT

## 2024-03-15 PROCEDURE — 84100 ASSAY OF PHOSPHORUS: CPT

## 2024-03-15 PROCEDURE — A9270 NON-COVERED ITEM OR SERVICE: HCPCS | Performed by: HOSPITALIST

## 2024-03-15 PROCEDURE — 80048 BASIC METABOLIC PNL TOTAL CA: CPT

## 2024-03-15 PROCEDURE — 700102 HCHG RX REV CODE 250 W/ 637 OVERRIDE(OP): Performed by: HOSPITALIST

## 2024-03-15 PROCEDURE — 36415 COLL VENOUS BLD VENIPUNCTURE: CPT

## 2024-03-15 PROCEDURE — A9270 NON-COVERED ITEM OR SERVICE: HCPCS | Performed by: INTERNAL MEDICINE

## 2024-03-15 RX ORDER — ONDANSETRON 8 MG/1
8 TABLET, ORALLY DISINTEGRATING ORAL PRN
OUTPATIENT
Start: 2024-03-20

## 2024-03-15 RX ORDER — 0.9 % SODIUM CHLORIDE 0.9 %
3 VIAL (ML) INJECTION PRN
OUTPATIENT
Start: 2024-03-20

## 2024-03-15 RX ORDER — POTASSIUM CHLORIDE 7.45 MG/ML
10 INJECTION INTRAVENOUS
Status: COMPLETED | OUTPATIENT
Start: 2024-03-15 | End: 2024-03-15

## 2024-03-15 RX ORDER — 0.9 % SODIUM CHLORIDE 0.9 %
10 VIAL (ML) INJECTION PRN
OUTPATIENT
Start: 2024-03-20

## 2024-03-15 RX ORDER — HEPARIN SODIUM (PORCINE) LOCK FLUSH IV SOLN 100 UNIT/ML 100 UNIT/ML
500 SOLUTION INTRAVENOUS PRN
OUTPATIENT
Start: 2024-03-20

## 2024-03-15 RX ORDER — HEPARIN SODIUM (PORCINE) LOCK FLUSH IV SOLN 100 UNIT/ML 100 UNIT/ML
500 SOLUTION INTRAVENOUS PRN
OUTPATIENT
Start: 2024-03-19

## 2024-03-15 RX ORDER — SODIUM CHLORIDE 9 MG/ML
INJECTION, SOLUTION INTRAVENOUS CONTINUOUS
OUTPATIENT
Start: 2024-03-20

## 2024-03-15 RX ORDER — 0.9 % SODIUM CHLORIDE 0.9 %
10 VIAL (ML) INJECTION PRN
OUTPATIENT
Start: 2024-03-19

## 2024-03-15 RX ORDER — 0.9 % SODIUM CHLORIDE 0.9 %
VIAL (ML) INJECTION PRN
OUTPATIENT
Start: 2024-03-20

## 2024-03-15 RX ORDER — 0.9 % SODIUM CHLORIDE 0.9 %
VIAL (ML) INJECTION PRN
OUTPATIENT
Start: 2024-03-19

## 2024-03-15 RX ORDER — ONDANSETRON 2 MG/ML
4 INJECTION INTRAMUSCULAR; INTRAVENOUS PRN
OUTPATIENT
Start: 2024-03-20

## 2024-03-15 RX ORDER — PROCHLORPERAZINE MALEATE 10 MG
10 TABLET ORAL EVERY 6 HOURS PRN
OUTPATIENT
Start: 2024-03-20

## 2024-03-15 RX ORDER — 0.9 % SODIUM CHLORIDE 0.9 %
3 VIAL (ML) INJECTION PRN
OUTPATIENT
Start: 2024-03-19

## 2024-03-15 RX ADMIN — APIXABAN 10 MG: 5 TABLET, FILM COATED ORAL at 17:11

## 2024-03-15 RX ADMIN — POTASSIUM CHLORIDE 10 MEQ: 7.46 INJECTION, SOLUTION INTRAVENOUS at 09:28

## 2024-03-15 RX ADMIN — ROSUVASTATIN 10 MG: 10 TABLET, FILM COATED ORAL at 22:20

## 2024-03-15 RX ADMIN — DULOXETINE HYDROCHLORIDE 30 MG: 30 CAPSULE, DELAYED RELEASE ORAL at 04:32

## 2024-03-15 RX ADMIN — PROCHLORPERAZINE EDISYLATE 10 MG: 5 INJECTION INTRAMUSCULAR; INTRAVENOUS at 12:21

## 2024-03-15 RX ADMIN — APIXABAN 10 MG: 5 TABLET, FILM COATED ORAL at 04:34

## 2024-03-15 RX ADMIN — Medication 100 MG: at 04:34

## 2024-03-15 RX ADMIN — POTASSIUM CHLORIDE 10 MEQ: 7.46 INJECTION, SOLUTION INTRAVENOUS at 08:17

## 2024-03-15 ASSESSMENT — COGNITIVE AND FUNCTIONAL STATUS - GENERAL
MOVING TO AND FROM BED TO CHAIR: A LITTLE
MOBILITY SCORE: 17
SUGGESTED CMS G CODE MODIFIER DAILY ACTIVITY: CK
STANDING UP FROM CHAIR USING ARMS: A LITTLE
DRESSING REGULAR UPPER BODY CLOTHING: A LITTLE
CLIMB 3 TO 5 STEPS WITH RAILING: A LOT
DRESSING REGULAR LOWER BODY CLOTHING: A LOT
DAILY ACTIVITIY SCORE: 18
SUGGESTED CMS G CODE MODIFIER MOBILITY: CK
WALKING IN HOSPITAL ROOM: A LITTLE
TURNING FROM BACK TO SIDE WHILE IN FLAT BAD: A LITTLE
HELP NEEDED FOR BATHING: A LITTLE
MOVING FROM LYING ON BACK TO SITTING ON SIDE OF FLAT BED: A LITTLE
TOILETING: A LITTLE
PERSONAL GROOMING: A LITTLE

## 2024-03-15 ASSESSMENT — GAIT ASSESSMENTS: GAIT LEVEL OF ASSIST: UNABLE TO PARTICIPATE

## 2024-03-15 ASSESSMENT — ENCOUNTER SYMPTOMS
NAUSEA: 1
DIZZINESS: 0

## 2024-03-15 ASSESSMENT — FIBROSIS 4 INDEX: FIB4 SCORE: 2.69

## 2024-03-15 NOTE — PROGRESS NOTES
Hospital Medicine Daily Progress Note    Date of Service  3/15/2024    Chief Complaint  Shortness of breath, flulike symptoms    Hospital Course  Yolanda Roe is a 58 y.o. female with a past medical history of dyslipidemia, hypertension, triple negative breast cancer status postmastectomy by Dr. Monahan and is undergoing chemotherapy by Dr. Gómez, who presented 3/9/2024 with shortness of breath and vomiting. She states for the past 3 weeks she is effectively not able to take any food because of intractable vomiting and has not had a bowel movements as she has not been on keep anything down.  She has felt short of breath though not had a fever and no chest pain.  On evaluation, labs revealed a significant metabolic acidosis with hypokalemia and a CT scan reveals right lower lobe pulmonary embolism with mild groundglass and interstitial opacities in the lower lungs, along with oval fluid collections in the breasts surgical sites left greater than the right.  CT abdomen pelvis was unremarkable.  Urinalysis only showed 5-10 WBC with negative leukocyte esterase or nitrate.  Influenza, RSV, and COVID-19 PCR's were negative.  Lactate was normal.  She was started on anticoagulation with therapeutic Lovenox.  Her nausea/vomiting was felt to be related to her chemotherapy, and she was started on IV fluids and antiemetics.  She tolerated oral intake, and she was transitioned to oral Eliquis.  She had low magnesium which was replaced.  She maintained sinus rhythm.      Interval Problem Update    Patient blood pressure improved after hydration with negative orthostasis  Reevaluated by PT OT remains weak requiring SNF referral discussed with case management  I reviewed chemistry panel potassium 3.3 magnesium 1.9 I ordered potassium repletion        I have discussed this patient's plan of care and discharge plan at IDT rounds today with Case Management, Nursing, Nursing leadership, and other members of the IDT  team.    Consultants/Specialty  Surgery    Code Status  Full Code    Disposition  Medically Cleared  Discharge plan TBD.  Anticipate discharge to home once medically cleared.  I have placed the appropriate orders for post-discharge needs.    Review of Systems  Review of Systems   Constitutional:  Positive for malaise/fatigue.   Cardiovascular:  Negative for chest pain.   Gastrointestinal:  Positive for nausea.   Musculoskeletal:  Positive for joint pain.   Neurological:  Negative for dizziness.   All other systems reviewed and are negative.       Pertinent positives/negatives as mentioned above.     A complete review of systems was personally done by me. All other systems were negative.       Physical Exam  Temp:  [36.3 °C (97.3 °F)-37.2 °C (99 °F)] 37.2 °C (99 °F)  Pulse:  [] 109  Resp:  [16-19] 18  BP: (120-154)/(69-99) 141/81  SpO2:  [90 %-92 %] 90 %    Physical Exam  Vitals and nursing note reviewed.   Constitutional:       General: She is not in acute distress.  HENT:      Head: Normocephalic and atraumatic.      Nose: Nose normal. No rhinorrhea.      Mouth/Throat:      Pharynx: No oropharyngeal exudate or posterior oropharyngeal erythema.   Eyes:      General:         Right eye: No discharge.         Left eye: No discharge.   Cardiovascular:      Rate and Rhythm: Normal rate and regular rhythm.      Heart sounds: Normal heart sounds. No murmur heard.     No friction rub. No gallop.   Pulmonary:      Effort: Pulmonary effort is normal. No respiratory distress.      Breath sounds: No stridor. No wheezing, rhonchi or rales.   Chest:      Chest wall: Tenderness present.   Abdominal:      General: Bowel sounds are normal. There is no distension.      Palpations: Abdomen is soft. There is no mass.      Tenderness: There is no abdominal tenderness. There is no rebound.   Musculoskeletal:         General: No swelling or tenderness.      Cervical back: Neck supple. No rigidity.   Skin:     General: Skin is warm  and dry.      Coloration: Skin is not cyanotic or jaundiced.      Nails: There is no clubbing.   Neurological:      General: No focal deficit present.      Mental Status: She is alert and oriented to person, place, and time.      Cranial Nerves: No cranial nerve deficit.      Motor: No weakness.   Psychiatric:         Mood and Affect: Mood normal.         Behavior: Behavior normal.         Fluids    Intake/Output Summary (Last 24 hours) at 3/15/2024 1558  Last data filed at 3/15/2024 0400  Gross per 24 hour   Intake 940 ml   Output 900 ml   Net 40 ml       Laboratory  Recent Labs     03/13/24  0122 03/15/24  0101   WBC 3.3* 3.3*   RBC 3.35* 3.34*   HEMOGLOBIN 11.0* 10.9*   HEMATOCRIT 32.5* 32.5*   MCV 97.0 97.3   MCH 32.8 32.6   MCHC 33.8 33.5   RDW 45.7 45.3   PLATELETCT 218 219   MPV 9.0 9.5     Recent Labs     03/13/24  0122 03/14/24  0149 03/15/24  0101   SODIUM 137 136 135   POTASSIUM 3.6 3.7 3.3*   CHLORIDE 104 102 99   CO2 25 21 21   GLUCOSE 113* 100* 90   BUN 2* 2* 2*   CREATININE 0.52 0.40* 0.41*   CALCIUM 9.4 9.4 8.9                   Imaging  EC-ECHOCARDIOGRAM COMPLETE W/ CONT   Final Result      CT-ABDOMEN-PELVIS WITH   Final Result      1.  No evidence of bowel obstruction or focal inflammatory change.      2.  Sigmoid diverticulosis.      3.  Fatty liver.      4.  Prior hysterectomy.      CT-CTA CHEST PULMONARY ARTERY W/ RECONS   Final Result      1.  Small right lower lobe pulmonary embolism.   2.  Mild groundglass and interstitial opacities in the lower lungs which may be due to edema and/or atelectasis/mild infiltrate.   3.  3 mm right upper lobe lung nodule.   4.  Oval complex collections in the bilateral breasts, left greater than right. This may be seroma/hematoma versus abscess not excluded. Correlate clinically.      VOALTE message of critical findings sent to Dr. Umaña at 3/9/2024 11:55 AM. I also received confirmation of receipt of VOALTE message back from the provider.       DX-CHEST-PORTABLE (1 VIEW)   Final Result      No evidence of acute cardiopulmonary process.           Assessment/Plan  * Pulmonary embolism and infarction (HCC)- (present on admission)  Assessment & Plan  -Right lower lobe pulmonary embolism, in the setting of known cancer.  No signs of right heart strain.    -Continue anticoagulation with Eliquis.  -Continue respiratory support with RT protocol, weaned off  from supplemental oxygen .      Fluid collection at surgical site- (present on admission)  Assessment & Plan  - CT showed oval fluid collections in the breasts surgical sites left greater than the right.    It has been several months since her surgery (September 2023),     I discussed with surgery no clinical signs of infection otherwise at this time will DC antibiotics and monitor  Continued pain management    Cymbalta added    Constipation- (present on admission)  Assessment & Plan  Continue bowel regimen    Hypomagnesemia- (present on admission)  Assessment & Plan  I ordered IV magnesium sulfate and repeat levels    Intractable vomiting- (present on admission)  Assessment & Plan  Vomiting resolved still with residual nausea continue Zofran as needed and bowel protocol    Hypokalemia- (present on admission)  Assessment & Plan  Replete and monitor    Hyponatremia- (present on admission)  Assessment & Plan  Resolved with hydration    Primary hypertension- (present on admission)  Assessment & Plan  Given orthostatic symptoms amlodipine discontinued  Monitor blood pressure adjust accordingly    Breast cancer of upper-outer quadrant of left female breast (HCC)- (present on admission)  Assessment & Plan  -Triple negative  -S/p bilateral total mastectomy  -On treatment by Dr. Gómez. Regimen: : Pembrolizumab + PACLitaxel + CARBOplatin (x 4 cycles) followed by Pembrolizumab + Cyclophosphamide + DOXOrubicin   (x4 cycles)  -Continue outpatient follow-up with oncology.         VTE prophylaxis: Eliquis

## 2024-03-15 NOTE — THERAPY
Occupational Therapy   Initial Evaluation     Patient Name: Yolanda Roe  Age:  58 y.o., Sex:  female  Medical Record #: [id number]  Today's Date: 3/14/2024     Precautions  Precautions: Fall Risk  Comments: orthostatic    Assessment    Patient is 58 y.o. female admitted for SOB and flu-like symptoms, found to have metabolic acidosis with hypokalemia and CT imaging revealed pulmonary embolism. Other pertinent medical history includes HTN, and breast cancer s/p mastectomy. Pt seen for OT evaluation and treatment. Pt required SBA to wash face while seated, max A to don/doff socks, CGA-min A for functional mobility, and SBA for bed mobility. Pt noted to have a BP drop from 128/79 to 62/40 after taking a few steps. RN aware. Pt lives alone with some support from friends if needed. Pt educated regarding the role of OT and the pathology of bedrest. Pt current functional performance limited by hypotension, impaired activity tolerance, impaired balance, and generalized weakness. Pt will benefit from skilled OT while admitted to acute care.     Plan    Occupational Therapy Initial Treatment Plan   Treatment Interventions: Self Care / Activities of Daily Living, Adaptive Equipment, Neuro Re-Education / Balance, Therapeutic Exercises, Therapeutic Activity  Treatment Frequency: 3 Times per Week  Duration: Until Therapy Goals Met    DC Equipment Recommendations: Unable to determine at this time  Discharge Recommendations:  (Anticipate home with home health once BP improves.)     Objective     03/14/24 1039   Prior Living Situation   Prior Services None   Housing / Facility 1 Story Apartment / Condo   Steps Into Home 0   Steps In Home 0   Bathroom Set up Bathtub / Shower Combination   Equipment Owned None   Lives with - Patient's Self Care Capacity Alone and Able to Care For Self   Comments Pt reproted that she has support from some friends who can assist.   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 Vitals monitored throughout as follows: EOB /79 , standing initially /76 , seated EOB after taking about 5-6 steps (unable to remain standing for read) 62/40 , supine /71 . RN aware.   Pain   Pain Scales 0 to 10 Scale    Pain 0 - 10 Group   Therapist Pain Assessment Post Activity Pain Same as Prior to Activity;Nurse Notified  (not rated, agreeable to session)   Cognition    Cognition / Consciousness X   Level of Consciousness Alert   Safety Awareness Impaired   Attention Impaired   Initiation Impaired   Comments pleasant and cooperative, delayed response throughout session   Passive ROM Upper Body   Passive ROM Upper Body WDL   Active ROM Upper Body   Active ROM Upper Body  WDL   Strength Upper Body   Upper Body Strength  WDL   Sensation Upper Body   Upper Extremity Sensation  WDL   Upper Body Muscle Tone   Upper Body Muscle Tone  WDL   Coordination Upper Body   Comments WFL from observation   Balance Assessment   Sitting Balance (Static) Fair   Sitting Balance (Dynamic) Fair -   Standing Balance (Static) Poor +   Standing Balance (Dynamic) Poor   Weight Shift Sitting Fair   Weight Shift Standing Poor   Comments w/ FWW   Bed Mobility    Supine to Sit Supervised   Sit to Supine Supervised " "  Scooting Supervised   Rolling Supervised   Comments HOB slightly elevated, no use of rails   ADL Assessment   Grooming Supervision;Seated  (washed hands)   Lower Body Dressing Maximal Assist  (don/doff socks)   Functional Mobility   Sit to Stand Contact Guard Assist   Mobility EOB>stand>EOB>stand with ~5-6 steps towards sink>EOB   Comments w/ FWW   Visual Perception   Visual Perception  Not Tested   Activity Tolerance   Sitting in Chair NT   Sitting Edge of Bed >10 min   Standing <2-3 min total   Comments limited by \"weird\" feeling and hypotension   Patient / Family Goals   Patient / Family Goal #1 to go home   Short Term Goals   Short Term Goal # 1 Pt will perform full standing g/h routine w/ no reports of fatigue   Short Term Goal # 2 Pt will perform ADL transfer w/ supv   Short Term Goal # 3 Pt will perform LB dressing w/ supv   Education Group   Education Provided Role of Occupational Therapist;Activities of Daily Living;Pathology of bedrest   Role of Occupational Therapist Patient Response Patient;Acceptance;Explanation;Verbal Demonstration   ADL Patient Response Patient;Acceptance;Verbal Demonstration;Demonstration;Explanation;Reinforcement Needed;Action Demonstration   Pathology of Bedrest Patient Response Patient;Acceptance;Explanation;Verbal Demonstration   Occupational Therapy Initial Treatment Plan    Treatment Interventions Self Care / Activities of Daily Living;Adaptive Equipment;Neuro Re-Education / Balance;Therapeutic Exercises;Therapeutic Activity   Treatment Frequency 3 Times per Week   Duration Until Therapy Goals Met   Problem List   Problem List Decreased Active Daily Living Skills;Decreased Homemaking Skills;Decreased Upper Extremity Strength Left;Decreased Upper Extremity Strength Right;Safety Awareness Deficits / Cognition;Decreased Activity Tolerance;Impaired Cognitive Function;Impaired Postural Control / Balance                 "

## 2024-03-15 NOTE — PROGRESS NOTES
Handoff report received from day shift nurse. Pt care assumed. Pt is currently resting in bed awake and NAD noted. POC discussed with Pt and Pt verbalizes no questions or needs at this time. Pt is AAOx4, on Ra, on Tele monitoring with rate and rhythm verified, and VSS. Call light and belongings within reach, bed in lowest and locked position, fall precautions in place and Pt educated on use of call light. Hourly rounding in place.

## 2024-03-15 NOTE — THERAPY
"Physical Therapy   Daily Treatment     Patient Name: Yolanda Roe  Age:  58 y.o., Sex:  female  Medical Record #: [id number]  Today's Date: 3/15/2024     Precautions  Precautions: Fall Risk  Comments: hx of orthostatic hypotension this admission    Assessment    Pt progressing with mobility and stable vitals with activity. Pt mainly limited by nausea this session, continues to present with weakness, impaired balance, activity tolerance, endurance. Now recommending placement as pt not progressing with mobility as quickly as originally anticipated, especially with vitals stable and denying lightheadedness/dizziness. Pt only tolerating transfers due to nausea. Reports her legs gave out with attempting to ambulate to BR earlier. Will continue to follow to address said deficits.     Plan    Treatment Plan Status: Continue Current Treatment Plan  Type of Treatment: Equipment, Gait Training, Neuro Re-Education / Balance, Self Care / Home Evaluation, Stair Training, Therapeutic Activities, Therapeutic Exercise  Treatment Frequency: 4 Times per Week  Treatment Duration: Until Therapy Goals Met    DC Equipment Recommendations: Front-Wheel Walker  Discharge Recommendations: Recommend post-acute placement for additional physical therapy services prior to discharge home      Subjective    \"I feel so cold.\" Despite warm room     Objective     03/15/24 1219   Charge Group   Charges  Yes   PT Therapeutic Activities (Units) 2   Total Time Spent   PT Total Time Yes   PT Therapeutic Activities Time Spent (Mins) 25   PT Total Time Spent (Calculated) 25   Precautions   Precautions Fall Risk   Comments hx of orthostatic hypotension this admission   Vitals   Vitals Comments Pt denying lightheadedness/dizziness throughout session (supine, sitting, standing, transfers). BP not assessed as pt asymptomatic   Pain 0 - 10 Group   Therapist Pain Assessment Post Activity Pain Same as Prior to Activity;Nurse Notified;0   Cognition    Level of " Consciousness Alert   Ability To Follow Commands 1 Step   Safety Awareness Impaired   New Learning Impaired   Attention Impaired   Initiation Impaired   Comments Pleasant and cooperative, delayed. Reports her mentation feels normal   Active ROM Lower Body    Active ROM Lower Body  WDL   Strength Lower Body   Lower Body Strength  X   Comments BLE no longer tremulous, grossly weak 3+/5   Other Treatments   Other Treatments Provided Discussion regarding new d/c recommendations as pt not progressing with mobility as quickly as originally anticipated, especially now that BP stable   Balance   Sitting Balance (Static) Fair   Sitting Balance (Dynamic) Fair -   Standing Balance (Static) Poor +   Standing Balance (Dynamic) Poor +   Weight Shift Sitting Fair   Weight Shift Standing Fair   Skilled Intervention Verbal Cuing;Tactile Cuing;Sequencing;Compensatory Strategies   Comments w/ FWW   Bed Mobility    Supine to Sit Supervised   Scooting Supervised   Rolling Supervised   Skilled Intervention Verbal Cuing   Gait Analysis   Gait Level Of Assist Unable to Participate   Comments transfers only as pt limited due to nausea, unable to tolerate ambulation attempts   Functional Mobility   Sit to Stand Contact Guard Assist   Bed, Chair, Wheelchair Transfer Contact Guard Assist   Toilet Transfers Contact Guard Assist   Transfer Method Stand Step   Mobility w/ FWW bed to BSC, BSC to bed, Bed to chair transfers   Skilled Intervention Verbal Cuing;Tactile Cuing;Compensatory Strategies   6 Clicks Assessment - How much HELP from from another person do you currently need... (If the patient hasn't done an activity recently, how much help from another person do you think he/she would need if he/she tried?)   Turning from your back to your side while in a flat bed without using bedrails? 3   Moving from lying on your back to sitting on the side of a flat bed without using bedrails? 3   Moving to and from a bed to a chair (including a  wheelchair)? 3   Standing up from a chair using your arms (e.g., wheelchair, or bedside chair)? 3   Walking in hospital room? 3   Climbing 3-5 steps with a railing? 2   6 clicks Mobility Score 17   Activity Tolerance   Comments limited by nausea   Patient / Family Goals    Patient / Family Goal #1 to go home   Goal #1 Outcome Goal not met   Short Term Goals    Short Term Goal # 1 Pt will perform stand step transfers with no AD and SPV in 6 visits to get in/out of a chair   Goal Outcome # 1 goal not met   Short Term Goal # 2 Pt will ambulate 150ft with LRAD and SPV in 6 visits to access household environement   Goal Outcome # 2 Goal not met   Education Group   Education Provided Use of Assistive Device;Transfer Status   Use of Assistive Device Patient Response Patient;Acceptance;Explanation;Action Demonstration   Transfer Status Patient Response Patient;Acceptance;Explanation;Action Demonstration   Physical Therapy Treatment Plan   Physical Therapy Treatment Plan Continue Current Treatment Plan   Anticipated Discharge Equipment and Recommendations   DC Equipment Recommendations Front-Wheel Walker   Discharge Recommendations Recommend post-acute placement for additional physical therapy services prior to discharge home   Interdisciplinary Plan of Care Collaboration   IDT Collaboration with  Nursing;Occupational Therapist   Patient Position at End of Therapy Seated;Chair Alarm On;Call Light within Reach;Tray Table within Reach;Phone within Reach   Collaboration Comments RN updated   Session Information   Date / Session Number  3/15 - 2 (2/4, 3/20)   Priority   (.)

## 2024-03-15 NOTE — THERAPY
Occupational Therapy  Daily Treatment     Patient Name: Yolanda Roe  Age:  58 y.o., Sex:  female  Medical Record #: [id number]  Today's Date: 3/15/2024     Precautions  Precautions: Fall Risk  Comments: orthostatic    Assessment    Pt seen for OT treatment. Pt required CGA-min A for functional mobility to/from bathroom, min A for toilet transfer, and CGA for standing handwashing. Pt felt very weak and required min A for functional mobility on return from bathroom. BP monitored throughout and pt was not orthostatic this date. Pt educated regarding the role of OT, pathology of bedrest, and energy conservation principles. Pt current functional performance limited by impaired activity tolerance, impaired balance, impaired cognition, and generalized weakness. Pt will continue to benefit from skilled OT while admitted to acute care.     Plan    Treatment Plan Status: Continue Current Treatment Plan  Type of Treatment: Self Care / Activities of Daily Living, Adaptive Equipment, Therapeutic Exercises, Neuro Re-Education / Balance, Therapeutic Activity  Treatment Frequency: 3 Times per Week  Treatment Duration: Until Therapy Goals Met    DC Equipment Recommendations: Unable to determine at this time  Discharge Recommendations: Recommend post-acute placement for additional occupational therapy services prior to discharge home     Objective     03/15/24 0852   Vitals   Vitals Comments BP checked throughout as follows: /88 , standing 136/87 , after walk to toilet 140/89 , after return to bed 141/90 . Pt feeling dizzy/weird after walking to bathroom.   Pain   Pain Scales 0 to 10 Scale    Pain 0 - 10 Group   Therapist Pain Assessment Post Activity Pain Same as Prior to Activity;Nurse Notified  (not rated, agreeable to eval)   Non Verbal Descriptors   Non Verbal Scale  Calm   Cognition    Cognition / Consciousness X   Level of Consciousness Alert   Ability To Follow Commands 1 Step   Safety  Awareness Impaired   New Learning Impaired   Attention Impaired   Initiation Impaired   Comments Pleasant and cooperative, reported that her mentation feels normal to her   Other Treatments   Other Treatments Provided Discussed the pathology of bedrest and recommendation for post acute placement.   Balance   Sitting Balance (Static) Fair   Sitting Balance (Dynamic) Fair -   Standing Balance (Static) Poor +   Standing Balance (Dynamic) Poor +   Weight Shift Sitting Fair   Weight Shift Standing Fair   Skilled Intervention Verbal Cuing;Facilitation   Comments w/ FWW   Bed Mobility    Supine to Sit Supervised   Sit to Supine Standby Assist   Scooting Supervised   Rolling Supervised   Skilled Intervention Verbal Cuing;Facilitation   Comments w/ HOB slightly elevated, no use of rails, some extra time required   Activities of Daily Living   Grooming Contact Guard Assist;Standing  (washed hands at sink)   Toileting Minimal Assist  (urine on toilet)   Skilled Intervention Verbal Cuing;Facilitation   Functional Mobility   Sit to Stand Contact Guard Assist   Bed, Chair, Wheelchair Transfer Contact Guard Assist   Toilet Transfers Minimal Assist   Transfer Method Stand Step   Mobility EOB>bathroom>bed   Comments w/ FWW, fatigued after standing from the toilet   Visual Perception   Visual Perception  Not Tested   Activity Tolerance   Sitting in Chair ~5 min on toilet   Sitting Edge of Bed >10 min   Standing <2-3 min total   Comments limited by weakness/fatigue   Patient / Family Goals   Patient / Family Goal #1 to go home   Goal #1 Outcome Progressing as expected   Short Term Goals   Short Term Goal # 1 Pt will perform full standing g/h routine w/ no reports of fatigue   Goal Outcome # 1 Progressing as expected   Short Term Goal # 2 Pt will perform ADL transfer w/ supv   Goal Outcome # 2 Progressing as expected   Short Term Goal # 3 Pt will perform LB dressing w/ supv   Goal Outcome # 3 Goal not met   Education Group   Education  Provided Role of Occupational Therapist;Activities of Daily Living;Energy Conservation   Role of Occupational Therapist Patient Response Patient;Acceptance;Explanation;Verbal Demonstration   Energy Conservation Patient Response Patient;Acceptance;Demonstration;Explanation;Verbal Demonstration;Action Demonstration   ADL Patient Response Patient;Acceptance;Explanation;Demonstration;Verbal Demonstration;Action Demonstration   Occupational Therapy Treatment Plan    O.T. Treatment Plan Continue Current Treatment Plan   Treatment Interventions Self Care / Activities of Daily Living;Adaptive Equipment;Therapeutic Exercises;Neuro Re-Education / Balance;Therapeutic Activity   Treatment Frequency 3 Times per Week   Duration Until Therapy Goals Met

## 2024-03-15 NOTE — DISCHARGE PLANNING
Per P/T note dated 3/15- patient is requiring post-acute placement. Signed choice form sent to [facility] for blanket referral to SNF. Patient is agreeable.

## 2024-03-15 NOTE — DISCHARGE PLANNING
[id number]  [facility] sent  referral to [facility] per choice form obtained from media.     [id number]  [facility] sent [facility] referrals @[phone] requested by HARVEY Verma.

## 2024-03-15 NOTE — CARE PLAN
The patient is Stable - Low risk of patient condition declining or worsening    Shift Goals  Clinical Goals: Pt/OT eval, control pain, encourage PO intake of food  Patient Goals: rest, pain mgmt  Family Goals: leif    Progress made toward(s) clinical / shift goals:    Problem: Knowledge Deficit - Standard  Goal: Patient and family/care givers will demonstrate understanding of plan of care, disease process/condition, diagnostic tests and medications  Outcome: Progressing     Problem: Skin Integrity  Goal: Skin integrity is maintained or improved  Outcome: Progressing     Problem: Fall Risk  Goal: Patient will remain free from falls  Outcome: Progressing

## 2024-03-15 NOTE — PROGRESS NOTES
Telemetry Report:    Rhythm: SR-ST  Ectopy: PVC    Rate:     VA: 0.14  QRS: 0.1  QT: 0.42

## 2024-03-15 NOTE — CARE PLAN
The patient is Stable - Low risk of patient condition declining or worsening    Shift Goals  Clinical Goals: Pt/OT eval, control pain, encourage PO intake of food  Patient Goals: rest, pain mgmt  Family Goals: leif    Progress made toward(s) clinical / shift goals:    Problem: Pain - Standard  Goal: Alleviation of pain or a reduction in pain to the patient’s comfort goal  Outcome: Progressing     Problem: Skin Integrity  Goal: Skin integrity is maintained or improved  Outcome: Progressing     Problem: Fall Risk  Goal: Patient will remain free from falls  Outcome: Progressing     Problem: Psychosocial  Goal: Patient's level of anxiety will decrease  Outcome: Progressing       Pt reports pain of 6/10, medicated and reports 3/10. Pt pivoting to commode with help and calls for assistance. Pt denies anxiety, encouraged to share feelings if anxious feelings increase or reappear.

## 2024-03-16 LAB
ANION GAP SERPL CALC-SCNC: 15 MMOL/L (ref 7–16)
APPEARANCE UR: CLEAR
BACTERIA #/AREA URNS HPF: NEGATIVE /HPF
BILIRUB UR QL STRIP.AUTO: NEGATIVE
BUN SERPL-MCNC: 2 MG/DL (ref 8–22)
CALCIUM SERPL-MCNC: 9.2 MG/DL (ref 8.5–10.5)
CHLORIDE SERPL-SCNC: 97 MMOL/L (ref 96–112)
CO2 SERPL-SCNC: 23 MMOL/L (ref 20–33)
COLOR UR: YELLOW
CREAT SERPL-MCNC: 0.36 MG/DL (ref 0.5–1.4)
CREAT UR-MCNC: 10.41 MG/DL
EPI CELLS #/AREA URNS HPF: NEGATIVE /HPF
ERYTHROCYTE [DISTWIDTH] IN BLOOD BY AUTOMATED COUNT: 43.1 FL (ref 35.9–50)
GFR SERPLBLD CREATININE-BSD FMLA CKD-EPI: 116 ML/MIN/1.73 M 2
GLUCOSE SERPL-MCNC: 87 MG/DL (ref 65–99)
GLUCOSE UR STRIP.AUTO-MCNC: NEGATIVE MG/DL
HCT VFR BLD AUTO: 31.9 % (ref 37–47)
HGB BLD-MCNC: 11 G/DL (ref 12–16)
HYALINE CASTS #/AREA URNS LPF: ABNORMAL /LPF
KETONES UR STRIP.AUTO-MCNC: ABNORMAL MG/DL
LEUKOCYTE ESTERASE UR QL STRIP.AUTO: ABNORMAL
MAGNESIUM SERPL-MCNC: 1.6 MG/DL (ref 1.5–2.5)
MCH RBC QN AUTO: 32.5 PG (ref 27–33)
MCHC RBC AUTO-ENTMCNC: 34.5 G/DL (ref 32.2–35.5)
MCV RBC AUTO: 94.4 FL (ref 81.4–97.8)
MICRO URNS: ABNORMAL
NITRITE UR QL STRIP.AUTO: NEGATIVE
OSMOLALITY SERPL: 273 MOSM/KG H2O (ref 278–298)
OSMOLALITY UR: 298 MOSM/KG H2O (ref 300–900)
PH UR STRIP.AUTO: 7 [PH] (ref 5–8)
PHOSPHATE SERPL-MCNC: 3.7 MG/DL (ref 2.5–4.5)
PLATELET # BLD AUTO: 247 K/UL (ref 164–446)
PMV BLD AUTO: 9.3 FL (ref 9–12.9)
POTASSIUM SERPL-SCNC: 3.2 MMOL/L (ref 3.6–5.5)
POTASSIUM UR-SCNC: 12 MMOL/L
PROT UR QL STRIP: NEGATIVE MG/DL
PROT UR-MCNC: <4 MG/DL (ref 0–15)
PROT/CREAT UR: NORMAL MG/G (ref 10–107)
RBC # BLD AUTO: 3.38 M/UL (ref 4.2–5.4)
RBC # URNS HPF: ABNORMAL /HPF
RBC UR QL AUTO: ABNORMAL
SODIUM SERPL-SCNC: 135 MMOL/L (ref 135–145)
SODIUM UR-SCNC: 138 MMOL/L
SP GR UR STRIP.AUTO: 1.01
UROBILINOGEN UR STRIP.AUTO-MCNC: 0.2 MG/DL
WBC # BLD AUTO: 3.6 K/UL (ref 4.8–10.8)
WBC #/AREA URNS HPF: ABNORMAL /HPF

## 2024-03-16 PROCEDURE — 700105 HCHG RX REV CODE 258: Performed by: HOSPITALIST

## 2024-03-16 PROCEDURE — 80048 BASIC METABOLIC PNL TOTAL CA: CPT

## 2024-03-16 PROCEDURE — 81001 URINALYSIS AUTO W/SCOPE: CPT

## 2024-03-16 PROCEDURE — 700102 HCHG RX REV CODE 250 W/ 637 OVERRIDE(OP): Performed by: HOSPITALIST

## 2024-03-16 PROCEDURE — 700111 HCHG RX REV CODE 636 W/ 250 OVERRIDE (IP): Performed by: HOSPITALIST

## 2024-03-16 PROCEDURE — 84300 ASSAY OF URINE SODIUM: CPT

## 2024-03-16 PROCEDURE — 85027 COMPLETE CBC AUTOMATED: CPT

## 2024-03-16 PROCEDURE — 84100 ASSAY OF PHOSPHORUS: CPT

## 2024-03-16 PROCEDURE — 36415 COLL VENOUS BLD VENIPUNCTURE: CPT

## 2024-03-16 PROCEDURE — 770020 HCHG ROOM/CARE - TELE (206)

## 2024-03-16 PROCEDURE — 99232 SBSQ HOSP IP/OBS MODERATE 35: CPT | Performed by: HOSPITALIST

## 2024-03-16 PROCEDURE — 700102 HCHG RX REV CODE 250 W/ 637 OVERRIDE(OP): Performed by: INTERNAL MEDICINE

## 2024-03-16 PROCEDURE — A9270 NON-COVERED ITEM OR SERVICE: HCPCS | Performed by: INTERNAL MEDICINE

## 2024-03-16 PROCEDURE — 83735 ASSAY OF MAGNESIUM: CPT

## 2024-03-16 PROCEDURE — 84156 ASSAY OF PROTEIN URINE: CPT

## 2024-03-16 PROCEDURE — A9270 NON-COVERED ITEM OR SERVICE: HCPCS | Performed by: HOSPITALIST

## 2024-03-16 PROCEDURE — 83935 ASSAY OF URINE OSMOLALITY: CPT

## 2024-03-16 PROCEDURE — 51798 US URINE CAPACITY MEASURE: CPT

## 2024-03-16 PROCEDURE — 82570 ASSAY OF URINE CREATININE: CPT

## 2024-03-16 PROCEDURE — 84133 ASSAY OF URINE POTASSIUM: CPT

## 2024-03-16 PROCEDURE — 83930 ASSAY OF BLOOD OSMOLALITY: CPT

## 2024-03-16 RX ORDER — MAGNESIUM SULFATE HEPTAHYDRATE 40 MG/ML
4 INJECTION, SOLUTION INTRAVENOUS ONCE
Status: COMPLETED | OUTPATIENT
Start: 2024-03-16 | End: 2024-03-16

## 2024-03-16 RX ORDER — POTASSIUM CHLORIDE 20 MEQ/1
40 TABLET, EXTENDED RELEASE ORAL 3 TIMES DAILY
Status: COMPLETED | OUTPATIENT
Start: 2024-03-16 | End: 2024-03-16

## 2024-03-16 RX ADMIN — ROSUVASTATIN 10 MG: 10 TABLET, FILM COATED ORAL at 20:20

## 2024-03-16 RX ADMIN — APIXABAN 10 MG: 5 TABLET, FILM COATED ORAL at 05:42

## 2024-03-16 RX ADMIN — POTASSIUM CHLORIDE 40 MEQ: 1500 TABLET, EXTENDED RELEASE ORAL at 08:19

## 2024-03-16 RX ADMIN — SODIUM CHLORIDE: 9 INJECTION, SOLUTION INTRAVENOUS at 00:26

## 2024-03-16 RX ADMIN — OXYCODONE 5 MG: 5 TABLET ORAL at 20:28

## 2024-03-16 RX ADMIN — MAGNESIUM SULFATE HEPTAHYDRATE 4 G: 4 INJECTION, SOLUTION INTRAVENOUS at 08:14

## 2024-03-16 RX ADMIN — APIXABAN 10 MG: 5 TABLET, FILM COATED ORAL at 18:13

## 2024-03-16 RX ADMIN — POTASSIUM CHLORIDE 40 MEQ: 1500 TABLET, EXTENDED RELEASE ORAL at 18:13

## 2024-03-16 RX ADMIN — POTASSIUM CHLORIDE 40 MEQ: 1500 TABLET, EXTENDED RELEASE ORAL at 12:20

## 2024-03-16 RX ADMIN — Medication 100 MG: at 05:42

## 2024-03-16 RX ADMIN — DULOXETINE HYDROCHLORIDE 30 MG: 30 CAPSULE, DELAYED RELEASE ORAL at 05:41

## 2024-03-16 ASSESSMENT — PAIN DESCRIPTION - PAIN TYPE: TYPE: ACUTE PAIN;CHRONIC PAIN

## 2024-03-16 ASSESSMENT — ENCOUNTER SYMPTOMS: NAUSEA: 1

## 2024-03-16 ASSESSMENT — FIBROSIS 4 INDEX: FIB4 SCORE: 2.39

## 2024-03-16 NOTE — CARE PLAN
The patient is Stable - Low risk of patient condition declining or worsening    Shift Goals  Clinical Goals: control pain, monitor VS  Patient Goals: rest, sleep  Family Goals: leif    Progress made toward(s) clinical / shift goals:    Problem: Pain - Standard  Goal: Alleviation of pain or a reduction in pain to the patient’s comfort goal  Outcome: Progressing     Problem: Knowledge Deficit - Standard  Goal: Patient and family/care givers will demonstrate understanding of plan of care, disease process/condition, diagnostic tests and medications  Outcome: Progressing     Problem: Skin Integrity  Goal: Skin integrity is maintained or improved  Outcome: Progressing     Problem: Fall Risk  Goal: Patient will remain free from falls  Outcome: Progressing       Patient is not progressing towards the following goals:

## 2024-03-16 NOTE — DISCHARGE PLANNING
RNCM met with patient bedside. Patient advised all local SNF's have declined her. RNCM was going to expand search to [place] however, patient is refusing. Hospitalist advised. Patient has been accepted on service with [facility]. RNCM will re-visit with patient this afternoon as per patient P/T is coming after lunch to work with her again. Charge RN advised.  RNCM has reached out to P/T for recommendations today.    13:43- Per P/T patient is not on their list today.    14:11- RNCM has left voicemail for [facility] to check on status of referral.

## 2024-03-16 NOTE — PROGRESS NOTES
NOC HOSPITALIST CROSS COVER    Notified by RN regarding patient having urinary frequency and urgency.  She denies dysuria.  Urinalysis was sent down which showed some hematuria, but was without WBCs, bacteria, and only trace leuk esterase.  Instructed bedside RN to perform bladder scan with pre and postvoid residuals.  Bladder scan demonstrated 360 mL prevoid and 126 postvoid, with a total urine output at that time of 250 mL. Per nursing report, over the course of the night the patient has urinated approximately 16 times, with an estimated total of 250 mL's of urine output each time.      Vitals:    03/16/24 0300   BP: (!) 147/85   Pulse: (!) 108   Resp: 18   Temp: 37.6 °C (99.7 °F)   SpO2: 93%      Plan:  #Urinary urgency  -Urinary frequency/urgency with significant urine output over the course of the shift  -Urine electrolytes ordered and pending  -Serum osmolality low at 273  -Serum sodium normal this a.m. at 135  -Continue IV fluid hydration  -Close monitoring of I/Os and electrolytes  -Encourage oral intake    -----------------------------------------------------------------------------------------------------------    Electronically signed by:  Alexey Barr, RADHA, APRN, HERNANDOP-BC  Hospitalist Services

## 2024-03-16 NOTE — PROGRESS NOTES
Hospital Medicine Daily Progress Note    Date of Service  3/16/2024    Chief Complaint  Shortness of breath, flulike symptoms    Hospital Course  Yolanda Roe is a 58 y.o. female with a past medical history of dyslipidemia, hypertension, triple negative breast cancer status postmastectomy by Dr. Monahan and is undergoing chemotherapy by Dr. Gómez, who presented 3/9/2024 with shortness of breath and vomiting. She states for the past 3 weeks she is effectively not able to take any food because of intractable vomiting and has not had a bowel movements as she has not been on keep anything down.  She has felt short of breath though not had a fever and no chest pain.  On evaluation, labs revealed a significant metabolic acidosis with hypokalemia and a CT scan reveals right lower lobe pulmonary embolism with mild groundglass and interstitial opacities in the lower lungs, along with oval fluid collections in the breasts surgical sites left greater than the right.  CT abdomen pelvis was unremarkable.  Urinalysis only showed 5-10 WBC with negative leukocyte esterase or nitrate.  Influenza, RSV, and COVID-19 PCR's were negative.  Lactate was normal.  She was started on anticoagulation with therapeutic Lovenox.  Her nausea/vomiting was felt to be related to her chemotherapy, and she was started on IV fluids and antiemetics.  She tolerated oral intake, and she was transitioned to oral Eliquis.  She had low magnesium which was replaced.  She maintained sinus rhythm.      Interval Problem Update    Patient denies any dizziness this morning  She is afebrile on room air  Nausea is improved but still with poor p.o. intake  I reviewed chemistry panel potassium 3.2 magnesium 1.6 I ordered repletion and repeat levels  Discussed with case management no accepting SNF facilities  Discussed with nursing staff to encourage mobilization with supervision  Patient with urinary frequency reviewed UA not consistent with UTI  Will DC IV  fluids and monitor    I have discussed this patient's plan of care and discharge plan at IDT rounds today with Case Management, Nursing, Nursing leadership, and other members of the IDT team.    Consultants/Specialty  Surgery    Code Status  Full Code    Disposition  Medically Cleared  Discharge plan TBD.  Anticipate discharge to home once medically cleared.  I have placed the appropriate orders for post-discharge needs.    Review of Systems  Review of Systems   Constitutional:  Positive for malaise/fatigue.   Gastrointestinal:  Positive for nausea.   Musculoskeletal:  Positive for joint pain.   All other systems reviewed and are negative.       Pertinent positives/negatives as mentioned above.     A complete review of systems was personally done by me. All other systems were negative.       Physical Exam  Temp:  [37 °C (98.6 °F)-38 °C (100.4 °F)] 37.3 °C (99.1 °F)  Pulse:  [105-115] 108  Resp:  [16-18] 18  BP: (128-147)/(76-90) 135/90  SpO2:  [90 %-93 %] 91 %    Physical Exam  Vitals and nursing note reviewed.   Constitutional:       General: She is not in acute distress.  HENT:      Head: Normocephalic and atraumatic.      Nose: Nose normal. No rhinorrhea.      Mouth/Throat:      Pharynx: No oropharyngeal exudate or posterior oropharyngeal erythema.   Eyes:      General:         Right eye: No discharge.         Left eye: No discharge.   Cardiovascular:      Rate and Rhythm: Normal rate and regular rhythm.   Pulmonary:      Effort: Pulmonary effort is normal. No respiratory distress.      Breath sounds: Normal breath sounds. No stridor. No rales.   Chest:      Chest wall: Tenderness present.   Abdominal:      General: Bowel sounds are normal. There is no distension.      Palpations: Abdomen is soft. There is no mass.      Tenderness: There is no abdominal tenderness. There is no rebound.   Musculoskeletal:         General: No swelling or tenderness.      Cervical back: Neck supple. No rigidity.   Skin:     General:  Skin is warm and dry.      Coloration: Skin is not cyanotic or jaundiced.      Nails: There is no clubbing.   Neurological:      General: No focal deficit present.      Mental Status: She is alert and oriented to person, place, and time.      Cranial Nerves: No cranial nerve deficit.      Motor: No weakness.   Psychiatric:         Behavior: Behavior normal.      Comments: Flat affect         Fluids    Intake/Output Summary (Last 24 hours) at 3/16/2024 1522  Last data filed at 3/16/2024 1003  Gross per 24 hour   Intake 1747.66 ml   Output 3950 ml   Net -2202.34 ml       Laboratory  Recent Labs     03/15/24  0101 03/16/24  0041   WBC 3.3* 3.6*   RBC 3.34* 3.38*   HEMOGLOBIN 10.9* 11.0*   HEMATOCRIT 32.5* 31.9*   MCV 97.3 94.4   MCH 32.6 32.5   MCHC 33.5 34.5   RDW 45.3 43.1   PLATELETCT 219 247   MPV 9.5 9.3     Recent Labs     03/14/24  0149 03/15/24  0101 03/16/24  0041   SODIUM 136 135 135   POTASSIUM 3.7 3.3* 3.2*   CHLORIDE 102 99 97   CO2 21 21 23   GLUCOSE 100* 90 87   BUN 2* 2* 2*   CREATININE 0.40* 0.41* 0.36*   CALCIUM 9.4 8.9 9.2                   Imaging  EC-ECHOCARDIOGRAM COMPLETE W/ CONT   Final Result      CT-ABDOMEN-PELVIS WITH   Final Result      1.  No evidence of bowel obstruction or focal inflammatory change.      2.  Sigmoid diverticulosis.      3.  Fatty liver.      4.  Prior hysterectomy.      CT-CTA CHEST PULMONARY ARTERY W/ RECONS   Final Result      1.  Small right lower lobe pulmonary embolism.   2.  Mild groundglass and interstitial opacities in the lower lungs which may be due to edema and/or atelectasis/mild infiltrate.   3.  3 mm right upper lobe lung nodule.   4.  Oval complex collections in the bilateral breasts, left greater than right. This may be seroma/hematoma versus abscess not excluded. Correlate clinically.      VOALTE message of critical findings sent to Dr. Umaña at 3/9/2024 11:55 AM. I also received confirmation of receipt of VOALTE message back from the provider.       DX-CHEST-PORTABLE (1 VIEW)   Final Result      No evidence of acute cardiopulmonary process.           Assessment/Plan  * Pulmonary embolism and infarction (HCC)- (present on admission)  Assessment & Plan  -Right lower lobe pulmonary embolism, in the setting of known cancer.  No signs of right heart strain.    -Continue anticoagulation with Eliquis.  -Continue respiratory support with RT protocol, weaned off  from supplemental oxygen .      Fluid collection at surgical site- (present on admission)  Assessment & Plan  - CT showed oval fluid collections in the breasts surgical sites left greater than the right.    It has been several months since her surgery (September 2023),     Evaluated by surgery no clinical signs of infection otherwise at this time will DC antibiotics and monitor  Continued pain management    Cymbalta added  Outpatient follow-up    Constipation- (present on admission)  Assessment & Plan  Continue bowel regimen    Hypomagnesemia- (present on admission)  Assessment & Plan  I ordered IV magnesium sulfate and repeat levels    Intractable vomiting- (present on admission)  Assessment & Plan  Vomiting resolved still with residual nausea but overall improved continue Zofran as needed and bowel protocol    Hypokalemia- (present on admission)  Assessment & Plan  Replete orally and monitor    Hyponatremia- (present on admission)  Assessment & Plan  Resolved with hydration    Primary hypertension- (present on admission)  Assessment & Plan  Given orthostatic symptoms amlodipine discontinued  Monitor blood pressure adjust accordingly    Breast cancer of upper-outer quadrant of left female breast (HCC)- (present on admission)  Assessment & Plan  -Triple negative  -S/p bilateral total mastectomy  -On treatment by Dr. Gómez. Regimen: : Pembrolizumab + PACLitaxel + CARBOplatin (x 4 cycles) followed by Pembrolizumab + Cyclophosphamide + DOXOrubicin   (x4 cycles)  -Continue outpatient follow-up with  oncology.         VTE prophylaxis: Eliquis

## 2024-03-17 PROBLEM — R50.9 FEVER: Status: ACTIVE | Noted: 2024-03-17

## 2024-03-17 LAB
ANION GAP SERPL CALC-SCNC: 18 MMOL/L (ref 7–16)
APPEARANCE UR: CLEAR
BACTERIA #/AREA URNS HPF: NEGATIVE /HPF
BILIRUB UR QL STRIP.AUTO: NEGATIVE
BUN SERPL-MCNC: 2 MG/DL (ref 8–22)
CALCIUM SERPL-MCNC: 8.7 MG/DL (ref 8.5–10.5)
CHLORIDE SERPL-SCNC: 92 MMOL/L (ref 96–112)
CO2 SERPL-SCNC: 21 MMOL/L (ref 20–33)
COLOR UR: YELLOW
CREAT SERPL-MCNC: 0.38 MG/DL (ref 0.5–1.4)
EPI CELLS #/AREA URNS HPF: ABNORMAL /HPF
GFR SERPLBLD CREATININE-BSD FMLA CKD-EPI: 115 ML/MIN/1.73 M 2
GLUCOSE SERPL-MCNC: 75 MG/DL (ref 65–99)
GLUCOSE UR STRIP.AUTO-MCNC: NEGATIVE MG/DL
HYALINE CASTS #/AREA URNS LPF: ABNORMAL /LPF
KETONES UR STRIP.AUTO-MCNC: 40 MG/DL
LEUKOCYTE ESTERASE UR QL STRIP.AUTO: NEGATIVE
MAGNESIUM SERPL-MCNC: 1.9 MG/DL (ref 1.5–2.5)
MICRO URNS: ABNORMAL
NITRITE UR QL STRIP.AUTO: NEGATIVE
PH UR STRIP.AUTO: 5.5 [PH] (ref 5–8)
PHOSPHATE SERPL-MCNC: 4.3 MG/DL (ref 2.5–4.5)
POTASSIUM SERPL-SCNC: 3.1 MMOL/L (ref 3.6–5.5)
PROT UR QL STRIP: NEGATIVE MG/DL
RBC # URNS HPF: ABNORMAL /HPF
RBC UR QL AUTO: ABNORMAL
SODIUM SERPL-SCNC: 131 MMOL/L (ref 135–145)
SP GR UR STRIP.AUTO: 1.01
UROBILINOGEN UR STRIP.AUTO-MCNC: 0.2 MG/DL
WBC #/AREA URNS HPF: ABNORMAL /HPF

## 2024-03-17 PROCEDURE — 83735 ASSAY OF MAGNESIUM: CPT

## 2024-03-17 PROCEDURE — 700102 HCHG RX REV CODE 250 W/ 637 OVERRIDE(OP): Performed by: INTERNAL MEDICINE

## 2024-03-17 PROCEDURE — 700111 HCHG RX REV CODE 636 W/ 250 OVERRIDE (IP): Performed by: HOSPITALIST

## 2024-03-17 PROCEDURE — 99232 SBSQ HOSP IP/OBS MODERATE 35: CPT | Performed by: HOSPITALIST

## 2024-03-17 PROCEDURE — A9270 NON-COVERED ITEM OR SERVICE: HCPCS | Performed by: HOSPITALIST

## 2024-03-17 PROCEDURE — 700105 HCHG RX REV CODE 258: Performed by: HOSPITALIST

## 2024-03-17 PROCEDURE — 81001 URINALYSIS AUTO W/SCOPE: CPT

## 2024-03-17 PROCEDURE — 700102 HCHG RX REV CODE 250 W/ 637 OVERRIDE(OP): Performed by: HOSPITALIST

## 2024-03-17 PROCEDURE — 80048 BASIC METABOLIC PNL TOTAL CA: CPT

## 2024-03-17 PROCEDURE — A9270 NON-COVERED ITEM OR SERVICE: HCPCS | Performed by: INTERNAL MEDICINE

## 2024-03-17 PROCEDURE — 87040 BLOOD CULTURE FOR BACTERIA: CPT

## 2024-03-17 PROCEDURE — 36415 COLL VENOUS BLD VENIPUNCTURE: CPT

## 2024-03-17 PROCEDURE — 770020 HCHG ROOM/CARE - TELE (206)

## 2024-03-17 PROCEDURE — 84100 ASSAY OF PHOSPHORUS: CPT

## 2024-03-17 RX ORDER — SODIUM CHLORIDE 9 MG/ML
INJECTION, SOLUTION INTRAVENOUS CONTINUOUS
Status: DISCONTINUED | OUTPATIENT
Start: 2024-03-17 | End: 2024-03-18

## 2024-03-17 RX ORDER — POTASSIUM CHLORIDE 7.45 MG/ML
10 INJECTION INTRAVENOUS ONCE
Status: COMPLETED | OUTPATIENT
Start: 2024-03-17 | End: 2024-03-17

## 2024-03-17 RX ORDER — POTASSIUM CHLORIDE 7.45 MG/ML
10 INJECTION INTRAVENOUS
Status: COMPLETED | OUTPATIENT
Start: 2024-03-17 | End: 2024-03-17

## 2024-03-17 RX ORDER — POTASSIUM CHLORIDE 20 MEQ/1
40 TABLET, EXTENDED RELEASE ORAL 3 TIMES DAILY
Status: DISCONTINUED | OUTPATIENT
Start: 2024-03-17 | End: 2024-03-17

## 2024-03-17 RX ADMIN — APIXABAN 10 MG: 5 TABLET, FILM COATED ORAL at 05:18

## 2024-03-17 RX ADMIN — ROSUVASTATIN 10 MG: 10 TABLET, FILM COATED ORAL at 20:19

## 2024-03-17 RX ADMIN — POTASSIUM CHLORIDE 10 MEQ: 7.46 INJECTION, SOLUTION INTRAVENOUS at 11:55

## 2024-03-17 RX ADMIN — ACETAMINOPHEN 650 MG: 325 TABLET, FILM COATED ORAL at 04:21

## 2024-03-17 RX ADMIN — PROCHLORPERAZINE EDISYLATE 10 MG: 5 INJECTION INTRAMUSCULAR; INTRAVENOUS at 07:54

## 2024-03-17 RX ADMIN — OXYCODONE 5 MG: 5 TABLET ORAL at 20:19

## 2024-03-17 RX ADMIN — Medication 100 MG: at 05:18

## 2024-03-17 RX ADMIN — APIXABAN 5 MG: 5 TABLET, FILM COATED ORAL at 17:21

## 2024-03-17 RX ADMIN — POTASSIUM CHLORIDE 10 MEQ: 7.46 INJECTION, SOLUTION INTRAVENOUS at 08:08

## 2024-03-17 RX ADMIN — DULOXETINE HYDROCHLORIDE 30 MG: 30 CAPSULE, DELAYED RELEASE ORAL at 05:18

## 2024-03-17 RX ADMIN — POTASSIUM CHLORIDE 40 MEQ: 1500 TABLET, EXTENDED RELEASE ORAL at 08:01

## 2024-03-17 RX ADMIN — POLYETHYLENE GLYCOL 3350 1 PACKET: 17 POWDER, FOR SOLUTION ORAL at 17:21

## 2024-03-17 RX ADMIN — DOCUSATE SODIUM 50 MG AND SENNOSIDES 8.6 MG 2 TABLET: 8.6; 5 TABLET, FILM COATED ORAL at 17:21

## 2024-03-17 RX ADMIN — POTASSIUM CHLORIDE 10 MEQ: 7.46 INJECTION, SOLUTION INTRAVENOUS at 09:22

## 2024-03-17 RX ADMIN — OXYCODONE 5 MG: 5 TABLET ORAL at 08:01

## 2024-03-17 RX ADMIN — SODIUM CHLORIDE: 9 INJECTION, SOLUTION INTRAVENOUS at 16:20

## 2024-03-17 ASSESSMENT — FIBROSIS 4 INDEX: FIB4 SCORE: 2.39

## 2024-03-17 ASSESSMENT — ENCOUNTER SYMPTOMS
NAUSEA: 1
ABDOMINAL PAIN: 0
SHORTNESS OF BREATH: 0
FEVER: 1

## 2024-03-17 ASSESSMENT — PAIN DESCRIPTION - PAIN TYPE
TYPE: ACUTE PAIN;CHRONIC PAIN
TYPE: ACUTE PAIN

## 2024-03-17 NOTE — CARE PLAN
The patient is Stable - Low risk of patient condition declining or worsening    Shift Goals  Clinical Goals: pain management,  Patient Goals: pain control  Family Goals: leif    Progress made toward(s) clinical / shift goals:    Problem: Pain - Standard  Goal: Alleviation of pain or a reduction in pain to the patient’s comfort goal  Outcome: Progressing   Pain managed well with PRN medication at this time.  Patient uses pharmacological and non pharmacological pain-relief strategies. Patient displays improvement in mood, coping.      Problem: Knowledge Deficit - Standard  Goal: Patient and family/care givers will demonstrate understanding of plan of care, disease process/condition, diagnostic tests and medications  Outcome: Progressing       Patient is not progressing towards the following goals:

## 2024-03-17 NOTE — PROGRESS NOTES
Hospital Medicine Daily Progress Note    Date of Service  3/17/2024    Chief Complaint  Shortness of breath, flulike symptoms    Hospital Course  Yolanda Roe is a 58 y.o. female with a past medical history of dyslipidemia, hypertension, triple negative breast cancer status postmastectomy by Dr. Monahan and is undergoing chemotherapy by Dr. Gómez, who presented 3/9/2024 with shortness of breath and vomiting. She states for the past 3 weeks she is effectively not able to take any food because of intractable vomiting and has not had a bowel movements as she has not been on keep anything down.  She has felt short of breath though not had a fever and no chest pain.  On evaluation, labs revealed a significant metabolic acidosis with hypokalemia and a CT scan reveals right lower lobe pulmonary embolism with mild groundglass and interstitial opacities in the lower lungs, along with oval fluid collections in the breasts surgical sites left greater than the right.  CT abdomen pelvis was unremarkable.  Urinalysis only showed 5-10 WBC with negative leukocyte esterase or nitrate.  Influenza, RSV, and COVID-19 PCR's were negative.  Lactate was normal.  She was started on anticoagulation with therapeutic Lovenox.  Her nausea/vomiting was felt to be related to her chemotherapy, and she was started on IV fluids and antiemetics.  She tolerated oral intake, and she was transitioned to oral Eliquis.  She had low magnesium which was replaced.  She maintained sinus rhythm.      Interval Problem Update    Tmax 38.2  Patient is on room air  She had nausea but tolerated breakfast  Reviewed chemistry panel sodium 131 potassium 3.1 magnesium 1.9 I ordered potassium and magnesium repletion  Given fever I ordered urinalysis and blood cultures      I have discussed this patient's plan of care and discharge plan at IDT rounds today with Case Management, Nursing, Nursing leadership, and other members of the IDT  team.    Consultants/Specialty  Surgery    Code Status  Full Code    Disposition  The patient is not medically cleared for discharge to home or a post-acute facility.      Discharge plan TBD.  Anticipate discharge to home once medically cleared.  I have placed the appropriate orders for post-discharge needs.    Review of Systems  Review of Systems   Constitutional:  Positive for fever and malaise/fatigue.   Respiratory:  Negative for shortness of breath.    Cardiovascular:         Chest wall pain   Gastrointestinal:  Positive for nausea. Negative for abdominal pain.        Pertinent positives/negatives as mentioned above.     A complete review of systems was personally done by me. All other systems were negative.       Physical Exam  Temp:  [36.3 °C (97.3 °F)-38.2 °C (100.7 °F)] 36.3 °C (97.3 °F)  Pulse:  [] 97  Resp:  [17-18] 17  BP: (108-159)/(60-90) 108/60  SpO2:  [90 %-93 %] 90 %    Physical Exam  Vitals and nursing note reviewed.   Constitutional:       General: She is not in acute distress.  HENT:      Head: Normocephalic and atraumatic.      Nose: Nose normal. No rhinorrhea.      Mouth/Throat:      Pharynx: No oropharyngeal exudate or posterior oropharyngeal erythema.   Eyes:      General:         Right eye: No discharge.         Left eye: No discharge.   Cardiovascular:      Rate and Rhythm: Normal rate and regular rhythm.      Heart sounds: Normal heart sounds. No murmur heard.     No friction rub. No gallop.   Pulmonary:      Effort: Pulmonary effort is normal. No respiratory distress.      Breath sounds: Normal breath sounds. No stridor. No wheezing, rhonchi or rales.   Chest:      Chest wall: Tenderness present.   Abdominal:      General: Bowel sounds are normal. There is no distension.      Palpations: Abdomen is soft. There is no mass.      Tenderness: There is no abdominal tenderness. There is no rebound.   Musculoskeletal:         General: No swelling or tenderness.      Cervical back: Neck  supple. No rigidity.   Skin:     General: Skin is warm and dry.      Coloration: Skin is not cyanotic or jaundiced.      Nails: There is no clubbing.   Neurological:      General: No focal deficit present.      Mental Status: She is alert and oriented to person, place, and time.      Cranial Nerves: No cranial nerve deficit.      Motor: No weakness.   Psychiatric:         Mood and Affect: Mood normal.         Behavior: Behavior normal.         Fluids    Intake/Output Summary (Last 24 hours) at 3/17/2024 1454  Last data filed at 3/17/2024 1435  Gross per 24 hour   Intake 476 ml   Output 1700 ml   Net -1224 ml       Laboratory  Recent Labs     03/15/24  0101 03/16/24  0041   WBC 3.3* 3.6*   RBC 3.34* 3.38*   HEMOGLOBIN 10.9* 11.0*   HEMATOCRIT 32.5* 31.9*   MCV 97.3 94.4   MCH 32.6 32.5   MCHC 33.5 34.5   RDW 45.3 43.1   PLATELETCT 219 247   MPV 9.5 9.3     Recent Labs     03/15/24  0101 03/16/24  0041 03/17/24  0237   SODIUM 135 135 131*   POTASSIUM 3.3* 3.2* 3.1*   CHLORIDE 99 97 92*   CO2 21 23 21   GLUCOSE 90 87 75   BUN 2* 2* 2*   CREATININE 0.41* 0.36* 0.38*   CALCIUM 8.9 9.2 8.7                   Imaging  EC-ECHOCARDIOGRAM COMPLETE W/ CONT   Final Result      CT-ABDOMEN-PELVIS WITH   Final Result      1.  No evidence of bowel obstruction or focal inflammatory change.      2.  Sigmoid diverticulosis.      3.  Fatty liver.      4.  Prior hysterectomy.      CT-CTA CHEST PULMONARY ARTERY W/ RECONS   Final Result      1.  Small right lower lobe pulmonary embolism.   2.  Mild groundglass and interstitial opacities in the lower lungs which may be due to edema and/or atelectasis/mild infiltrate.   3.  3 mm right upper lobe lung nodule.   4.  Oval complex collections in the bilateral breasts, left greater than right. This may be seroma/hematoma versus abscess not excluded. Correlate clinically.      VOALTE message of critical findings sent to Dr. Umaña at 3/9/2024 11:55 AM. I also received confirmation of receipt of  VOALTE message back from the provider.      DX-CHEST-PORTABLE (1 VIEW)   Final Result      No evidence of acute cardiopulmonary process.           Assessment/Plan  * Pulmonary embolism and infarction (HCC)- (present on admission)  Assessment & Plan  -Right lower lobe pulmonary embolism, in the setting of known cancer.  No signs of right heart strain.    -Continue anticoagulation with Eliquis.  -Continue respiratory support with RT protocol, weaned off  from supplemental oxygen .      Fever  Assessment & Plan  No clear source clinically at this time  Urinalysis reviewed negative for infection  Lungs are clear and she is on room air  I ordered blood cultures  Monitor off antibiotics    Fluid collection at surgical site- (present on admission)  Assessment & Plan  - CT showed oval fluid collections in the breasts surgical sites left greater than the right.    It has been several months since her surgery (September 2023),     Evaluated by surgery with recommendation for conservative management  Continued pain management    Cymbalta added  Outpatient follow-up    Constipation- (present on admission)  Assessment & Plan  Continue bowel regimen    Hypomagnesemia- (present on admission)  Assessment & Plan  Repleted    Intractable vomiting- (present on admission)  Assessment & Plan  Vomiting resolved still with residual nausea but overall improved continue Zofran as needed and bowel protocol    Hypokalemia- (present on admission)  Assessment & Plan  Replete and monitor    Hyponatremia- (present on admission)  Assessment & Plan  Sodium 131 resume IV fluids    Primary hypertension- (present on admission)  Assessment & Plan  Given orthostatic symptoms amlodipine discontinued  Monitor blood pressure adjust accordingly    Breast cancer of upper-outer quadrant of left female breast (HCC)- (present on admission)  Assessment & Plan  -Triple negative  -S/p bilateral total mastectomy  -On treatment by Dr. Gómez. Regimen: :  Pembrolizumab + PACLitaxel + CARBOplatin (x 4 cycles) followed by Pembrolizumab + Cyclophosphamide + DOXOrubicin   (x4 cycles)  -Continue outpatient follow-up with oncology.         VTE prophylaxis: Eliquis

## 2024-03-17 NOTE — PROGRESS NOTES
Monitor Summary:     Rhythm: SR/ST  Rate:   Ectopy: .15/.07/.35  Measurements: PVC(R)                 12 Hour Chart Check

## 2024-03-17 NOTE — CARE PLAN
The patient is Stable - Low risk of patient condition declining or worsening    Shift Goals  Clinical Goals: pain management, urine output, hemodynamic stability  Patient Goals: sleep, pain management  Family Goals: leif    Progress made toward(s) clinical / shift goals:        Problem: Pain - Standard  Goal: Alleviation of pain or a reduction in pain to the patient’s comfort goal  3/16/2024 2339 by Yuki Dorantes RMYRIAM  Outcome: Progressing  Note: Patient using appropriate pain scale. Discussed pharm and non-pharm mechanisms of pain management. Patient verbalizes understanding   3/16/2024 2339 by MANSI [email or web address].  Reactivated     Problem: Knowledge Deficit - Standard  Goal: Patient and family/care givers will demonstrate understanding of plan of care, disease process/condition, diagnostic tests and medications  3/16/2024 2339 by Yuki Dorantes, R.N.  Outcome: Progressing  Note: Patient verbalizes understanding of plan of care   3/16/2024 2339 by Yuki Dorantes R.N.  Reactivated       Patient is not progressing towards the following goals:

## 2024-03-18 LAB
ANION GAP SERPL CALC-SCNC: 16 MMOL/L (ref 7–16)
BUN SERPL-MCNC: 3 MG/DL (ref 8–22)
CALCIUM SERPL-MCNC: 8.9 MG/DL (ref 8.5–10.5)
CHLORIDE SERPL-SCNC: 93 MMOL/L (ref 96–112)
CO2 SERPL-SCNC: 23 MMOL/L (ref 20–33)
CREAT SERPL-MCNC: 0.41 MG/DL (ref 0.5–1.4)
GFR SERPLBLD CREATININE-BSD FMLA CKD-EPI: 113 ML/MIN/1.73 M 2
GLUCOSE SERPL-MCNC: 81 MG/DL (ref 65–99)
MAGNESIUM SERPL-MCNC: 1.6 MG/DL (ref 1.5–2.5)
PHOSPHATE SERPL-MCNC: 4.1 MG/DL (ref 2.5–4.5)
POTASSIUM SERPL-SCNC: 3.1 MMOL/L (ref 3.6–5.5)
SODIUM SERPL-SCNC: 132 MMOL/L (ref 135–145)

## 2024-03-18 PROCEDURE — 770020 HCHG ROOM/CARE - TELE (206)

## 2024-03-18 PROCEDURE — A9270 NON-COVERED ITEM OR SERVICE: HCPCS | Performed by: INTERNAL MEDICINE

## 2024-03-18 PROCEDURE — 700102 HCHG RX REV CODE 250 W/ 637 OVERRIDE(OP): Performed by: INTERNAL MEDICINE

## 2024-03-18 PROCEDURE — 700105 HCHG RX REV CODE 258: Performed by: HOSPITALIST

## 2024-03-18 PROCEDURE — A9270 NON-COVERED ITEM OR SERVICE: HCPCS | Performed by: HOSPITALIST

## 2024-03-18 PROCEDURE — 84100 ASSAY OF PHOSPHORUS: CPT

## 2024-03-18 PROCEDURE — 700102 HCHG RX REV CODE 250 W/ 637 OVERRIDE(OP): Performed by: HOSPITALIST

## 2024-03-18 PROCEDURE — 36415 COLL VENOUS BLD VENIPUNCTURE: CPT

## 2024-03-18 PROCEDURE — 99232 SBSQ HOSP IP/OBS MODERATE 35: CPT | Performed by: HOSPITALIST

## 2024-03-18 PROCEDURE — 80048 BASIC METABOLIC PNL TOTAL CA: CPT

## 2024-03-18 PROCEDURE — 83735 ASSAY OF MAGNESIUM: CPT

## 2024-03-18 PROCEDURE — 700111 HCHG RX REV CODE 636 W/ 250 OVERRIDE (IP): Performed by: HOSPITALIST

## 2024-03-18 PROCEDURE — 97530 THERAPEUTIC ACTIVITIES: CPT

## 2024-03-18 PROCEDURE — 97116 GAIT TRAINING THERAPY: CPT

## 2024-03-18 RX ORDER — SODIUM CHLORIDE 1 G/1
1 TABLET ORAL 2 TIMES DAILY WITH MEALS
Status: DISCONTINUED | OUTPATIENT
Start: 2024-03-18 | End: 2024-03-19

## 2024-03-18 RX ORDER — MAGNESIUM SULFATE HEPTAHYDRATE 40 MG/ML
4 INJECTION, SOLUTION INTRAVENOUS ONCE
Status: COMPLETED | OUTPATIENT
Start: 2024-03-18 | End: 2024-03-18

## 2024-03-18 RX ORDER — POTASSIUM CHLORIDE 7.45 MG/ML
10 INJECTION INTRAVENOUS ONCE
Status: COMPLETED | OUTPATIENT
Start: 2024-03-18 | End: 2024-03-18

## 2024-03-18 RX ORDER — POTASSIUM CHLORIDE 20 MEQ/1
20 TABLET, EXTENDED RELEASE ORAL 3 TIMES DAILY
Status: DISCONTINUED | OUTPATIENT
Start: 2024-03-18 | End: 2024-03-20 | Stop reason: HOSPADM

## 2024-03-18 RX ADMIN — SODIUM CHLORIDE: 9 INJECTION, SOLUTION INTRAVENOUS at 04:34

## 2024-03-18 RX ADMIN — OXYCODONE 5 MG: 5 TABLET ORAL at 00:36

## 2024-03-18 RX ADMIN — APIXABAN 5 MG: 5 TABLET, FILM COATED ORAL at 05:38

## 2024-03-18 RX ADMIN — MAGNESIUM SULFATE IN WATER FOR 4 G: 40 INJECTION INTRAVENOUS at 18:01

## 2024-03-18 RX ADMIN — OXYCODONE 5 MG: 5 TABLET ORAL at 17:58

## 2024-03-18 RX ADMIN — SODIUM CHLORIDE 1 G: 1 TABLET ORAL at 17:57

## 2024-03-18 RX ADMIN — OXYCODONE 5 MG: 5 TABLET ORAL at 22:18

## 2024-03-18 RX ADMIN — DULOXETINE HYDROCHLORIDE 30 MG: 30 CAPSULE, DELAYED RELEASE ORAL at 05:38

## 2024-03-18 RX ADMIN — OXYCODONE 5 MG: 5 TABLET ORAL at 06:24

## 2024-03-18 RX ADMIN — POTASSIUM CHLORIDE 10 MEQ: 7.46 INJECTION, SOLUTION INTRAVENOUS at 16:42

## 2024-03-18 RX ADMIN — ROSUVASTATIN 10 MG: 10 TABLET, FILM COATED ORAL at 21:42

## 2024-03-18 RX ADMIN — Medication 100 MG: at 05:38

## 2024-03-18 RX ADMIN — APIXABAN 5 MG: 5 TABLET, FILM COATED ORAL at 17:57

## 2024-03-18 RX ADMIN — POTASSIUM CHLORIDE 20 MEQ: 1500 TABLET, EXTENDED RELEASE ORAL at 17:57

## 2024-03-18 ASSESSMENT — PAIN DESCRIPTION - PAIN TYPE
TYPE: ACUTE PAIN;CHRONIC PAIN
TYPE: ACUTE PAIN

## 2024-03-18 ASSESSMENT — ENCOUNTER SYMPTOMS
NAUSEA: 1
FEVER: 0
VOMITING: 0

## 2024-03-18 ASSESSMENT — COGNITIVE AND FUNCTIONAL STATUS - GENERAL
MOVING TO AND FROM BED TO CHAIR: A LITTLE
TURNING FROM BACK TO SIDE WHILE IN FLAT BAD: A LITTLE
SUGGESTED CMS G CODE MODIFIER MOBILITY: CK
CLIMB 3 TO 5 STEPS WITH RAILING: A LITTLE
STANDING UP FROM CHAIR USING ARMS: A LITTLE
MOBILITY SCORE: 18
WALKING IN HOSPITAL ROOM: A LITTLE
MOVING FROM LYING ON BACK TO SITTING ON SIDE OF FLAT BED: A LITTLE

## 2024-03-18 ASSESSMENT — FIBROSIS 4 INDEX: FIB4 SCORE: 2.39

## 2024-03-18 ASSESSMENT — GAIT ASSESSMENTS
ASSISTIVE DEVICE: FRONT WHEEL WALKER
GAIT LEVEL OF ASSIST: STANDBY ASSIST
DISTANCE (FEET): 75

## 2024-03-18 NOTE — CARE PLAN
The patient is Stable - Low risk of patient condition declining or worsening    Shift Goals  Clinical Goals: pain management, ambulation, safety  Patient Goals: rest, comfort  Family Goals: leif    Progress made toward(s) clinical / shift goals:        Problem: Pain - Standard  Goal: Alleviation of pain or a reduction in pain to the patient’s comfort goal  Outcome: Progressing     Problem: Knowledge Deficit - Standard  Goal: Patient and family/care givers will demonstrate understanding of plan of care, disease process/condition, diagnostic tests and medications  Outcome: Progressing       Patient is not progressing towards the following goals:

## 2024-03-18 NOTE — PROGRESS NOTES
Monitor Summary:     Rhythm: ST  Rate: 103-118  Ectopy: (O)PVC  Measurements: .16/.08/.36                  12 Hour Chart Check

## 2024-03-18 NOTE — DISCHARGE PLANNING
1423  Agency/Facility Name: [facility]   Spoke To: Lora   Outcome: DPA called to ask if [facility] can reconsider referral if pt can bring own medication (Keytruda) to facility. Per Lora the referral will still be declined as pt will still need outpatient oncology follow ups and in the case the pt will need to be put in more treatment the facility may need to pay for treatment.     1426  Agency/Facility Name: [facility]   Spoke To: Magdy   Outcome: ANA called [facility] if [facility] can reconsider if pt can bring own medication. [facility]dy requested for referral to be sent to review as she was not in office last week.     DPA resent referral to [facility].     1548  Agency/Facility Name: [facility]   Spoke To: Magdy   Outcome: [facility] called to notify DPA that pt has been declined as pt is still currently on going chemo treatment.

## 2024-03-18 NOTE — PROGRESS NOTES
Hospital Medicine Daily Progress Note    Date of Service  3/18/2024    Chief Complaint  Shortness of breath, flulike symptoms    Hospital Course  Yolanda Roe is a 58 y.o. female with a past medical history of dyslipidemia, hypertension, triple negative breast cancer status postmastectomy by Dr. Monahan and is undergoing chemotherapy by Dr. Gómez, who presented 3/9/2024 with shortness of breath and vomiting. She states for the past 3 weeks she is effectively not able to take any food because of intractable vomiting and has not had a bowel movements as she has not been on keep anything down.  She has felt short of breath though not had a fever and no chest pain.  On evaluation, labs revealed a significant metabolic acidosis with hypokalemia and a CT scan reveals right lower lobe pulmonary embolism with mild groundglass and interstitial opacities in the lower lungs, along with oval fluid collections in the breasts surgical sites left greater than the right.  CT abdomen pelvis was unremarkable.  Urinalysis only showed 5-10 WBC with negative leukocyte esterase or nitrate.  Influenza, RSV, and COVID-19 PCR's were negative.  Lactate was normal.  She was started on anticoagulation with therapeutic Lovenox.  Her nausea/vomiting was felt to be related to her chemotherapy, and she was started on IV fluids and antiemetics.  She tolerated oral intake, and she was transitioned to oral Eliquis.  She had low magnesium which was replaced.  She maintained sinus rhythm.      Interval Problem Update    Patient is afebrile  Nausea is improved no further vomiting  I reviewed chemistry panel potassium 3.1 I ordered scheduled potassium  Magnesium 1.6 I ordered IV magnesium repletion  I discussed with case management no accepting SNF  I discussed with PT patient still not safe for home discharge patient      I have discussed this patient's plan of care and discharge plan at IDT rounds today with Case Management, Nursing, Nursing  leadership, and other members of the IDT team.    Consultants/Specialty  Surgery    Code Status  Full Code    Disposition  The patient is not medically cleared for discharge to home or a post-acute facility.  Anticipate discharge to: skilled nursing facility    Discharge plan TBD.  Anticipate discharge to home once medically cleared.  I have placed the appropriate orders for post-discharge needs.    Review of Systems  Review of Systems   Constitutional:  Positive for malaise/fatigue. Negative for fever.   Cardiovascular:         Chest wall pain   Gastrointestinal:  Positive for nausea. Negative for vomiting.        Pertinent positives/negatives as mentioned above.     A complete review of systems was personally done by me. All other systems were negative.       Physical Exam  Temp:  [36.5 °C (97.7 °F)-37 °C (98.6 °F)] 36.9 °C (98.5 °F)  Pulse:  [] 94  Resp:  [16-22] 18  BP: (108-138)/(65-86) 125/86  SpO2:  [90 %-95 %] 91 %    Physical Exam  Vitals and nursing note reviewed.   Constitutional:       General: She is not in acute distress.  HENT:      Head: Normocephalic and atraumatic.      Nose: Nose normal. No rhinorrhea.      Mouth/Throat:      Pharynx: No oropharyngeal exudate or posterior oropharyngeal erythema.   Eyes:      General:         Right eye: No discharge.         Left eye: No discharge.   Cardiovascular:      Rate and Rhythm: Normal rate and regular rhythm.      Heart sounds: Normal heart sounds. No murmur heard.  Pulmonary:      Effort: Pulmonary effort is normal. No respiratory distress.      Breath sounds: Normal breath sounds. No stridor. No rales.   Chest:      Chest wall: Tenderness present.   Abdominal:      General: There is no distension.      Palpations: Abdomen is soft. There is no mass.      Tenderness: There is no abdominal tenderness.   Musculoskeletal:         General: Swelling present. No tenderness.      Cervical back: Neck supple. No rigidity.   Skin:     General: Skin is warm  and dry.      Coloration: Skin is not cyanotic or jaundiced.      Nails: There is no clubbing.   Neurological:      General: No focal deficit present.      Mental Status: She is alert and oriented to person, place, and time.      Cranial Nerves: No cranial nerve deficit.      Motor: No weakness.   Psychiatric:         Mood and Affect: Mood normal.         Behavior: Behavior normal.         Fluids    Intake/Output Summary (Last 24 hours) at 3/18/2024 1542  Last data filed at 3/18/2024 1528  Gross per 24 hour   Intake 760 ml   Output 575 ml   Net 185 ml       Laboratory  Recent Labs     03/16/24  0041   WBC 3.6*   RBC 3.38*   HEMOGLOBIN 11.0*   HEMATOCRIT 31.9*   MCV 94.4   MCH 32.5   MCHC 34.5   RDW 43.1   PLATELETCT 247   MPV 9.3     Recent Labs     03/16/24  0041 03/17/24  0237 03/18/24  0739   SODIUM 135 131* 132*   POTASSIUM 3.2* 3.1* 3.1*   CHLORIDE 97 92* 93*   CO2 23 21 23   GLUCOSE 87 75 81   BUN 2* 2* 3*   CREATININE 0.36* 0.38* 0.41*   CALCIUM 9.2 8.7 8.9                   Imaging  EC-ECHOCARDIOGRAM COMPLETE W/ CONT   Final Result      CT-ABDOMEN-PELVIS WITH   Final Result      1.  No evidence of bowel obstruction or focal inflammatory change.      2.  Sigmoid diverticulosis.      3.  Fatty liver.      4.  Prior hysterectomy.      CT-CTA CHEST PULMONARY ARTERY W/ RECONS   Final Result      1.  Small right lower lobe pulmonary embolism.   2.  Mild groundglass and interstitial opacities in the lower lungs which may be due to edema and/or atelectasis/mild infiltrate.   3.  3 mm right upper lobe lung nodule.   4.  Oval complex collections in the bilateral breasts, left greater than right. This may be seroma/hematoma versus abscess not excluded. Correlate clinically.      VOALTE message of critical findings sent to Dr. Umaña at 3/9/2024 11:55 AM. I also received confirmation of receipt of VOALTE message back from the provider.      DX-CHEST-PORTABLE (1 VIEW)   Final Result      No evidence of acute  cardiopulmonary process.           Assessment/Plan  * Pulmonary embolism and infarction (HCC)- (present on admission)  Assessment & Plan  -Right lower lobe pulmonary embolism, in the setting of known cancer.  No signs of right heart strain.    -Continue anticoagulation with Eliquis.  -Continue respiratory support with RT protocol, weaned off  from supplemental oxygen .      Fever  Assessment & Plan  No clear source clinically at this time  Urinalysis reviewed negative for infection   blood cultures so far negative follow-up on final results  Monitor off antibiotics  Afebrile today    Fluid collection at surgical site- (present on admission)  Assessment & Plan  - CT showed oval fluid collections in the breasts surgical sites left greater than the right.    It has been several months since her surgery (September 2023),     Evaluated by surgery with recommendation for conservative management  Continued pain management    Cymbalta added  Outpatient follow-up    Constipation- (present on admission)  Assessment & Plan  Continue bowel regimen    Hypomagnesemia- (present on admission)  Assessment & Plan  Replete and monitor    Intractable vomiting- (present on admission)  Assessment & Plan  Vomiting resolved still with residual nausea but overall improved continue Zofran as needed and bowel protocol    Hypokalemia- (present on admission)  Assessment & Plan  Replete and monitor    Hyponatremia- (present on admission)  Assessment & Plan  Improved sodium 132  DC IV fluids start salt tablets    Primary hypertension- (present on admission)  Assessment & Plan  Given orthostatic symptoms amlodipine discontinued  Monitor blood pressure adjust accordingly    Breast cancer of upper-outer quadrant of left female breast (HCC)- (present on admission)  Assessment & Plan  -Triple negative  -S/p bilateral total mastectomy  -On treatment by Dr. Gómez. Regimen: : Pembrolizumab + PACLitaxel + CARBOplatin (x 4 cycles) followed by  Pembrolizumab + Cyclophosphamide + DOXOrubicin   (x4 cycles)  -Continue outpatient follow-up with oncology.         VTE prophylaxis: Eliquis

## 2024-03-18 NOTE — THERAPY
Physical Therapy   Daily Treatment     Patient Name: Yolanda Roe  Age:  58 y.o., Sex:  female  Medical Record #: [id number]  Today's Date: 3/18/2024     Precautions  Precautions: (P) Fall Risk  Comments: (P) orthostatic hypotension    Assessment    Pt demonstrates progress with PT from previous session, no reports of nausea.  She was SBA frorall mobility with FWW, ambulating 75'.  She reports having a studio apartment that is no bigger than her hospital room.   However she has no family support.  PT will continue to follow in the hospital.  Continue recommendation for post-acute PT.    Plan    Treatment Plan Status: (P) Continue Current Treatment Plan  Type of Treatment: Equipment, Gait Training, Neuro Re-Education / Balance, Self Care / Home Evaluation, Stair Training, Therapeutic Activities, Therapeutic Exercise  Treatment Frequency: 4 Times per Week  Treatment Duration: Until Therapy Goals Met    DC Equipment Recommendations: (P) 4-Wheeled Walker  Discharge Recommendations: (P) Recommend post-acute placement for additional physical therapy services prior to discharge home           Objective       03/18/24 1050   Precautions   Precautions Fall Risk   Comments orthostatic hypotension   Vitals   Pulse (!) 105  (supine, 112 standing, 107 /p ambulation)   Blood Pressure 125/79  (supine, 112 standing, 107 /p ambulation)   Pain 0 - 10 Group   Therapist Pain Assessment 0   Cognition    Cognition / Consciousness WDL   Level of Consciousness Alert   Ability To Follow Commands 2 Step   Passive ROM Lower Body   Passive ROM Lower Body WDL   Sitting Lower Body Exercises   Comments STS x5 from chair SBA, VCs to weight shift forward over feet   Balance   Sitting Balance (Static) Fair +   Sitting Balance (Dynamic) Fair   Standing Balance (Static) Fair   Standing Balance (Dynamic) Fair -   Weight Shift Sitting Good   Weight Shift Standing Fair   Bed Mobility    Supine to Sit Supervised   Scooting Supervised   Rolling Supervised    Comments HOB elevated, rail   Gait Analysis   Gait Level Of Assist Standby Assist   Assistive Device Front Wheel Walker   Distance (Feet) 75   # of Times Distance was Traveled 1   Weight Bearing Status FWB   Comments forward flexed posture with head down, decreased joseph, fair B foot clearance   Functional Mobility   Sit to Stand Standby Assist   Bed, Chair, Wheelchair Transfer Standby Assist   Transfer Method Stand Step   Comments with FWW   Activity Tolerance   Sitting in Chair post session   Sitting Edge of Bed 3 min   Standing 2 min   Patient / Family Goals    Goal #1 Outcome Goal not met   Short Term Goals    Goal Outcome # 1 Progressing as expected   Goal Outcome # 2 Progressing as expected   Education Group   Education Provided Transfer Status   Transfer Status Patient Response Patient;Acceptance;Explanation;Action Demonstration;Reinforcement Needed   Additional Comments PT educated on pauses between changes of position before moving on to allow time to readjust to decrease fall risk   Physical Therapy Treatment Plan   Physical Therapy Treatment Plan Continue Current Treatment Plan   Anticipated Discharge Equipment and Recommendations   DC Equipment Recommendations 4-Wheeled Walker   Discharge Recommendations Recommend post-acute placement for additional physical therapy services prior to discharge home   Interdisciplinary Plan of Care Collaboration   IDT Collaboration with  Nursing;Physician;Occupational Therapist   Patient Position at End of Therapy Seated;Chair Alarm On;Call Light within Reach;Tray Table within Reach;Phone within Reach   Collaboration Comments RN updated.  OT and physician updated on progess.   Session Information   Date / Session Number  3/18 -3 (3/4, 3/20)   Priority 4

## 2024-03-18 NOTE — DIETARY
Nutrition Update:  Day 9 of admit.  Yolanda Roe is a 58 y.o. female with admitting DX of Pulmonary embolism and infarction (HCC) [I26.99].  Patient being followed to optimize nutrition.    Current Diet: Regular diet with Ensure Plus TID.  PO intake < 25% all recent meals or refused by patient.   K+: low @ 3.1, Na+: low @ 132. Mag/Phos WNL; Thiamine and K+ on board.    Met with patient at bedside this afternoon.  Reviewed importance of PO intake and adequate nutrition. Discussed menu and supplements.      Problem: Nutritional:  Goal: Achieve adequate nutritional intake  Description: Patient will consume >50% of meals  Outcome: No Met      Plan/Recommend:  Continue with Ensure vanilla TID  Encourage PO intake  Document % consumed in flowsheets.      RD monitoring

## 2024-03-18 NOTE — DISCHARGE PLANNING
Case Management Discharge Planning    Admission Date: 3/9/2024  GMLOS: 4  ALOS: 9    6-Clicks ADL Score: 18  6-Clicks Mobility Score: 17  PT and/or OT Eval ordered: Yes  Post-acute Referrals Ordered: Yes  Post-acute Choice Obtained: Yes  Has referral(s) been sent to post-acute provider:  Yes      Anticipated Discharge Dispo: Discharge Disposition: Discharged to home/self care (01)    DME Needed: No    Action(s) Taken: Updated Provider/Nurse on Discharge Plan    Escalations Completed: None    Medically Clear: Yes    Next Steps: Pt has been cleared for SNF. 2 pending facilities, [facility] and [facility]. [facility] can't accept due to on-going catruda. Left VM message with Anuj at [facility].    Barriers to Discharge: Pending Placement    Is the patient up for discharge tomorrow: No

## 2024-03-18 NOTE — CARE PLAN
Problem: Pain - Standard  Goal: Alleviation of pain or a reduction in pain to the patient’s comfort goal  Outcome: Progressing     Problem: Knowledge Deficit - Standard  Goal: Patient and family/care givers will demonstrate understanding of plan of care, disease process/condition, diagnostic tests and medications  Outcome: Progressing   The patient is Stable - Low risk of patient condition declining or worsening    Shift Goals  Clinical Goals: pain management, ambulation, safety  Patient Goals: rest, comfort  Family Goals: leif    Progress made toward(s) clinical / shift goals:    Pt has not reported any pain during this RN's shift. Pt has gotten up multiple times to use bathroom. Pt has call light within reach. Pt has belongings within reach    Patient is not progressing towards the following goals:

## 2024-03-19 LAB
ANION GAP SERPL CALC-SCNC: 16 MMOL/L (ref 7–16)
B PARAP IS1001 DNA NPH QL NAA+NON-PROBE: NOT DETECTED
B PERT.PT PRMT NPH QL NAA+NON-PROBE: NOT DETECTED
BASOPHILS # BLD AUTO: 0.5 % (ref 0–1.8)
BASOPHILS # BLD: 0.02 K/UL (ref 0–0.12)
BUN SERPL-MCNC: 3 MG/DL (ref 8–22)
C PNEUM DNA NPH QL NAA+NON-PROBE: NOT DETECTED
CALCIUM SERPL-MCNC: 8.9 MG/DL (ref 8.5–10.5)
CHLORIDE SERPL-SCNC: 93 MMOL/L (ref 96–112)
CO2 SERPL-SCNC: 23 MMOL/L (ref 20–33)
CREAT SERPL-MCNC: 0.35 MG/DL (ref 0.5–1.4)
EOSINOPHIL # BLD AUTO: 0.23 K/UL (ref 0–0.51)
EOSINOPHIL NFR BLD: 6.2 % (ref 0–6.9)
ERYTHROCYTE [DISTWIDTH] IN BLOOD BY AUTOMATED COUNT: 43.1 FL (ref 35.9–50)
ERYTHROCYTE [DISTWIDTH] IN BLOOD BY AUTOMATED COUNT: 43.3 FL (ref 35.9–50)
FLUAV RNA NPH QL NAA+NON-PROBE: NOT DETECTED
FLUBV RNA NPH QL NAA+NON-PROBE: NOT DETECTED
GFR SERPLBLD CREATININE-BSD FMLA CKD-EPI: 117 ML/MIN/1.73 M 2
GLUCOSE SERPL-MCNC: 77 MG/DL (ref 65–99)
HADV DNA NPH QL NAA+NON-PROBE: NOT DETECTED
HCOV 229E RNA NPH QL NAA+NON-PROBE: NOT DETECTED
HCOV HKU1 RNA NPH QL NAA+NON-PROBE: NOT DETECTED
HCOV NL63 RNA NPH QL NAA+NON-PROBE: NOT DETECTED
HCOV OC43 RNA NPH QL NAA+NON-PROBE: NOT DETECTED
HCT VFR BLD AUTO: 30.3 % (ref 37–47)
HCT VFR BLD AUTO: 31.6 % (ref 37–47)
HGB BLD-MCNC: 10.3 G/DL (ref 12–16)
HGB BLD-MCNC: 11 G/DL (ref 12–16)
HMPV RNA NPH QL NAA+NON-PROBE: NOT DETECTED
HPIV1 RNA NPH QL NAA+NON-PROBE: NOT DETECTED
HPIV2 RNA NPH QL NAA+NON-PROBE: NOT DETECTED
HPIV3 RNA NPH QL NAA+NON-PROBE: NOT DETECTED
HPIV4 RNA NPH QL NAA+NON-PROBE: NOT DETECTED
IMM GRANULOCYTES # BLD AUTO: 0.01 K/UL (ref 0–0.11)
IMM GRANULOCYTES NFR BLD AUTO: 0.3 % (ref 0–0.9)
LYMPHOCYTES # BLD AUTO: 1.75 K/UL (ref 1–4.8)
LYMPHOCYTES NFR BLD: 47.4 % (ref 22–41)
M PNEUMO DNA NPH QL NAA+NON-PROBE: NOT DETECTED
MAGNESIUM SERPL-MCNC: 2.3 MG/DL (ref 1.5–2.5)
MCH RBC QN AUTO: 32.3 PG (ref 27–33)
MCH RBC QN AUTO: 32.4 PG (ref 27–33)
MCHC RBC AUTO-ENTMCNC: 34 G/DL (ref 32.2–35.5)
MCHC RBC AUTO-ENTMCNC: 34.8 G/DL (ref 32.2–35.5)
MCV RBC AUTO: 93.2 FL (ref 81.4–97.8)
MCV RBC AUTO: 95 FL (ref 81.4–97.8)
MONOCYTES # BLD AUTO: 0.51 K/UL (ref 0–0.85)
MONOCYTES NFR BLD AUTO: 13.8 % (ref 0–13.4)
NEUTROPHILS # BLD AUTO: 1.17 K/UL (ref 1.82–7.42)
NEUTROPHILS NFR BLD: 31.8 % (ref 44–72)
NRBC # BLD AUTO: 0 K/UL
NRBC BLD-RTO: 0 /100 WBC (ref 0–0.2)
PHOSPHATE SERPL-MCNC: 4.1 MG/DL (ref 2.5–4.5)
PLATELET # BLD AUTO: 264 K/UL (ref 164–446)
PLATELET # BLD AUTO: 290 K/UL (ref 164–446)
PMV BLD AUTO: 8.8 FL (ref 9–12.9)
PMV BLD AUTO: 8.9 FL (ref 9–12.9)
POTASSIUM SERPL-SCNC: 3.1 MMOL/L (ref 3.6–5.5)
RBC # BLD AUTO: 3.19 M/UL (ref 4.2–5.4)
RBC # BLD AUTO: 3.39 M/UL (ref 4.2–5.4)
RSV RNA NPH QL NAA+NON-PROBE: NOT DETECTED
RV+EV RNA NPH QL NAA+NON-PROBE: NOT DETECTED
SARS-COV-2 RNA NPH QL NAA+NON-PROBE: NOTDETECTED
SODIUM SERPL-SCNC: 132 MMOL/L (ref 135–145)
WBC # BLD AUTO: 3.7 K/UL (ref 4.8–10.8)
WBC # BLD AUTO: 4 K/UL (ref 4.8–10.8)

## 2024-03-19 PROCEDURE — A9270 NON-COVERED ITEM OR SERVICE: HCPCS | Performed by: INTERNAL MEDICINE

## 2024-03-19 PROCEDURE — 80048 BASIC METABOLIC PNL TOTAL CA: CPT

## 2024-03-19 PROCEDURE — 99233 SBSQ HOSP IP/OBS HIGH 50: CPT | Performed by: INTERNAL MEDICINE

## 2024-03-19 PROCEDURE — 87633 RESP VIRUS 12-25 TARGETS: CPT

## 2024-03-19 PROCEDURE — 97535 SELF CARE MNGMENT TRAINING: CPT

## 2024-03-19 PROCEDURE — 84100 ASSAY OF PHOSPHORUS: CPT

## 2024-03-19 PROCEDURE — 700102 HCHG RX REV CODE 250 W/ 637 OVERRIDE(OP): Performed by: HOSPITALIST

## 2024-03-19 PROCEDURE — 85025 COMPLETE CBC W/AUTO DIFF WBC: CPT

## 2024-03-19 PROCEDURE — A9270 NON-COVERED ITEM OR SERVICE: HCPCS | Performed by: HOSPITALIST

## 2024-03-19 PROCEDURE — 700102 HCHG RX REV CODE 250 W/ 637 OVERRIDE(OP): Performed by: INTERNAL MEDICINE

## 2024-03-19 PROCEDURE — 700105 HCHG RX REV CODE 258: Performed by: INTERNAL MEDICINE

## 2024-03-19 PROCEDURE — 87486 CHLMYD PNEUM DNA AMP PROBE: CPT

## 2024-03-19 PROCEDURE — 87798 DETECT AGENT NOS DNA AMP: CPT

## 2024-03-19 PROCEDURE — 97530 THERAPEUTIC ACTIVITIES: CPT

## 2024-03-19 PROCEDURE — 87581 M.PNEUMON DNA AMP PROBE: CPT

## 2024-03-19 PROCEDURE — 85027 COMPLETE CBC AUTOMATED: CPT

## 2024-03-19 PROCEDURE — 770020 HCHG ROOM/CARE - TELE (206)

## 2024-03-19 PROCEDURE — 83735 ASSAY OF MAGNESIUM: CPT

## 2024-03-19 RX ORDER — MORPHINE SULFATE 4 MG/ML
4 INJECTION INTRAVENOUS
Status: DISCONTINUED | OUTPATIENT
Start: 2024-03-19 | End: 2024-03-20 | Stop reason: HOSPADM

## 2024-03-19 RX ORDER — OXYCODONE HYDROCHLORIDE 10 MG/1
10 TABLET ORAL
Status: DISCONTINUED | OUTPATIENT
Start: 2024-03-19 | End: 2024-03-20 | Stop reason: HOSPADM

## 2024-03-19 RX ORDER — SODIUM CHLORIDE 9 MG/ML
INJECTION, SOLUTION INTRAVENOUS CONTINUOUS
Status: DISCONTINUED | OUTPATIENT
Start: 2024-03-19 | End: 2024-03-19

## 2024-03-19 RX ORDER — POTASSIUM CHLORIDE 20 MEQ/1
40 TABLET, EXTENDED RELEASE ORAL ONCE
Status: COMPLETED | OUTPATIENT
Start: 2024-03-19 | End: 2024-03-19

## 2024-03-19 RX ORDER — OXYCODONE HYDROCHLORIDE 5 MG/1
5 TABLET ORAL
Status: DISCONTINUED | OUTPATIENT
Start: 2024-03-19 | End: 2024-03-20 | Stop reason: HOSPADM

## 2024-03-19 RX ADMIN — POTASSIUM CHLORIDE 20 MEQ: 1500 TABLET, EXTENDED RELEASE ORAL at 05:10

## 2024-03-19 RX ADMIN — DULOXETINE HYDROCHLORIDE 30 MG: 30 CAPSULE, DELAYED RELEASE ORAL at 05:10

## 2024-03-19 RX ADMIN — APIXABAN 5 MG: 5 TABLET, FILM COATED ORAL at 05:11

## 2024-03-19 RX ADMIN — OXYCODONE 5 MG: 5 TABLET ORAL at 01:58

## 2024-03-19 RX ADMIN — POLYETHYLENE GLYCOL 3350 1 PACKET: 17 POWDER, FOR SOLUTION ORAL at 17:02

## 2024-03-19 RX ADMIN — POTASSIUM CHLORIDE 20 MEQ: 1500 TABLET, EXTENDED RELEASE ORAL at 11:32

## 2024-03-19 RX ADMIN — POTASSIUM CHLORIDE 40 MEQ: 1500 TABLET, EXTENDED RELEASE ORAL at 08:47

## 2024-03-19 RX ADMIN — POTASSIUM CHLORIDE 20 MEQ: 1500 TABLET, EXTENDED RELEASE ORAL at 17:02

## 2024-03-19 RX ADMIN — APIXABAN 5 MG: 5 TABLET, FILM COATED ORAL at 17:02

## 2024-03-19 RX ADMIN — OXYCODONE 5 MG: 5 TABLET ORAL at 08:50

## 2024-03-19 RX ADMIN — DOCUSATE SODIUM 50 MG AND SENNOSIDES 8.6 MG 2 TABLET: 8.6; 5 TABLET, FILM COATED ORAL at 17:01

## 2024-03-19 RX ADMIN — METOPROLOL TARTRATE 25 MG: 25 TABLET, FILM COATED ORAL at 17:02

## 2024-03-19 RX ADMIN — SODIUM CHLORIDE: 9 INJECTION, SOLUTION INTRAVENOUS at 08:54

## 2024-03-19 RX ADMIN — OXYCODONE 5 MG: 5 TABLET ORAL at 05:12

## 2024-03-19 RX ADMIN — OXYCODONE 5 MG: 5 TABLET ORAL at 20:25

## 2024-03-19 RX ADMIN — OXYCODONE HYDROCHLORIDE 10 MG: 10 TABLET ORAL at 15:38

## 2024-03-19 RX ADMIN — ROSUVASTATIN 10 MG: 10 TABLET, FILM COATED ORAL at 20:15

## 2024-03-19 RX ADMIN — Medication 100 MG: at 05:13

## 2024-03-19 RX ADMIN — METOPROLOL TARTRATE 25 MG: 25 TABLET, FILM COATED ORAL at 11:32

## 2024-03-19 RX ADMIN — SODIUM CHLORIDE 1 G: 1 TABLET ORAL at 08:42

## 2024-03-19 ASSESSMENT — COGNITIVE AND FUNCTIONAL STATUS - GENERAL
MOBILITY SCORE: 20
HELP NEEDED FOR BATHING: A LITTLE
STANDING UP FROM CHAIR USING ARMS: A LITTLE
CLIMB 3 TO 5 STEPS WITH RAILING: A LITTLE
PERSONAL GROOMING: A LITTLE
WALKING IN HOSPITAL ROOM: A LITTLE
MOVING TO AND FROM BED TO CHAIR: A LITTLE
DRESSING REGULAR UPPER BODY CLOTHING: A LITTLE
DAILY ACTIVITIY SCORE: 19
TOILETING: A LITTLE
SUGGESTED CMS G CODE MODIFIER MOBILITY: CJ
DRESSING REGULAR LOWER BODY CLOTHING: A LITTLE
SUGGESTED CMS G CODE MODIFIER DAILY ACTIVITY: CK

## 2024-03-19 ASSESSMENT — ENCOUNTER SYMPTOMS
VOMITING: 0
PALPITATIONS: 0
WEAKNESS: 1
FEVER: 0
NAUSEA: 1
SHORTNESS OF BREATH: 0

## 2024-03-19 ASSESSMENT — GAIT ASSESSMENTS
GAIT LEVEL OF ASSIST: STANDBY ASSIST
DISTANCE (FEET): 200
ASSISTIVE DEVICE: FRONT WHEEL WALKER

## 2024-03-19 ASSESSMENT — PAIN DESCRIPTION - PAIN TYPE
TYPE: ACUTE PAIN;CHRONIC PAIN

## 2024-03-19 ASSESSMENT — FIBROSIS 4 INDEX: FIB4 SCORE: 2.03

## 2024-03-19 NOTE — PROGRESS NOTES
Bedside report received from Perri GOMEZ. Pt assessment complete. Pt AO x 4. Reviewed plan of care with pt. Pt tele monitored. Chart and labs reviewed. Bed in lowest position, and 2 side rails up. Pt educated on call lights, call light within reach. Hourly rounding in place

## 2024-03-19 NOTE — PROGRESS NOTES
Report received from day shift RN, assumed patient care. Patient is calmly resting in bed, no signs of distress, even and unlabored breathing noted. Pt on room air. A&Ox4. Tele box on and in place. Patient has call light within reach, fall precautions in place. Patient states no needs at this time. Hourly rounding initiated.

## 2024-03-19 NOTE — PROGRESS NOTES
Monitor Summary:     Rhythm: SR  Rate: 92-99  Ectopy: (R)PVC  Measurements: 0.17/0.08/0.31           12 Hour Chart Check

## 2024-03-19 NOTE — CARE PLAN
The patient is Stable - Low risk of patient condition declining or worsening    Shift Goals  Clinical Goals: safety, VSS  Patient Goals: rest, pain management  Family Goals: leif    Progress made toward(s) clinical / shift goals:  pt demonstrates ability to transfer from bedside chair to bed steadily. She verbalizes requests to get back to bed for comfort and can clearly communicate her pain level      Problem: Pain - Standard  Goal: Alleviation of pain or a reduction in pain to the patient’s comfort goal  Outcome: Progressing  Note: Pt able to verbalize her pain level and use 0-10 pain scale. She reports reduction in pain level after pain medication.     Problem: Knowledge Deficit - Standard  Goal: Patient and family/care givers will demonstrate understanding of plan of care, disease process/condition, diagnostic tests and medications  Outcome: Progressing  Note: Pt verbalizes understanding of medications ordered and changed for her today, and participates in PT and OT care plans.        Patient is not progressing towards the following goals:

## 2024-03-19 NOTE — FACE TO FACE
Face to Face Supporting Documentation - Home Health    The encounter with this patient was in whole or in part the primary reason for home health admission.    Date of encounter:   Patient:                    MRN:                       YOB: 2025  Yolanda Roe  [id number]  1965     Home health to see patient for:  Skilled Nursing care for assessment, interventions & education, Home health aide, Physical Therapy evaluation and treatment, and Occupational therapy evaluation and treatment    Skilled need for:  Recent Deterioration of Health Status Breast cancer, PE, decondition, Medication Management Vitals, and Comment: PT/OT, safety     Skilled nursing interventions to include:  Comment: vitals, meds management, PT/OT, safety     Homebound status evidenced by:  Need the aid of supportive devices such as crutches, canes, wheelchairs or walkers, Require the use of special transportation, or Needs the assistance of another person in order to leave the home. Leaving home requires a considerable and taxing effort. There is a normal inability to leave the home.    Community Physician to provide follow up care: Yuko Lala P.A.-C.     Optional Interventions? No      I certify the face to face encounter for this home health care referral meets the CMS requirements and the encounter/clinical assessment with the patient was, in whole, or in part, for the medical condition(s) listed above, which is the primary reason for home health care. Based on my clinical findings: the service(s) are medically necessary, support the need for home health care, and the homebound criteria are met.  I certify that this patient has had a face to face encounter by myself.  Bruna Lomas M.D. - NPI: [id number]

## 2024-03-19 NOTE — THERAPY
"Physical Therapy   Daily Treatment     Patient Name: Yolanda Roe  Age:  58 y.o., Sex:  female  Medical Record #: [id number]  Today's Date: 3/19/2024     Precautions  Precautions: Fall Risk  Comments: orthostatic hypotension    Assessment    Pt continues to show progress, improving to MI for bed mobility, and increasing ambulation distance to 200' overall with 2 standing rest breaks.  Pt reported fatigue at the end of the session and concern for being able to make it back to the room.  Pt educated to turn around when she starts to feel fatigue, to have enough energy to make it back.  PT will continue to follow. PT recommending home health PT services due to limited activity tolerance.    Plan    Treatment Plan Status: (P) Continue Current Treatment Plan  Type of Treatment: Equipment, Gait Training, Neuro Re-Education / Balance, Self Care / Home Evaluation, Stair Training, Therapeutic Activities, Therapeutic Exercise  Treatment Frequency: 4 Times per Week  Treatment Duration: Until Therapy Goals Met    DC Equipment Recommendations: (P) 4-Wheeled Walker  Discharge Recommendations: (P) Recommend home health for continued physical therapy services      Subjective    \"I can't get a break.\"  Pt reports phlebotomy was just in the room.     Objective       03/19/24 0939   Vitals   Pulse (!) 101   Pulse Oximetry 91 %   O2 Delivery Device None - Room Air   Pain 0 - 10 Group   Therapist Pain Assessment Post Activity Pain Same as Prior to Activity   Cognition    Cognition / Consciousness WDL   Level of Consciousness Alert   Passive ROM Lower Body   Passive ROM Lower Body WDL   Balance   Sitting Balance (Static) Fair +   Sitting Balance (Dynamic) Fair +   Standing Balance (Static) Fair   Standing Balance (Dynamic) Fair   Weight Shift Sitting Good   Weight Shift Standing Fair   Bed Mobility    Supine to Sit Modified Independent   Scooting Modified Independent   Rolling Modified Independent   Comments HOB elevated, rail   Gait " Analysis   Gait Level Of Assist Standby Assist   Assistive Device Front Wheel Walker   Distance (Feet) 200   # of Times Distance was Traveled 1  (with 2 standing rest breaks)   Deviation   (mild forward flexed posture)   Weight Bearing Status FWB   Skilled Intervention Verbal Cuing   Comments VCs stay close to FWW, turn around when stat to feel a little tired so she can make it back to the room   Functional Mobility   Sit to Stand Supervised   Bed, Chair, Wheelchair Transfer Standby Assist   Transfer Method Stand Step   Comments with FWW   Activity Tolerance   Sitting in Chair post session   Patient / Family Goals    Patient / Family Goal #1 to go home   Goal #1 Outcome Goal not met   Short Term Goals    Short Term Goal # 1 Pt will perform stand step transfers with no AD and SPV in 6 visits to get in/out of a chair   Goal Outcome # 1 Progressing as expected   Short Term Goal # 2 Pt will ambulate 150ft with LRAD and SPV in 6 visits to access household environement   Goal Outcome # 2 Progressing as expected   Education Group   Additional Comments Monitor fatigue, SOB for safety when need to sit down and rest- education reinforcement needed   Physical Therapy Treatment Plan   Physical Therapy Treatment Plan Continue Current Treatment Plan   Anticipated Discharge Equipment and Recommendations   DC Equipment Recommendations 4-Wheeled Walker   Discharge Recommendations Recommend home health for continued physical therapy services   Interdisciplinary Plan of Care Collaboration   IDT Collaboration with  Nursing;Occupational Therapist   Patient Position at End of Therapy Seated;Call Light within Reach;Tray Table within Reach  (OT in the room)   Collaboration Comments RN updated, OT updated on pt performence and PT recs   Session Information   Date / Session Number  3/19 -4 (4/4, 3/20)

## 2024-03-19 NOTE — CARE PLAN
The patient is Stable - Low risk of patient condition declining or worsening    Shift Goals  Clinical Goals: VSS, safety  Patient Goals: pain management  Family Goals: leif    Progress made toward(s) clinical / shift goals:  Patient's pain managed with medication per MAR. Educated on pain scale 0-10. Reinforced non-pharmacologic interventions to help with pain management. Fall protocols in place.      Problem: Pain - Standard  Goal: Alleviation of pain or a reduction in pain to the patient’s comfort goal  Outcome: Progressing     Problem: Knowledge Deficit - Standard  Goal: Patient and family/care givers will demonstrate understanding of plan of care, disease process/condition, diagnostic tests and medications  Outcome: Progressing

## 2024-03-19 NOTE — PROGRESS NOTES
Hospital Medicine Daily Progress Note    Date of Service  3/19/2024    Chief Complaint  Shortness of breath, flulike symptoms    Hospital Course  Yolanda Roe is a 58 y.o. female with a past medical history of dyslipidemia, hypertension, triple negative breast cancer status postmastectomy by Dr. Monahan and is undergoing chemotherapy by Dr. Gómez, who presented 3/9/2024 with shortness of breath and vomiting. She states for the past 3 weeks she is effectively not able to take any food because of intractable vomiting and has not had a bowel movements as she has not been on keep anything down.  She has felt short of breath though not had a fever and no chest pain.  On evaluation, labs revealed a significant metabolic acidosis with hypokalemia and a CT scan reveals right lower lobe pulmonary embolism with mild groundglass and interstitial opacities in the lower lungs, along with oval fluid collections in the breasts surgical sites left greater than the right.  CT abdomen pelvis was unremarkable.  Urinalysis only showed 5-10 WBC with negative leukocyte esterase or nitrate.  Influenza, RSV, and COVID-19 PCR's were negative.  Lactate was normal.  She was started on anticoagulation with therapeutic Lovenox.  Her nausea/vomiting was felt to be related to her chemotherapy, and she was started on IV fluids and antiemetics.  She tolerated oral intake, and she was transitioned to oral Eliquis.  She had low magnesium which was replaced.  She maintained sinus rhythm.      Interval Problem Update    Patient is afebrile  Nausea is improved no further vomiting  I reviewed chemistry panel potassium 3.1 I ordered scheduled potassium  Magnesium 1.6 I ordered IV magnesium repletion  I discussed with case management no accepting SNF  I discussed with PT patient still not safe for home discharge patient    3/19/2024-no event overnight.  Patient looks tired.  She tells me that she was not able to sleep well because it was too much  noise in the hospital.  Today she is tachycardic heart rate , she is 85% in room air and currently on oxygen.  Nausea has improved.  Cultures remain negative.  I am still screening her for viral panel.  I am starting her on metoprolol titrate 25 mg twice daily.  WBC 3.7, hemoglobin 11.0.  No active bleeding.  Platelets are normal at 264.  I did initially start some IV fluid because of tachycardia but after seeing patient I believe she is slightly volume overloaded therefore I discontinue IV fluid I will consider Lasix IV later  If she continues to improve I will consider discharging home tomorrow with home health.  Continue to monitor on telemetry    I have discussed this patient's plan of care and discharge plan at IDT rounds today with Case Management, Nursing, Nursing leadership, and other members of the IDT team.    Consultants/Specialty  Surgery    Code Status  Full Code    Disposition  The patient is not medically cleared for discharge to home or a post-acute facility.      Discharge plan TBD.  Anticipate discharge to home once medically cleared.  I have placed the appropriate orders for post-discharge needs.    Review of Systems  Review of Systems   Constitutional:  Positive for malaise/fatigue. Negative for fever.   Respiratory:  Negative for shortness of breath.    Cardiovascular:  Positive for leg swelling. Negative for chest pain and palpitations.        Chest wall pain   Gastrointestinal:  Positive for nausea. Negative for vomiting.   Neurological:  Positive for weakness.        Pertinent positives/negatives as mentioned above.     A complete review of systems was personally done by me. All other systems were negative.       Physical Exam  Temp:  [36 °C (96.8 °F)-37.2 °C (99 °F)] 37.1 °C (98.8 °F)  Pulse:  [] 105  Resp:  [17-20] 18  BP: (108-140)/(65-89) 133/78  SpO2:  [85 %-95 %] 85 %    Physical Exam  Vitals and nursing note reviewed.   Constitutional:       General: She is not in acute  distress.  HENT:      Head: Normocephalic and atraumatic.      Nose: Nose normal. No rhinorrhea.      Mouth/Throat:      Pharynx: No oropharyngeal exudate or posterior oropharyngeal erythema.   Eyes:      General:         Right eye: No discharge.         Left eye: No discharge.   Cardiovascular:      Rate and Rhythm: Regular rhythm. Tachycardia present.      Heart sounds: Normal heart sounds. No murmur heard.  Pulmonary:      Effort: Pulmonary effort is normal. No respiratory distress.      Breath sounds: Normal breath sounds. No stridor. No rales.   Chest:      Chest wall: Tenderness present.   Abdominal:      General: There is no distension.      Palpations: Abdomen is soft. There is no mass.      Tenderness: There is no abdominal tenderness.   Musculoskeletal:         General: Swelling present. No tenderness.      Cervical back: Neck supple. No rigidity.   Skin:     General: Skin is warm and dry.      Coloration: Skin is not cyanotic or jaundiced.      Nails: There is no clubbing.   Neurological:      General: No focal deficit present.      Mental Status: She is alert and oriented to person, place, and time.      Cranial Nerves: No cranial nerve deficit.      Motor: No weakness.   Psychiatric:         Mood and Affect: Mood normal.         Behavior: Behavior normal.         Fluids    Intake/Output Summary (Last 24 hours) at 3/19/2024 1118  Last data filed at 3/19/2024 0854  Gross per 24 hour   Intake 920 ml   Output 1775 ml   Net -855 ml       Laboratory  Recent Labs     03/19/24  0032 03/19/24  0922   WBC 4.0* 3.7*   RBC 3.19* 3.39*   HEMOGLOBIN 10.3* 11.0*   HEMATOCRIT 30.3* 31.6*   MCV 95.0 93.2   MCH 32.3 32.4   MCHC 34.0 34.8   RDW 43.3 43.1   PLATELETCT 290 264   MPV 8.8* 8.9*     Recent Labs     03/17/24  0237 03/18/24  0739 03/19/24  0032   SODIUM 131* 132* 132*   POTASSIUM 3.1* 3.1* 3.1*   CHLORIDE 92* 93* 93*   CO2 21 23 23   GLUCOSE 75 81 77   BUN 2* 3* 3*   CREATININE 0.38* 0.41* 0.35*   CALCIUM 8.7  8.9 8.9                   Imaging  EC-ECHOCARDIOGRAM COMPLETE W/ CONT   Final Result      CT-ABDOMEN-PELVIS WITH   Final Result      1.  No evidence of bowel obstruction or focal inflammatory change.      2.  Sigmoid diverticulosis.      3.  Fatty liver.      4.  Prior hysterectomy.      CT-CTA CHEST PULMONARY ARTERY W/ RECONS   Final Result      1.  Small right lower lobe pulmonary embolism.   2.  Mild groundglass and interstitial opacities in the lower lungs which may be due to edema and/or atelectasis/mild infiltrate.   3.  3 mm right upper lobe lung nodule.   4.  Oval complex collections in the bilateral breasts, left greater than right. This may be seroma/hematoma versus abscess not excluded. Correlate clinically.      VOALTE message of critical findings sent to Dr. Umaña at 3/9/2024 11:55 AM. I also received confirmation of receipt of VOALTE message back from the provider.      DX-CHEST-PORTABLE (1 VIEW)   Final Result      No evidence of acute cardiopulmonary process.           Assessment/Plan  * Pulmonary embolism and infarction (HCC)- (present on admission)  Assessment & Plan  -Right lower lobe pulmonary embolism, in the setting of known cancer.  No signs of right heart strain.    -Continue anticoagulation with Eliquis.  -Continue respiratory support with RT protocol, weaned off  from supplemental oxygen .  3/19-continue to wean off oxygen.  Consider Lasix later      Fever  Assessment & Plan  No clear source clinically at this time  Urinalysis reviewed negative for infection   blood cultures so far negative follow-up on final results  Monitor off antibiotics  Afebrile today  3/19-she remains afebrile.  No active infection.  Blood cultures negative.  Respiratory panel to screen    Fluid collection at surgical site- (present on admission)  Assessment & Plan  - CT showed oval fluid collections in the breasts surgical sites left greater than the right.    It has been several months since her surgery  (September 2023),     Evaluated by surgery with recommendation for conservative management  Continued pain management    Cymbalta added  Outpatient follow-up    Constipation- (present on admission)  Assessment & Plan  Continue bowel regimen    Hypomagnesemia- (present on admission)  Assessment & Plan  Replete and monitor    Intractable vomiting- (present on admission)  Assessment & Plan  Vomiting resolved still with residual nausea but overall improved continue Zofran as needed and bowel protocol    Hypokalemia- (present on admission)  Assessment & Plan  Replete and monitor    Hyponatremia- (present on admission)  Assessment & Plan  Improved sodium 132  DC IV fluids start salt tablets  3/19-stop salt tablet.  Patient is volume overloaded.    Primary hypertension- (present on admission)  Assessment & Plan  Given orthostatic symptoms amlodipine discontinued  Monitor blood pressure adjust accordingly  3/19.  Continue to monitor    Breast cancer of upper-outer quadrant of left female breast (HCC)- (present on admission)  Assessment & Plan  -Triple negative  -S/p bilateral total mastectomy  -On treatment by Dr. Gómez. Regimen: : Pembrolizumab + PACLitaxel + CARBOplatin (x 4 cycles) followed by Pembrolizumab + Cyclophosphamide + DOXOrubicin   (x4 cycles)  -Continue outpatient follow-up with oncology.         VTE prophylaxis: Eliquis        Greater than 51 minutes spent prepping to see patient (e.g. review of tests) obtaining and/or reviewing separately obtained history. Performing a medically appropriate examination and/ evaluation.  Counseling and educating the patient/family/caregiver.  Ordering medications, tests, or procedures.  Referring and communicating with other health care professionals.  Documenting clinical information in EPIC.  Independently interpreting results and communicating results to patient/family/caregiver.  Care coordination.

## 2024-03-19 NOTE — THERAPY
Occupational Therapy  Daily Treatment     Patient Name: Yolanda Roe  Age:  58 y.o., Sex:  female  Medical Record #: [id number]  Today's Date: 3/19/2024     Precautions  Precautions: Fall Risk  Comments:  (not orthostatic 3/19)    Assessment    Pt seen for OT treatment. Pt donned/doffed socks, stood to wash hands, performed toilet transfer, and toilet hygiene w/ supv-SBA. Pt demonstrated improved strength/cognition from prior session with this therapist, however still has impaired activity tolerance. Pt desaturated after walk from bathroom to 85%. RN aware. Pt educated regarding the role of OT, energy conservation principles, recommendation for HH, and recommendation for shower chair (handout provided). Pt will continue to benefit from skilled OT while admitted to acute care.     Plan    Treatment Plan Status: Continue Current Treatment Plan  Type of Treatment: Self Care / Activities of Daily Living, Adaptive Equipment, Neuro Re-Education / Balance, Therapeutic Exercises, Therapeutic Activity  Treatment Frequency: 3 Times per Week  Treatment Duration: Until Therapy Goals Met    DC Equipment Recommendations: Unable to determine at this time  Discharge Recommendations: Recommend home health for continued occupational therapy services     Objective     03/19/24 1003   Vitals   Pulse (!) 105   Blood Pressure 133/78   Pulse Oximetry (!) 85 %  (after activity on RA, placed back on oxymask with recovery to 97%)   Vitals Comments no reports of lightheadedness or dizziness, reported feeling SOB after return from the bathroom   Pain   Pain Scales 0 to 10 Scale    Pain 0 - 10 Group   Therapist Pain Assessment Post Activity Pain Same as Prior to Activity   Non Verbal Descriptors   Non Verbal Scale  Calm   Cognition    Cognition / Consciousness X   Attention Impaired   Comments Pleasant and cooperative, improved from prior session with this therapist   Other Treatments   Other Treatments Provided Discussed energy conservation  principles, HH OT, and recommendation for shower chair (handout provided)   Balance   Sitting Balance (Static) Fair +   Sitting Balance (Dynamic) Fair +   Standing Balance (Static) Fair   Standing Balance (Dynamic) Fair   Weight Shift Sitting Good   Weight Shift Standing Fair   Skilled Intervention Verbal Cuing   Comments w/ FWW   Bed Mobility    Comments up to chair pre/post   Activities of Daily Living   Grooming Standby Assist;Standing  (washed hands at sink)   Lower Body Dressing Standby Assist  (don/doff socks)   Toileting Standby Assist  (urine on toilet)   Skilled Intervention Verbal Cuing;Facilitation   Comments some extra time required for ADL tasks   Functional Mobility   Sit to Stand Supervised   Bed, Chair, Wheelchair Transfer Standby Assist   Toilet Transfers Standby Assist   Transfer Method Stand Step   Mobility chair>bathroom>chair   Skilled Intervention Verbal Cuing;Facilitation   Comments w/ FWW   Visual Perception   Visual Perception  Not Tested   Activity Tolerance   Sitting in Chair up to chair pre/post   Sitting Edge of Bed NT   Standing >5 min   Comments limited by increased work of breathing and O2 desaturation   Patient / Family Goals   Patient / Family Goal #1 to go home   Goal #1 Outcome Progressing as expected   Short Term Goals   Short Term Goal # 1 Pt will perform full standing g/h routine w/ no reports of fatigue   Goal Outcome # 1 Progressing as expected   Short Term Goal # 2 Pt will perform ADL transfer w/ supv   Goal Outcome # 2 Progressing as expected   Short Term Goal # 3 Pt will perform LB dressing w/ supv   Goal Outcome # 3 Progressing as expected  ((did not trial pants/underwear))   Education Group   Education Provided Role of Occupational Therapist;Activities of Daily Living;Energy Conservation;Adaptive Equipment   Role of Occupational Therapist Patient Response Patient;Acceptance;Explanation;Verbal Demonstration   Energy Conservation Patient Response  Patient;Acceptance;Explanation;Verbal Demonstration   ADL Patient Response Patient;Acceptance;Explanation;Demonstration;Verbal Demonstration;Action Demonstration   Adaptive Equipment Patient Response Patient;Acceptance;Explanation;Handout;Verbal Demonstration   Occupational Therapy Treatment Plan    O.T. Treatment Plan Continue Current Treatment Plan   Treatment Interventions Self Care / Activities of Daily Living;Adaptive Equipment;Neuro Re-Education / Balance;Therapeutic Exercises;Therapeutic Activity   Treatment Frequency 3 Times per Week   Duration Until Therapy Goals Met

## 2024-03-20 ENCOUNTER — APPOINTMENT (OUTPATIENT)
Dept: ONCOLOGY | Facility: MEDICAL CENTER | Age: 59
End: 2024-03-20
Attending: INTERNAL MEDICINE
Payer: COMMERCIAL

## 2024-03-20 ENCOUNTER — PHARMACY VISIT (OUTPATIENT)
Dept: PHARMACY | Facility: MEDICAL CENTER | Age: 59
End: 2024-03-20
Payer: COMMERCIAL

## 2024-03-20 VITALS
RESPIRATION RATE: 16 BRPM | OXYGEN SATURATION: 90 % | TEMPERATURE: 97.9 F | WEIGHT: 164.02 LBS | DIASTOLIC BLOOD PRESSURE: 81 MMHG | SYSTOLIC BLOOD PRESSURE: 139 MMHG | HEART RATE: 91 BPM | BODY MASS INDEX: 28.7 KG/M2

## 2024-03-20 PROCEDURE — 700102 HCHG RX REV CODE 250 W/ 637 OVERRIDE(OP): Performed by: INTERNAL MEDICINE

## 2024-03-20 PROCEDURE — A9270 NON-COVERED ITEM OR SERVICE: HCPCS | Performed by: HOSPITALIST

## 2024-03-20 PROCEDURE — 99239 HOSP IP/OBS DSCHRG MGMT >30: CPT | Performed by: INTERNAL MEDICINE

## 2024-03-20 PROCEDURE — RXMED WILLOW AMBULATORY MEDICATION CHARGE: Performed by: INTERNAL MEDICINE

## 2024-03-20 PROCEDURE — A9270 NON-COVERED ITEM OR SERVICE: HCPCS | Performed by: INTERNAL MEDICINE

## 2024-03-20 PROCEDURE — 700102 HCHG RX REV CODE 250 W/ 637 OVERRIDE(OP): Performed by: HOSPITALIST

## 2024-03-20 RX ORDER — DULOXETIN HYDROCHLORIDE 30 MG/1
30 CAPSULE, DELAYED RELEASE ORAL DAILY
Qty: 30 CAPSULE | Refills: 0 | Status: ON HOLD | OUTPATIENT
Start: 2024-03-21

## 2024-03-20 RX ORDER — OXYCODONE HYDROCHLORIDE 5 MG/1
5 TABLET ORAL EVERY 6 HOURS PRN
Qty: 20 TABLET | Refills: 0 | Status: ON HOLD | OUTPATIENT
Start: 2024-03-20 | End: 2024-03-25

## 2024-03-20 RX ORDER — METOPROLOL SUCCINATE 25 MG/1
25 TABLET, EXTENDED RELEASE ORAL DAILY
Qty: 30 TABLET | Refills: 0 | Status: ON HOLD | OUTPATIENT
Start: 2024-03-20

## 2024-03-20 RX ORDER — POTASSIUM CHLORIDE 20 MEQ/1
20 TABLET, EXTENDED RELEASE ORAL 3 TIMES DAILY
Qty: 21 TABLET | Refills: 0 | Status: SHIPPED | OUTPATIENT
Start: 2024-03-20 | End: 2024-03-25

## 2024-03-20 RX ADMIN — METOPROLOL TARTRATE 25 MG: 25 TABLET, FILM COATED ORAL at 05:04

## 2024-03-20 RX ADMIN — OXYCODONE 5 MG: 5 TABLET ORAL at 02:05

## 2024-03-20 RX ADMIN — APIXABAN 5 MG: 5 TABLET, FILM COATED ORAL at 05:04

## 2024-03-20 RX ADMIN — OXYCODONE 5 MG: 5 TABLET ORAL at 09:53

## 2024-03-20 RX ADMIN — Medication 100 MG: at 05:04

## 2024-03-20 RX ADMIN — DULOXETINE HYDROCHLORIDE 30 MG: 30 CAPSULE, DELAYED RELEASE ORAL at 05:05

## 2024-03-20 RX ADMIN — POTASSIUM CHLORIDE 20 MEQ: 1500 TABLET, EXTENDED RELEASE ORAL at 05:04

## 2024-03-20 ASSESSMENT — PAIN DESCRIPTION - PAIN TYPE
TYPE: ACUTE PAIN;CHRONIC PAIN
TYPE: ACUTE PAIN;CHRONIC PAIN

## 2024-03-20 ASSESSMENT — FIBROSIS 4 INDEX: FIB4 SCORE: 2.23

## 2024-03-20 NOTE — CARE PLAN
The patient is Stable - Low risk of patient condition declining or worsening    Shift Goals  Clinical Goals: safety, VSS  Patient Goals: rest, pain management  Family Goals: uat    Progress made toward(s) clinical / shift goals:  Pt appropriately calls for assistance when getting out of bed. She is able to turn herself in bed without assistance. Communicates pain level.      Problem: Pain - Standard  Goal: Alleviation of pain or a reduction in pain to the patient’s comfort goal  Outcome: Progressing  Note: Pt communicates pain and requests pain medication appropriately     Problem: Knowledge Deficit - Standard  Goal: Patient and family/care givers will demonstrate understanding of plan of care, disease process/condition, diagnostic tests and medications  Outcome: Progressing  Note: Pt interested in her plan of care for today, has questions for nursing       Patient is not progressing towards the following goals:

## 2024-03-20 NOTE — DIETARY
Nutrition Update:    Day 11 of admit.  Yolanda Roe is a 58 y.o. female with admitting DX of Pulmonary embolism and infarction (HCC) [I26.99].  Patient being followed to optimize nutrition.    Current Diet: Regular w/ Ensure Plus TID  PO intake variable from 0% to % w/ avg of ~40%.  Labs: Na 132, K+ 3.1; Kdur given this am per MAR.    Problem: Nutritional:  Goal: Achieve adequate nutritional intake  Description: Patient will consume >50% of meals  Outcome: Progressing slower than expected.    Recommendations/Plan:  Continue w/ Ensure TID   Encourage intake of >50%  Document intake of all meals and supplements as % taken in ADL's to provide interdisciplinary communication across all shifts.

## 2024-03-20 NOTE — PROGRESS NOTES
Monitor Summary:     Rhythm: SR  Rate: 80-92  Ectopy: (O)PVC  Measurements: 0.14/0.08/0.39           12 Hour Chart Check

## 2024-03-20 NOTE — PROGRESS NOTES
Bedside report received from Perri GOMEZ. Pt assessment complete. Pt AO x 4. Reviewed plan of care with pt. Pt tele monitored. Chart and labs reviewed. Bed in lowest position, and 3 side rails up. Pt educated on call lights, call light within reach. Hourly rounding in place.

## 2024-03-20 NOTE — CARE PLAN
The patient is Stable - Low risk of patient condition declining or worsening    Shift Goals  Clinical Goals: safety, SPO2  Patient Goals: rest, pain management  Family Goals: leif    Progress made toward(s) clinical / shift goals:  Patient's pain has reduced since last shift, not requiring as much pharmacological aid. Educated on pain management and pain medication side effects.      Problem: Pain - Standard  Goal: Alleviation of pain or a reduction in pain to the patient’s comfort goal  Outcome: Progressing     Problem: Knowledge Deficit - Standard  Goal: Patient and family/care givers will demonstrate understanding of plan of care, disease process/condition, diagnostic tests and medications  Outcome: Progressing

## 2024-03-20 NOTE — DISCHARGE PLANNING
Case Management Discharge Planning    Admission Date: 3/9/2024  GMLOS: 4  ALOS: 11    6-Clicks ADL Score: 19  6-Clicks Mobility Score: 20      Anticipated Discharge Dispo: Discharge Disposition: D/T to home under [facility] care in anticipation of covered skilled care (06)    DME Needed: Yes    DME Ordered: Yes  PT recommends 4-Wheeled Walker but insurance only covers FWW. Pt was informed and she is okay with getting a FWW. RN CM informed Pt that she will have to purchase a 4 wheel walker from outside vendor if she decides to have it. Pt was also provided resources for [facility].    Action(s) Taken: Updated Provider/Nurse on Discharge Plan, Choice obtained, and Referral(s) sent      0930, RN CM visited Pt at room and she was informed of acceptance to [facility] and she is in agreement to this plan. Choice obtained for FWW DME and fax to [facility].    4138, Cab Voucher provided to JASON Christopher.      Escalations Completed: None    Medically Clear: Yes    Next Steps: CM will continue to assist Pt with discharge needs.      Barriers to Discharge: None

## 2024-03-21 NOTE — DISCHARGE SUMMARY
Discharge Summary    CHIEF COMPLAINT ON ADMISSION  Chief Complaint   Patient presents with    Shortness of Breath    Flu Like Symptoms     X 3 weeks   Chills, body aches,        Reason for Admission  Flu Like Symptoms     Admission Date  3/9/2024    CODE STATUS  Prior    HPI & HOSPITAL COURSE  58 y.o. female with a past medical history of dyslipidemia, hypertension, triple negative breast cancer status postmastectomy by Dr. Monahan and is undergoing chemotherapy by Dr. Gómez, who presented 3/9/2024 with shortness of breath and vomiting. She states for the past 3 weeks she is effectively not able to take any food because of intractable vomiting and has not had a bowel movements as she has not been on keep anything down.  She has felt short of breath though not had a fever and no chest pain.  On evaluation, labs revealed a significant metabolic acidosis with hypokalemia and a CT scan reveals right lower lobe pulmonary embolism with mild groundglass and interstitial opacities in the lower lungs, along with oval fluid collections in the breasts surgical sites left greater than the right.  CT abdomen pelvis was unremarkable.  Urinalysis only showed 5-10 WBC with negative leukocyte esterase or nitrate.  Influenza, RSV, and COVID-19 PCR's were negative.  Lactate was normal.  She was started on anticoagulation with therapeutic Lovenox.  Her nausea/vomiting was felt to be related to her chemotherapy, and she was started on IV fluids and antiemetics.  She tolerated oral intake, and she was transitioned to oral Eliquis.  She had low magnesium which was replaced.  She maintained sinus rhythm.   She was diuresed properly. Cultures remained negative. Respiratory panel PCR was negative. She was able to eat well and no complications. She will be discharge on eliquis. She will follow up with Dr. Gómez as OP.  CT chest showed oval fluid collections in the breasts surgical sites left greater than the right. It has been several  months since her surgery (September 2023), Evaluated by surgery with recommendation for conservative management    Therefore, she is discharged in guarded and stable condition to home with close outpatient follow-up.    The patient met 2-midnight criteria for an inpatient stay at the time of discharge.    Discharge Date  3/20/2024    FOLLOW UP ITEMS POST DISCHARGE  Follow up with Dr. Gómez in 1 week     DISCHARGE DIAGNOSES  Principal Problem:    Pulmonary embolism and infarction (HCC) (POA: Yes)  Active Problems:    Breast cancer of upper-outer quadrant of left female breast (HCC) (POA: Yes)    Primary hypertension (POA: Yes)    Hyponatremia (POA: Yes)    Hypokalemia (POA: Yes)    Intractable vomiting (POA: Yes)    Hypomagnesemia (POA: Yes)    Constipation (POA: Yes)    Fluid collection at surgical site (POA: Yes)    Fever (POA: Unknown)  Resolved Problems:    Metabolic acidosis (POA: Yes)    Tobacco dependence (POA: Yes)      FOLLOW UP  Future Appointments   Date Time Provider Department Center   3/26/2024  1:30 PM [facility]   4/10/2024  8:15 AM [facility] [place] [id number] [phone]    Schedule an appointment as soon as possible for a visit in 1 week(s)        MEDICATIONS ON DISCHARGE     Medication List        START taking these medications        Instructions   DULoxetine 30 MG Cpep  Start taking on: March 21, 2024  Commonly known as: Cymbalta   Take 1 Capsule by mouth every day.  Dose: 30 mg     Eliquis 5 MG Tabs  Generic drug: apixaban   Take 1 Tablet by mouth 2 times a day. Indications: DVT/PE  Dose: 5 mg     metoprolol SR 25 MG Tb24  Commonly known as: Toprol XL   Take 1 Tablet by mouth every day.  Dose: 25 mg     oxyCODONE immediate-release 5 MG Tabs  Commonly known as: Roxicodone   Take 1 Tablet by mouth every 6 hours as needed for Severe Pain for up to 5 days.  Dose: 5 mg     potassium chloride SA  20 MEQ Tbcr  Commonly known as: Kdur   Take 1 Tablet by mouth 3 times a day for 7 days.  Dose: 20 mEq            CONTINUE taking these medications        Instructions   lidocaine-prilocaine 2.5-2.5 % Crea  Commonly known as: Emla   Apply 1 Application topically as needed. Indications: Anesthesia to a Specific Part of the Body  Dose: 1 Application     ondansetron 4 MG Tbdp  Commonly known as: Zofran ODT   Take 4 mg by mouth every 6 hours as needed for Nausea/Vomiting.  Dose: 4 mg     rosuvastatin 10 MG Tabs  Commonly known as: Crestor   Take 10 mg by mouth every evening.  Dose: 10 mg     Tylenol 8 Hour 650 MG CR tablet  Generic drug: acetaminophen   Take 1,300 mg by mouth every 6 hours as needed. Indications: Pain  Dose: 1,300 mg            STOP taking these medications      amLODIPine 5 MG Tabs  Commonly known as: Norvasc     potassium chloride 20 MEQ Pack  Commonly known as: Klor-Con              Allergies  No Known Allergies    DIET  No orders of the defined types were placed in this encounter.      ACTIVITY  As tolerated.  Weight bearing as tolerated    CONSULTATIONS  General surgery     PROCEDURES  none    LABORATORY  Lab Results   Component Value Date    SODIUM 132 (L) 03/19/2024    POTASSIUM 3.1 (L) 03/19/2024    CHLORIDE 93 (L) 03/19/2024    CO2 23 03/19/2024    GLUCOSE 77 03/19/2024    BUN 3 (L) 03/19/2024    CREATININE 0.35 (L) 03/19/2024        Lab Results   Component Value Date    WBC 3.7 (L) 03/19/2024    HEMOGLOBIN 11.0 (L) 03/19/2024    HEMATOCRIT 31.6 (L) 03/19/2024    PLATELETCT 264 03/19/2024        Total time of the discharge process exceeds 33 minutes.

## 2024-03-22 LAB
BACTERIA BLD CULT: NORMAL
BACTERIA BLD CULT: NORMAL
SIGNIFICANT IND 70042: NORMAL
SIGNIFICANT IND 70042: NORMAL
SITE SITE: NORMAL
SITE SITE: NORMAL
SOURCE SOURCE: NORMAL
SOURCE SOURCE: NORMAL

## 2024-03-25 ENCOUNTER — APPOINTMENT (OUTPATIENT)
Dept: RADIOLOGY | Facility: MEDICAL CENTER | Age: 59
DRG: 175 | End: 2024-03-25
Payer: COMMERCIAL

## 2024-03-25 ENCOUNTER — HOSPITAL ENCOUNTER (INPATIENT)
Facility: MEDICAL CENTER | Age: 59
DRG: 175 | End: 2024-03-25
Attending: EMERGENCY MEDICINE | Admitting: STUDENT IN AN ORGANIZED HEALTH CARE EDUCATION/TRAINING PROGRAM
Payer: COMMERCIAL

## 2024-03-25 ENCOUNTER — APPOINTMENT (OUTPATIENT)
Dept: RADIOLOGY | Facility: MEDICAL CENTER | Age: 59
DRG: 175 | End: 2024-03-25
Attending: EMERGENCY MEDICINE
Payer: COMMERCIAL

## 2024-03-25 DIAGNOSIS — E86.0 DEHYDRATION: ICD-10-CM

## 2024-03-25 DIAGNOSIS — E87.6 HYPOKALEMIA: ICD-10-CM

## 2024-03-25 DIAGNOSIS — R11.10 INTRACTABLE VOMITING: ICD-10-CM

## 2024-03-25 DIAGNOSIS — E87.1 HYPONATREMIA: ICD-10-CM

## 2024-03-25 DIAGNOSIS — R53.1 GENERALIZED WEAKNESS: ICD-10-CM

## 2024-03-25 DIAGNOSIS — Z17.1 MALIGNANT NEOPLASM OF OVERLAPPING SITES OF LEFT BREAST IN FEMALE, ESTROGEN RECEPTOR NEGATIVE (HCC): ICD-10-CM

## 2024-03-25 DIAGNOSIS — C50.412 MALIGNANT NEOPLASM OF UPPER-OUTER QUADRANT OF LEFT BREAST IN FEMALE, ESTROGEN RECEPTOR NEGATIVE (HCC): ICD-10-CM

## 2024-03-25 DIAGNOSIS — C50.812 MALIGNANT NEOPLASM OF OVERLAPPING SITES OF LEFT BREAST IN FEMALE, ESTROGEN RECEPTOR NEGATIVE (HCC): ICD-10-CM

## 2024-03-25 DIAGNOSIS — Z17.1 MALIGNANT NEOPLASM OF UPPER-OUTER QUADRANT OF LEFT BREAST IN FEMALE, ESTROGEN RECEPTOR NEGATIVE (HCC): ICD-10-CM

## 2024-03-25 PROBLEM — Z71.89 ADVANCE CARE PLANNING: Status: ACTIVE | Noted: 2024-03-25

## 2024-03-25 PROBLEM — J96.01 ACUTE RESPIRATORY FAILURE WITH HYPOXIA (HCC): Status: ACTIVE | Noted: 2024-03-25

## 2024-03-25 PROBLEM — E83.52 HYPERCALCEMIA: Status: ACTIVE | Noted: 2024-03-25

## 2024-03-25 LAB
ALBUMIN SERPL BCP-MCNC: 3.7 G/DL (ref 3.2–4.9)
ALBUMIN/GLOB SERPL: 1.2 G/DL
ALP SERPL-CCNC: 110 U/L (ref 30–99)
ALT SERPL-CCNC: 8 U/L (ref 2–50)
ANION GAP SERPL CALC-SCNC: 25 MMOL/L (ref 7–16)
APPEARANCE UR: CLEAR
AST SERPL-CCNC: 35 U/L (ref 12–45)
BACTERIA #/AREA URNS HPF: NEGATIVE /HPF
BASOPHILS # BLD AUTO: 0.7 % (ref 0–1.8)
BASOPHILS # BLD: 0.05 K/UL (ref 0–0.12)
BILIRUB SERPL-MCNC: 0.4 MG/DL (ref 0.1–1.5)
BILIRUB UR QL STRIP.AUTO: NEGATIVE
BUN SERPL-MCNC: 6 MG/DL (ref 8–22)
CALCIUM ALBUM COR SERPL-MCNC: 11.6 MG/DL (ref 8.5–10.5)
CALCIUM SERPL-MCNC: 11.4 MG/DL (ref 8.5–10.5)
CHLORIDE SERPL-SCNC: 87 MMOL/L (ref 96–112)
CO2 SERPL-SCNC: 18 MMOL/L (ref 20–33)
COLOR UR: YELLOW
CREAT SERPL-MCNC: 0.85 MG/DL (ref 0.5–1.4)
EKG IMPRESSION: NORMAL
EOSINOPHIL # BLD AUTO: 0.28 K/UL (ref 0–0.51)
EOSINOPHIL NFR BLD: 3.9 % (ref 0–6.9)
EPI CELLS #/AREA URNS HPF: ABNORMAL /HPF
ERYTHROCYTE [DISTWIDTH] IN BLOOD BY AUTOMATED COUNT: 45.7 FL (ref 35.9–50)
GFR SERPLBLD CREATININE-BSD FMLA CKD-EPI: 78 ML/MIN/1.73 M 2
GLOBULIN SER CALC-MCNC: 3.1 G/DL (ref 1.9–3.5)
GLUCOSE SERPL-MCNC: 83 MG/DL (ref 65–99)
GLUCOSE UR STRIP.AUTO-MCNC: NEGATIVE MG/DL
HCT VFR BLD AUTO: 35.8 % (ref 37–47)
HGB BLD-MCNC: 12.1 G/DL (ref 12–16)
HYALINE CASTS #/AREA URNS LPF: ABNORMAL /LPF
IMM GRANULOCYTES # BLD AUTO: 0.04 K/UL (ref 0–0.11)
IMM GRANULOCYTES NFR BLD AUTO: 0.6 % (ref 0–0.9)
KETONES UR STRIP.AUTO-MCNC: 80 MG/DL
LACTATE SERPL-SCNC: 1.2 MMOL/L (ref 0.5–2)
LACTATE SERPL-SCNC: 2.2 MMOL/L (ref 0.5–2)
LACTATE SERPL-SCNC: 2.3 MMOL/L (ref 0.5–2)
LEUKOCYTE ESTERASE UR QL STRIP.AUTO: ABNORMAL
LYMPHOCYTES # BLD AUTO: 2.49 K/UL (ref 1–4.8)
LYMPHOCYTES NFR BLD: 34.3 % (ref 22–41)
MAGNESIUM SERPL-MCNC: 1.5 MG/DL (ref 1.5–2.5)
MCH RBC QN AUTO: 32.3 PG (ref 27–33)
MCHC RBC AUTO-ENTMCNC: 33.8 G/DL (ref 32.2–35.5)
MCV RBC AUTO: 95.5 FL (ref 81.4–97.8)
MICRO URNS: ABNORMAL
MONOCYTES # BLD AUTO: 0.72 K/UL (ref 0–0.85)
MONOCYTES NFR BLD AUTO: 9.9 % (ref 0–13.4)
NEUTROPHILS # BLD AUTO: 3.68 K/UL (ref 1.82–7.42)
NEUTROPHILS NFR BLD: 50.6 % (ref 44–72)
NITRITE UR QL STRIP.AUTO: NEGATIVE
NRBC # BLD AUTO: 0 K/UL
NRBC BLD-RTO: 0 /100 WBC (ref 0–0.2)
NT-PROBNP SERPL IA-MCNC: 98 PG/ML (ref 0–125)
PH UR STRIP.AUTO: 5.5 [PH] (ref 5–8)
PHOSPHATE SERPL-MCNC: 3.8 MG/DL (ref 2.5–4.5)
PLATELET # BLD AUTO: 312 K/UL (ref 164–446)
PMV BLD AUTO: 9.3 FL (ref 9–12.9)
POTASSIUM SERPL-SCNC: 3.5 MMOL/L (ref 3.6–5.5)
PROT SERPL-MCNC: 6.8 G/DL (ref 6–8.2)
PROT UR QL STRIP: NEGATIVE MG/DL
RBC # BLD AUTO: 3.75 M/UL (ref 4.2–5.4)
RBC # URNS HPF: ABNORMAL /HPF
RBC UR QL AUTO: ABNORMAL
SODIUM SERPL-SCNC: 130 MMOL/L (ref 135–145)
SP GR UR STRIP.AUTO: 1.02
TROPONIN T SERPL-MCNC: 22 NG/L (ref 6–19)
TROPONIN T SERPL-MCNC: 23 NG/L (ref 6–19)
TSH SERPL DL<=0.005 MIU/L-ACNC: 2.56 UIU/ML (ref 0.38–5.33)
UROBILINOGEN UR STRIP.AUTO-MCNC: 0.2 MG/DL
WBC # BLD AUTO: 7.3 K/UL (ref 4.8–10.8)
WBC #/AREA URNS HPF: ABNORMAL /HPF

## 2024-03-25 PROCEDURE — 700102 HCHG RX REV CODE 250 W/ 637 OVERRIDE(OP): Performed by: STUDENT IN AN ORGANIZED HEALTH CARE EDUCATION/TRAINING PROGRAM

## 2024-03-25 PROCEDURE — 84484 ASSAY OF TROPONIN QUANT: CPT

## 2024-03-25 PROCEDURE — A9270 NON-COVERED ITEM OR SERVICE: HCPCS | Performed by: STUDENT IN AN ORGANIZED HEALTH CARE EDUCATION/TRAINING PROGRAM

## 2024-03-25 PROCEDURE — 96374 THER/PROPH/DIAG INJ IV PUSH: CPT

## 2024-03-25 PROCEDURE — 83735 ASSAY OF MAGNESIUM: CPT

## 2024-03-25 PROCEDURE — 700105 HCHG RX REV CODE 258: Performed by: STUDENT IN AN ORGANIZED HEALTH CARE EDUCATION/TRAINING PROGRAM

## 2024-03-25 PROCEDURE — 87086 URINE CULTURE/COLONY COUNT: CPT

## 2024-03-25 PROCEDURE — 99223 1ST HOSP IP/OBS HIGH 75: CPT | Performed by: STUDENT IN AN ORGANIZED HEALTH CARE EDUCATION/TRAINING PROGRAM

## 2024-03-25 PROCEDURE — 99285 EMERGENCY DEPT VISIT HI MDM: CPT

## 2024-03-25 PROCEDURE — 700111 HCHG RX REV CODE 636 W/ 250 OVERRIDE (IP): Mod: JZ | Performed by: EMERGENCY MEDICINE

## 2024-03-25 PROCEDURE — 770004 HCHG ROOM/CARE - ONCOLOGY PRIVATE *

## 2024-03-25 PROCEDURE — 81001 URINALYSIS AUTO W/SCOPE: CPT

## 2024-03-25 PROCEDURE — 80053 COMPREHEN METABOLIC PANEL: CPT

## 2024-03-25 PROCEDURE — 36415 COLL VENOUS BLD VENIPUNCTURE: CPT

## 2024-03-25 PROCEDURE — 84443 ASSAY THYROID STIM HORMONE: CPT

## 2024-03-25 PROCEDURE — 700101 HCHG RX REV CODE 250: Performed by: STUDENT IN AN ORGANIZED HEALTH CARE EDUCATION/TRAINING PROGRAM

## 2024-03-25 PROCEDURE — 85025 COMPLETE CBC W/AUTO DIFF WBC: CPT

## 2024-03-25 PROCEDURE — 93005 ELECTROCARDIOGRAM TRACING: CPT | Performed by: EMERGENCY MEDICINE

## 2024-03-25 PROCEDURE — 71045 X-RAY EXAM CHEST 1 VIEW: CPT

## 2024-03-25 PROCEDURE — 84100 ASSAY OF PHOSPHORUS: CPT

## 2024-03-25 PROCEDURE — 700105 HCHG RX REV CODE 258: Performed by: EMERGENCY MEDICINE

## 2024-03-25 PROCEDURE — 83605 ASSAY OF LACTIC ACID: CPT

## 2024-03-25 PROCEDURE — 83880 ASSAY OF NATRIURETIC PEPTIDE: CPT

## 2024-03-25 PROCEDURE — 87040 BLOOD CULTURE FOR BACTERIA: CPT

## 2024-03-25 RX ORDER — PROMETHAZINE HYDROCHLORIDE 25 MG/1
12.5-25 TABLET ORAL EVERY 4 HOURS PRN
Status: DISCONTINUED | OUTPATIENT
Start: 2024-03-25 | End: 2024-04-01 | Stop reason: HOSPADM

## 2024-03-25 RX ORDER — PROCHLORPERAZINE EDISYLATE 5 MG/ML
5-10 INJECTION INTRAMUSCULAR; INTRAVENOUS EVERY 4 HOURS PRN
Status: DISCONTINUED | OUTPATIENT
Start: 2024-03-25 | End: 2024-04-01 | Stop reason: HOSPADM

## 2024-03-25 RX ORDER — POTASSIUM CHLORIDE 20 MEQ/1
40 TABLET, EXTENDED RELEASE ORAL ONCE
Status: DISCONTINUED | OUTPATIENT
Start: 2024-03-25 | End: 2024-03-25

## 2024-03-25 RX ORDER — AMOXICILLIN 250 MG
2 CAPSULE ORAL EVERY EVENING
Status: DISCONTINUED | OUTPATIENT
Start: 2024-03-25 | End: 2024-04-01 | Stop reason: HOSPADM

## 2024-03-25 RX ORDER — ACETAMINOPHEN 325 MG/1
650 TABLET ORAL EVERY 6 HOURS PRN
Status: DISCONTINUED | OUTPATIENT
Start: 2024-03-25 | End: 2024-03-31

## 2024-03-25 RX ORDER — POLYETHYLENE GLYCOL 3350 17 G/17G
1 POWDER, FOR SOLUTION ORAL
Status: DISCONTINUED | OUTPATIENT
Start: 2024-03-25 | End: 2024-04-01 | Stop reason: HOSPADM

## 2024-03-25 RX ORDER — ONDANSETRON 2 MG/ML
4 INJECTION INTRAMUSCULAR; INTRAVENOUS EVERY 4 HOURS PRN
Status: DISCONTINUED | OUTPATIENT
Start: 2024-03-25 | End: 2024-04-01 | Stop reason: HOSPADM

## 2024-03-25 RX ORDER — SODIUM CHLORIDE, SODIUM LACTATE, POTASSIUM CHLORIDE, CALCIUM CHLORIDE 600; 310; 30; 20 MG/100ML; MG/100ML; MG/100ML; MG/100ML
INJECTION, SOLUTION INTRAVENOUS CONTINUOUS
Status: DISCONTINUED | OUTPATIENT
Start: 2024-03-25 | End: 2024-03-25

## 2024-03-25 RX ORDER — SODIUM CHLORIDE 9 MG/ML
1000 INJECTION, SOLUTION INTRAVENOUS ONCE
Status: COMPLETED | OUTPATIENT
Start: 2024-03-25 | End: 2024-03-25

## 2024-03-25 RX ORDER — ONDANSETRON 4 MG/1
4 TABLET, ORALLY DISINTEGRATING ORAL EVERY 4 HOURS PRN
Status: DISCONTINUED | OUTPATIENT
Start: 2024-03-25 | End: 2024-04-01 | Stop reason: HOSPADM

## 2024-03-25 RX ORDER — HYDROCODONE BITARTRATE AND ACETAMINOPHEN 5; 325 MG/1; MG/1
1 TABLET ORAL EVERY 6 HOURS PRN
Status: DISCONTINUED | OUTPATIENT
Start: 2024-03-25 | End: 2024-03-26

## 2024-03-25 RX ORDER — ONDANSETRON 2 MG/ML
4 INJECTION INTRAMUSCULAR; INTRAVENOUS ONCE
Status: COMPLETED | OUTPATIENT
Start: 2024-03-25 | End: 2024-03-25

## 2024-03-25 RX ORDER — PROMETHAZINE HYDROCHLORIDE 25 MG/1
12.5-25 SUPPOSITORY RECTAL EVERY 4 HOURS PRN
Status: DISCONTINUED | OUTPATIENT
Start: 2024-03-25 | End: 2024-04-01 | Stop reason: HOSPADM

## 2024-03-25 RX ORDER — ROSUVASTATIN CALCIUM 10 MG/1
10 TABLET, COATED ORAL NIGHTLY
Status: DISCONTINUED | OUTPATIENT
Start: 2024-03-25 | End: 2024-04-01 | Stop reason: HOSPADM

## 2024-03-25 RX ORDER — POTASSIUM CHLORIDE 20 MEQ/1
20 TABLET, EXTENDED RELEASE ORAL NIGHTLY
Status: ON HOLD | COMMUNITY
End: 2024-04-01

## 2024-03-25 RX ORDER — LABETALOL HYDROCHLORIDE 5 MG/ML
10 INJECTION, SOLUTION INTRAVENOUS EVERY 4 HOURS PRN
Status: DISCONTINUED | OUTPATIENT
Start: 2024-03-25 | End: 2024-04-01 | Stop reason: HOSPADM

## 2024-03-25 RX ORDER — DULOXETIN HYDROCHLORIDE 30 MG/1
30 CAPSULE, DELAYED RELEASE ORAL DAILY
Status: DISCONTINUED | OUTPATIENT
Start: 2024-03-26 | End: 2024-04-01 | Stop reason: HOSPADM

## 2024-03-25 RX ORDER — SODIUM CHLORIDE, SODIUM LACTATE, POTASSIUM CHLORIDE, AND CALCIUM CHLORIDE .6; .31; .03; .02 G/100ML; G/100ML; G/100ML; G/100ML
1000 INJECTION, SOLUTION INTRAVENOUS ONCE
Status: COMPLETED | OUTPATIENT
Start: 2024-03-25 | End: 2024-03-25

## 2024-03-25 RX ORDER — SODIUM CHLORIDE AND POTASSIUM CHLORIDE 150; 900 MG/100ML; MG/100ML
INJECTION, SOLUTION INTRAVENOUS CONTINUOUS
Status: DISPENSED | OUTPATIENT
Start: 2024-03-25 | End: 2024-03-26

## 2024-03-25 RX ADMIN — SODIUM CHLORIDE 1000 ML: 9 INJECTION, SOLUTION INTRAVENOUS at 13:27

## 2024-03-25 RX ADMIN — SODIUM CHLORIDE, POTASSIUM CHLORIDE, SODIUM LACTATE AND CALCIUM CHLORIDE 1000 ML: 600; 310; 30; 20 INJECTION, SOLUTION INTRAVENOUS at 14:30

## 2024-03-25 RX ADMIN — POTASSIUM CHLORIDE AND SODIUM CHLORIDE: 900; 150 INJECTION, SOLUTION INTRAVENOUS at 18:24

## 2024-03-25 RX ADMIN — SODIUM CHLORIDE, POTASSIUM CHLORIDE, SODIUM LACTATE AND CALCIUM CHLORIDE: 600; 310; 30; 20 INJECTION, SOLUTION INTRAVENOUS at 17:03

## 2024-03-25 RX ADMIN — APIXABAN 5 MG: 5 TABLET, FILM COATED ORAL at 17:09

## 2024-03-25 RX ADMIN — ONDANSETRON 4 MG: 2 INJECTION INTRAMUSCULAR; INTRAVENOUS at 13:27

## 2024-03-25 RX ADMIN — ROSUVASTATIN 10 MG: 10 TABLET, FILM COATED ORAL at 21:06

## 2024-03-25 ASSESSMENT — LIFESTYLE VARIABLES
CONSUMPTION TOTAL: NEGATIVE
TOTAL SCORE: 0
HOW MANY TIMES IN THE PAST YEAR HAVE YOU HAD 5 OR MORE DRINKS IN A DAY: 0
HAVE PEOPLE ANNOYED YOU BY CRITICIZING YOUR DRINKING: NO
HAVE YOU EVER FELT YOU SHOULD CUT DOWN ON YOUR DRINKING: NO
TOTAL SCORE: 0
EVER FELT BAD OR GUILTY ABOUT YOUR DRINKING: NO
SUBSTANCE_ABUSE: 0
EVER HAD A DRINK FIRST THING IN THE MORNING TO STEADY YOUR NERVES TO GET RID OF A HANGOVER: NO
AVERAGE NUMBER OF DAYS PER WEEK YOU HAVE A DRINK CONTAINING ALCOHOL: 0
ALCOHOL_USE: NO
TOTAL SCORE: 0
DOES PATIENT WANT TO STOP DRINKING: NO
ON A TYPICAL DAY WHEN YOU DRINK ALCOHOL HOW MANY DRINKS DO YOU HAVE: 0

## 2024-03-25 ASSESSMENT — ENCOUNTER SYMPTOMS
NERVOUS/ANXIOUS: 0
FALLS: 0
FOCAL WEAKNESS: 0
CHILLS: 0
SPEECH CHANGE: 0
FEVER: 0
DOUBLE VISION: 0
NAUSEA: 0
BLURRED VISION: 0
PALPITATIONS: 0
WEAKNESS: 1
SINUS PAIN: 0
SENSORY CHANGE: 0
SHORTNESS OF BREATH: 1
VOMITING: 0

## 2024-03-25 ASSESSMENT — COGNITIVE AND FUNCTIONAL STATUS - GENERAL
HELP NEEDED FOR BATHING: A LITTLE
TURNING FROM BACK TO SIDE WHILE IN FLAT BAD: A LITTLE
DRESSING REGULAR UPPER BODY CLOTHING: A LITTLE
MOBILITY SCORE: 18
STANDING UP FROM CHAIR USING ARMS: A LITTLE
CLIMB 3 TO 5 STEPS WITH RAILING: A LITTLE
MOVING FROM LYING ON BACK TO SITTING ON SIDE OF FLAT BED: A LITTLE
WALKING IN HOSPITAL ROOM: A LITTLE
EATING MEALS: A LITTLE
DAILY ACTIVITIY SCORE: 18
SUGGESTED CMS G CODE MODIFIER MOBILITY: CK
MOVING TO AND FROM BED TO CHAIR: A LITTLE
PERSONAL GROOMING: A LITTLE
DRESSING REGULAR LOWER BODY CLOTHING: A LITTLE
SUGGESTED CMS G CODE MODIFIER DAILY ACTIVITY: CK
TOILETING: A LITTLE

## 2024-03-25 ASSESSMENT — PAIN DESCRIPTION - PAIN TYPE
TYPE: ACUTE PAIN

## 2024-03-25 ASSESSMENT — FIBROSIS 4 INDEX
FIB4 SCORE: 2.23
FIB4 SCORE: 2.34

## 2024-03-25 NOTE — H&P
"Hospital Medicine History & Physical Note    Date of Service  3/25/2024    Primary Care Physician  Yuko Lala P.A.-C.    Consultants  None    Code Status  Full Code    Chief Complaint  Chief Complaint   Patient presents with    Shortness of Breath     PT reports she has felt like \"I can't catch my breath.\"  H/O breast cancer and hospitalized last week for pulmonary embolism.  Pt reports she has not felt good since leaving the hospital.       History of Presenting Illness  Yolanda Roe is a 58 y.o. female dyslipidemia, hypertension, triple negative breast cancer status postmastectomy undergoing chemotherapy by Dr. Gómez, recently diagnosed with PE on Eliquis who presented 3/25/2024 with dyspnea.    Patient recently admitted from 3/9/2024 to 3/20/2024.  She presented for dyspnea and vomiting, decreased p.o. intake.  She was found to have right lower lobe PE along with fluid collections in her's breast surgical sites.  She was started on anticoagulation.  Started on IV fluid and antiemetics.  Respiratory panel PCR negative, tolerating p.o. intake.  She was still feeling weak at discharge however it was felt that there were no ongoing acute inpatient admission needs.  Subsequently discharged.    She returns today feeling generally weak, continued dyspnea.  She states she feels similarly to when she was discharged, she feels poorly and thinks she was discharged too soon.  She had continued poor p.o. intake, started having increased work of breathing.  Due to the persistent weakness, increased dyspnea, inability to tolerate p.o. intake she presented for evaluation.  She denies any fevers or chills headache or vision changes chest pain nausea vomiting dysuria diarrhea.    In the ED she is afebrile pulse 115-125 respiratory rate 12-20 systolic blood pressure  pulse ox 83 to 100% requiring 2 L nasal cannula.  Initial workup no leukocytosis, improved hemoglobin, normal platelet count sodium 130 potassium 3.5 " chloride 87 bicarb 18 normal renal function and liver function calcium 11.6 lactic acid 2.2 troponin T 23 chest x-ray shows no acute findings, EKG sinus tachycardia normal intervals QRS subtle ST segment abnormalities anteriorly and laterally not significantly different from prior.  Was given IV fluids subsequent referred to hospitalist for admission.    I discussed the plan of care with patient, bedside RN, charge RN, , and pharmacy.    Review of Systems  Review of Systems   Constitutional:  Positive for malaise/fatigue. Negative for chills and fever.   HENT:  Negative for congestion and sinus pain.    Eyes:  Negative for blurred vision and double vision.   Respiratory:  Positive for shortness of breath.    Cardiovascular:  Negative for chest pain and palpitations.   Gastrointestinal:  Negative for nausea and vomiting.   Genitourinary:  Negative for dysuria and urgency.   Musculoskeletal:  Negative for falls and joint pain.   Neurological:  Positive for weakness. Negative for sensory change, speech change and focal weakness.   Psychiatric/Behavioral:  Negative for substance abuse. The patient is not nervous/anxious.        Past Medical History   has a past medical history of Arthritis (03/09/2023), Cancer (HCC) (03/09/2023), Dental disorder (03/09/2023), High cholesterol (03/09/2023), Hypertension (03/09/2023), Malignant neoplasm of upper-outer quadrant of left breast in female, estrogen receptor positive (HCC) (03/21/2023), Pain, and Urinary incontinence.    Surgical History   has a past surgical history that includes other orthopedic surgery (03/09/2023); hysterectomy, vaginal (1991); lumpectomy (2003); cath placement (Right, 03/21/2023); tubal ligation; mastectomy (Bilateral, 9/19/2023); and node biopsy sentinel (Left, 9/19/2023).     Family History  family history is not on file.   Family history reviewed with patient. There is no family history that is pertinent to the chief complaint.     Social  History   reports that she has been smoking cigarettes. She started smoking about 28 years ago. She has a 14.1 pack-year smoking history. She has never been exposed to tobacco smoke. She has never used smokeless tobacco. She reports current alcohol use of about 4.2 oz of alcohol per week. She reports that she does not use drugs.    Allergies  No Known Allergies    Medications  Prior to Admission Medications   Prescriptions Last Dose Informant Patient Reported? Taking?   DULoxetine (CYMBALTA) 30 MG Cap DR Particles 3/25/2024 at AM Patient No Yes   Sig: Take 1 Capsule by mouth every day.   apixaban (ELIQUIS) 5mg Tab 3/25/2024 at AM Patient No Yes   Sig: Take 1 Tablet by mouth 2 times a day. Indications: DVT/PE   lidocaine-prilocaine (EMLA) 2.5-2.5 % Cream PRN at PRN Patient Yes No   Sig: Apply 1 Application topically as needed. Indications: Anesthesia to a Specific Part of the Body   metoprolol SR (TOPROL XL) 25 MG TABLET SR 24 HR 3/25/2024 at AM Patient No Yes   Sig: Take 1 Tablet by mouth every day.   oxyCODONE immediate-release (ROXICODONE) 5 MG Tab 3/24/2024 at PM Patient No Yes   Sig: Take 1 Tablet by mouth every 6 hours as needed for Severe Pain for up to 5 days.   potassium chloride SA (KDUR) 20 MEQ Tab CR 3/24/2024 at PM Patient Yes Yes   Sig: Take 20 mEq by mouth every evening.   rosuvastatin (CRESTOR) 10 MG Tab 3/24/2024 at PM Patient Yes Yes   Sig: Take 10 mg by mouth every evening.      Facility-Administered Medications: None       Physical Exam  Temp:  [36.5 °C (97.7 °F)] 36.5 °C (97.7 °F)  Pulse:  [115-125] 116  Resp:  [12-20] 14  BP: ()/(54-80) 99/54  SpO2:  [83 %-100 %] 100 %  Blood Pressure: 99/54   Temperature: 36.5 °C (97.7 °F)   Pulse: (!) 116   Respiration: 14   Pulse Oximetry: 100 %       Physical Exam  Vitals and nursing note reviewed.   Constitutional:       General: She is not in acute distress.     Appearance: She is ill-appearing. She is not toxic-appearing.   HENT:      Head:  Normocephalic and atraumatic.      Nose: Nose normal. No rhinorrhea.      Mouth/Throat:      Mouth: Mucous membranes are dry.      Pharynx: Oropharynx is clear.   Eyes:      General: No scleral icterus.     Extraocular Movements: Extraocular movements intact.      Conjunctiva/sclera: Conjunctivae normal.   Cardiovascular:      Rate and Rhythm: Regular rhythm. Tachycardia present.      Pulses: Normal pulses.      Heart sounds: Murmur heard.   Pulmonary:      Effort: No respiratory distress.      Breath sounds: No wheezing, rhonchi or rales.   Abdominal:      General: There is no distension.      Palpations: Abdomen is soft.      Tenderness: There is no abdominal tenderness. There is no guarding or rebound.   Musculoskeletal:         General: Normal range of motion.      Cervical back: Normal range of motion and neck supple. No rigidity.      Right lower leg: No edema.      Left lower leg: No edema.   Skin:     General: Skin is warm and dry.      Capillary Refill: Capillary refill takes less than 2 seconds.      Findings: No erythema or rash.   Neurological:      General: No focal deficit present.      Mental Status: She is alert and oriented to person, place, and time.      Cranial Nerves: No cranial nerve deficit.      Sensory: No sensory deficit.      Motor: Weakness (generalized) present.      Coordination: Coordination normal.   Psychiatric:         Mood and Affect: Mood normal.         Behavior: Behavior normal.         Thought Content: Thought content normal.         Judgment: Judgment normal.         Laboratory:  Recent Labs     03/25/24  1252   WBC 7.3   RBC 3.75*   HEMOGLOBIN 12.1   HEMATOCRIT 35.8*   MCV 95.5   MCH 32.3   MCHC 33.8   RDW 45.7   PLATELETCT 312   MPV 9.3     Recent Labs     03/25/24  1252   SODIUM 130*   POTASSIUM 3.5*   CHLORIDE 87*   CO2 18*   GLUCOSE 83   BUN 6*   CREATININE 0.85   CALCIUM 11.4*     Recent Labs     03/25/24  1252   ALTSGPT 8   ASTSGOT 35   ALKPHOSPHAT 110*   TBILIRUBIN 0.4  "  GLUCOSE 83         No results for input(s): \"NTPROBNP\" in the last 72 hours.      Recent Labs     03/25/24  1252   TROPONINT 23*       Imaging:  DX-CHEST-PORTABLE (1 VIEW)   Final Result      1. No acute findings.   2. Appropriate position of the right internal jugular Port-A-Cath.          X-Ray:  I have personally reviewed the images and compared with prior images. and My impression is: No acute cardiopulmonary processes  EKG:  I have personally reviewed the images and compared with prior images. and My impression is: EKG sinus tachycardia normal intervals QRS subtle ST segment abnormalities anteriorly and laterally not significantly different from prior    Assessment/Plan:  Justification for Admission Status  I anticipate this patient will require at least two midnights for appropriate medical management, necessitating inpatient admission because acute respiratory failure with hypoxia    * Acute respiratory failure with hypoxia (HCC)- (present on admission)  Assessment & Plan  Recently diagnosed with PE, on Eliquis.  Coming in dehydrated, hypoxic, requiring 2 L nasal cannula in the ED to maintain oxygen saturations.  Oxygen per guidelines, wean as able, continuous pulse ox    Hypercalcemia- (present on admission)  Assessment & Plan  Mild elevation.  receiving IV fluid.  Trend      Dehydration- (present on admission)  Assessment & Plan  Hold antihypertensives.  IVF, trend lactate.  Encourage PO intake    Hypokalemia- (present on admission)  Assessment & Plan  P.o. replacement ordered    Hyponatremia- (present on admission)  Assessment & Plan  Mild, 130, continue to trend    Primary hypertension- (present on admission)  Assessment & Plan  Holding antihypertensives, admitted with dehydration.  Trend lactate until normalized.  Receiving IV fluids for the dehydration.  IV antihypertensives ordered with parameters.  Resume antihypertensives when clinically indicated    Pulmonary embolism and infarction (HCC)- " (present on admission)  Assessment & Plan  Continue Eliquis.  On 2 L NC, oxygen per guidelines wean as able.   Pulse ox.    Breast cancer of upper-outer quadrant of left female breast (HCC)- (present on admission)  Assessment & Plan  -Triple negative  -S/p bilateral total mastectomy  -On treatment by Dr. Gómez. Regimen: : Pembrolizumab + PACLitaxel + CARBOplatin (x 4 cycles) followed by Pembrolizumab + Cyclophosphamide + DOXOrubicin   (x4 cycles)  -Continue outpatient follow-up with oncology.    Advance care planning  Assessment & Plan  I discussed advance care planning with the patient for at least 12 minutes, including diagnosis, prognosis, plan of care, risks and benefits of any therapies that could be offered, as well as alternatives including palliation and hospice, as appropriate.          VTE prophylaxis: SCDs/TEDs and therapeutic anticoagulation with Eliquis

## 2024-03-25 NOTE — ED PROVIDER NOTES
"ED Provider Note    CHIEF COMPLAINT  Chief Complaint   Patient presents with    Shortness of Breath     PT reports she has felt like \"I can't catch my breath.\"  H/O breast cancer and hospitalized last week for pulmonary embolism.  Pt reports she has not felt good since leaving the hospital.       EXTERNAL RECORDS REVIEWED  Other reviewed a discharge summary from 20 March after the patient was admitted for flulike symptoms and shortness of breath.    HPI/ROS  LIMITATION TO HISTORY   Select: Patient is a poor historian      Yolanda Roe is a 58 y.o. female who presents with generalized weakness and shortness of breath.  As mentioned above the patient was admitted the ninth and discharged the 20th after she presented with flulike symptoms.  The patient was noted to be undergoing chemotherapy by Dr. Gómez.  The patient states that after she was discharged she has had continued poor appetite, generalized weakness, and increased work of breathing.  She was found to have a right lower lobe embolus and started on Eliquis.  Patient has not had any associated fevers.  She states she has not had any vomiting or diarrhea.    PAST MEDICAL HISTORY   has a past medical history of Arthritis (03/09/2023), Cancer (HCC) (03/09/2023), Dental disorder (03/09/2023), High cholesterol (03/09/2023), Hypertension (03/09/2023), Malignant neoplasm of upper-outer quadrant of left breast in female, estrogen receptor positive (HCC) (03/21/2023), Pain, and Urinary incontinence.    SURGICAL HISTORY   has a past surgical history that includes other orthopedic surgery (03/09/2023); hysterectomy, vaginal (1991); lumpectomy (2003); cath placement (Right, 03/21/2023); tubal ligation; mastectomy (Bilateral, 9/19/2023); and node biopsy sentinel (Left, 9/19/2023).    FAMILY HISTORY  History reviewed. No pertinent family history.    SOCIAL HISTORY  Social History     Tobacco Use    Smoking status: Every Day     Current packs/day: 0.50     Average " packs/day: 0.5 packs/day for 28.2 years (14.1 ttl pk-yrs)     Types: Cigarettes     Start date: 1996     Passive exposure: Never    Smokeless tobacco: Never    Tobacco comments:     1 pack lasts 3 days   Vaping Use    Vaping Use: Never used   Substance and Sexual Activity    Alcohol use: Yes     Alcohol/week: 4.2 oz     Types: 7 Standard drinks or equivalent per week     Comment: 1 drink daily after work    Drug use: Never    Sexual activity: Not on file       CURRENT MEDICATIONS  Home Medications       Reviewed by Meenu Echavarria R.N. (Registered Nurse) on 03/25/24 at 1232  Med List Status: Partial     Medication Last Dose Status   acetaminophen (TYLENOL 8 HOUR) 650 MG CR tablet  Active   apixaban (ELIQUIS) 5mg Tab  Active   DULoxetine (CYMBALTA) 30 MG Cap DR Particles  Active   lidocaine-prilocaine (EMLA) 2.5-2.5 % Cream  Active   metoprolol SR (TOPROL XL) 25 MG TABLET SR 24 HR  Active   ondansetron (ZOFRAN ODT) 4 MG TABLET DISPERSIBLE  Active   oxyCODONE immediate-release (ROXICODONE) 5 MG Tab  Active   potassium chloride SA (KDUR) 20 MEQ Tab CR  Active   rosuvastatin (CRESTOR) 10 MG Tab  Active                    ALLERGIES  No Known Allergies    PHYSICAL EXAM  VITAL SIGNS: /80   Pulse (!) 125   Temp 36.5 °C (97.7 °F) (Temporal)   Resp 16   Wt 74.4 kg (164 lb 0.4 oz)   SpO2 94%   BMI 28.70 kg/m²    General the patient appears chronically ill    HEENT unremarkable    Pulmonary the patient's lungs are clear to auscultation bilaterally    Cardiovascular S1-S2 with a tachycardic rate    GI abdomen soft    Skin no rashes, pallor, no jaundice    Extremities no distal edema    Neurologic examination is grossly intact    DIAGNOSTIC STUDIES  Results for orders placed or performed during the hospital encounter of 03/25/24   CBC with Differential   Result Value Ref Range    WBC 7.3 4.8 - 10.8 K/uL    RBC 3.75 (L) 4.20 - 5.40 M/uL    Hemoglobin 12.1 12.0 - 16.0 g/dL    Hematocrit 35.8 (L) 37.0 - 47.0 %    MCV  95.5 81.4 - 97.8 fL    MCH 32.3 27.0 - 33.0 pg    MCHC 33.8 32.2 - 35.5 g/dL    RDW 45.7 35.9 - 50.0 fL    Platelet Count 312 164 - 446 K/uL    MPV 9.3 9.0 - 12.9 fL    Neutrophils-Polys 50.60 44.00 - 72.00 %    Lymphocytes 34.30 22.00 - 41.00 %    Monocytes 9.90 0.00 - 13.40 %    Eosinophils 3.90 0.00 - 6.90 %    Basophils 0.70 0.00 - 1.80 %    Immature Granulocytes 0.60 0.00 - 0.90 %    Nucleated RBC 0.00 0.00 - 0.20 /100 WBC    Neutrophils (Absolute) 3.68 1.82 - 7.42 K/uL    Lymphs (Absolute) 2.49 1.00 - 4.80 K/uL    Monos (Absolute) 0.72 0.00 - 0.85 K/uL    Eos (Absolute) 0.28 0.00 - 0.51 K/uL    Baso (Absolute) 0.05 0.00 - 0.12 K/uL    Immature Granulocytes (abs) 0.04 0.00 - 0.11 K/uL    NRBC (Absolute) 0.00 K/uL   Complete Metabolic Panel (CMP)   Result Value Ref Range    Sodium 130 (L) 135 - 145 mmol/L    Potassium 3.5 (L) 3.6 - 5.5 mmol/L    Chloride 87 (L) 96 - 112 mmol/L    Co2 18 (L) 20 - 33 mmol/L    Anion Gap 25.0 (H) 7.0 - 16.0    Glucose 83 65 - 99 mg/dL    Bun 6 (L) 8 - 22 mg/dL    Creatinine 0.85 0.50 - 1.40 mg/dL    Calcium 11.4 (H) 8.5 - 10.5 mg/dL    Correct Calcium 11.6 (H) 8.5 - 10.5 mg/dL    AST(SGOT) 35 12 - 45 U/L    ALT(SGPT) 8 2 - 50 U/L    Alkaline Phosphatase 110 (H) 30 - 99 U/L    Total Bilirubin 0.4 0.1 - 1.5 mg/dL    Albumin 3.7 3.2 - 4.9 g/dL    Total Protein 6.8 6.0 - 8.2 g/dL    Globulin 3.1 1.9 - 3.5 g/dL    A-G Ratio 1.2 g/dL   Troponins NOW   Result Value Ref Range    Troponin T 23 (H) 6 - 19 ng/L   Lactic Acid   Result Value Ref Range    Lactic Acid 2.2 (H) 0.5 - 2.0 mmol/L   Lactic Acid   Result Value Ref Range    Lactic Acid 2.3 (H) 0.5 - 2.0 mmol/L   ESTIMATED GFR   Result Value Ref Range    GFR (CKD-EPI) 78 >60 mL/min/1.73 m 2   EKG   Result Value Ref Range    Report       [facility] Emergency Dept.    Test Date:  2024-03-25  Pt Name:    ORQUIDEA WOODSON                  Department: ER  MRN:        [id number]                      Room:  Gender:     Female                        Technician: [id number]  :        [id number]                   Requested By:ER TRIAGE PROTOCOL  Order #:    [id number]                    Reading MD: AMISH GARZA MD    Measurements  Intervals                                Axis  Rate:       120                          P:          89  SC:         103                          QRS:        27  QRSD:       93                           T:          206  QT:         335  QTc:        474    Interpretive Statements  Twelve-lead EKG shows a sinus tachycardia with a ventricular rate of 120,  normal intervals, normal QRS, ST segment depression anteriorly and laterally  that does not appear to be acute from prior.  Electronically Signed On 2024 13:20:11 PDT by AMISH GARZA MD       EKG  I have independently interpreted this EKG  Please see my interpretation above    RADIOLOGY  DX-CHEST-PORTABLE (1 VIEW)   Final Result      1. No acute findings.   2. Appropriate position of the right internal jugular Port-A-Cath.          COURSE & MEDICAL DECISION MAKING    This is a 58-year-old female who presents to the emergency department with generalized weakness.  Clinically she did appear dehydrated therefore an IV was established and she received a liter of fluid intravenously.  Laboratory and also has been reviewed and she does have a concerning acidosis with a lactic acidosis.  I do not see any evidence of a bacterial infection therefore hold off on antibiotics.  My suspicion is she is just extremely dehydrated most likely due to her poor oral intake from possible gastritis from chemotherapy.  The patient's abdomen is nonsurgical.  She has not had any chest pain and a troponin was ordered in triage via protocol as well as an EKG.  She does have some ST segment depression noted and will trend her troponin over time although this depression does not appear to be acute.  It does not sound like the patient is having acute cardiac event.    For the shortness of  breath I suspect this is secondary to recent pulmonary embolus and she is already on anticoagulation.    FINAL DIAGNOSIS  1.  Generalized weakness  2.  Dyspnea  3.  Dehydration  4.  Elevated troponin    Disposition  Patient will be admitted in stable condition       Electronically signed by: Josiah Wu M.D., 3/25/2024 1:11 PM

## 2024-03-25 NOTE — ED TRIAGE NOTES
"Chief Complaint   Patient presents with    Shortness of Breath     PT reports she has felt like \"I can't catch my breath.\"  H/O breast cancer and hospitalized last week for pulmonary embolism.  Pt reports she has not felt good since leaving the hospital.     Pt is lethargic and speaking slowly.  She reports worsening weakness over last week.  Sepsis score = 3.  Sepsis protocol and CP protocol initiated.      Pt placed in family waiting room and CN made aware of pt.      "

## 2024-03-25 NOTE — ED NOTES
Med Rec complete per patient   Allergies reviewed  Antibiotics in the past 30 days:no  Anticoagulant in past 14 days:yes  Anticoagulant:Eliquis 5 mg Last dose:03/25/24  Pharmacy patient utilizes:[facility]    Pt receives chemo infusions here at [facility]. Pt unable to tell me when last Keytruda infusion was given    Per records  last Keytruda was on 02/28/2024

## 2024-03-25 NOTE — ASSESSMENT & PLAN NOTE
I ordered IV and p.o. repletion    Continue spironolactone  Monitor levels I ordered repeat chemistry panel

## 2024-03-26 PROBLEM — R11.2 NAUSEA & VOMITING: Status: ACTIVE | Noted: 2024-03-26

## 2024-03-26 LAB
ANION GAP SERPL CALC-SCNC: 20 MMOL/L (ref 7–16)
BASOPHILS # BLD AUTO: 0.4 % (ref 0–1.8)
BASOPHILS # BLD: 0.02 K/UL (ref 0–0.12)
BUN SERPL-MCNC: 5 MG/DL (ref 8–22)
CALCIUM SERPL-MCNC: 10.2 MG/DL (ref 8.5–10.5)
CHLORIDE SERPL-SCNC: 93 MMOL/L (ref 96–112)
CO2 SERPL-SCNC: 20 MMOL/L (ref 20–33)
CREAT SERPL-MCNC: 0.57 MG/DL (ref 0.5–1.4)
EOSINOPHIL # BLD AUTO: 0.24 K/UL (ref 0–0.51)
EOSINOPHIL NFR BLD: 5.1 % (ref 0–6.9)
ERYTHROCYTE [DISTWIDTH] IN BLOOD BY AUTOMATED COUNT: 47.4 FL (ref 35.9–50)
GFR SERPLBLD CREATININE-BSD FMLA CKD-EPI: 104 ML/MIN/1.73 M 2
GLUCOSE SERPL-MCNC: 80 MG/DL (ref 65–99)
HCT VFR BLD AUTO: 30.3 % (ref 37–47)
HGB BLD-MCNC: 10.2 G/DL (ref 12–16)
IMM GRANULOCYTES # BLD AUTO: 0.02 K/UL (ref 0–0.11)
IMM GRANULOCYTES NFR BLD AUTO: 0.4 % (ref 0–0.9)
LYMPHOCYTES # BLD AUTO: 2.24 K/UL (ref 1–4.8)
LYMPHOCYTES NFR BLD: 47.4 % (ref 22–41)
MCH RBC QN AUTO: 32.7 PG (ref 27–33)
MCHC RBC AUTO-ENTMCNC: 33.7 G/DL (ref 32.2–35.5)
MCV RBC AUTO: 97.1 FL (ref 81.4–97.8)
MONOCYTES # BLD AUTO: 0.44 K/UL (ref 0–0.85)
MONOCYTES NFR BLD AUTO: 9.3 % (ref 0–13.4)
NEUTROPHILS # BLD AUTO: 1.77 K/UL (ref 1.82–7.42)
NEUTROPHILS NFR BLD: 37.4 % (ref 44–72)
NRBC # BLD AUTO: 0 K/UL
NRBC BLD-RTO: 0 /100 WBC (ref 0–0.2)
PLATELET # BLD AUTO: 273 K/UL (ref 164–446)
PMV BLD AUTO: 9.1 FL (ref 9–12.9)
POTASSIUM SERPL-SCNC: 3.3 MMOL/L (ref 3.6–5.5)
RBC # BLD AUTO: 3.12 M/UL (ref 4.2–5.4)
SODIUM SERPL-SCNC: 133 MMOL/L (ref 135–145)
WBC # BLD AUTO: 4.7 K/UL (ref 4.8–10.8)

## 2024-03-26 PROCEDURE — 700102 HCHG RX REV CODE 250 W/ 637 OVERRIDE(OP): Performed by: STUDENT IN AN ORGANIZED HEALTH CARE EDUCATION/TRAINING PROGRAM

## 2024-03-26 PROCEDURE — A9270 NON-COVERED ITEM OR SERVICE: HCPCS | Performed by: STUDENT IN AN ORGANIZED HEALTH CARE EDUCATION/TRAINING PROGRAM

## 2024-03-26 PROCEDURE — A9270 NON-COVERED ITEM OR SERVICE: HCPCS

## 2024-03-26 PROCEDURE — 97530 THERAPEUTIC ACTIVITIES: CPT

## 2024-03-26 PROCEDURE — 36415 COLL VENOUS BLD VENIPUNCTURE: CPT

## 2024-03-26 PROCEDURE — 700102 HCHG RX REV CODE 250 W/ 637 OVERRIDE(OP)

## 2024-03-26 PROCEDURE — 97162 PT EVAL MOD COMPLEX 30 MIN: CPT

## 2024-03-26 PROCEDURE — 85025 COMPLETE CBC W/AUTO DIFF WBC: CPT

## 2024-03-26 PROCEDURE — 97166 OT EVAL MOD COMPLEX 45 MIN: CPT

## 2024-03-26 PROCEDURE — 700101 HCHG RX REV CODE 250: Performed by: STUDENT IN AN ORGANIZED HEALTH CARE EDUCATION/TRAINING PROGRAM

## 2024-03-26 PROCEDURE — 99232 SBSQ HOSP IP/OBS MODERATE 35: CPT | Performed by: HOSPITALIST

## 2024-03-26 PROCEDURE — 80048 BASIC METABOLIC PNL TOTAL CA: CPT

## 2024-03-26 PROCEDURE — 700111 HCHG RX REV CODE 636 W/ 250 OVERRIDE (IP): Performed by: HOSPITALIST

## 2024-03-26 PROCEDURE — 770004 HCHG ROOM/CARE - ONCOLOGY PRIVATE *

## 2024-03-26 RX ORDER — POTASSIUM CHLORIDE 20 MEQ/1
20 TABLET, EXTENDED RELEASE ORAL 3 TIMES DAILY
Status: DISCONTINUED | OUTPATIENT
Start: 2024-03-26 | End: 2024-03-26

## 2024-03-26 RX ORDER — POTASSIUM CHLORIDE 7.45 MG/ML
10 INJECTION INTRAVENOUS
Status: COMPLETED | OUTPATIENT
Start: 2024-03-26 | End: 2024-03-26

## 2024-03-26 RX ORDER — OXYCODONE HYDROCHLORIDE 10 MG/1
10 TABLET ORAL
Status: DISCONTINUED | OUTPATIENT
Start: 2024-03-26 | End: 2024-03-29

## 2024-03-26 RX ORDER — OXYCODONE HYDROCHLORIDE 5 MG/1
5 TABLET ORAL
Status: DISCONTINUED | OUTPATIENT
Start: 2024-03-26 | End: 2024-03-29

## 2024-03-26 RX ORDER — HYDROMORPHONE HYDROCHLORIDE 1 MG/ML
0.5 INJECTION, SOLUTION INTRAMUSCULAR; INTRAVENOUS; SUBCUTANEOUS
Status: DISCONTINUED | OUTPATIENT
Start: 2024-03-26 | End: 2024-03-29

## 2024-03-26 RX ADMIN — APIXABAN 5 MG: 5 TABLET, FILM COATED ORAL at 05:19

## 2024-03-26 RX ADMIN — OXYCODONE HYDROCHLORIDE 10 MG: 10 TABLET ORAL at 03:03

## 2024-03-26 RX ADMIN — OXYCODONE HYDROCHLORIDE 10 MG: 10 TABLET ORAL at 19:57

## 2024-03-26 RX ADMIN — POTASSIUM CHLORIDE 10 MEQ: 7.46 INJECTION, SOLUTION INTRAVENOUS at 09:58

## 2024-03-26 RX ADMIN — HYDROCODONE BITARTRATE AND ACETAMINOPHEN 1 TABLET: 5; 325 TABLET ORAL at 00:08

## 2024-03-26 RX ADMIN — OXYCODONE HYDROCHLORIDE 10 MG: 10 TABLET ORAL at 13:12

## 2024-03-26 RX ADMIN — POTASSIUM CHLORIDE AND SODIUM CHLORIDE: 900; 150 INJECTION, SOLUTION INTRAVENOUS at 02:32

## 2024-03-26 RX ADMIN — ROSUVASTATIN 10 MG: 10 TABLET, FILM COATED ORAL at 19:54

## 2024-03-26 RX ADMIN — DULOXETINE HYDROCHLORIDE 30 MG: 30 CAPSULE, DELAYED RELEASE ORAL at 05:19

## 2024-03-26 RX ADMIN — POTASSIUM CHLORIDE 10 MEQ: 7.46 INJECTION, SOLUTION INTRAVENOUS at 11:20

## 2024-03-26 RX ADMIN — APIXABAN 5 MG: 5 TABLET, FILM COATED ORAL at 17:46

## 2024-03-26 ASSESSMENT — COGNITIVE AND FUNCTIONAL STATUS - GENERAL
MOBILITY SCORE: 18
WALKING IN HOSPITAL ROOM: A LITTLE
DAILY ACTIVITIY SCORE: 21
MOVING FROM LYING ON BACK TO SITTING ON SIDE OF FLAT BED: A LITTLE
SUGGESTED CMS G CODE MODIFIER DAILY ACTIVITY: CJ
TOILETING: A LITTLE
STANDING UP FROM CHAIR USING ARMS: A LITTLE
TURNING FROM BACK TO SIDE WHILE IN FLAT BAD: A LITTLE
MOVING TO AND FROM BED TO CHAIR: A LITTLE
CLIMB 3 TO 5 STEPS WITH RAILING: A LITTLE
DRESSING REGULAR LOWER BODY CLOTHING: A LITTLE
HELP NEEDED FOR BATHING: A LITTLE
SUGGESTED CMS G CODE MODIFIER MOBILITY: CK

## 2024-03-26 ASSESSMENT — PAIN DESCRIPTION - PAIN TYPE
TYPE: ACUTE PAIN

## 2024-03-26 ASSESSMENT — ENCOUNTER SYMPTOMS
WEAKNESS: 1
NAUSEA: 1

## 2024-03-26 ASSESSMENT — ACTIVITIES OF DAILY LIVING (ADL): TOILETING: INDEPENDENT

## 2024-03-26 ASSESSMENT — COPD QUESTIONNAIRES
COPD SCREENING SCORE: 5
DO YOU EVER COUGH UP ANY MUCUS OR PHLEGM?: NO/ONLY WITH OCCASIONAL COLDS OR INFECTIONS
DURING THE PAST 4 WEEKS HOW MUCH DID YOU FEEL SHORT OF BREATH: SOME OF THE TIME
HAVE YOU SMOKED AT LEAST 100 CIGARETTES IN YOUR ENTIRE LIFE: YES

## 2024-03-26 ASSESSMENT — GAIT ASSESSMENTS
DISTANCE (FEET): 125
ASSISTIVE DEVICE: FRONT WHEEL WALKER
GAIT LEVEL OF ASSIST: STANDBY ASSIST

## 2024-03-26 NOTE — CARE PLAN
The patient is Stable - Low risk of patient condition declining or worsening    Shift Goals  Clinical Goals: safety, monitor 02 jhon.  Patient Goals: rest.  Family Goals: leif    Progress made toward(s) clinical / shift goals:  fall risk protocol in place. Patient calls appropriately. Patient receiving supplemental 02 via oxy mask. .     Patient is not progressing towards the following goals:

## 2024-03-26 NOTE — DISCHARGE PLANNING
Care Transition Team Assessment    Patient is a 58 year old female who presented with shortness of breath. Please see patient's H&P for prior medical history. Patient was previously admitted from 3/9/24 to 3/20/24 for pulmonary embolism. RNCM met with patient at bedside to complete assessment. Patient is A/Ox4 and able to verify the information on the face sheet. Patient lives alone in a second story apartment with an elevator to get to the second story.  Patient reports, prior to admission patient is independent with ADLs and IADLs. Patient does not use any DME at baseline. Patient reported she has friends but no family locally. Patient stated she is getting a new PCP through [facility]. Patient's preferred pharmacy is [facility] on [address] Patient denies a history of SNF/IPR or HH use in the past. Patient denies any SA or MH concerns. Patient  confirmed medical coverage via [employer]. Patient primary oncologist is Dr. Gómez. Patient does not have an advanced directive and declined the advanced directive booklet.     Patient is currently on 1L of oxygen NC. RNCM obtained DME oxygen choice proactively if patient is unable to be weaned off. If patient is requiring oxygen upon discharge, a home O2 eval and oxygen order will be needed. Per chart review, patient was previously discharged with [facility] but patient denies having HH.     Information Source  Orientation Level: Oriented X4  Information Given By: Patient  Who is responsible for making decisions for patient? : Patient    Readmission Evaluation  Is this a readmission?: Yes - unplanned readmission    Elopement Risk  Legal Hold: No  Ambulatory or Self Mobile in Wheelchair: Yes  Disoriented: No  Psychiatric Symptoms: None  History of Wandering: No  Elopement this Admit: No  Vocalizing Wanting to Leave: No  Displays Behaviors, Body Language Wanting to Leave: No-Not at Risk for Elopement  Elopement Risk: Not at Risk for  Elopement    Interdisciplinary Discharge Planning  Lives with - Patient's Self Care Capacity: Alone and Able to Care For Self  Patient or legal guardian wants to designate a caregiver: No  Support Systems: Family Member(s), Friends / Neighbors  Housing / Facility: 1 Story Apartment / Condo  Prior Services: None  Durable Medical Equipment: Not Applicable    Discharge Preparedness  What is your plan after discharge?: Home with help  What are your discharge supports?: Other (comment) (friends)  Prior Functional Level: Ambulatory, Independent with Activities of Daily Living, Independent with Medication Management  Difficulity with ADLs: None  Difficulity with IADLs: None    Functional Assesment  Prior Functional Level: Ambulatory, Independent with Activities of Daily Living, Independent with Medication Management    Finances  Financial Barriers to Discharge: No    Vision / Hearing Impairment  Vision Impairment : Yes  Right Eye Vision: Impaired, Wears Glasses  Left Eye Vision: Impaired, Wears Glasses  Hearing Impairment : No    Advance Directive  Advance Directive?: None  Advance Directive offered?: AD Booklet refused    Domestic Abuse  Have you ever been the victim of abuse or violence?: No  Physical Abuse or Sexual Abuse: No  Verbal Abuse or Emotional Abuse: No  Possible Abuse/Neglect Reported to:: Not Applicable    Psychological Assessment  History of Substance Abuse: None  History of Psychiatric Problems: No  Non-compliant with Treatment: No  Newly Diagnosed Illness: No    Discharge Risks or Barriers  Discharge risks or barriers?: Lives alone, no community support  Patient risk factors: Lives alone and no community support    Anticipated Discharge Information  Discharge Disposition: Discharged to home/self care (01)

## 2024-03-26 NOTE — THERAPY
Occupational Therapy   Initial Evaluation     Patient Name: Yolanda Roe  Age:  58 y.o., Sex:  female  Medical Record #: [id number]  Today's Date: 3/26/2024     Precautions  Precautions: Fall Risk    Assessment  Patient is 58 y.o. female with a diagnosis of recent PE, dyspnea.  Pt currently limited by decreased functional mobility, activity tolerance, balance, and pain which are affecting pt's ability to complete ADLs/IADLs at baseline. Pt would benefit from OT services in the acute care setting to maximize functional recovery.    Plan    Occupational Therapy Initial Treatment Plan   Treatment Interventions: Self Care / Activities of Daily Living, Therapeutic Activity  Treatment Frequency: 3 Times per Week  Duration: Until Therapy Goals Met       Discharge Recommendations: (P) Anticipate that the patient will have no further occupational therapy needs after discharge from the hospital        03/26/24 1026   Prior Living Situation   Prior Services None   Housing / Facility 1 Story Apartment / Condo   Steps Into Home 0   Steps In Home 0   Bathroom Set up Bathtub / Shower Combination   Equipment Owned None   Lives with - Patient's Self Care Capacity Alone and Able to Care For Self   Prior Level of ADL Function   Self Feeding Independent   Grooming / Hygiene Independent   Bathing Independent   Dressing Independent   Toileting Independent   ADL Assessment   Grooming Supervision   Upper Body Dressing Supervision   Lower Body Dressing Minimal Assist   Functional Mobility   Sit to Stand Contact Guard Assist   Bed, Chair, Wheelchair Transfer Contact Guard Assist   Short Term Goals   Short Term Goal # 1 supervised with LB dressing   Short Term Goal # 2 supervised with ADL txfs   Occupational Therapy Initial Treatment Plan    Treatment Interventions Self Care / Activities of Daily Living;Therapeutic Activity   Treatment Frequency 3 Times per Week   Duration Until Therapy Goals Met   Anticipated Discharge Equipment and  Recommendations   Discharge Recommendations Anticipate that the patient will have no further occupational therapy needs after discharge from the hospital

## 2024-03-26 NOTE — HOSPITAL COURSE
Yolanda Roe is a 58 y.o. female dyslipidemia, hypertension, triple negative breast cancer status postmastectomy undergoing chemotherapy by Dr. Gómez, recently diagnosed with PE on Eliquis who presented 3/25/2024 with dyspnea.     Patient recently admitted from 3/9/2024 to 3/20/2024.  She presented for dyspnea and vomiting, decreased p.o. intake.  She was found to have right lower lobe PE along with fluid collections in her's breast surgical sites.  She was started on anticoagulation.  Started on IV fluid and antiemetics.  Respiratory panel PCR negative, tolerating p.o. intake.  She was still feeling weak at discharge however it was felt that there were no ongoing acute inpatient admission needs.  Subsequently discharged.     She returns today feeling generally weak, continued dyspnea.  She states she feels similarly to when she was discharged, she feels poorly and thinks she was discharged too soon.  She had continued poor p.o. intake, started having increased work of breathing.  Due to the persistent weakness, increased dyspnea, inability to tolerate p.o. intake she presented for evaluation.  She denies any fevers or chills headache or vision changes chest pain nausea vomiting dysuria diarrhea.     In the ED she is afebrile pulse 115-125 respiratory rate 12-20 systolic blood pressure  pulse ox 83 to 100% requiring 2 L nasal cannula.  Initial workup no leukocytosis, improved hemoglobin, normal platelet count sodium 130 potassium 3.5 chloride 87 bicarb 18 normal renal function and liver function calcium 11.6 lactic acid 2.2 troponin T 23 chest x-ray shows no acute findings, EKG sinus tachycardia normal intervals QRS subtle ST segment abnormalities anteriorly and laterally not significantly different from prior.  Was given IV fluids subsequent referred to hospitalist for admission.

## 2024-03-26 NOTE — DIETARY
"Nutrition services: Day 1 of admit.  Yolanda Roe is a 58 y.o. female with admitting DX of dehydration.    Consult received for poor PO intake and MST score of 2 for reported unintentional wt loss of 2-13 lb x 1 month and decreased appetite.    Met w/ pt at bedside. Pt slow to respond and did not provide in depth answers to questions asked. Pt reported not having an appetite, but did not relay how long this has been going on. Pt was amenable to receive Ensure Plus QD but only vanilla flavor.    Did not conduct nutrition focused physical exam as pt did not consent.     Pt was assessed by [facility] RD during previous admit on 3/10 whereby pt met malnutrition criteria: \"Pt with severe, acute malnutrition related to diminished PO intake with vomiting (currently undergoing chemotherapy), AEB severe wt loss of 10% in one month and <50% of estimated energy intake >5 days.\" Given pt has lost 10 lb since this assessment, more than likely continuing to meet criteria for the same reasons. During this previous admit, pt had poor intake and was unable to meet goal of PO >50% and unable to provide any food preferences.    Assessment:  Height: 160 cm (5' 3\")   Weight: 69.4 kg (153 lb) taken via bed scale on 3/25  Body mass index is 27.1 kg/m². BMI classification: overweight  Diet/Intake: Regular / 0% of breakfast consumed this morning per ADLs    Evaluation:   Pt presented to ED w/ c/o SOB, as well as feeling unwell ever since dc from [facility] for pulmonary embolism on 3/20. PMHx includes dyslipidemia, HTN, and triple negative breast cacner status post mastectomy undergoing chemotherapy.  Per chart review, pt has lost 31 lb (17%) x 6 months, 28 lb (15%) x 3 months, and 19 lb (11%) x 1 month, all of which are severe.  Wt Readings from Last 11 Encounters:   03/25/24 69.4 kg (153 lb)   03/20/24 74.4 kg (164 lb 0.4 oz)   02/28/24 78.4 kg (172 lb 13.5 oz)   02/07/24 82.6 kg (182 lb 1.6 oz)   01/17/24 83.4 kg (183 lb 13.8 oz)   12/27/23 82.4 " "kg (181 lb 10.5 oz)   12/06/23 82.7 kg (182 lb 5.1 oz)   11/15/23 82 kg (180 lb 12.4 oz)   10/25/23 81.2 kg (179 lb 0.2 oz)   10/04/23 80.6 kg (177 lb 11.1 oz)   09/22/23 83.5 kg (184 lb 1.4 oz)   Labs: sodium 133, potassium 3.3, chloride 93, BUN 5  Meds: Potassium chloride, Crestor,   Last BM: 3/24    Malnutrition Risk: Malnutrition ongoing as established on 3/10: \"Pt with severe, acute malnutrition related to diminished PO intake with vomiting (currently undergoing chemotherapy), AEB severe wt loss of 10% in one month and <50% of estimated energy intake >5 days.\"    Recommendations/Plan:  RD to continue to monitor PO intake.  Encourage intake of >50% of meals and supplements.  Document intake of all meals as % taken in ADL's to provide interdisciplinary communication across all shifts.   Monitor weight.  Nutrition rep will continue to see patient for ongoing meal and snack preferences.       RD following  "

## 2024-03-26 NOTE — CARE PLAN
Problem: Pain - Standard  Goal: Alleviation of pain or a reduction in pain to the patient’s comfort goal  Outcome: Progressing     Problem: Knowledge Deficit - Standard  Goal: Patient and family/care givers will demonstrate understanding of plan of care, disease process/condition, diagnostic tests and medications  Outcome: Progressing   The patient is Watcher - Medium risk of patient condition declining or worsening    Shift Goals  Clinical Goals: safty, wean of 02  Patient Goals: rest.  Family Goals: leif    Progress made toward(s) clinical / shift goals:      Patient is not progressing towards the following goals:    Pt AOx4, slow to respond. Pt on 1 L02 saturating above 90 %. Pt declines pain med. PIV patent SL. Pt worked with PT. Pt up with 1 p assist and FWW.

## 2024-03-26 NOTE — PROGRESS NOTES
[facility] Medicine Daily Progress Note    Date of Service  3/26/2024    Chief Complaint  Yolanda Roe is a 58 y.o. female admitted 3/25/2024 with nausea vomiting    Hospital Course  Yolanda Roe is a 58 y.o. female dyslipidemia, hypertension, triple negative breast cancer status postmastectomy undergoing chemotherapy by Dr. Gómez, recently diagnosed with PE on Eliquis who presented 3/25/2024 with dyspnea.     Patient recently admitted from 3/9/2024 to 3/20/2024.  She presented for dyspnea and vomiting, decreased p.o. intake.  She was found to have right lower lobe PE along with fluid collections in her's breast surgical sites.  She was started on anticoagulation.  Started on IV fluid and antiemetics.  Respiratory panel PCR negative, tolerating p.o. intake.  She was still feeling weak at discharge however it was felt that there were no ongoing acute inpatient admission needs.  Subsequently discharged.     She returns today feeling generally weak, continued dyspnea.  She states she feels similarly to when she was discharged, she feels poorly and thinks she was discharged too soon.  She had continued poor p.o. intake, started having increased work of breathing.  Due to the persistent weakness, increased dyspnea, inability to tolerate p.o. intake she presented for evaluation.  She denies any fevers or chills headache or vision changes chest pain nausea vomiting dysuria diarrhea.     In the ED she is afebrile pulse 115-125 respiratory rate 12-20 systolic blood pressure  pulse ox 83 to 100% requiring 2 L nasal cannula.  Initial workup no leukocytosis, improved hemoglobin, normal platelet count sodium 130 potassium 3.5 chloride 87 bicarb 18 normal renal function and liver function calcium 11.6 lactic acid 2.2 troponin T 23 chest x-ray shows no acute findings, EKG sinus tachycardia normal intervals QRS subtle ST segment abnormalities anteriorly and laterally not significantly different from prior.  Was given  IV fluids subsequent referred to hospitalist for admission.    Interval Problem Update    Patient complains of nausea and poor intake  She is afebrile on 1 L nasal cannula  I reviewed CBC sodium 133 potassium 3.3 I ordered potassium repletion  Reviewed CBC hemoglobin 10.2    I have discussed this patient's plan of care and discharge plan at IDT rounds today with Case Management, Nursing, Nursing leadership, and other members of the IDT team.    Consultants/Specialty       Code Status  Full Code    Disposition  The patient is not medically cleared for discharge to home or a post-acute facility.  Anticipate discharge to: home with organized home healthcare and close outpatient follow-up    I have placed the appropriate orders for post-discharge needs.    Review of Systems  Review of Systems   Constitutional:  Positive for malaise/fatigue.   Cardiovascular:  Negative for chest pain.   Gastrointestinal:  Positive for nausea.   Neurological:  Positive for weakness.        Physical Exam  Temp:  [36.3 °C (97.3 °F)-36.9 °C (98.4 °F)] 36.9 °C (98.4 °F)  Pulse:  [] 101  Resp:  [14-20] 16  BP: ()/(51-75) 109/68  SpO2:  [92 %-100 %] 99 %    Physical Exam  Vitals and nursing note reviewed.   Constitutional:       General: She is not in acute distress.  HENT:      Head: Normocephalic and atraumatic.      Nose: Nose normal. No rhinorrhea.      Mouth/Throat:      Pharynx: No oropharyngeal exudate or posterior oropharyngeal erythema.   Eyes:      General:         Right eye: No discharge.         Left eye: No discharge.   Cardiovascular:      Rate and Rhythm: Normal rate and regular rhythm.      Heart sounds: Normal heart sounds. No murmur heard.     No friction rub. No gallop.   Pulmonary:      Effort: Pulmonary effort is normal. No respiratory distress.      Breath sounds: Normal breath sounds. No stridor. No rales.   Chest:      Chest wall: No tenderness.   Abdominal:      General: Bowel sounds are normal. There is no  distension.      Palpations: Abdomen is soft. There is no mass.      Tenderness: There is no abdominal tenderness.   Musculoskeletal:         General: No swelling or tenderness.      Cervical back: Neck supple. No rigidity.   Skin:     General: Skin is warm and dry.      Coloration: Skin is not cyanotic or jaundiced.      Nails: There is no clubbing.   Neurological:      General: No focal deficit present.      Mental Status: She is alert and oriented to person, place, and time.      Cranial Nerves: No cranial nerve deficit.      Motor: Weakness (Generalized) present.   Psychiatric:         Mood and Affect: Mood normal.         Speech: Speech is delayed.         Behavior: Behavior is slowed.         Fluids    Intake/Output Summary (Last 24 hours) at 3/26/2024 1453  Last data filed at 3/26/2024 0900  Gross per 24 hour   Intake 120 ml   Output --   Net 120 ml       Laboratory  Recent Labs     03/25/24  1252 03/26/24  0015   WBC 7.3 4.7*   RBC 3.75* 3.12*   HEMOGLOBIN 12.1 10.2*   HEMATOCRIT 35.8* 30.3*   MCV 95.5 97.1   MCH 32.3 32.7   MCHC 33.8 33.7   RDW 45.7 47.4   PLATELETCT 312 273   MPV 9.3 9.1     Recent Labs     03/25/24  1252 03/26/24  0015   SODIUM 130* 133*   POTASSIUM 3.5* 3.3*   CHLORIDE 87* 93*   CO2 18* 20   GLUCOSE 83 80   BUN 6* 5*   CREATININE 0.85 0.57   CALCIUM 11.4* 10.2                   Imaging  DX-CHEST-PORTABLE (1 VIEW)   Final Result      1. No acute findings.   2. Appropriate position of the right internal jugular Port-A-Cath.      US-RUQ    (Results Pending)   MR-BRAIN-WITH & W/O    (Results Pending)        Assessment/Plan  * Acute respiratory failure with hypoxia (HCC)- (present on admission)  Assessment & Plan  Recently diagnosed with PE, on Eliquis.    Wean off oxygen as tolerated    Nausea & vomiting  Assessment & Plan  Etiology is not clear patient with persistent nausea.  Check liver ultrasound and lipase  Given generalized weakness delayed response of persistent nausea vomiting in  setting of malignancy will check MRI to rule out metastatic disease    Advance care planning  Assessment & Plan        Hypercalcemia- (present on admission)  Assessment & Plan  Secondary to dehydration continue IV fluids    Dehydration- (present on admission)  Assessment & Plan  Continue IV hydration    Hypokalemia- (present on admission)  Assessment & Plan  Secondary to nausea and poor intake  IV repletion ordered  I ordered repeat chemistry panel    Hyponatremia- (present on admission)  Assessment & Plan  Improved sodium 133 continue IV hydration monitor    Primary hypertension- (present on admission)  Assessment & Plan  Stable off medical therapy continue to monitor    Pulmonary embolism and infarction (HCC)- (present on admission)  Assessment & Plan  Continue Eliquis.  Wean off oxygen as tolerated    Breast cancer of upper-outer quadrant of left female breast (HCC)- (present on admission)  Assessment & Plan  -Triple negative  -S/p bilateral total mastectomy  -On treatment by Dr. Gómez. Regimen: : Pembrolizumab + PACLitaxel + CARBOplatin (x 4 cycles) followed by Pembrolizumab + Cyclophosphamide + DOXOrubicin   (x4 cycles)  -Continue outpatient follow-up with oncology.         VTE prophylaxis: Eliquis    I have performed a physical exam and reviewed and updated ROS and Plan today (3/26/2024). In review of yesterday's note (3/25/2024), there are no changes except as documented above.

## 2024-03-26 NOTE — THERAPY
Physical Therapy   Initial Evaluation     Patient Name: Yolanda Roe  Age:  58 y.o., Sex:  female  Medical Record #: [id number]  Today's Date: 3/26/2024     Precautions  Precautions: Fall Risk    Assessment  Pt is a 58-y.o. female who presents to acute physical therapy with shortness of breath and generalized weakness. The patient has a PMHx of dyslipidemia, HTN, triple negative breast cancer s/p mastectomy undergoing chemotherapy by Dr. Gómez, and PE on Eliquis. The pt was recently admitted from 3/9-3/20 with dyspnea/vomiting, and decreased p.o. intake and medical dx of PE.    The pt presented with decreased activity tolerance, apparent impaired cognition, and lack of insight into deficits which are limiting her ability to safely perform functional mobility. The pt appeared lethargic and slow to respond throughout session. The pt requires cues to initiate and sequence mobility tasks like bed mobility, transfers, and ambulation; she appears to have impaired motor planning. The pt's mobility is detailed below.    Will defer DC recommendations to other disciplines given patient performed mobility without physical assist and anticipate improved performance with continued mobilization. However, concern for cognition, insight, and safety awareness and patient lives alone.    Plan    Physical Therapy Initial Treatment Plan   Treatment Plan : Bed Mobility, Equipment, Gait Training, Neuro Re-Education / Balance, Self Care / Home Evaluation, Stair Training, Therapeutic Activities, Therapeutic Exercise  Treatment Frequency: 3 Times per Week  Duration: Until Therapy Goals Met    DC Equipment Recommendations: Unable to determine at this time  Discharge Recommendations:  (defer DC recommendation to other disciplines; primary concern regarding cognition and safety awareness, anticipate progression of functional mobility to adequate level for DC home but patient lives alone)       Subjective    RN cleared patient for PT. Pt  received in bed. Pt agreeable.     Objective       03/26/24 0901   Cosignature   Documentation Review Approved with modifications made by preceptor in flowsheet   Charge Group   PT Evaluation PT Evaluation Mod   Total Time Spent   PT Total Time Yes   PT Evaluation Time Spent (Mins) 20   PT Therapeutic Activities Time Spent (Mins) 9   PT Total Time Spent (Calculated) 29    Services   Is patient using  services for this encounter? No   Initial Contact Note    Initial Contact Note Order Received and Verified, Physical Therapy Evaluation in Progress with Full Report to Follow.   Precautions   Precautions Fall Risk   Vitals   Pulse 96   Pulse Oximetry 92 %   Room Air Oximetry 0   O2 (LPM) 1   O2 Delivery Device Oxymask   Vitals Comments Pt O2 92% while laying in bed, SpO2 dropped to 88% when sitting EOB on RA, pt appeared to be holding breath. Pt SpO2 stabilized to above 90% after cueing for deep breathing. Left with NC on   Pain 0 - 10 Group   Therapist Pain Assessment Nurse Notified  (Pt declined symptoms of pain.)   Prior Living Situation   Prior Services None   Housing / Facility 1 Story Apartment / Condo   Steps Into Home 0   Steps In Home 0   Bathroom Set up Bathtub / Shower Combination   Equipment Owned None   Lives with - Patient's Self Care Capacity Alone and Able to Care For Self   Comments Patient reported limited social support   Prior Level of Functional Mobility   Bed Mobility Independent   Transfer Status Independent   Ambulation Independent   Ambulation Distance community   Assistive Devices Used None   Comments Patient reported she works full time as  at [employer]   History of Falls   History of Falls   (not asked)   Cognition    Cognition / Consciousness X   Level of Consciousness Alert   Comments Pt appears lethargic and slow to respond. Pt unable to recall the date but was oriented to person and place. Difficulties with motor planning and  sequencing. Patient attributed to being woken up but RN reported family noted cognitive change.   Active ROM Upper Body   Comments patient moved BUE functionally during session   Active ROM Lower Body    Active ROM Lower Body  WDL   Strength Lower Body   Lower Body Strength  WDL   Comments B LE strength grossly 3+/5   Balance Assessment   Sitting Balance (Static) Fair   Sitting Balance (Dynamic) Fair   Standing Balance (Static) Fair   Standing Balance (Dynamic) Fair -   Weight Shift Sitting Fair   Weight Shift Standing Fair   Comments used FWW for standing, no overt LOB   Bed Mobility    Supine to Sit Contact Guard Assist   Sit to Supine   (Pt left in chair.)   Scooting Standby Assist   Rolling Minimal Assist to Rt.   Comments HOB flat, used rail   Gait Analysis   Gait Level Of Assist Standby Assist   Assistive Device Front Wheel Walker   Distance (Feet) 125   # of Times Distance was Traveled 1   Deviation   (Decreased joseph; increased R LE external rotation, anticipate this is patient's baseline)   # of Stairs Climbed 0   Weight Bearing Status no restrictions   Vision Deficits Impacting Mobility Pt required glasses prior to ambulation.   Functional Mobility   Sit to Stand Contact Guard Assist  (Pt required increased time and cueing to process task.)   Bed, Chair, Wheelchair Transfer Contact Guard Assist   Toilet Transfers Contact Guard Assist   Transfer Method Stand Step   6 Clicks Assessment - How much HELP from from another person do you currently need... (If the patient hasn't done an activity recently, how much help from another person do you think he/she would need if he/she tried?)   Turning from your back to your side while in a flat bed without using bedrails? 3   Moving from lying on your back to sitting on the side of a flat bed without using bedrails? 3   Moving to and from a bed to a chair (including a wheelchair)? 3   Standing up from a chair using your arms (e.g., wheelchair, or bedside chair)? 3    Walking in hospital room? 3   Climbing 3-5 steps with a railing? 3   6 clicks Mobility Score 18   Activity Tolerance   Sitting in Chair Pt left sitting in chair for breakfast. Nursing aware.   Standing Pt able to stand for 5 min. Pt able to tolerate 2 mins of dynamic standing to wash hands.   Patient / Family Goals    Patient / Family Goal #1 to go home   Short Term Goals    Short Term Goal # 1 Pt will ambulate 250 feet with LRAD SBA within 6 visits to facilitate return to prior level of living.   Short Term Goal # 2 Pt will transfer supine to sitting EOB with bed flat, no rail and supervision within 6 visits to facilitate preparation for out of bed activities.   Short Term Goal # 3 Pt will transfer sit <-> stand with LRAD with supervision in 6 visits to facilitate return to prior level of function.   Education Group   Education Provided Role of Physical Therapist   Role of Physical Therapist Patient Response Patient;Acceptance;Explanation;Verbal Demonstration   Physical Therapy Initial Treatment Plan    Treatment Plan  Bed Mobility;Equipment;Gait Training;Neuro Re-Education / Balance;Self Care / Home Evaluation;Stair Training;Therapeutic Activities;Therapeutic Exercise   Treatment Frequency 3 Times per Week   Duration Until Therapy Goals Met   Anticipated Discharge Equipment and Recommendations   DC Equipment Recommendations Unable to determine at this time   Discharge Recommendations   (defer DC recommendation to other disciplines; primary concern regarding cognition and safety awareness, anticipate progression of functional mobility to adequate level for DC home but patient lives alone)   Interdisciplinary Plan of Care Collaboration   IDT Collaboration with  Nursing   Patient Position at End of Therapy Seated;Chair Alarm On;Call Light within Reach;Tray Table within Reach;Phone within Reach   Session Information   Date / Session Number  3/26 - 1 (1/3, 4/1)

## 2024-03-26 NOTE — PROGRESS NOTES
4 Eyes Skin Assessment Completed by Sara RN and JASON Horowitz.    Head WDL  Ears WDL  Nose WDL  Mouth WDL  Neck WDL  Breast/Chest WDL  Shoulder Blades WDL  Spine WDL  (R) Arm/Elbow/Hand WDL  (L) Arm/Elbow/Hand WDL  Abdomen WDL  Groin WDL  Scrotum/Coccyx/Buttocks WDL  (R) Leg WDL  (L) Leg WDL  (R) Heel/Foot/Toe WDL  (L) Heel/Foot/Toe WDL          Devices In Places Pulse Ox      Interventions In Place N/A    Possible Skin Injury No    Pictures Uploaded Into Epic N/A  Wound Consult Placed N/A  RN Wound Prevention Protocol Ordered No

## 2024-03-26 NOTE — PROGRESS NOTES
Received report from day shift R> assumed pt care. On initial rounds and assessment pt lying in bed, eyes closed. Responds to voice. When asked decline pain. Assisted with dinner tray set up. Patient refused dinner, alternatives offered, patient  declined. Bed is locked and in low position, bed alarm on, calls appropriately, call bell within reach.

## 2024-03-27 ENCOUNTER — APPOINTMENT (OUTPATIENT)
Dept: RADIOLOGY | Facility: MEDICAL CENTER | Age: 59
DRG: 175 | End: 2024-03-27
Attending: HOSPITALIST
Payer: COMMERCIAL

## 2024-03-27 LAB
ALBUMIN SERPL BCP-MCNC: 3.1 G/DL (ref 3.2–4.9)
ALBUMIN/GLOB SERPL: 1.1 G/DL
ALP SERPL-CCNC: 113 U/L (ref 30–99)
ALT SERPL-CCNC: 15 U/L (ref 2–50)
ANION GAP SERPL CALC-SCNC: 15 MMOL/L (ref 7–16)
AST SERPL-CCNC: 56 U/L (ref 12–45)
BILIRUB SERPL-MCNC: 0.3 MG/DL (ref 0.1–1.5)
BUN SERPL-MCNC: 4 MG/DL (ref 8–22)
CALCIUM ALBUM COR SERPL-MCNC: 10.9 MG/DL (ref 8.5–10.5)
CALCIUM SERPL-MCNC: 10.2 MG/DL (ref 8.5–10.5)
CHLORIDE SERPL-SCNC: 96 MMOL/L (ref 96–112)
CO2 SERPL-SCNC: 25 MMOL/L (ref 20–33)
CREAT SERPL-MCNC: 0.6 MG/DL (ref 0.5–1.4)
ERYTHROCYTE [DISTWIDTH] IN BLOOD BY AUTOMATED COUNT: 46.9 FL (ref 35.9–50)
GFR SERPLBLD CREATININE-BSD FMLA CKD-EPI: 103 ML/MIN/1.73 M 2
GLOBULIN SER CALC-MCNC: 2.8 G/DL (ref 1.9–3.5)
GLUCOSE SERPL-MCNC: 83 MG/DL (ref 65–99)
HCT VFR BLD AUTO: 31.6 % (ref 37–47)
HGB BLD-MCNC: 10.6 G/DL (ref 12–16)
LIPASE SERPL-CCNC: 38 U/L (ref 11–82)
MAGNESIUM SERPL-MCNC: 1.3 MG/DL (ref 1.5–2.5)
MCH RBC QN AUTO: 32.8 PG (ref 27–33)
MCHC RBC AUTO-ENTMCNC: 33.5 G/DL (ref 32.2–35.5)
MCV RBC AUTO: 97.8 FL (ref 81.4–97.8)
PHOSPHATE SERPL-MCNC: 4 MG/DL (ref 2.5–4.5)
PLATELET # BLD AUTO: 265 K/UL (ref 164–446)
PMV BLD AUTO: 8.9 FL (ref 9–12.9)
POTASSIUM SERPL-SCNC: 3.5 MMOL/L (ref 3.6–5.5)
PROT SERPL-MCNC: 5.9 G/DL (ref 6–8.2)
RBC # BLD AUTO: 3.23 M/UL (ref 4.2–5.4)
SODIUM SERPL-SCNC: 136 MMOL/L (ref 135–145)
WBC # BLD AUTO: 4.4 K/UL (ref 4.8–10.8)

## 2024-03-27 PROCEDURE — 83735 ASSAY OF MAGNESIUM: CPT

## 2024-03-27 PROCEDURE — A9270 NON-COVERED ITEM OR SERVICE: HCPCS | Performed by: STUDENT IN AN ORGANIZED HEALTH CARE EDUCATION/TRAINING PROGRAM

## 2024-03-27 PROCEDURE — 70553 MRI BRAIN STEM W/O & W/DYE: CPT

## 2024-03-27 PROCEDURE — 80053 COMPREHEN METABOLIC PANEL: CPT

## 2024-03-27 PROCEDURE — 700105 HCHG RX REV CODE 258: Performed by: HOSPITALIST

## 2024-03-27 PROCEDURE — 700117 HCHG RX CONTRAST REV CODE 255: Performed by: HOSPITALIST

## 2024-03-27 PROCEDURE — A9270 NON-COVERED ITEM OR SERVICE: HCPCS

## 2024-03-27 PROCEDURE — 83690 ASSAY OF LIPASE: CPT

## 2024-03-27 PROCEDURE — 84100 ASSAY OF PHOSPHORUS: CPT

## 2024-03-27 PROCEDURE — 700102 HCHG RX REV CODE 250 W/ 637 OVERRIDE(OP)

## 2024-03-27 PROCEDURE — A9579 GAD-BASE MR CONTRAST NOS,1ML: HCPCS | Performed by: HOSPITALIST

## 2024-03-27 PROCEDURE — 36415 COLL VENOUS BLD VENIPUNCTURE: CPT

## 2024-03-27 PROCEDURE — 85027 COMPLETE CBC AUTOMATED: CPT

## 2024-03-27 PROCEDURE — 700111 HCHG RX REV CODE 636 W/ 250 OVERRIDE (IP): Performed by: HOSPITALIST

## 2024-03-27 PROCEDURE — 76705 ECHO EXAM OF ABDOMEN: CPT

## 2024-03-27 PROCEDURE — 770004 HCHG ROOM/CARE - ONCOLOGY PRIVATE *

## 2024-03-27 PROCEDURE — 99232 SBSQ HOSP IP/OBS MODERATE 35: CPT | Performed by: HOSPITALIST

## 2024-03-27 PROCEDURE — 700102 HCHG RX REV CODE 250 W/ 637 OVERRIDE(OP): Performed by: STUDENT IN AN ORGANIZED HEALTH CARE EDUCATION/TRAINING PROGRAM

## 2024-03-27 RX ORDER — MAGNESIUM SULFATE HEPTAHYDRATE 40 MG/ML
4 INJECTION, SOLUTION INTRAVENOUS ONCE
Status: COMPLETED | OUTPATIENT
Start: 2024-03-27 | End: 2024-03-27

## 2024-03-27 RX ORDER — POTASSIUM CHLORIDE 7.45 MG/ML
10 INJECTION INTRAVENOUS ONCE
Status: COMPLETED | OUTPATIENT
Start: 2024-03-27 | End: 2024-03-27

## 2024-03-27 RX ORDER — SODIUM CHLORIDE 9 MG/ML
INJECTION, SOLUTION INTRAVENOUS CONTINUOUS
Status: DISCONTINUED | OUTPATIENT
Start: 2024-03-27 | End: 2024-04-01 | Stop reason: HOSPADM

## 2024-03-27 RX ORDER — LIDOCAINE 50 MG/G
OINTMENT TOPICAL ONCE
Status: DISCONTINUED | OUTPATIENT
Start: 2024-03-27 | End: 2024-03-27

## 2024-03-27 RX ORDER — LIDOCAINE 50 MG/G
OINTMENT TOPICAL
Status: COMPLETED | OUTPATIENT
Start: 2024-03-27 | End: 2024-03-28

## 2024-03-27 RX ADMIN — MAGNESIUM SULFATE IN WATER FOR 4 G: 40 INJECTION INTRAVENOUS at 08:55

## 2024-03-27 RX ADMIN — SODIUM CHLORIDE: 9 INJECTION, SOLUTION INTRAVENOUS at 13:39

## 2024-03-27 RX ADMIN — POTASSIUM CHLORIDE 10 MEQ: 7.46 INJECTION, SOLUTION INTRAVENOUS at 08:55

## 2024-03-27 RX ADMIN — OXYCODONE 5 MG: 5 TABLET ORAL at 19:22

## 2024-03-27 RX ADMIN — DOCUSATE SODIUM 50 MG AND SENNOSIDES 8.6 MG 2 TABLET: 8.6; 5 TABLET, FILM COATED ORAL at 16:54

## 2024-03-27 RX ADMIN — ROSUVASTATIN 10 MG: 10 TABLET, FILM COATED ORAL at 20:44

## 2024-03-27 RX ADMIN — APIXABAN 5 MG: 5 TABLET, FILM COATED ORAL at 16:54

## 2024-03-27 RX ADMIN — OXYCODONE 5 MG: 5 TABLET ORAL at 14:59

## 2024-03-27 RX ADMIN — GADOTERIDOL 15 ML: 279.3 INJECTION, SOLUTION INTRAVENOUS at 19:47

## 2024-03-27 RX ADMIN — APIXABAN 5 MG: 5 TABLET, FILM COATED ORAL at 05:03

## 2024-03-27 RX ADMIN — DULOXETINE HYDROCHLORIDE 30 MG: 30 CAPSULE, DELAYED RELEASE ORAL at 05:03

## 2024-03-27 ASSESSMENT — PAIN DESCRIPTION - PAIN TYPE
TYPE: ACUTE PAIN

## 2024-03-27 ASSESSMENT — ENCOUNTER SYMPTOMS
NAUSEA: 1
WEAKNESS: 1
FEVER: 0
VOMITING: 0
NERVOUS/ANXIOUS: 1

## 2024-03-27 NOTE — DOCUMENTATION QUERY
"                                                                         [facility]                                                                       Query Response Note      PATIENT:               ORQUIDEA WOODSON  ACCT #:                  [id number]  MRN:                     [id number]  :                      1965  ADMIT DATE:       3/25/2024 12:49 PM  DISCH DATE:          RESPONDING  PROVIDER #:        [id number]           QUERY TEXT:    Please clarify the clinical/diagnostic relevance for the documented findings.    The patient's clinical indicators include:  (Dietary Note ) \"Malnutrition ongoing as established on 3/10: \"Pt with severe, acute malnutrition related to diminished PO intake with vomiting (currently undergoing chemotherapy), AEB severe wt loss of 10% in one month and <50% of estimated  energy intake >5 days.\"\"    Body mass index is 27.1 kg/m².    Labs: sodium 133, potassium 3.3, chloride 93, BUN 5    Risk Factors:  Acute respiratory failure with hypoxia, Nausea & vomiting, Dehydration, Hypokalemia, Hyponatremia, Primary hypertension, Pulmonary embolism and infarction, Breast cancer of upper-outer quadrant of left female breast    Tx: RD to continue to monitor PO intake, Document intake of all meals as % taken in ADL's to provide interdisciplinary communication across all shifts, Monitor weight.    If you have any questions, please contact:  Lele Carrillo RN CDI [facility]  [email or web address]  Lele Carrillo [address]  Options provided:   -- Clinically significant and to be documented as Malnutrition, Severe Protein Calorie   -- Findings of no clinical significance   -- Other explanation, (please specify the other explanation)   -- Unable to determine      Query created by: Lele Carrillo on 3/27/2024 8:37 AM    RESPONSE TEXT:    Clinically significant and to be documented as Malnutrition, Severe Protein Calorie          Electronically signed by:  MICHAEL VALERIO MD 3/27/2024 " 9:54 AM

## 2024-03-27 NOTE — PROGRESS NOTES
[facility] Medicine Daily Progress Note    Date of Service  3/27/2024    Chief Complaint  Yolanda Roe is a 58 y.o. female admitted 3/25/2024 with nausea vomiting    Hospital Course  Yolanda Roe is a 58 y.o. female dyslipidemia, hypertension, triple negative breast cancer status postmastectomy undergoing chemotherapy by Dr. Gómez, recently diagnosed with PE on Eliquis who presented 3/25/2024 with dyspnea.     Patient recently admitted from 3/9/2024 to 3/20/2024.  She presented for dyspnea and vomiting, decreased p.o. intake.  She was found to have right lower lobe PE along with fluid collections in her's breast surgical sites.  She was started on anticoagulation.  Started on IV fluid and antiemetics.  Respiratory panel PCR negative, tolerating p.o. intake.  She was still feeling weak at discharge however it was felt that there were no ongoing acute inpatient admission needs.  Subsequently discharged.     She returns today feeling generally weak, continued dyspnea.  She states she feels similarly to when she was discharged, she feels poorly and thinks she was discharged too soon.  She had continued poor p.o. intake, started having increased work of breathing.  Due to the persistent weakness, increased dyspnea, inability to tolerate p.o. intake she presented for evaluation.  She denies any fevers or chills headache or vision changes chest pain nausea vomiting dysuria diarrhea.     In the ED she is afebrile pulse 115-125 respiratory rate 12-20 systolic blood pressure  pulse ox 83 to 100% requiring 2 L nasal cannula.  Initial workup no leukocytosis, improved hemoglobin, normal platelet count sodium 130 potassium 3.5 chloride 87 bicarb 18 normal renal function and liver function calcium 11.6 lactic acid 2.2 troponin T 23 chest x-ray shows no acute findings, EKG sinus tachycardia normal intervals QRS subtle ST segment abnormalities anteriorly and laterally not significantly different from prior.  Was given  IV fluids subsequent referred to hospitalist for admission.    Interval Problem Update    Patient is afebrile on 1 L nasal cannula  Tolerating p.o. intake no vomiting  I reviewed CMP potassium 3.5 AST 56 ALT 15 calcium 10.9  Magnesium 1.3 I ordered IV repletion  Reviewed CBC WBC 4.4 hemoglobin 10.6 platelet 265        I have discussed this patient's plan of care and discharge plan at IDT rounds today with Case Management, Nursing, Nursing leadership, and other members of the IDT team.    Consultants/Specialty       Code Status  Full Code    Disposition  Medically Cleared  I have placed the appropriate orders for post-discharge needs.    Review of Systems  Review of Systems   Constitutional:  Negative for fever.   Gastrointestinal:  Positive for nausea. Negative for vomiting.   Neurological:  Positive for weakness.   Psychiatric/Behavioral:  The patient is nervous/anxious.    All other systems reviewed and are negative.       Physical Exam  Temp:  [36.8 °C (98.2 °F)-37.2 °C (98.9 °F)] 37.2 °C (98.9 °F)  Pulse:  [106-118] 115  Resp:  [16-18] 16  BP: (106-120)/(62-81) 106/63  SpO2:  [96 %-100 %] 96 %    Physical Exam  Vitals and nursing note reviewed.   Constitutional:       General: She is not in acute distress.  HENT:      Head: Normocephalic and atraumatic.      Nose: No rhinorrhea.      Mouth/Throat:      Pharynx: No oropharyngeal exudate or posterior oropharyngeal erythema.   Eyes:      General:         Right eye: No discharge.         Left eye: No discharge.   Cardiovascular:      Rate and Rhythm: Normal rate and regular rhythm.      Heart sounds: Normal heart sounds. No murmur heard.  Pulmonary:      Effort: Pulmonary effort is normal. No respiratory distress.      Breath sounds: No stridor. No rales.   Chest:      Chest wall: No tenderness.   Abdominal:      General: There is no distension.      Palpations: Abdomen is soft. There is no mass.      Tenderness: There is no abdominal tenderness. There is no  rebound.   Musculoskeletal:         General: No swelling or tenderness.      Cervical back: Neck supple. No rigidity.   Skin:     General: Skin is warm and dry.      Coloration: Skin is not cyanotic or jaundiced.      Nails: There is no clubbing.   Neurological:      General: No focal deficit present.      Mental Status: She is alert and oriented to person, place, and time.      Cranial Nerves: No cranial nerve deficit.      Motor: Weakness (Generalized) present.   Psychiatric:         Mood and Affect: Mood normal.         Speech: Speech is delayed.         Behavior: Behavior is slowed.         Fluids  No intake or output data in the 24 hours ending 03/27/24 1259      Laboratory  Recent Labs     03/25/24  1252 03/26/24  0015 03/27/24  0013   WBC 7.3 4.7* 4.4*   RBC 3.75* 3.12* 3.23*   HEMOGLOBIN 12.1 10.2* 10.6*   HEMATOCRIT 35.8* 30.3* 31.6*   MCV 95.5 97.1 97.8   MCH 32.3 32.7 32.8   MCHC 33.8 33.7 33.5   RDW 45.7 47.4 46.9   PLATELETCT 312 273 265   MPV 9.3 9.1 8.9*     Recent Labs     03/25/24  1252 03/26/24  0015 03/27/24  0013   SODIUM 130* 133* 136   POTASSIUM 3.5* 3.3* 3.5*   CHLORIDE 87* 93* 96   CO2 18* 20 25   GLUCOSE 83 80 83   BUN 6* 5* 4*   CREATININE 0.85 0.57 0.60   CALCIUM 11.4* 10.2 10.2                   Imaging  DX-CHEST-PORTABLE (1 VIEW)   Final Result      1. No acute findings.   2. Appropriate position of the right internal jugular Port-A-Cath.      US-RUQ    (Results Pending)   MR-BRAIN-WITH & W/O    (Results Pending)        Assessment/Plan  * Acute respiratory failure with hypoxia (HCC)- (present on admission)  Assessment & Plan  Recently diagnosed with PE, on Eliquis.    Wean off oxygen as tolerated    Nausea & vomiting  Assessment & Plan  Etiology is not clear patient with persistent nausea.     Mild transaminase elevation check liver ultrasound  Lipase is normal  Given generalized weakness delayed response of persistent nausea vomiting in setting of malignancy will check MRI to rule out  metastatic disease    Advance care planning  Assessment & Plan        Hypercalcemia- (present on admission)  Assessment & Plan  Secondary to dehydration continue IV fluids    Dehydration- (present on admission)  Assessment & Plan  Continue IV hydration    Hypokalemia- (present on admission)  Assessment & Plan  I ordered IV repletion patient does not tolerate p.o. repletion while secondary to nausea  Monitor levels I ordered repeat chemistry panel    Hyponatremia- (present on admission)  Assessment & Plan  Resolved with hydration  Continue to monitor electrolytes    Primary hypertension- (present on admission)  Assessment & Plan  Stable off medical therapy continue to monitor    Pulmonary embolism and infarction (HCC)- (present on admission)  Assessment & Plan  Continue Eliquis.  Wean off oxygen as tolerated    Breast cancer of upper-outer quadrant of left female breast (HCC)- (present on admission)  Assessment & Plan  -Triple negative  -S/p bilateral total mastectomy  -On treatment by Dr. Gómez. Regimen: : Pembrolizumab + PACLitaxel + CARBOplatin (x 4 cycles) followed by Pembrolizumab + Cyclophosphamide + DOXOrubicin   (x4 cycles)  -Continue outpatient follow-up with oncology.         VTE prophylaxis: Eliquis    I have performed a physical exam and reviewed and updated ROS and Plan today (3/27/2024). In review of yesterday's note (3/26/2024), there are no changes except as documented above.

## 2024-03-27 NOTE — DISCHARGE PLANNING
Case Management Discharge Planning    Admission Date: 3/25/2024  GMLOS: 4  ALOS: 2    6-Clicks ADL Score: 21  6-Clicks Mobility Score: 18      Anticipated Discharge Dispo: Discharge Disposition: Discharged to home/self care (01)    DME Needed: No    Action(s) Taken: Patient was discussed in IDT rounds. MD order a Brain MRI. RNCM spoke with patient at bedside who stated she works at [employer] which is where she lives, however, she is not currently working and states her income is zero.     Escalations Completed: None    Medically Clear: No    Next Steps: pending MRI    Barriers to Discharge: Medical clearance    Is the patient up for discharge tomorrow: No         117.9

## 2024-03-27 NOTE — CARE PLAN
The patient is Watcher - Medium risk of patient condition declining or worsening    Shift Goals  Clinical Goals: Ultrasound results, MRI, Monitor mentation, monitor labs  Patient Goals: Rest  Family Goals: Not present    Progress made toward(s) clinical / shift goals:    Patient remained free from falls throughout shift. Bed locked and in lowest position, bed alarm on, call light and belongings within reach. Room free from clutter. Patient calls appropriately. Medicated with PRN pain medications. Pt states medications are effective in decreasing pain. Breathing WDL, resting comfortably. Turns self from side to side, pillows in place for support and positioning. Continent of bowel and bladder. Skin remains intact.      Patient is not progressing towards the following goals:  Patient A/Ox4, but has delayed responses and can become confused in conversation. Patient not fully able to understand POC. Reinforcement education given.     Problem: Knowledge Deficit - Standard  Goal: Patient and family/care givers will demonstrate understanding of plan of care, disease process/condition, diagnostic tests and medications  Outcome: Not Progressing

## 2024-03-28 LAB
ALBUMIN SERPL BCP-MCNC: 3.1 G/DL (ref 3.2–4.9)
ALBUMIN/GLOB SERPL: 1.2 G/DL
ALP SERPL-CCNC: 101 U/L (ref 30–99)
ALT SERPL-CCNC: 9 U/L (ref 2–50)
AMMONIA PLAS-SCNC: 21 UMOL/L (ref 11–45)
ANION GAP SERPL CALC-SCNC: 16 MMOL/L (ref 7–16)
AST SERPL-CCNC: 32 U/L (ref 12–45)
BACTERIA UR CULT: NORMAL
BILIRUB SERPL-MCNC: 0.3 MG/DL (ref 0.1–1.5)
BUN SERPL-MCNC: 3 MG/DL (ref 8–22)
CALCIUM ALBUM COR SERPL-MCNC: 10.2 MG/DL (ref 8.5–10.5)
CALCIUM SERPL-MCNC: 9.5 MG/DL (ref 8.5–10.5)
CHLORIDE SERPL-SCNC: 96 MMOL/L (ref 96–112)
CO2 SERPL-SCNC: 24 MMOL/L (ref 20–33)
CREAT SERPL-MCNC: 0.49 MG/DL (ref 0.5–1.4)
ERYTHROCYTE [DISTWIDTH] IN BLOOD BY AUTOMATED COUNT: 45.1 FL (ref 35.9–50)
GFR SERPLBLD CREATININE-BSD FMLA CKD-EPI: 108 ML/MIN/1.73 M 2
GLOBULIN SER CALC-MCNC: 2.6 G/DL (ref 1.9–3.5)
GLUCOSE SERPL-MCNC: 78 MG/DL (ref 65–99)
HCT VFR BLD AUTO: 29.6 % (ref 37–47)
HGB BLD-MCNC: 10 G/DL (ref 12–16)
MAGNESIUM SERPL-MCNC: 1.8 MG/DL (ref 1.5–2.5)
MCH RBC QN AUTO: 32.4 PG (ref 27–33)
MCHC RBC AUTO-ENTMCNC: 33.8 G/DL (ref 32.2–35.5)
MCV RBC AUTO: 95.8 FL (ref 81.4–97.8)
PHOSPHATE SERPL-MCNC: 4.2 MG/DL (ref 2.5–4.5)
PLATELET # BLD AUTO: 247 K/UL (ref 164–446)
PMV BLD AUTO: 9.3 FL (ref 9–12.9)
POTASSIUM SERPL-SCNC: 3.3 MMOL/L (ref 3.6–5.5)
PROT SERPL-MCNC: 5.7 G/DL (ref 6–8.2)
RBC # BLD AUTO: 3.09 M/UL (ref 4.2–5.4)
SIGNIFICANT IND 70042: NORMAL
SITE SITE: NORMAL
SODIUM SERPL-SCNC: 136 MMOL/L (ref 135–145)
SOURCE SOURCE: NORMAL
WBC # BLD AUTO: 3.6 K/UL (ref 4.8–10.8)

## 2024-03-28 PROCEDURE — A9270 NON-COVERED ITEM OR SERVICE: HCPCS | Performed by: STUDENT IN AN ORGANIZED HEALTH CARE EDUCATION/TRAINING PROGRAM

## 2024-03-28 PROCEDURE — 85027 COMPLETE CBC AUTOMATED: CPT

## 2024-03-28 PROCEDURE — 700101 HCHG RX REV CODE 250: Performed by: HOSPITALIST

## 2024-03-28 PROCEDURE — 80053 COMPREHEN METABOLIC PANEL: CPT

## 2024-03-28 PROCEDURE — 700102 HCHG RX REV CODE 250 W/ 637 OVERRIDE(OP): Performed by: HOSPITALIST

## 2024-03-28 PROCEDURE — 36415 COLL VENOUS BLD VENIPUNCTURE: CPT

## 2024-03-28 PROCEDURE — 82140 ASSAY OF AMMONIA: CPT

## 2024-03-28 PROCEDURE — 84100 ASSAY OF PHOSPHORUS: CPT

## 2024-03-28 PROCEDURE — 700102 HCHG RX REV CODE 250 W/ 637 OVERRIDE(OP)

## 2024-03-28 PROCEDURE — 99232 SBSQ HOSP IP/OBS MODERATE 35: CPT | Performed by: HOSPITALIST

## 2024-03-28 PROCEDURE — 770004 HCHG ROOM/CARE - ONCOLOGY PRIVATE *

## 2024-03-28 PROCEDURE — 83735 ASSAY OF MAGNESIUM: CPT

## 2024-03-28 PROCEDURE — 700111 HCHG RX REV CODE 636 W/ 250 OVERRIDE (IP): Performed by: HOSPITALIST

## 2024-03-28 PROCEDURE — A9270 NON-COVERED ITEM OR SERVICE: HCPCS | Performed by: HOSPITALIST

## 2024-03-28 PROCEDURE — A9270 NON-COVERED ITEM OR SERVICE: HCPCS

## 2024-03-28 PROCEDURE — 700102 HCHG RX REV CODE 250 W/ 637 OVERRIDE(OP): Performed by: STUDENT IN AN ORGANIZED HEALTH CARE EDUCATION/TRAINING PROGRAM

## 2024-03-28 RX ORDER — POTASSIUM CHLORIDE 7.45 MG/ML
10 INJECTION INTRAVENOUS
Status: COMPLETED | OUTPATIENT
Start: 2024-03-28 | End: 2024-03-28

## 2024-03-28 RX ORDER — MAGNESIUM SULFATE HEPTAHYDRATE 40 MG/ML
2 INJECTION, SOLUTION INTRAVENOUS ONCE
Status: COMPLETED | OUTPATIENT
Start: 2024-03-28 | End: 2024-03-28

## 2024-03-28 RX ORDER — METOPROLOL SUCCINATE 25 MG/1
25 TABLET, EXTENDED RELEASE ORAL DAILY
Status: DISCONTINUED | OUTPATIENT
Start: 2024-03-28 | End: 2024-04-01 | Stop reason: HOSPADM

## 2024-03-28 RX ADMIN — METOPROLOL SUCCINATE 25 MG: 25 TABLET, EXTENDED RELEASE ORAL at 14:04

## 2024-03-28 RX ADMIN — APIXABAN 5 MG: 5 TABLET, FILM COATED ORAL at 05:04

## 2024-03-28 RX ADMIN — DOCUSATE SODIUM 50 MG AND SENNOSIDES 8.6 MG 2 TABLET: 8.6; 5 TABLET, FILM COATED ORAL at 17:22

## 2024-03-28 RX ADMIN — MAGNESIUM SULFATE HEPTAHYDRATE 2 G: 2 INJECTION, SOLUTION INTRAVENOUS at 08:44

## 2024-03-28 RX ADMIN — LIDOCAINE: 50 OINTMENT TOPICAL at 14:14

## 2024-03-28 RX ADMIN — POTASSIUM CHLORIDE 10 MEQ: 7.46 INJECTION, SOLUTION INTRAVENOUS at 10:31

## 2024-03-28 RX ADMIN — APIXABAN 5 MG: 5 TABLET, FILM COATED ORAL at 17:22

## 2024-03-28 RX ADMIN — POLYETHYLENE GLYCOL 3350 1 PACKET: 17 POWDER, FOR SOLUTION ORAL at 14:04

## 2024-03-28 RX ADMIN — OXYCODONE HYDROCHLORIDE 10 MG: 10 TABLET ORAL at 02:57

## 2024-03-28 RX ADMIN — OXYCODONE HYDROCHLORIDE 10 MG: 10 TABLET ORAL at 17:34

## 2024-03-28 RX ADMIN — OXYCODONE 5 MG: 5 TABLET ORAL at 21:00

## 2024-03-28 RX ADMIN — POTASSIUM CHLORIDE 10 MEQ: 7.46 INJECTION, SOLUTION INTRAVENOUS at 08:43

## 2024-03-28 RX ADMIN — ROSUVASTATIN 10 MG: 10 TABLET, FILM COATED ORAL at 21:01

## 2024-03-28 RX ADMIN — DULOXETINE HYDROCHLORIDE 30 MG: 30 CAPSULE, DELAYED RELEASE ORAL at 05:04

## 2024-03-28 ASSESSMENT — ENCOUNTER SYMPTOMS
SHORTNESS OF BREATH: 0
FEVER: 0
ABDOMINAL PAIN: 0
VOMITING: 0

## 2024-03-28 ASSESSMENT — PAIN DESCRIPTION - PAIN TYPE
TYPE: ACUTE PAIN

## 2024-03-28 NOTE — DISCHARGE PLANNING
Case Management Discharge Planning    Admission Date: 3/25/2024  GMLOS: 4  ALOS: 3    6-Clicks ADL Score: 21  6-Clicks Mobility Score: 18      Anticipated Discharge Dispo: Discharge Disposition: Discharged to home/self care (01)    DME Needed: No    Action(s) Taken: Patient was discussed in IDT rounds. MRI of brain came back negative. RNCM requested PFA to screen patient for Medicaid. Patient is not medically cleared.    1538:RNCM placed call to Cata [phone] who is patient's niece. Per Cata, she also lives at [facility] with patient's nephew. RNCM informed Cata patient can get HH upon discharge. Cata is worried about patient living alone. RNCM informed Cata, patient can go to a group home but it can be $3500-$5000/month. Cata stated she will be at bedside later today or tomorrow.     RNCM obtained  choice 1)[facility]  2)[facility] 3)[facility]  and faxed to [facility]    Escalations Completed: None    Medically Clear: No    Next Steps: F/u with pt and IDT regarding dc barriers and needs as they arise     Barriers to Discharge: Medical clearance    Is the patient up for discharge tomorrow: No

## 2024-03-28 NOTE — DISCHARGE PLANNING
Received HH choice form, sent referral to [facility] per choice form and order.

## 2024-03-28 NOTE — PROGRESS NOTES
[facility] Medicine Daily Progress Note    Date of Service  3/28/2024    Chief Complaint  Yolanda Roe is a 58 y.o. female admitted 3/25/2024 with nausea vomiting    Hospital Course  Yolanda Roe is a 58 y.o. female dyslipidemia, hypertension, triple negative breast cancer status postmastectomy undergoing chemotherapy by Dr. Gómez, recently diagnosed with PE on Eliquis who presented 3/25/2024 with dyspnea.     Patient recently admitted from 3/9/2024 to 3/20/2024.  She presented for dyspnea and vomiting, decreased p.o. intake.  She was found to have right lower lobe PE along with fluid collections in her's breast surgical sites.  She was started on anticoagulation.  Started on IV fluid and antiemetics.  Respiratory panel PCR negative, tolerating p.o. intake.  She was still feeling weak at discharge however it was felt that there were no ongoing acute inpatient admission needs.  Subsequently discharged.     She returns today feeling generally weak, continued dyspnea.  She states she feels similarly to when she was discharged, she feels poorly and thinks she was discharged too soon.  She had continued poor p.o. intake, started having increased work of breathing.  Due to the persistent weakness, increased dyspnea, inability to tolerate p.o. intake she presented for evaluation.  She denies any fevers or chills headache or vision changes chest pain nausea vomiting dysuria diarrhea.     In the ED she is afebrile pulse 115-125 respiratory rate 12-20 systolic blood pressure  pulse ox 83 to 100% requiring 2 L nasal cannula.  Initial workup no leukocytosis, improved hemoglobin, normal platelet count sodium 130 potassium 3.5 chloride 87 bicarb 18 normal renal function and liver function calcium 11.6 lactic acid 2.2 troponin T 23 chest x-ray shows no acute findings, EKG sinus tachycardia normal intervals QRS subtle ST segment abnormalities anteriorly and laterally not significantly different from prior.  Was given  IV fluids subsequent referred to hospitalist for admission.    Interval Problem Update      Patient denies any pain at time of evaluation  She is afebrile weaned off oxygen this morning  Poor appetite no vomiting    I reviewed chemistry panel potassium 3.3 magnesium 1.8 phosphorus 4.2 I ordered repletion  Ammonia level 21  Reviewed CBC WBC 3.6 hemoglobin 10 platelets 247  I reviewed MRI of the brain with mild cerebral atrophy negative for acute pathology or metastatic disease  I reviewed right upper quadrant ultrasound with cholelithiasis but no sonographic evidence of acute cholecystitis    I discussed with nursing staff case management and pharmacist  I ordered home health  Patient is slow to answer questions but is oriented x 4      I have discussed this patient's plan of care and discharge plan at IDT rounds today with Case Management, Nursing, Nursing leadership, and other members of the IDT team.    Consultants/Specialty       Code Status  Full Code    Disposition  Medically Cleared  I have placed the appropriate orders for post-discharge needs.    Review of Systems  Review of Systems   Constitutional:  Positive for malaise/fatigue. Negative for fever.   Respiratory:  Negative for shortness of breath.    Cardiovascular:  Negative for chest pain.   Gastrointestinal:  Negative for abdominal pain and vomiting.   All other systems reviewed and are negative.       Physical Exam  Temp:  [36.3 °C (97.3 °F)-37.1 °C (98.7 °F)] 37.1 °C (98.7 °F)  Pulse:  [] 133  Resp:  [16-20] 17  BP: (102-175)/(51-98) 152/94  SpO2:  [90 %-99 %] 91 %    Physical Exam  Vitals and nursing note reviewed.   Constitutional:       General: She is not in acute distress.  HENT:      Head: Normocephalic and atraumatic.      Nose: Nose normal. No rhinorrhea.      Mouth/Throat:      Pharynx: No oropharyngeal exudate or posterior oropharyngeal erythema.   Eyes:      General:         Right eye: No discharge.         Left eye: No discharge.    Cardiovascular:      Rate and Rhythm: Normal rate and regular rhythm.      Heart sounds: No murmur heard.     No friction rub. No gallop.   Pulmonary:      Effort: Pulmonary effort is normal. No respiratory distress.      Breath sounds: Normal breath sounds. No stridor. No wheezing, rhonchi or rales.   Chest:      Chest wall: No tenderness.   Abdominal:      General: Bowel sounds are normal. There is no distension.      Palpations: There is no mass.      Tenderness: There is no abdominal tenderness. There is no rebound.   Musculoskeletal:         General: No swelling or tenderness.      Cervical back: Neck supple. No rigidity.   Skin:     General: Skin is warm and dry.      Coloration: Skin is not cyanotic or jaundiced.      Nails: There is no clubbing.   Neurological:      General: No focal deficit present.      Mental Status: She is alert and oriented to person, place, and time.      Cranial Nerves: No cranial nerve deficit.      Motor: Weakness present.   Psychiatric:         Mood and Affect: Mood normal.         Speech: Speech is delayed.         Behavior: Behavior normal.         Fluids    Intake/Output Summary (Last 24 hours) at 3/28/2024 1340  Last data filed at 3/27/2024 1400  Gross per 24 hour   Intake 0 ml   Output --   Net 0 ml         Laboratory  Recent Labs     03/26/24  0015 03/27/24  0013 03/28/24  0216   WBC 4.7* 4.4* 3.6*   RBC 3.12* 3.23* 3.09*   HEMOGLOBIN 10.2* 10.6* 10.0*   HEMATOCRIT 30.3* 31.6* 29.6*   MCV 97.1 97.8 95.8   MCH 32.7 32.8 32.4   MCHC 33.7 33.5 33.8   RDW 47.4 46.9 45.1   PLATELETCT 273 265 247   MPV 9.1 8.9* 9.3     Recent Labs     03/26/24  0015 03/27/24  0013 03/28/24  0216   SODIUM 133* 136 136   POTASSIUM 3.3* 3.5* 3.3*   CHLORIDE 93* 96 96   CO2 20 25 24   GLUCOSE 80 83 78   BUN 5* 4* 3*   CREATININE 0.57 0.60 0.49*   CALCIUM 10.2 10.2 9.5                   Imaging  MR-BRAIN-WITH & W/O   Final Result      1.  Mild age-related cerebral atrophy.      2.  No evidence of  metastatic disease to the brain parenchyma.      US-RUQ   Final Result      1. Stones and sludge in the gallbladder lumen. Gallbladder wall thickness. No sonographic evidence of acute cholecystitis.   2. Hepatic steatosis.   3. The remainder of the examination is within normal limits.      DX-CHEST-PORTABLE (1 VIEW)   Final Result      1. No acute findings.   2. Appropriate position of the right internal jugular Port-A-Cath.           Assessment/Plan  * Acute respiratory failure with hypoxia (HCC)- (present on admission)  Assessment & Plan  Recently diagnosed with PE, on Eliquis.    Wean off oxygen as tolerated    Nausea & vomiting  Assessment & Plan  Etiology is not clear patient with persistent nausea.  Clinically improved  Lipase is normal  Liver ultrasound revealed gallbladder sludge and cholelithiasis but no acute cholecystitis  MRI negative for metastatic disease or acute pathology    Advance care planning  Assessment & Plan        Hypercalcemia- (present on admission)  Assessment & Plan  Secondary to dehydration  Resolved with IV fluids    Dehydration- (present on admission)  Assessment & Plan  Continue IV hydration encourage p.o. intake    Hypokalemia- (present on admission)  Assessment & Plan  I ordered IV repletion   Monitor levels I ordered repeat chemistry panel    Hyponatremia- (present on admission)  Assessment & Plan  Resolved with hydration  Continue to monitor electrolytes    Primary hypertension- (present on admission)  Assessment & Plan  Blood pressure is elevated to resume home dose of metoprolol    Pulmonary embolism and infarction (HCC)- (present on admission)  Assessment & Plan  Continue Eliquis.  Wean off oxygen as tolerated    Breast cancer of upper-outer quadrant of left female breast (HCC)- (present on admission)  Assessment & Plan  -Triple negative  -S/p bilateral total mastectomy  -On treatment by Dr. Gómez. Regimen: : Pembrolizumab + PACLitaxel + CARBOplatin (x 4 cycles) followed by  Pembrolizumab + Cyclophosphamide + DOXOrubicin   (x4 cycles)  -Continue outpatient follow-up with oncology.         VTE prophylaxis: Eliquis    I have performed a physical exam and reviewed and updated ROS and Plan today (3/28/2024). In review of yesterday's note (3/27/2024), there are no changes except as documented above.

## 2024-03-29 LAB
ANION GAP SERPL CALC-SCNC: 16 MMOL/L (ref 7–16)
ANION GAP SERPL CALC-SCNC: 19 MMOL/L (ref 7–16)
BUN SERPL-MCNC: 2 MG/DL (ref 8–22)
BUN SERPL-MCNC: 2 MG/DL (ref 8–22)
CALCIUM SERPL-MCNC: 8.9 MG/DL (ref 8.5–10.5)
CALCIUM SERPL-MCNC: 9.1 MG/DL (ref 8.5–10.5)
CHLORIDE SERPL-SCNC: 96 MMOL/L (ref 96–112)
CHLORIDE SERPL-SCNC: 97 MMOL/L (ref 96–112)
CO2 SERPL-SCNC: 22 MMOL/L (ref 20–33)
CO2 SERPL-SCNC: 23 MMOL/L (ref 20–33)
CREAT SERPL-MCNC: 0.47 MG/DL (ref 0.5–1.4)
CREAT SERPL-MCNC: 0.53 MG/DL (ref 0.5–1.4)
ERYTHROCYTE [DISTWIDTH] IN BLOOD BY AUTOMATED COUNT: 44.5 FL (ref 35.9–50)
GFR SERPLBLD CREATININE-BSD FMLA CKD-EPI: 106 ML/MIN/1.73 M 2
GFR SERPLBLD CREATININE-BSD FMLA CKD-EPI: 109 ML/MIN/1.73 M 2
GLUCOSE SERPL-MCNC: 77 MG/DL (ref 65–99)
GLUCOSE SERPL-MCNC: 77 MG/DL (ref 65–99)
HCT VFR BLD AUTO: 28.9 % (ref 37–47)
HGB BLD-MCNC: 10 G/DL (ref 12–16)
MAGNESIUM SERPL-MCNC: 1.6 MG/DL (ref 1.5–2.5)
MCH RBC QN AUTO: 32.7 PG (ref 27–33)
MCHC RBC AUTO-ENTMCNC: 34.6 G/DL (ref 32.2–35.5)
MCV RBC AUTO: 94.4 FL (ref 81.4–97.8)
PHOSPHATE SERPL-MCNC: 3.6 MG/DL (ref 2.5–4.5)
PLATELET # BLD AUTO: 235 K/UL (ref 164–446)
PMV BLD AUTO: 9.2 FL (ref 9–12.9)
POTASSIUM SERPL-SCNC: 3 MMOL/L (ref 3.6–5.5)
POTASSIUM SERPL-SCNC: 3.5 MMOL/L (ref 3.6–5.5)
RBC # BLD AUTO: 3.06 M/UL (ref 4.2–5.4)
SODIUM SERPL-SCNC: 135 MMOL/L (ref 135–145)
SODIUM SERPL-SCNC: 138 MMOL/L (ref 135–145)
WBC # BLD AUTO: 3.6 K/UL (ref 4.8–10.8)

## 2024-03-29 PROCEDURE — 700102 HCHG RX REV CODE 250 W/ 637 OVERRIDE(OP): Performed by: HOSPITALIST

## 2024-03-29 PROCEDURE — 700111 HCHG RX REV CODE 636 W/ 250 OVERRIDE (IP): Mod: JZ | Performed by: STUDENT IN AN ORGANIZED HEALTH CARE EDUCATION/TRAINING PROGRAM

## 2024-03-29 PROCEDURE — 700105 HCHG RX REV CODE 258: Performed by: HOSPITALIST

## 2024-03-29 PROCEDURE — A9270 NON-COVERED ITEM OR SERVICE: HCPCS | Mod: JZ

## 2024-03-29 PROCEDURE — 36415 COLL VENOUS BLD VENIPUNCTURE: CPT

## 2024-03-29 PROCEDURE — 700111 HCHG RX REV CODE 636 W/ 250 OVERRIDE (IP): Mod: JZ

## 2024-03-29 PROCEDURE — 84100 ASSAY OF PHOSPHORUS: CPT

## 2024-03-29 PROCEDURE — 97530 THERAPEUTIC ACTIVITIES: CPT

## 2024-03-29 PROCEDURE — 700111 HCHG RX REV CODE 636 W/ 250 OVERRIDE (IP): Performed by: HOSPITALIST

## 2024-03-29 PROCEDURE — 80048 BASIC METABOLIC PNL TOTAL CA: CPT | Mod: 91

## 2024-03-29 PROCEDURE — 83735 ASSAY OF MAGNESIUM: CPT

## 2024-03-29 PROCEDURE — 85027 COMPLETE CBC AUTOMATED: CPT

## 2024-03-29 PROCEDURE — 700102 HCHG RX REV CODE 250 W/ 637 OVERRIDE(OP)

## 2024-03-29 PROCEDURE — A9270 NON-COVERED ITEM OR SERVICE: HCPCS | Performed by: HOSPITALIST

## 2024-03-29 PROCEDURE — 700102 HCHG RX REV CODE 250 W/ 637 OVERRIDE(OP): Mod: JZ

## 2024-03-29 PROCEDURE — A9270 NON-COVERED ITEM OR SERVICE: HCPCS

## 2024-03-29 PROCEDURE — A9270 NON-COVERED ITEM OR SERVICE: HCPCS | Performed by: STUDENT IN AN ORGANIZED HEALTH CARE EDUCATION/TRAINING PROGRAM

## 2024-03-29 PROCEDURE — 770004 HCHG ROOM/CARE - ONCOLOGY PRIVATE *

## 2024-03-29 PROCEDURE — 700102 HCHG RX REV CODE 250 W/ 637 OVERRIDE(OP): Performed by: STUDENT IN AN ORGANIZED HEALTH CARE EDUCATION/TRAINING PROGRAM

## 2024-03-29 PROCEDURE — 99232 SBSQ HOSP IP/OBS MODERATE 35: CPT | Performed by: HOSPITALIST

## 2024-03-29 RX ORDER — POTASSIUM CHLORIDE 7.45 MG/ML
10 INJECTION INTRAVENOUS
Status: COMPLETED | OUTPATIENT
Start: 2024-03-29 | End: 2024-03-29

## 2024-03-29 RX ORDER — MAGNESIUM SULFATE HEPTAHYDRATE 40 MG/ML
4 INJECTION, SOLUTION INTRAVENOUS ONCE
Status: COMPLETED | OUTPATIENT
Start: 2024-03-29 | End: 2024-03-29

## 2024-03-29 RX ORDER — POTASSIUM CHLORIDE 20 MEQ/1
40 TABLET, EXTENDED RELEASE ORAL ONCE
Status: COMPLETED | OUTPATIENT
Start: 2024-03-29 | End: 2024-03-29

## 2024-03-29 RX ORDER — MAGNESIUM SULFATE HEPTAHYDRATE 40 MG/ML
2 INJECTION, SOLUTION INTRAVENOUS ONCE
Status: COMPLETED | OUTPATIENT
Start: 2024-03-29 | End: 2024-03-29

## 2024-03-29 RX ADMIN — MAGNESIUM 64 MG (MAGNESIUM CHLORIDE) TABLET,DELAYED RELEASE 64 MG: at 17:55

## 2024-03-29 RX ADMIN — MAGNESIUM 64 MG (MAGNESIUM CHLORIDE) TABLET,DELAYED RELEASE 64 MG: at 08:57

## 2024-03-29 RX ADMIN — MAGNESIUM SULFATE HEPTAHYDRATE 4 G: 40 INJECTION, SOLUTION INTRAVENOUS at 09:11

## 2024-03-29 RX ADMIN — POTASSIUM BICARBONATE 25 MEQ: 978 TABLET, EFFERVESCENT ORAL at 17:54

## 2024-03-29 RX ADMIN — POTASSIUM CHLORIDE 10 MEQ: 7.46 INJECTION, SOLUTION INTRAVENOUS at 10:21

## 2024-03-29 RX ADMIN — POTASSIUM CHLORIDE 10 MEQ: 7.46 INJECTION, SOLUTION INTRAVENOUS at 11:36

## 2024-03-29 RX ADMIN — DULOXETINE HYDROCHLORIDE 30 MG: 30 CAPSULE, DELAYED RELEASE ORAL at 05:53

## 2024-03-29 RX ADMIN — APIXABAN 5 MG: 5 TABLET, FILM COATED ORAL at 05:53

## 2024-03-29 RX ADMIN — POTASSIUM CHLORIDE 10 MEQ: 7.46 INJECTION, SOLUTION INTRAVENOUS at 09:05

## 2024-03-29 RX ADMIN — OXYCODONE HYDROCHLORIDE 10 MG: 10 TABLET ORAL at 04:18

## 2024-03-29 RX ADMIN — SODIUM CHLORIDE: 9 INJECTION, SOLUTION INTRAVENOUS at 18:31

## 2024-03-29 RX ADMIN — ONDANSETRON 4 MG: 2 INJECTION INTRAMUSCULAR; INTRAVENOUS at 04:09

## 2024-03-29 RX ADMIN — ROSUVASTATIN 10 MG: 10 TABLET, FILM COATED ORAL at 21:18

## 2024-03-29 RX ADMIN — POTASSIUM CHLORIDE 10 MEQ: 7.46 INJECTION, SOLUTION INTRAVENOUS at 13:03

## 2024-03-29 RX ADMIN — APIXABAN 5 MG: 5 TABLET, FILM COATED ORAL at 17:54

## 2024-03-29 RX ADMIN — MAGNESIUM SULFATE HEPTAHYDRATE 2 G: 2 INJECTION, SOLUTION INTRAVENOUS at 03:38

## 2024-03-29 RX ADMIN — POTASSIUM CHLORIDE 40 MEQ: 1500 TABLET, EXTENDED RELEASE ORAL at 03:39

## 2024-03-29 ASSESSMENT — COGNITIVE AND FUNCTIONAL STATUS - GENERAL
HELP NEEDED FOR BATHING: A LITTLE
DAILY ACTIVITIY SCORE: 22
SUGGESTED CMS G CODE MODIFIER DAILY ACTIVITY: CJ
DRESSING REGULAR LOWER BODY CLOTHING: A LITTLE

## 2024-03-29 ASSESSMENT — PAIN DESCRIPTION - PAIN TYPE
TYPE: ACUTE PAIN
TYPE: ACUTE PAIN

## 2024-03-29 ASSESSMENT — ENCOUNTER SYMPTOMS
FOCAL WEAKNESS: 0
SHORTNESS OF BREATH: 0
HEADACHES: 0

## 2024-03-29 NOTE — DISCHARGE PLANNING
Case Management Discharge Planning    Admission Date: 3/25/2024  GMLOS: 4  ALOS: 4    6-Clicks ADL Score: 22  6-Clicks Mobility Score: 18      Anticipated Discharge Dispo: Discharge Disposition: Discharged to home/self care (01)    DME Needed: No    Action(s) Taken: Patient was discussed in IDT rounds. Patient's electrolytes are low. Patient is not medically cleared. Patient may be medically cleared to discharge home tomorrow. [facility] accepted patient.    Escalations Completed: None    Medically Clear: No    Next Steps: pending medical clearance     Barriers to Discharge: Medical clearance    Is the patient up for discharge tomorrow: No

## 2024-03-29 NOTE — CARE PLAN
The patient is Watcher - Medium risk of patient condition declining or worsening    Shift Goals  Clinical Goals: safety, monitor cognition  Patient Goals: rest  Family Goals: Not present    Progress made toward(s) clinical / shift goals:  A/Ox4, pt is able to understand plan of care. All questions answered at the moment. Fall precautions in place, bed in lowest position, call light and belongings in reach.   Pt calls appropriately. PRN pain medications given per MAR working effectively to promote comfort.      Patient is not progressing towards the following goals:    Pt A/Ox4 but with continued confusion, MD aware, pupils equal in size and reactive. Pt having delayed responses unable to fully understand plan of care at this time. POC reinforced.     Problem: Knowledge Deficit - Standard  Goal: Patient and family/care givers will demonstrate understanding of plan of care, disease process/condition, diagnostic tests and medications  Outcome: Not Progressing

## 2024-03-29 NOTE — PROGRESS NOTES
Received EOS report assumed care of Pt. Pt resting in bed denies pain at this time. PT on 2L NC. Pt updated on POC, all questions answered. Fall prevention measures in place.VSS.

## 2024-03-29 NOTE — THERAPY
Occupational Therapy  Daily Treatment     Patient Name: Yolanda Roe  Age:  58 y.o., Sex:  female  Medical Record #: [id number]  Today's Date: 3/29/2024     Precautions  Precautions: Fall Risk    Assessment    Pt currently limited by decreased activity tolerance, sensation, strength, balance, which are affecting pt's ability to complete ADLs/IADLs at baseline. Pt would benefit from OT services in the acute care setting to maximize functional recovery.  .      Plan    Treatment Plan Status: (P) Continue Current Treatment Plan  Type of Treatment: Self Care / Activities of Daily Living, Therapeutic Activity  Treatment Frequency: 3 Times per Week  Treatment Duration: Until Therapy Goals Met       Discharge Recommendations: (P) Anticipate that the patient will have no further occupational therapy needs after discharge from the hospital         03/29/24 0806   Activities of Daily Living   Grooming Supervision   Upper Body Dressing Supervision   Lower Body Dressing Minimal Assist   Toileting Standby Assist   Functional Mobility   Sit to Stand Contact Guard Assist   Bed, Chair, Wheelchair Transfer Contact Guard Assist   Toilet Transfers Contact Guard Assist   Short Term Goals   Short Term Goal # 1 supervised with LB dressing   Goal Outcome # 1 Progressing as expected   Short Term Goal # 2 supervised with ADL txfs   Goal Outcome # 2 Progressing as expected   Occupational Therapy Treatment Plan    O.T. Treatment Plan Continue Current Treatment Plan   Anticipated Discharge Equipment and Recommendations   Discharge Recommendations Anticipate that the patient will have no further occupational therapy needs after discharge from the hospital

## 2024-03-29 NOTE — DIETARY
Nutrition Update:    Day 4 of admit.  Yolanda Roe is a 58 y.o. female with admitting DX of Dehydration [E86.0].  Patient being followed to optimize nutrition.    Current Diet: Regular, Ensure Plus QD    PO intake continues to be poor w/ recorded intakes of 0% x 2 meals and <25% x 2 meals. Pt unable to provide any food preferences or anything she is willing to eat.    Initiation of nutrition support likely excessive in this case. Could trial an appetite stimulant such as Remeron or Marinol if pt is interested.    Problem: Nutritional:  Goal: Achieve adequate nutritional intake  Description: Patient will consume >50% of meals  Outcome: not progressing      RD following

## 2024-03-29 NOTE — PROGRESS NOTES
[facility] Medicine Daily Progress Note    Date of Service  3/29/2024    Chief Complaint  Yolanda Roe is a 58 y.o. female admitted 3/25/2024 with nausea vomiting    Hospital Course  Yolanda Roe is a 58 y.o. female dyslipidemia, hypertension, triple negative breast cancer status postmastectomy undergoing chemotherapy by Dr. Gómez, recently diagnosed with PE on Eliquis who presented 3/25/2024 with dyspnea.     Patient recently admitted from 3/9/2024 to 3/20/2024.  She presented for dyspnea and vomiting, decreased p.o. intake.  She was found to have right lower lobe PE along with fluid collections in her's breast surgical sites.  She was started on anticoagulation.  Started on IV fluid and antiemetics.  Respiratory panel PCR negative, tolerating p.o. intake.  She was still feeling weak at discharge however it was felt that there were no ongoing acute inpatient admission needs.  Subsequently discharged.     She returns today feeling generally weak, continued dyspnea.  She states she feels similarly to when she was discharged, she feels poorly and thinks she was discharged too soon.  She had continued poor p.o. intake, started having increased work of breathing.  Due to the persistent weakness, increased dyspnea, inability to tolerate p.o. intake she presented for evaluation.  She denies any fevers or chills headache or vision changes chest pain nausea vomiting dysuria diarrhea.     In the ED she is afebrile pulse 115-125 respiratory rate 12-20 systolic blood pressure  pulse ox 83 to 100% requiring 2 L nasal cannula.  Initial workup no leukocytosis, improved hemoglobin, normal platelet count sodium 130 potassium 3.5 chloride 87 bicarb 18 normal renal function and liver function calcium 11.6 lactic acid 2.2 troponin T 23 chest x-ray shows no acute findings, EKG sinus tachycardia normal intervals QRS subtle ST segment abnormalities anteriorly and laterally not significantly different from prior.  Was given  IV fluids subsequent referred to hospitalist for admission.    Interval Problem Update      Patient with poor p.o. intake  Reports feeling tired and sleepy  Denies pain  No vomiting  Reviewed chemistry panel potassium 3.0 I ordered IV and p.o. repletion  Magnesium 1.6 ordered IV repletion  Reviewed CBC hemoglobin 10.0      I have discussed this patient's plan of care and discharge plan at IDT rounds today with Case Management, Nursing, Nursing leadership, and other members of the IDT team.    Consultants/Specialty       Code Status  Full Code    Disposition  Medically Cleared  I have placed the appropriate orders for post-discharge needs.    Review of Systems  Review of Systems   Constitutional:  Positive for malaise/fatigue.   Respiratory:  Negative for shortness of breath.    Cardiovascular:  Negative for chest pain.   Neurological:  Negative for focal weakness and headaches.   All other systems reviewed and are negative.       Physical Exam  Temp:  [36.7 °C (98 °F)-36.9 °C (98.5 °F)] 36.9 °C (98.5 °F)  Pulse:  [] 97  Resp:  [18] 18  BP: ()/(59-72) 95/64  SpO2:  [80 %-96 %] 91 %    Physical Exam  HENT:      Head: Normocephalic and atraumatic.   Cardiovascular:      Rate and Rhythm: Normal rate and regular rhythm.   Pulmonary:      Effort: Pulmonary effort is normal. No respiratory distress.   Abdominal:      Palpations: Abdomen is soft.      Tenderness: There is no abdominal tenderness.   Musculoskeletal:         General: No swelling.   Neurological:      General: No focal deficit present.      Mental Status: She is alert and oriented to person, place, and time.      Motor: Weakness present.   Psychiatric:         Speech: Speech is delayed.         Behavior: Behavior is slowed.         Fluids    Intake/Output Summary (Last 24 hours) at 3/29/2024 1407  Last data filed at 3/28/2024 2031  Gross per 24 hour   Intake 100 ml   Output --   Net 100 ml         Laboratory  Recent Labs     03/27/24  0013  03/28/24  0216 03/29/24  0040   WBC 4.4* 3.6* 3.6*   RBC 3.23* 3.09* 3.06*   HEMOGLOBIN 10.6* 10.0* 10.0*   HEMATOCRIT 31.6* 29.6* 28.9*   MCV 97.8 95.8 94.4   MCH 32.8 32.4 32.7   MCHC 33.5 33.8 34.6   RDW 46.9 45.1 44.5   PLATELETCT 265 247 235   MPV 8.9* 9.3 9.2     Recent Labs     03/27/24  0013 03/28/24  0216 03/29/24  0040   SODIUM 136 136 138   POTASSIUM 3.5* 3.3* 3.0*   CHLORIDE 96 96 97   CO2 25 24 22   GLUCOSE 83 78 77   BUN 4* 3* 2*   CREATININE 0.60 0.49* 0.53   CALCIUM 10.2 9.5 9.1                   Imaging  MR-BRAIN-WITH & W/O   Final Result      1.  Mild age-related cerebral atrophy.      2.  No evidence of metastatic disease to the brain parenchyma.      US-RUQ   Final Result      1. Stones and sludge in the gallbladder lumen. Gallbladder wall thickness. No sonographic evidence of acute cholecystitis.   2. Hepatic steatosis.   3. The remainder of the examination is within normal limits.      DX-CHEST-PORTABLE (1 VIEW)   Final Result      1. No acute findings.   2. Appropriate position of the right internal jugular Port-A-Cath.           Assessment/Plan  * Acute respiratory failure with hypoxia (HCC)- (present on admission)  Assessment & Plan  Recently diagnosed with PE, on Eliquis.    Wean off oxygen as tolerated    Nausea & vomiting  Assessment & Plan  Etiology is not clear patient with persistent nausea.  Clinically improved  Lipase is normal  Liver ultrasound revealed gallbladder sludge and cholelithiasis but no acute cholecystitis  MRI negative for metastatic disease or acute pathology    Advance care planning  Assessment & Plan        Hypercalcemia- (present on admission)  Assessment & Plan  Secondary to dehydration  Resolved with IV fluids    Dehydration- (present on admission)  Assessment & Plan  Continue IV hydration encourage p.o. intake    Hypokalemia- (present on admission)  Assessment & Plan  I ordered IV and p.o. repletion    Monitor levels I ordered repeat chemistry  panel    Hyponatremia- (present on admission)  Assessment & Plan  Resolved with hydration  Continue to monitor electrolytes    Primary hypertension- (present on admission)  Assessment & Plan  Continue metoprolol and monitor blood pressure    Pulmonary embolism and infarction (HCC)- (present on admission)  Assessment & Plan  Continue Eliquis.  Wean off oxygen as tolerated    Breast cancer of upper-outer quadrant of left female breast (HCC)- (present on admission)  Assessment & Plan  -Triple negative  -S/p bilateral total mastectomy  -On treatment by Dr. Gómez. Regimen: : Pembrolizumab + PACLitaxel + CARBOplatin (x 4 cycles) followed by Pembrolizumab + Cyclophosphamide + DOXOrubicin   (x4 cycles)  -Continue outpatient follow-up with oncology.         VTE prophylaxis: Eliquis    I have performed a physical exam and reviewed and updated ROS and Plan today (3/29/2024). In review of yesterday's note (3/28/2024), there are no changes except as documented above.

## 2024-03-29 NOTE — CARE PLAN
The patient is Watcher - Medium risk of patient condition declining or worsening    Shift Goals  Clinical Goals: pts mentation will remain stable, pt to remain free of falls  Patient Goals: pain control  Family Goals: not present    Progress made toward(s) clinical / shift goals:        Problem: Pain - Standard  Goal: Alleviation of pain or a reduction in pain to the patient’s comfort goal  Outcome: Progressing     Problem: Knowledge Deficit - Standard  Goal: Patient and family/care givers will demonstrate understanding of plan of care, disease process/condition, diagnostic tests and medications  Outcome: Progressing     Problem: Fall Risk  Goal: Patient will remain free from falls  Outcome: Progressing

## 2024-03-29 NOTE — CARE PLAN
The patient is Stable - Low risk of patient condition declining or worsening    Shift Goals  Clinical Goals: MOnitor pts electrolytes during shift  Patient Goals: Pt will sit in chair for all meals and eat at least 50%  Family Goals: Not present    Progress made toward(s) clinical / shift goals:    Pt's denies pain during shift thus far. . Pt resting comfortably. Pt able to ambulate to restroom calls appropriatly. Pt does not have an appitite has not eaten any of her breakfast or lunch goal is to eat 50% of meals. Pt updated on POC no further questions at this time.  VSS. Bed in lowest position/locked, Hourly rounding in progress.     Problem: Fall Risk  Goal: Patient will remain free from falls  Outcome: Progressing          Patient is not progressing towards the following goals:      Problem: Nutrition  Goal: Patient's nutritional and fluid intake will be adequate or improve  Outcome: Not Met

## 2024-03-30 LAB
ANION GAP SERPL CALC-SCNC: 17 MMOL/L (ref 7–16)
BACTERIA BLD CULT: NORMAL
BACTERIA BLD CULT: NORMAL
BUN SERPL-MCNC: <2 MG/DL (ref 8–22)
CALCIUM SERPL-MCNC: 8.3 MG/DL (ref 8.5–10.5)
CHLORIDE SERPL-SCNC: 100 MMOL/L (ref 96–112)
CO2 SERPL-SCNC: 21 MMOL/L (ref 20–33)
CREAT SERPL-MCNC: 0.42 MG/DL (ref 0.5–1.4)
ERYTHROCYTE [DISTWIDTH] IN BLOOD BY AUTOMATED COUNT: 44.4 FL (ref 35.9–50)
GFR SERPLBLD CREATININE-BSD FMLA CKD-EPI: 112 ML/MIN/1.73 M 2
GLUCOSE SERPL-MCNC: 69 MG/DL (ref 65–99)
HCT VFR BLD AUTO: 27.3 % (ref 37–47)
HGB BLD-MCNC: 9.4 G/DL (ref 12–16)
MAGNESIUM SERPL-MCNC: 1.7 MG/DL (ref 1.5–2.5)
MCH RBC QN AUTO: 32.6 PG (ref 27–33)
MCHC RBC AUTO-ENTMCNC: 34.4 G/DL (ref 32.2–35.5)
MCV RBC AUTO: 94.8 FL (ref 81.4–97.8)
PHOSPHATE SERPL-MCNC: 3.9 MG/DL (ref 2.5–4.5)
PLATELET # BLD AUTO: 217 K/UL (ref 164–446)
PMV BLD AUTO: 9.2 FL (ref 9–12.9)
POTASSIUM SERPL-SCNC: 3.1 MMOL/L (ref 3.6–5.5)
RBC # BLD AUTO: 2.88 M/UL (ref 4.2–5.4)
SIGNIFICANT IND 70042: NORMAL
SIGNIFICANT IND 70042: NORMAL
SITE SITE: NORMAL
SITE SITE: NORMAL
SODIUM SERPL-SCNC: 138 MMOL/L (ref 135–145)
SOURCE SOURCE: NORMAL
SOURCE SOURCE: NORMAL
WBC # BLD AUTO: 3.2 K/UL (ref 4.8–10.8)

## 2024-03-30 PROCEDURE — 770004 HCHG ROOM/CARE - ONCOLOGY PRIVATE *

## 2024-03-30 PROCEDURE — 700102 HCHG RX REV CODE 250 W/ 637 OVERRIDE(OP): Performed by: HOSPITALIST

## 2024-03-30 PROCEDURE — A9270 NON-COVERED ITEM OR SERVICE: HCPCS | Performed by: STUDENT IN AN ORGANIZED HEALTH CARE EDUCATION/TRAINING PROGRAM

## 2024-03-30 PROCEDURE — 700102 HCHG RX REV CODE 250 W/ 637 OVERRIDE(OP): Performed by: STUDENT IN AN ORGANIZED HEALTH CARE EDUCATION/TRAINING PROGRAM

## 2024-03-30 PROCEDURE — 80048 BASIC METABOLIC PNL TOTAL CA: CPT

## 2024-03-30 PROCEDURE — 700111 HCHG RX REV CODE 636 W/ 250 OVERRIDE (IP): Mod: JZ | Performed by: STUDENT IN AN ORGANIZED HEALTH CARE EDUCATION/TRAINING PROGRAM

## 2024-03-30 PROCEDURE — 700111 HCHG RX REV CODE 636 W/ 250 OVERRIDE (IP): Performed by: HOSPITALIST

## 2024-03-30 PROCEDURE — 83735 ASSAY OF MAGNESIUM: CPT

## 2024-03-30 PROCEDURE — 85027 COMPLETE CBC AUTOMATED: CPT

## 2024-03-30 PROCEDURE — 99232 SBSQ HOSP IP/OBS MODERATE 35: CPT | Performed by: HOSPITALIST

## 2024-03-30 PROCEDURE — A9270 NON-COVERED ITEM OR SERVICE: HCPCS | Performed by: HOSPITALIST

## 2024-03-30 PROCEDURE — 84100 ASSAY OF PHOSPHORUS: CPT

## 2024-03-30 PROCEDURE — 97535 SELF CARE MNGMENT TRAINING: CPT

## 2024-03-30 RX ORDER — SPIRONOLACTONE 25 MG/1
25 TABLET ORAL
Status: DISCONTINUED | OUTPATIENT
Start: 2024-03-30 | End: 2024-04-01 | Stop reason: HOSPADM

## 2024-03-30 RX ORDER — MAGNESIUM SULFATE HEPTAHYDRATE 40 MG/ML
2 INJECTION, SOLUTION INTRAVENOUS ONCE
Status: COMPLETED | OUTPATIENT
Start: 2024-03-30 | End: 2024-03-30

## 2024-03-30 RX ORDER — POTASSIUM CHLORIDE 7.45 MG/ML
10 INJECTION INTRAVENOUS
Status: COMPLETED | OUTPATIENT
Start: 2024-03-30 | End: 2024-03-30

## 2024-03-30 RX ADMIN — MAGNESIUM 64 MG (MAGNESIUM CHLORIDE) TABLET,DELAYED RELEASE 64 MG: at 05:32

## 2024-03-30 RX ADMIN — POTASSIUM CHLORIDE 10 MEQ: 7.46 INJECTION, SOLUTION INTRAVENOUS at 07:46

## 2024-03-30 RX ADMIN — POTASSIUM BICARBONATE 25 MEQ: 978 TABLET, EFFERVESCENT ORAL at 05:32

## 2024-03-30 RX ADMIN — DOCUSATE SODIUM 50 MG AND SENNOSIDES 8.6 MG 2 TABLET: 8.6; 5 TABLET, FILM COATED ORAL at 17:31

## 2024-03-30 RX ADMIN — MAGNESIUM 64 MG (MAGNESIUM CHLORIDE) TABLET,DELAYED RELEASE 64 MG: at 21:02

## 2024-03-30 RX ADMIN — SPIRONOLACTONE 25 MG: 25 TABLET ORAL at 11:46

## 2024-03-30 RX ADMIN — APIXABAN 5 MG: 5 TABLET, FILM COATED ORAL at 05:31

## 2024-03-30 RX ADMIN — MAGNESIUM SULFATE HEPTAHYDRATE 2 G: 2 INJECTION, SOLUTION INTRAVENOUS at 11:50

## 2024-03-30 RX ADMIN — POTASSIUM BICARBONATE 25 MEQ: 978 TABLET, EFFERVESCENT ORAL at 11:46

## 2024-03-30 RX ADMIN — ONDANSETRON 4 MG: 2 INJECTION INTRAMUSCULAR; INTRAVENOUS at 20:46

## 2024-03-30 RX ADMIN — POTASSIUM CHLORIDE 10 MEQ: 7.46 INJECTION, SOLUTION INTRAVENOUS at 10:16

## 2024-03-30 RX ADMIN — METOPROLOL SUCCINATE 25 MG: 25 TABLET, EXTENDED RELEASE ORAL at 05:31

## 2024-03-30 RX ADMIN — APIXABAN 5 MG: 5 TABLET, FILM COATED ORAL at 17:31

## 2024-03-30 RX ADMIN — POTASSIUM CHLORIDE 10 MEQ: 7.46 INJECTION, SOLUTION INTRAVENOUS at 08:45

## 2024-03-30 RX ADMIN — ROSUVASTATIN 10 MG: 10 TABLET, FILM COATED ORAL at 21:02

## 2024-03-30 RX ADMIN — DULOXETINE HYDROCHLORIDE 30 MG: 30 CAPSULE, DELAYED RELEASE ORAL at 05:32

## 2024-03-30 RX ADMIN — POTASSIUM BICARBONATE 25 MEQ: 978 TABLET, EFFERVESCENT ORAL at 17:31

## 2024-03-30 ASSESSMENT — COGNITIVE AND FUNCTIONAL STATUS - GENERAL
MOVING TO AND FROM BED TO CHAIR: A LITTLE
CLIMB 3 TO 5 STEPS WITH RAILING: A LITTLE
WALKING IN HOSPITAL ROOM: A LITTLE
STANDING UP FROM CHAIR USING ARMS: A LITTLE
MOVING FROM LYING ON BACK TO SITTING ON SIDE OF FLAT BED: A LITTLE
MOBILITY SCORE: 19
SUGGESTED CMS G CODE MODIFIER MOBILITY: CK

## 2024-03-30 ASSESSMENT — PAIN DESCRIPTION - PAIN TYPE
TYPE: ACUTE PAIN
TYPE: ACUTE PAIN

## 2024-03-30 ASSESSMENT — ENCOUNTER SYMPTOMS
SHORTNESS OF BREATH: 0
NAUSEA: 0
VOMITING: 0
ABDOMINAL PAIN: 0

## 2024-03-30 ASSESSMENT — GAIT ASSESSMENTS: GAIT LEVEL OF ASSIST: REFUSED

## 2024-03-30 NOTE — CARE PLAN
The patient is Watcher - Medium risk of patient condition declining or worsening    Shift Goals  Clinical Goals: monitor labs, improve po intake  Patient Goals: rest  Family Goals: not present    Progress made toward(s) clinical / shift goals:  Patient remained free from falls throughout shift. Bed locked and in lowest position, call light and belongings within reach. Room free from clutter. Patient calls appropriately. Patient turns self from side to side, pillows in place for support and positioning. Patient is continent of bowel and bladder. Skin remains intact.  Patient ambulated to bathroom and worked with PT. Tolerated well.     Patient is not progressing towards the following goals:  Poor PO intake, patient given shakes to enhance nutritional intake.   Patient often confused in conversation, delayed responses, but answers all orientation questions appropriately.     Problem: Knowledge Deficit - Standard  Goal: Patient and family/care givers will demonstrate understanding of plan of care, disease process/condition, diagnostic tests and medications  Outcome: Not Progressing

## 2024-03-30 NOTE — CARE PLAN
The patient is Watcher - Medium risk of patient condition declining or worsening    Shift Goals  Clinical Goals: monitor labs  Patient Goals: rest comfortably  Family Goals: not present    Progress made toward(s) clinical / shift goals:        Problem: Pain - Standard  Goal: Alleviation of pain or a reduction in pain to the patient’s comfort goal  Outcome: Progressing     Problem: Knowledge Deficit - Standard  Goal: Patient and family/care givers will demonstrate understanding of plan of care, disease process/condition, diagnostic tests and medications  Outcome: Progressing     Problem: Fall Risk  Goal: Patient will remain free from falls  Outcome: Progressing

## 2024-03-30 NOTE — PROGRESS NOTES
[facility] Medicine Daily Progress Note    Date of Service  3/30/2024    Chief Complaint  Yolanda Roe is a 58 y.o. female admitted 3/25/2024 with nausea vomiting    Hospital Course  Yolanda Roe is a 58 y.o. female dyslipidemia, hypertension, triple negative breast cancer status postmastectomy undergoing chemotherapy by Dr. Gómez, recently diagnosed with PE on Eliquis who presented 3/25/2024 with dyspnea.     Patient recently admitted from 3/9/2024 to 3/20/2024.  She presented for dyspnea and vomiting, decreased p.o. intake.  She was found to have right lower lobe PE along with fluid collections in her's breast surgical sites.  She was started on anticoagulation.  Started on IV fluid and antiemetics.  Respiratory panel PCR negative, tolerating p.o. intake.  She was still feeling weak at discharge however it was felt that there were no ongoing acute inpatient admission needs.  Subsequently discharged.     She returns today feeling generally weak, continued dyspnea.  She states she feels similarly to when she was discharged, she feels poorly and thinks she was discharged too soon.  She had continued poor p.o. intake, started having increased work of breathing.  Due to the persistent weakness, increased dyspnea, inability to tolerate p.o. intake she presented for evaluation.  She denies any fevers or chills headache or vision changes chest pain nausea vomiting dysuria diarrhea.     In the ED she is afebrile pulse 115-125 respiratory rate 12-20 systolic blood pressure  pulse ox 83 to 100% requiring 2 L nasal cannula.  Initial workup no leukocytosis, improved hemoglobin, normal platelet count sodium 130 potassium 3.5 chloride 87 bicarb 18 normal renal function and liver function calcium 11.6 lactic acid 2.2 troponin T 23 chest x-ray shows no acute findings, EKG sinus tachycardia normal intervals QRS subtle ST segment abnormalities anteriorly and laterally not significantly different from prior.  Was given  IV fluids subsequent referred to hospitalist for admission.    Interval Problem Update      Patient is more alert today she is afebrile on room air  Still with poor appetite and poor intake she reports she does not like the food she was provided  Reviewed chemistry panel potassium 3.1 I ordered IV repletion and increase p.o. supplement  I discussed with pharmacist and will add spironolactone to help with her persistent hypokalemia  Magnesium 1.7 I ordered IV repletion in addition to p.o. supplements  Reviewed chemistry panel WBC 3.2 hemoglobin 9.4 platelets 217      I have discussed this patient's plan of care and discharge plan at IDT rounds today with Case Management, Nursing, Nursing leadership, and other members of the IDT team.    Consultants/Specialty       Code Status  Full Code    Disposition  Medically Cleared  I have placed the appropriate orders for post-discharge needs.    Review of Systems  Review of Systems   Constitutional:  Positive for malaise/fatigue.   Respiratory:  Negative for shortness of breath.    Cardiovascular:  Negative for chest pain.   Gastrointestinal:  Negative for abdominal pain, nausea and vomiting.        Physical Exam  Temp:  [36.2 °C (97.1 °F)-37.7 °C (99.8 °F)] 37.7 °C (99.8 °F)  Pulse:  [] 108  Resp:  [17-18] 18  BP: (111-153)/(70-92) 144/87  SpO2:  [91 %-99 %] 91 %    Physical Exam  HENT:      Head: Normocephalic and atraumatic.   Eyes:      General:         Right eye: No discharge.         Left eye: No discharge.      Pupils: Pupils are equal, round, and reactive to light.   Cardiovascular:      Rate and Rhythm: Regular rhythm. Tachycardia present.   Pulmonary:      Effort: Pulmonary effort is normal.      Breath sounds: Normal breath sounds.   Abdominal:      General: There is no distension.      Palpations: Abdomen is soft.      Tenderness: There is no abdominal tenderness.   Neurological:      General: No focal deficit present.      Mental Status: She is alert.       Cranial Nerves: No cranial nerve deficit.      Motor: Weakness present.         Fluids  No intake or output data in the 24 hours ending 03/30/24 1322        Laboratory  Recent Labs     03/28/24  0216 03/29/24  0040 03/30/24  0027   WBC 3.6* 3.6* 3.2*   RBC 3.09* 3.06* 2.88*   HEMOGLOBIN 10.0* 10.0* 9.4*   HEMATOCRIT 29.6* 28.9* 27.3*   MCV 95.8 94.4 94.8   MCH 32.4 32.7 32.6   MCHC 33.8 34.6 34.4   RDW 45.1 44.5 44.4   PLATELETCT 247 235 217   MPV 9.3 9.2 9.2     Recent Labs     03/29/24  0040 03/29/24  1434 03/30/24  0027   SODIUM 138 135 138   POTASSIUM 3.0* 3.5* 3.1*   CHLORIDE 97 96 100   CO2 22 23 21   GLUCOSE 77 77 69   BUN 2* 2* <2*   CREATININE 0.53 0.47* 0.42*   CALCIUM 9.1 8.9 8.3*                   Imaging  MR-BRAIN-WITH & W/O   Final Result      1.  Mild age-related cerebral atrophy.      2.  No evidence of metastatic disease to the brain parenchyma.      US-RUQ   Final Result      1. Stones and sludge in the gallbladder lumen. Gallbladder wall thickness. No sonographic evidence of acute cholecystitis.   2. Hepatic steatosis.   3. The remainder of the examination is within normal limits.      DX-CHEST-PORTABLE (1 VIEW)   Final Result      1. No acute findings.   2. Appropriate position of the right internal jugular Port-A-Cath.           Assessment/Plan  * Acute respiratory failure with hypoxia (HCC)- (present on admission)  Assessment & Plan  Recently diagnosed with PE, on Eliquis.    Resolved    Nausea & vomiting  Assessment & Plan  Improved    Workup negative  Advance diet as tolerated  Ensure supplements    Advance care planning  Assessment & Plan        Hypercalcemia- (present on admission)  Assessment & Plan  Secondary to dehydration  Resolved with IV fluids    Dehydration- (present on admission)  Assessment & Plan  Resolved with IV hydration encourage p.o. intake    Hypokalemia- (present on admission)  Assessment & Plan  I ordered IV and p.o. repletion    Start spironolactone  Monitor levels I  ordered repeat chemistry panel    Hyponatremia- (present on admission)  Assessment & Plan  Resolved with hydration  Sodium 138  Continue to monitor electrolytes    Primary hypertension- (present on admission)  Assessment & Plan  Continue metoprolol and monitor blood pressure    Pulmonary embolism and infarction (HCC)- (present on admission)  Assessment & Plan  Continue Eliquis.  Weaned off oxygen      Breast cancer of upper-outer quadrant of left female breast (HCC)- (present on admission)  Assessment & Plan  -Triple negative  -S/p bilateral total mastectomy  -On treatment by Dr. Gómez. Regimen: : Pembrolizumab + PACLitaxel + CARBOplatin (x 4 cycles) followed by Pembrolizumab + Cyclophosphamide + DOXOrubicin   (x4 cycles)  -Continue outpatient follow-up with oncology.         VTE prophylaxis: Eliquis    I have performed a physical exam and reviewed and updated ROS and Plan today (3/30/2024). In review of yesterday's note (3/29/2024), there are no changes except as documented above.

## 2024-03-31 VITALS
OXYGEN SATURATION: 91 % | WEIGHT: 153 LBS | DIASTOLIC BLOOD PRESSURE: 78 MMHG | HEART RATE: 98 BPM | RESPIRATION RATE: 17 BRPM | SYSTOLIC BLOOD PRESSURE: 120 MMHG | TEMPERATURE: 97.8 F | BODY MASS INDEX: 27.11 KG/M2 | HEIGHT: 63 IN

## 2024-03-31 PROBLEM — E86.0 DEHYDRATION: Status: RESOLVED | Noted: 2024-03-25 | Resolved: 2024-03-31

## 2024-03-31 LAB
ANION GAP SERPL CALC-SCNC: 19 MMOL/L (ref 7–16)
BUN SERPL-MCNC: <2 MG/DL (ref 8–22)
CALCIUM SERPL-MCNC: 7.7 MG/DL (ref 8.5–10.5)
CHLORIDE SERPL-SCNC: 103 MMOL/L (ref 96–112)
CO2 SERPL-SCNC: 16 MMOL/L (ref 20–33)
CREAT SERPL-MCNC: 0.38 MG/DL (ref 0.5–1.4)
GFR SERPLBLD CREATININE-BSD FMLA CKD-EPI: 115 ML/MIN/1.73 M 2
GLUCOSE SERPL-MCNC: 65 MG/DL (ref 65–99)
MAGNESIUM SERPL-MCNC: 1.3 MG/DL (ref 1.5–2.5)
POTASSIUM SERPL-SCNC: 3 MMOL/L (ref 3.6–5.5)
POTASSIUM SERPL-SCNC: 4 MMOL/L (ref 3.6–5.5)
SODIUM SERPL-SCNC: 138 MMOL/L (ref 135–145)

## 2024-03-31 PROCEDURE — 84132 ASSAY OF SERUM POTASSIUM: CPT

## 2024-03-31 PROCEDURE — A9270 NON-COVERED ITEM OR SERVICE: HCPCS | Performed by: STUDENT IN AN ORGANIZED HEALTH CARE EDUCATION/TRAINING PROGRAM

## 2024-03-31 PROCEDURE — 700111 HCHG RX REV CODE 636 W/ 250 OVERRIDE (IP)

## 2024-03-31 PROCEDURE — A9270 NON-COVERED ITEM OR SERVICE: HCPCS | Performed by: HOSPITALIST

## 2024-03-31 PROCEDURE — 700102 HCHG RX REV CODE 250 W/ 637 OVERRIDE(OP): Performed by: STUDENT IN AN ORGANIZED HEALTH CARE EDUCATION/TRAINING PROGRAM

## 2024-03-31 PROCEDURE — 770004 HCHG ROOM/CARE - ONCOLOGY PRIVATE *

## 2024-03-31 PROCEDURE — 83735 ASSAY OF MAGNESIUM: CPT

## 2024-03-31 PROCEDURE — 700102 HCHG RX REV CODE 250 W/ 637 OVERRIDE(OP)

## 2024-03-31 PROCEDURE — 700105 HCHG RX REV CODE 258: Performed by: HOSPITALIST

## 2024-03-31 PROCEDURE — 99232 SBSQ HOSP IP/OBS MODERATE 35: CPT | Performed by: HOSPITALIST

## 2024-03-31 PROCEDURE — 700111 HCHG RX REV CODE 636 W/ 250 OVERRIDE (IP): Performed by: HOSPITALIST

## 2024-03-31 PROCEDURE — 80048 BASIC METABOLIC PNL TOTAL CA: CPT

## 2024-03-31 PROCEDURE — 700102 HCHG RX REV CODE 250 W/ 637 OVERRIDE(OP): Performed by: HOSPITALIST

## 2024-03-31 PROCEDURE — A9270 NON-COVERED ITEM OR SERVICE: HCPCS

## 2024-03-31 RX ORDER — MAGNESIUM SULFATE HEPTAHYDRATE 40 MG/ML
4 INJECTION, SOLUTION INTRAVENOUS ONCE
Status: DISCONTINUED | OUTPATIENT
Start: 2024-03-31 | End: 2024-03-31

## 2024-03-31 RX ORDER — CALCIUM CARBONATE 500 MG/1
500 TABLET, CHEWABLE ORAL 3 TIMES DAILY
Status: COMPLETED | OUTPATIENT
Start: 2024-03-31 | End: 2024-03-31

## 2024-03-31 RX ORDER — MAGNESIUM SULFATE HEPTAHYDRATE 40 MG/ML
2 INJECTION, SOLUTION INTRAVENOUS ONCE
Status: COMPLETED | OUTPATIENT
Start: 2024-03-31 | End: 2024-03-31

## 2024-03-31 RX ORDER — GAUZE BANDAGE 2" X 2"
100 BANDAGE TOPICAL DAILY
Status: DISCONTINUED | OUTPATIENT
Start: 2024-03-31 | End: 2024-04-01 | Stop reason: HOSPADM

## 2024-03-31 RX ORDER — OXYCODONE HYDROCHLORIDE 5 MG/1
2.5 TABLET ORAL EVERY 6 HOURS PRN
Status: DISCONTINUED | OUTPATIENT
Start: 2024-03-31 | End: 2024-04-01 | Stop reason: HOSPADM

## 2024-03-31 RX ORDER — MAGNESIUM SULFATE HEPTAHYDRATE 40 MG/ML
4 INJECTION, SOLUTION INTRAVENOUS ONCE
Status: COMPLETED | OUTPATIENT
Start: 2024-03-31 | End: 2024-03-31

## 2024-03-31 RX ORDER — ACETAMINOPHEN 500 MG
1000 TABLET ORAL 4 TIMES DAILY
Status: DISCONTINUED | OUTPATIENT
Start: 2024-03-31 | End: 2024-04-01 | Stop reason: HOSPADM

## 2024-03-31 RX ORDER — POTASSIUM CHLORIDE 7.45 MG/ML
10 INJECTION INTRAVENOUS ONCE
Status: COMPLETED | OUTPATIENT
Start: 2024-03-31 | End: 2024-03-31

## 2024-03-31 RX ADMIN — ANTACID TABLETS 500 MG: 500 TABLET, CHEWABLE ORAL at 05:29

## 2024-03-31 RX ADMIN — MAGNESIUM SULFATE HEPTAHYDRATE 4 G: 4 INJECTION, SOLUTION INTRAVENOUS at 02:25

## 2024-03-31 RX ADMIN — MAGNESIUM 64 MG (MAGNESIUM CHLORIDE) TABLET,DELAYED RELEASE 64 MG: at 17:22

## 2024-03-31 RX ADMIN — OXYCODONE 2.5 MG: 5 TABLET ORAL at 11:57

## 2024-03-31 RX ADMIN — ACETAMINOPHEN 1000 MG: 500 TABLET, FILM COATED ORAL at 09:20

## 2024-03-31 RX ADMIN — POTASSIUM BICARBONATE 50 MEQ: 978 TABLET, EFFERVESCENT ORAL at 11:57

## 2024-03-31 RX ADMIN — MAGNESIUM SULFATE HEPTAHYDRATE 2 G: 2 INJECTION, SOLUTION INTRAVENOUS at 08:58

## 2024-03-31 RX ADMIN — ROSUVASTATIN 10 MG: 10 TABLET, FILM COATED ORAL at 20:24

## 2024-03-31 RX ADMIN — DULOXETINE HYDROCHLORIDE 30 MG: 30 CAPSULE, DELAYED RELEASE ORAL at 05:31

## 2024-03-31 RX ADMIN — ANTACID TABLETS 500 MG: 500 TABLET, CHEWABLE ORAL at 11:57

## 2024-03-31 RX ADMIN — POTASSIUM CHLORIDE 10 MEQ: 7.46 INJECTION, SOLUTION INTRAVENOUS at 09:04

## 2024-03-31 RX ADMIN — POTASSIUM BICARBONATE 25 MEQ: 978 TABLET, EFFERVESCENT ORAL at 05:28

## 2024-03-31 RX ADMIN — MAGNESIUM 64 MG (MAGNESIUM CHLORIDE) TABLET,DELAYED RELEASE 64 MG: at 05:31

## 2024-03-31 RX ADMIN — APIXABAN 5 MG: 5 TABLET, FILM COATED ORAL at 05:29

## 2024-03-31 RX ADMIN — POTASSIUM BICARBONATE 25 MEQ: 978 TABLET, EFFERVESCENT ORAL at 17:22

## 2024-03-31 RX ADMIN — APIXABAN 5 MG: 5 TABLET, FILM COATED ORAL at 17:22

## 2024-03-31 RX ADMIN — Medication 100 MG: at 08:50

## 2024-03-31 RX ADMIN — SPIRONOLACTONE 25 MG: 25 TABLET ORAL at 05:29

## 2024-03-31 RX ADMIN — METOPROLOL SUCCINATE 25 MG: 25 TABLET, EXTENDED RELEASE ORAL at 05:29

## 2024-03-31 RX ADMIN — ACETAMINOPHEN 1000 MG: 500 TABLET, FILM COATED ORAL at 20:24

## 2024-03-31 RX ADMIN — SODIUM CHLORIDE: 9 INJECTION, SOLUTION INTRAVENOUS at 20:22

## 2024-03-31 RX ADMIN — OXYCODONE 2.5 MG: 5 TABLET ORAL at 20:29

## 2024-03-31 RX ADMIN — ANTACID TABLETS 500 MG: 500 TABLET, CHEWABLE ORAL at 17:22

## 2024-03-31 ASSESSMENT — ENCOUNTER SYMPTOMS
SHORTNESS OF BREATH: 0
ABDOMINAL PAIN: 0

## 2024-03-31 ASSESSMENT — PAIN DESCRIPTION - PAIN TYPE
TYPE: ACUTE PAIN

## 2024-03-31 NOTE — CARE PLAN
The patient is Stable - Low risk of patient condition declining or worsening    Shift Goals  Clinical Goals: monitor labs  Patient Goals: sleep  Family Goals: not present    Progress made toward(s) clinical / shift goals:        Problem: Pain - Standard  Goal: Alleviation of pain or a reduction in pain to the patient’s comfort goal  Outcome: Progressing     Problem: Knowledge Deficit - Standard  Goal: Patient and family/care givers will demonstrate understanding of plan of care, disease process/condition, diagnostic tests and medications  Outcome: Progressing     Problem: Fall Risk  Goal: Patient will remain free from falls  Outcome: Progressing

## 2024-03-31 NOTE — PROGRESS NOTES
[facility] Medicine Daily Progress Note    Date of Service  3/31/2024    Chief Complaint  Yolanda Roe is a 58 y.o. female admitted 3/25/2024 with nausea vomiting    Hospital Course  Yolanda Roe is a 58 y.o. female dyslipidemia, hypertension, triple negative breast cancer status postmastectomy undergoing chemotherapy by Dr. Gómez, recently diagnosed with PE on Eliquis who presented 3/25/2024 with dyspnea.     Patient recently admitted from 3/9/2024 to 3/20/2024.  She presented for dyspnea and vomiting, decreased p.o. intake.  She was found to have right lower lobe PE along with fluid collections in her's breast surgical sites.  She was started on anticoagulation.  Started on IV fluid and antiemetics.  Respiratory panel PCR negative, tolerating p.o. intake.  She was still feeling weak at discharge however it was felt that there were no ongoing acute inpatient admission needs.  Subsequently discharged.     She returns today feeling generally weak, continued dyspnea.  She states she feels similarly to when she was discharged, she feels poorly and thinks she was discharged too soon.  She had continued poor p.o. intake, started having increased work of breathing.  Due to the persistent weakness, increased dyspnea, inability to tolerate p.o. intake she presented for evaluation.  She denies any fevers or chills headache or vision changes chest pain nausea vomiting dysuria diarrhea.     In the ED she is afebrile pulse 115-125 respiratory rate 12-20 systolic blood pressure  pulse ox 83 to 100% requiring 2 L nasal cannula.  Initial workup no leukocytosis, improved hemoglobin, normal platelet count sodium 130 potassium 3.5 chloride 87 bicarb 18 normal renal function and liver function calcium 11.6 lactic acid 2.2 troponin T 23 chest x-ray shows no acute findings, EKG sinus tachycardia normal intervals QRS subtle ST segment abnormalities anteriorly and laterally not significantly different from prior.  Was given  IV fluids subsequent referred to hospitalist for admission.    Interval Problem Update      Patient is alert oriented x 3  She has been weaned off oxygen  Still with poor appetite and p.o. intake  Potassium 3.0 I ordered IV repletion increase p.o. supplements  I ordered repeat potassium 4.0  Magnesium 1.3 I ordered IV magnesium sulfate for total of 6 g  Blood cultures are negative  She is afebrile        I have discussed this patient's plan of care and discharge plan at IDT rounds today with Case Management, Nursing, Nursing leadership, and other members of the IDT team.    Consultants/Specialty       Code Status  Full Code    Disposition  The patient is not medically cleared for discharge to home or a post-acute facility.      I have placed the appropriate orders for post-discharge needs.    Review of Systems  Review of Systems   Constitutional:  Positive for malaise/fatigue.   Respiratory:  Negative for shortness of breath.    Cardiovascular:  Negative for chest pain.   Gastrointestinal:  Negative for abdominal pain.   All other systems reviewed and are negative.       Physical Exam  Temp:  [36.7 °C (98.1 °F)-37.2 °C (98.9 °F)] 37.2 °C (98.9 °F)  Pulse:  [] 100  Resp:  [18] 18  BP: (123-154)/(79-92) 141/85  SpO2:  [90 %-98 %] 91 %    Physical Exam  Vitals and nursing note reviewed.   Constitutional:       General: She is not in acute distress.  HENT:      Head: Normocephalic and atraumatic.      Nose: Nose normal. No rhinorrhea.      Mouth/Throat:      Pharynx: No oropharyngeal exudate or posterior oropharyngeal erythema.   Eyes:      General:         Right eye: No discharge.         Left eye: No discharge.   Cardiovascular:      Rate and Rhythm: Normal rate and regular rhythm.      Heart sounds: Normal heart sounds.   Pulmonary:      Effort: Pulmonary effort is normal. No respiratory distress.      Breath sounds: Normal breath sounds. No stridor. No rales.   Chest:      Chest wall: No tenderness.    Abdominal:      General: There is no distension.      Palpations: Abdomen is soft. There is no mass.      Tenderness: There is no abdominal tenderness.   Musculoskeletal:         General: No swelling or tenderness.      Cervical back: Neck supple. No rigidity.   Skin:     General: Skin is warm and dry.      Coloration: Skin is not cyanotic or jaundiced.      Nails: There is no clubbing.   Neurological:      General: No focal deficit present.      Mental Status: She is alert and oriented to person, place, and time.      Cranial Nerves: No cranial nerve deficit.      Motor: Weakness present.   Psychiatric:         Mood and Affect: Mood normal.         Behavior: Behavior normal.         Fluids  No intake or output data in the 24 hours ending 03/31/24 1429        Laboratory  Recent Labs     03/29/24  0040 03/30/24  0027   WBC 3.6* 3.2*   RBC 3.06* 2.88*   HEMOGLOBIN 10.0* 9.4*   HEMATOCRIT 28.9* 27.3*   MCV 94.4 94.8   MCH 32.7 32.6   MCHC 34.6 34.4   RDW 44.5 44.4   PLATELETCT 235 217   MPV 9.2 9.2     Recent Labs     03/29/24  1434 03/30/24  0027 03/31/24  0045 03/31/24  1315   SODIUM 135 138 138  --    POTASSIUM 3.5* 3.1* 3.0* 4.0   CHLORIDE 96 100 103  --    CO2 23 21 16*  --    GLUCOSE 77 69 65  --    BUN 2* <2* <2*  --    CREATININE 0.47* 0.42* 0.38*  --    CALCIUM 8.9 8.3* 7.7*  --                    Imaging  MR-BRAIN-WITH & W/O   Final Result      1.  Mild age-related cerebral atrophy.      2.  No evidence of metastatic disease to the brain parenchyma.      US-RUQ   Final Result      1. Stones and sludge in the gallbladder lumen. Gallbladder wall thickness. No sonographic evidence of acute cholecystitis.   2. Hepatic steatosis.   3. The remainder of the examination is within normal limits.      DX-CHEST-PORTABLE (1 VIEW)   Final Result      1. No acute findings.   2. Appropriate position of the right internal jugular Port-A-Cath.           Assessment/Plan  * Acute respiratory failure with hypoxia (HCC)-  (present on admission)  Assessment & Plan  Recently diagnosed with PE, on Eliquis.    Resolved    Nausea & vomiting  Assessment & Plan  Improved    Workup negative  Advance diet as tolerated  Ensure supplements    Advance care planning  Assessment & Plan        Hypercalcemia- (present on admission)  Assessment & Plan  Secondary to dehydration  Resolved with IV fluids    Hypokalemia- (present on admission)  Assessment & Plan  I ordered IV and p.o. repletion    Continue spironolactone  Monitor levels I ordered repeat chemistry panel    Hyponatremia- (present on admission)  Assessment & Plan  Resolved with hydration  Sodium 138  Continue to monitor electrolytes    Primary hypertension- (present on admission)  Assessment & Plan  Continue metoprolol and monitor blood pressure    Pulmonary embolism and infarction (HCC)- (present on admission)  Assessment & Plan  Continue Eliquis.  Weaned off oxygen      Breast cancer of upper-outer quadrant of left female breast (HCC)- (present on admission)  Assessment & Plan  -Triple negative  -S/p bilateral total mastectomy  -On treatment by Dr. Gómez. Regimen: : Pembrolizumab + PACLitaxel + CARBOplatin (x 4 cycles) followed by Pembrolizumab + Cyclophosphamide + DOXOrubicin   (x4 cycles)  -Continue outpatient follow-up with oncology.         VTE prophylaxis: Eliquis    I have performed a physical exam and reviewed and updated ROS and Plan today (3/31/2024). In review of yesterday's note (3/30/2024), there are no changes except as documented above.

## 2024-03-31 NOTE — THERAPY
Physical Therapy Contact Note    Patient Name: Yolanda Roe  Age:  58 y.o., Sex:  female  Medical Record #: [id number]  Today's Date: 3/30/2024    Pt essentially non-participatory; speaking in full sentences at times and at others appears to be ignoring therapist; reporting she 'doesn't feel like it' when asked to ambulate for dc recs; reporting she has just walked to the bathroom with FWW without assist; confirmed with RN staff; pt appears to need more speech/OT appears perfectly capable of mobility;          03/30/24 1150   Cognition    Cognition / Consciousness X   Level of Consciousness Alert   Ability To Follow Commands Other (See Comments)   Comments refusing to participate; at times responding in complete sentences and at times not responding to therapist; did not seem physiological; ' i don't feel like it'   Balance   Comments maxed rasied HOB/uncovered attempted to assist out of bed, pt not participating; reporting she just walked to the bathroom without assist   Bed Mobility    Supine to Sit Refused   Gait Analysis   Gait Level Of Assist Refused   Comments confirmed with pt/RN staff pt walked to restroom without assist   Functional Mobility   Sit to Stand Refused   Short Term Goals    Short Term Goal # 1 Pt will ambulate 250 feet with LRAD SBA within 6 visits to facilitate return to prior level of living.   Goal Outcome # 1 Other (see comments)  (not observed)   Short Term Goal # 2 Pt will transfer supine to sitting EOB with bed flat, no rail and supervision within 6 visits to facilitate preparation for out of bed activities.   Goal Outcome # 2 Other (see comments)  (not observed)   Short Term Goal # 3 Pt will transfer sit <-> stand with LRAD with supervision in 6 visits to facilitate return to prior level of function.  (not observe)

## 2024-03-31 NOTE — DISCHARGE PLANNING
Contacted [facility], they have accepted patient for services, and will follow for discharge.

## 2024-03-31 NOTE — CARE PLAN
The patient is Watcher - Medium risk of patient condition declining or worsening    Shift Goals  Clinical Goals: Patient potassium and magnesium levels will increase and remain stable after replacement as evidenced by noon  lab draw  Patient Goals: Pain managment  Family Goals: None present at this time    Progress made toward(s) clinical / shift goals:    Problem: Pain - Standard  Goal: Alleviation of pain or a reduction in pain to the patient’s comfort goal  Outcome: Progressing     Problem: Knowledge Deficit - Standard  Goal: Patient and family/care givers will demonstrate understanding of plan of care, disease process/condition, diagnostic tests and medications  Outcome: Progressing     Problem: Fall Risk  Goal: Patient will remain free from falls  Outcome: Progressing     Problem: Discharge Barriers/Planning  Goal: Patient's continuum of care needs are met  Outcome: Progressing     Problem: Hemodynamics  Goal: Patient's hemodynamics, fluid balance and neurologic status will be stable or improve  Outcome: Progressing     Problem: Fluid Volume  Goal: Fluid volume balance will be maintained  Outcome: Progressing     Problem: Urinary Elimination  Goal: Establish and maintain regular urinary output  Outcome: Progressing     Problem: Bowel Elimination  Goal: Establish and maintain regular bowel function  Outcome: Progressing       Patient is not progressing towards the following goals:      Problem: Communication  Goal: The ability to communicate needs accurately and effectively will improve  Outcome: Not Progressing  Note: Patient with delayed responses and frequently stops in the middle of communication or does not respond to questions requiring further prompting.      Problem: Nutrition  Goal: Patient's nutritional and fluid intake will be adequate or improve  Outcome: Not Progressing  Note: Patient only eats between 25-50% of each meal. Nutritional supplements provided and patient encouraged to consume. Denies  nausea.

## 2024-04-01 ENCOUNTER — PHARMACY VISIT (OUTPATIENT)
Dept: PHARMACY | Facility: MEDICAL CENTER | Age: 59
End: 2024-04-01
Payer: COMMERCIAL

## 2024-04-01 VITALS
HEIGHT: 63 IN | WEIGHT: 153 LBS | TEMPERATURE: 98.3 F | OXYGEN SATURATION: 91 % | RESPIRATION RATE: 18 BRPM | BODY MASS INDEX: 27.11 KG/M2 | SYSTOLIC BLOOD PRESSURE: 143 MMHG | HEART RATE: 101 BPM | DIASTOLIC BLOOD PRESSURE: 84 MMHG

## 2024-04-01 PROBLEM — E87.1 HYPONATREMIA: Status: RESOLVED | Noted: 2024-03-09 | Resolved: 2024-04-01

## 2024-04-01 PROBLEM — J96.01 ACUTE RESPIRATORY FAILURE WITH HYPOXIA (HCC): Status: RESOLVED | Noted: 2024-03-25 | Resolved: 2024-04-01

## 2024-04-01 PROBLEM — E83.52 HYPERCALCEMIA: Status: RESOLVED | Noted: 2024-03-25 | Resolved: 2024-04-01

## 2024-04-01 PROBLEM — R11.2 NAUSEA & VOMITING: Status: RESOLVED | Noted: 2024-03-26 | Resolved: 2024-04-01

## 2024-04-01 LAB
ANION GAP SERPL CALC-SCNC: 16 MMOL/L (ref 7–16)
BUN SERPL-MCNC: <2 MG/DL (ref 8–22)
CALCIUM SERPL-MCNC: 9.3 MG/DL (ref 8.5–10.5)
CHLORIDE SERPL-SCNC: 94 MMOL/L (ref 96–112)
CO2 SERPL-SCNC: 23 MMOL/L (ref 20–33)
CREAT SERPL-MCNC: 0.48 MG/DL (ref 0.5–1.4)
ERYTHROCYTE [DISTWIDTH] IN BLOOD BY AUTOMATED COUNT: 43.4 FL (ref 35.9–50)
GFR SERPLBLD CREATININE-BSD FMLA CKD-EPI: 108 ML/MIN/1.73 M 2
GLUCOSE SERPL-MCNC: 76 MG/DL (ref 65–99)
HCT VFR BLD AUTO: 28.3 % (ref 37–47)
HGB BLD-MCNC: 9.6 G/DL (ref 12–16)
MAGNESIUM SERPL-MCNC: 2 MG/DL (ref 1.5–2.5)
MCH RBC QN AUTO: 31.8 PG (ref 27–33)
MCHC RBC AUTO-ENTMCNC: 33.9 G/DL (ref 32.2–35.5)
MCV RBC AUTO: 93.7 FL (ref 81.4–97.8)
PHOSPHATE SERPL-MCNC: 5.6 MG/DL (ref 2.5–4.5)
PLATELET # BLD AUTO: 217 K/UL (ref 164–446)
PMV BLD AUTO: 8.7 FL (ref 9–12.9)
POTASSIUM SERPL-SCNC: 3.8 MMOL/L (ref 3.6–5.5)
RBC # BLD AUTO: 3.02 M/UL (ref 4.2–5.4)
SODIUM SERPL-SCNC: 133 MMOL/L (ref 135–145)
WBC # BLD AUTO: 3 K/UL (ref 4.8–10.8)

## 2024-04-01 PROCEDURE — A9270 NON-COVERED ITEM OR SERVICE: HCPCS | Performed by: HOSPITALIST

## 2024-04-01 PROCEDURE — 700105 HCHG RX REV CODE 258: Performed by: HOSPITALIST

## 2024-04-01 PROCEDURE — 83735 ASSAY OF MAGNESIUM: CPT

## 2024-04-01 PROCEDURE — RXMED WILLOW AMBULATORY MEDICATION CHARGE: Performed by: HOSPITALIST

## 2024-04-01 PROCEDURE — 85027 COMPLETE CBC AUTOMATED: CPT

## 2024-04-01 PROCEDURE — 700102 HCHG RX REV CODE 250 W/ 637 OVERRIDE(OP): Performed by: STUDENT IN AN ORGANIZED HEALTH CARE EDUCATION/TRAINING PROGRAM

## 2024-04-01 PROCEDURE — A9270 NON-COVERED ITEM OR SERVICE: HCPCS | Performed by: STUDENT IN AN ORGANIZED HEALTH CARE EDUCATION/TRAINING PROGRAM

## 2024-04-01 PROCEDURE — 700102 HCHG RX REV CODE 250 W/ 637 OVERRIDE(OP): Performed by: HOSPITALIST

## 2024-04-01 PROCEDURE — 84100 ASSAY OF PHOSPHORUS: CPT

## 2024-04-01 PROCEDURE — 99239 HOSP IP/OBS DSCHRG MGMT >30: CPT | Performed by: HOSPITALIST

## 2024-04-01 PROCEDURE — 80048 BASIC METABOLIC PNL TOTAL CA: CPT

## 2024-04-01 PROCEDURE — 700111 HCHG RX REV CODE 636 W/ 250 OVERRIDE (IP): Performed by: HOSPITALIST

## 2024-04-01 RX ORDER — ACETAMINOPHEN 500 MG
500-1000 TABLET ORAL EVERY 6 HOURS PRN
Qty: 100 TABLET | Refills: 0 | Status: SHIPPED | OUTPATIENT
Start: 2024-04-01

## 2024-04-01 RX ORDER — ONDANSETRON 4 MG/1
4 TABLET, ORALLY DISINTEGRATING ORAL EVERY 6 HOURS PRN
Qty: 30 TABLET | Refills: 0 | Status: SHIPPED | OUTPATIENT
Start: 2024-04-01

## 2024-04-01 RX ORDER — SPIRONOLACTONE 25 MG/1
25 TABLET ORAL DAILY
Qty: 30 TABLET | Refills: 0 | Status: SHIPPED | OUTPATIENT
Start: 2024-04-02

## 2024-04-01 RX ADMIN — Medication 100 MG: at 04:27

## 2024-04-01 RX ADMIN — SPIRONOLACTONE 25 MG: 25 TABLET ORAL at 04:26

## 2024-04-01 RX ADMIN — HEPARIN 500 UNITS: 100 SYRINGE at 09:08

## 2024-04-01 RX ADMIN — ACETAMINOPHEN 1000 MG: 500 TABLET, FILM COATED ORAL at 04:27

## 2024-04-01 RX ADMIN — MAGNESIUM 64 MG (MAGNESIUM CHLORIDE) TABLET,DELAYED RELEASE 64 MG: at 04:27

## 2024-04-01 RX ADMIN — POTASSIUM BICARBONATE 25 MEQ: 978 TABLET, EFFERVESCENT ORAL at 04:26

## 2024-04-01 RX ADMIN — APIXABAN 5 MG: 5 TABLET, FILM COATED ORAL at 04:27

## 2024-04-01 RX ADMIN — SODIUM CHLORIDE: 9 INJECTION, SOLUTION INTRAVENOUS at 04:22

## 2024-04-01 RX ADMIN — METOPROLOL SUCCINATE 25 MG: 25 TABLET, EXTENDED RELEASE ORAL at 04:27

## 2024-04-01 RX ADMIN — DULOXETINE HYDROCHLORIDE 30 MG: 30 CAPSULE, DELAYED RELEASE ORAL at 04:27

## 2024-04-01 NOTE — CARE PLAN
The patient is Stable - Low risk of patient condition declining or worsening    Shift Goals  Clinical Goals: patient potassium level will remain within normal limits and magnesium will increase to normal limits  Patient Goals: Patient will rest comfortably throughout the shift  Family Goals: N/A    Progress made toward(s) clinical / shift goals:  Labs reviewed. Potassium and magnesium within normal limits. Patient resting comfortably throughout the shift. No new complaints or changes.       Problem: Pain - Standard  Goal: Alleviation of pain or a reduction in pain to the patient’s comfort goal  Outcome: Progressing     Problem: Knowledge Deficit - Standard  Goal: Patient and family/care givers will demonstrate understanding of plan of care, disease process/condition, diagnostic tests and medications  Outcome: Progressing     Problem: Fall Risk  Goal: Patient will remain free from falls  Outcome: Progressing     Problem: Discharge Barriers/Planning  Goal: Patient's continuum of care needs are met  Outcome: Progressing     Problem: Urinary Elimination  Goal: Establish and maintain regular urinary output  Outcome: Progressing       Patient is not progressing towards the following goals: N/A

## 2024-04-01 NOTE — DISCHARGE INSTRUCTIONS
Discharge Instructions    Discharged to home by car with relative. Discharged via wheelchair, hospital escort: Yes.  Special equipment needed: Not Applicable    Be sure to schedule a follow-up appointment with your primary care doctor or any specialists as instructed.     Discharge Plan:   Diet Plan: Discussed  Activity Level: Discussed  Confirmed Follow up Appointment: Appointment Scheduled  Confirmed Symptoms Management: Discussed  Medication Reconciliation Updated: Yes  Influenza Vaccine Indication: Patient Refuses    I understand that a diet low in cholesterol, fat, and sodium is recommended for good health. Unless I have been given specific instructions below for another diet, I accept this instruction as my diet prescription.   Other diet: Regular    Special Instructions: None    -Is this patient being discharged with medication to prevent blood clots?  No    Is patient discharged on Warfarin / Coumadin?   No       Acute Respiratory Failure, Adult  Acute respiratory failure is when one or both of these things happen:  Oxygen cannot pass from the lungs into the blood, causing the blood oxygen level to drop. Loss of blood oxygen means tissues and organs may not work well.  A gas called carbon dioxide cannot pass from the blood into the lungs so the body can get rid of it. The buildup of carbon dioxide can damage the tissues and organs in the body.  Acute respiratory failure happens fast. It is an emergency and needs to be treated right away.  What are the causes?  Common causes of respiratory failure that may cause low oxygen levels include:  Trauma to the lung, chest, or ribs, or to the tissues around the lung.  Lung conditions, such as pneumonia, asthma, or blood clots in the lungs (pulmonary embolism).  Breathing in harmful chemicals, smoke, water, or vomit.  A widespread infection (sepsis).  Heart attack.  Common causes of respiratory failure that cause a buildup of carbon dioxide include:  Stroke.  A spinal  cord injury.  Drug or alcohol overdose.  Sepsis.  The heart stopping all of a sudden (cardiac arrest).  What increases the risk?  This condition is more likely to develop in people who have:  Known lung conditions, such as asthma or chronic obstructive pulmonary disease (COPD).  A condition that damages or weakens the muscles, nerves, bones, or tissues that are involved in breathing, such as myasthenia gravis or Guillain-Barré syndrome.  A health problem that blocks the unconscious reflex that is involved in breathing, such as hypothyroidism or sleep apnea.  What are the signs or symptoms?  Symptoms may depend on the cause and on the levels of oxygen and carbon dioxide in your blood.   Trouble breathing is the main symptom of acute respiratory failure.  Other symptoms may include:  Fast breathing.  Making high-pitched whistling sounds when you breathe (wheezing) and grunting.  Confusion or changes in behavior.  Feeling tired, sleeping more than normal, or being hard to wake.  Skin, lips, or fingernails that look blue (cyanosis).  Feeling restless or anxious.  Fast or irregular heartbeats (palpitations).  How is this diagnosed?  This condition may be diagnosed based on:  Your medical history and a physical exam. Your health care provider will listen to your heart and lungs to check for abnormal sounds.  Tests to confirm the diagnosis and find the cause of respiratory failure. Tests may include:  Measuring the amount of oxygen in your blood (pulse oximetry). This involves placing a small device on your finger, earlobe, or toe.  Blood tests to measure levels of blood oxygen and carbon dioxide.  Chest X-ray.  How is this treated?  Treatment for this condition usually takes place in a hospital intensive care unit (ICU). Treatment depends on the cause of the condition. Treatment may include one or more of these:  Oxygen given through your nose or a face mask.  A device to help you breathe, such as a continuous positive  airway pressure (CPAP) machine or bilevel positive airway pressure (BIPAP) machine. The device gives you oxygen and pressure.  Other breathing treatments, fluids, and medicines.  A breathing machine called a ventilator. This gives you oxygen and pressure to help you breathe. A tube is put into your mouth and windpipe (trachea) and connects to the ventilator.  Tracheostomy placement, if you are on a ventilator for a long time. A tracheostomy is a breathing tube put through your neck into your trachea.  Follow these instructions at home:  Medicines  Take over-the-counter and prescription medicines only as told by your health care provider.  If you were prescribed antibiotics, take them as told by your health care provider. Do not stop using the antibiotic even if you start to feel better.  General instructions  Return to your normal activities as told by your health care provider. Ask your health care provider what activities are safe for you.  Do not use any products that contain nicotine or tobacco. These products include cigarettes, chewing tobacco, and vaping devices, such as e-cigarettes. If you need help quitting, ask your health care provider.  Keep all follow-up visits.  Contact a health care provider if:  Your symptoms do not improve or they get worse.  Get help right away if:  You are having trouble breathing.  You lose consciousness.  Your heart starts beating very fast.  Your fingers, lips, or other areas of your body turn blue.  You are confused.  These symptoms may be an emergency. Get help right away. Call 911.  Do not wait to see if the symptoms will go away.  Do not drive yourself to the hospital.  Summary  Acute respiratory failure is a condition that develops fast and needs to be treated right away.  The main symptom of this condition is trouble breathing.  Treatment for this condition usually takes place in a hospital intensive care unit (ICU). Treatment may include oxygen, fluids, and medicines. A  machine may be used to help you breathe, such as a ventilator.  Contact a health care provider if your symptoms do not improve or if they get worse.  This information is not intended to replace advice given to you by your health care provider. Make sure you discuss any questions you have with your health care provider.  Hypoxia  Hypoxia is a condition that happens when there is a lack of oxygen in the body's tissues and organs. When there is not enough oxygen, organs cannot work as they should. This causes serious problems throughout the body and in the brain.  What are the causes?  This condition may be caused by:  Exposure to high altitude.  A collapsed lung (pneumothorax).  Lung infection (pneumonia).  Lung injury.  Long-term (chronic) lung disease, such as chronic obstructive pulmonary disease (COPD) or emphysema.  Fluid collecting in the chest cavity (congestive heart failure), or blood collecting in the chest cavity (hemothorax).  Food, saliva, or vomit getting into the airway (aspiration).  Reduced blood flow (ischemia).  Severe blood loss.  Slow or shallow breathing (hypoventilation).  Blood disorders, such as anemia.  Carbon monoxide or cyanide poisoning.  The heart suddenly stopping (cardiac arrest).  Medicines or recreational drugs with severe sedating effects.  Drowning.  Choking.  What are the signs or symptoms?  Symptoms of this condition include:  Headache.  Feeling tired (fatigue).  Forgetfulness.  Nausea.  Confusion.  Shortness of breath.  Dizziness.  Bluish color of the skin, lips, or nail beds (cyanosis).  Change in consciousness or awareness.  If hypoxia is not treated, it can lead to convulsions, loss of consciousness (coma), or brain damage, which can be life-threatening.  How is this diagnosed?  This condition may be diagnosed based on:  A physical exam.  Blood tests.  A test that measures how much oxygen is in your blood (pulse oximetry). This is done with a sensor that is placed on your  finger, toe, or earlobe.  Imaging, such as a chest X-ray or CT scan.  Tests to check your lung function (pulmonary function tests).  A test to check the electrical activity of your heart (electrocardiogram, ECG).  You may have other tests to determine the cause of your hypoxia.  How is this treated?    Treatment for this condition depends on what is causing the hypoxia. You will likely be treated with oxygen therapy. This may be done by giving you oxygen through a face mask or through tubes in your nose.  Your health care provider may also recommend other therapies to treat the underlying cause of your hypoxia.  Follow these instructions at home:  Take over-the-counter and prescription medicines only as told by your health care provider.  Do not use any products that contain nicotine or tobacco. These products include cigarettes, chewing tobacco, and vaping devices, such as e-cigarettes. If you need help quitting, ask your health care provider.  Avoid secondhand smoke.  Work with your health care provider to manage any chronic conditions you have that may be causing hypoxia, such as COPD.  Keep all follow-up visits. This is important.  Contact a health care provider if:  You have a fever.  You become extremely short of breath when you exercise.  Get help right away if:  Your shortness of breath gets worse, especially with normal or very little activity.  You have trouble breathing, even after treatment.  Your skin, lips, or nail beds have a bluish color.  You become confused or you cannot think properly.  You have chest pain.  These symptoms may be an emergency. Get help right away. Call 911.  Do not wait to see if the symptoms will go away.  Do not drive yourself to the hospital.  Summary  Hypoxia is a condition that happens when there is a lack of oxygen in the body's tissues and organs.  If hypoxia is not treated, it can lead to convulsions, loss of consciousness (coma), or brain damage.  Symptoms of hypoxia can  include a headache, shortness of breath, confusion, nausea, and a bluish skin color.  Hypoxia has many possible causes, including exposure to high altitude, carbon monoxide poisoning, or other health issues, such as blood disorders or cardiac arrest.  Hypoxia is usually treated with oxygen therapy.  This information is not intended to replace advice given to you by your health care provider. Make sure you discuss any questions you have with your health care provider.  Document Revised: 07/19/2022 Document Reviewed: 07/19/2022  [employer] Patient Education © 2023 [employer]    Document Revised: 02/24/2023 Document Reviewed: 02/24/2023  [employer] Patient Education © 2023 [employer]

## 2024-04-01 NOTE — DISCHARGE SUMMARY
"Discharge Summary    CHIEF COMPLAINT ON ADMISSION  Chief Complaint   Patient presents with    Shortness of Breath     PT reports she has felt like \"I can't catch my breath.\"  H/O breast cancer and hospitalized last week for pulmonary embolism.  Pt reports she has not felt good since leaving the hospital.       Reason for Admission  Weakness     Admission Date  3/25/2024    CODE STATUS  Prior    HPI & HOSPITAL COURSE    Yolanda Roe is a 58 y.o. female dyslipidemia, hypertension, triple negative breast cancer status postmastectomy undergoing chemotherapy by Dr. Gómez, recently diagnosed with PE on Eliquis who presented 3/25/2024 with dyspnea.     Patient recently admitted from 3/9/2024 to 3/20/2024.  She presented for dyspnea and vomiting, decreased p.o. intake.  She was found to have right lower lobe PE along with fluid collections in her's breast surgical sites.  She was started on anticoagulation.  Started on IV fluid and antiemetics.  Respiratory panel PCR negative, tolerating p.o. intake.  She was still feeling weak at discharge however it was felt that there were no ongoing acute inpatient admission needs.  Subsequently discharged.     She returns today feeling generally weak, continued dyspnea.  She states she feels similarly to when she was discharged, she feels poorly and thinks she was discharged too soon.  She had continued poor p.o. intake, started having increased work of breathing.  Due to the persistent weakness, increased dyspnea, inability to tolerate p.o. intake she presented for evaluation.  She denies any fevers or chills headache or vision changes chest pain nausea vomiting dysuria diarrhea.     In the ED she is afebrile pulse 115-125 respiratory rate 12-20 systolic blood pressure  pulse ox 83 to 100% requiring 2 L nasal cannula.  Initial workup no leukocytosis, improved hemoglobin, normal platelet count sodium 130 potassium 3.5 chloride 87 bicarb 18 normal renal function and liver " function calcium 11.6 lactic acid 2.2 troponin T 23 chest x-ray shows no acute findings, EKG sinus tachycardia normal intervals QRS subtle ST segment abnormalities anteriorly and laterally not significantly different from prior.  Was given IV fluids subsequent referred to hospitalist for admission.    The patient was admitted she was noted to be hypokalemic and was having poor intake with nausea she underwent IV hydration and electrolyte repletion..  She made persistently hypokalemic despite potassium supplements and was started on spironolactone in addition to oral supplements.  On my evaluation this morning she feels improved she is tolerating her diet she denies any nausea vomiting.  Her potassium remained stable overnight.  She is clinically stable for discharge reviewed with her importance of good oral hydration recommended supplementing her diet with Ensure given her poor appetite.  She has been weaned off oxygen, she remains on Eliquis for recently diagnosed PE.    The patient has been set up for home health reviewed with her the importance of close follow-up with PCP with repeat chemistry panel and follow-up with her oncologist as scheduled    Therefore, she is discharged in good and stable condition to home with organized home healthcare and close outpatient follow-up.    The patient met 2-midnight criteria for an inpatient stay at the time of discharge.    Discharge Date  4/1/2024    FOLLOW UP ITEMS POST DISCHARGE  Follow-up with PCP repeat CBC chemistry panel magnesium  Follow-up with oncology    DISCHARGE DIAGNOSES  Principal Problem (Resolved):    Acute respiratory failure with hypoxia (HCC) (POA: Yes)  Active Problems:    Breast cancer of upper-outer quadrant of left female breast (HCC) (POA: Yes)    Pulmonary embolism and infarction (HCC) (POA: Yes)    Primary hypertension (POA: Yes)    Hypokalemia (POA: Yes)    Advance care planning (POA: Unknown)  Resolved Problems:    Hyponatremia (POA: Yes)     Dehydration (POA: Yes)    Hypercalcemia (POA: Yes)    Nausea & vomiting (POA: Unknown)      FOLLOW UP  Future Appointments   Date Time Provider Department Center   4/10/2024  8:15 AM [facility] [place] [id number] [address] [address]  [phone]          MEDICATIONS ON DISCHARGE     Medication List        START taking these medications        Instructions   Acetaminophen Extra Strength 500 MG Tabs   Take 1-2 Tablets by mouth every 6 hours as needed for Mild Pain or Moderate Pain.  Dose: 500-1,000 mg     Klor-Con/EF 25 MEQ tablet  Generic drug: potassium bicarbonate   Take 1 Tablet by mouth 2 times a day.  Dose: 25 mEq     MagDelay 64 MG Tbec  Generic drug: magnesium chloride   Take 1 Tablet by mouth 2 times a day.  Dose: 64 mg     ondansetron 4 MG Tbdp  Commonly known as: Zofran ODT   Dissolve 1 Tablet by mouth every 6 hours as needed for Nausea/Vomiting.  Dose: 4 mg     One-Daily Multi-Vitamin Tabs   Take 1 Tablet by mouth every day.  Dose: 1 Tablet     spironolactone 25 MG Tabs  Start taking on: April 2, 2024  Commonly known as: Aldactone   Take 1 Tablet by mouth every day.  Dose: 25 mg            CONTINUE taking these medications        Instructions   DULoxetine 30 MG Cpep  Commonly known as: Cymbalta   Take 1 Capsule by mouth every day.  Dose: 30 mg     Eliquis 5 MG Tabs  Generic drug: apixaban   Take 1 Tablet by mouth 2 times a day. Indications: DVT/PE  Dose: 5 mg     lidocaine-prilocaine 2.5-2.5 % Crea  Commonly known as: Emla   Apply 1 Application topically as needed. Indications: Anesthesia to a Specific Part of the Body  Dose: 1 Application     metoprolol SR 25 MG Tb24  Commonly known as: Toprol XL   Take 1 Tablet by mouth every day.  Dose: 25 mg     rosuvastatin 10 MG Tabs  Commonly known as: Crestor   Take 10 mg by mouth every evening.  Dose: 10 mg            STOP taking these medications      oxyCODONE immediate-release 5 MG Tabs  Commonly known as:  Roxicodone     potassium chloride SA 20 MEQ Tbcr  Commonly known as: Kdur              Allergies  No Known Allergies    DIET  No orders of the defined types were placed in this encounter.      ACTIVITY  As tolerated.  Weight bearing as tolerated        LABORATORY  Lab Results   Component Value Date    SODIUM 133 (L) 04/01/2024    POTASSIUM 3.8 04/01/2024    CHLORIDE 94 (L) 04/01/2024    CO2 23 04/01/2024    GLUCOSE 76 04/01/2024    BUN <2 (L) 04/01/2024    CREATININE 0.48 (L) 04/01/2024        Lab Results   Component Value Date    WBC 3.0 (L) 04/01/2024    HEMOGLOBIN 9.6 (L) 04/01/2024    HEMATOCRIT 28.3 (L) 04/01/2024    PLATELETCT 217 04/01/2024        Total time of the discharge process exceeds 35 minutes.

## 2024-04-01 NOTE — DISCHARGE PLANNING
Case Management Discharge Planning    Admission Date: 3/25/2024  GMLOS: 4  ALOS: 7    6-Clicks ADL Score: 22  6-Clicks Mobility Score: 19      Anticipated Discharge Dispo: Discharge Disposition: Discharged to home/self care (01)    DME Needed: No    Action(s) Taken: Patient was discussed in 0830 rounds. Patient is medically cleared to discharge home. [facility] accepted patient. RNCM was informed patient will need transportation home. RNCM requested patient be screened for Medicaid on 3/28. RNCM reached out to [facility] torobert who stated they will see patient today. RNCM informed P[facility] patient is medically cleared to discharge.     RNCM was informed patient has a family member that will pick her up. PFA stated they will be at bedside in 10-15 minutes to screen patient for Medicaid.    Escalations Completed: None    Medically Clear: Yes    Next Steps: pending patient to be screened for Medicaid     Barriers to Discharge: Transportation    Is the patient up for discharge tomorrow: No

## 2024-05-01 ENCOUNTER — APPOINTMENT (OUTPATIENT)
Dept: ONCOLOGY | Facility: MEDICAL CENTER | Age: 59
End: 2024-05-01
Payer: COMMERCIAL

## 2024-05-01 ENCOUNTER — PRE-ADMISSION TESTING (OUTPATIENT)
Dept: ADMISSIONS | Facility: MEDICAL CENTER | Age: 59
End: 2024-05-01
Attending: INTERNAL MEDICINE
Payer: COMMERCIAL

## 2024-05-01 RX ORDER — VALPROIC ACID 250 MG/1
500 CAPSULE, LIQUID FILLED ORAL 2 TIMES DAILY
COMMUNITY
Start: 2024-04-26

## 2024-05-01 NOTE — OR NURSING
"Msg to Josephine NAVAS, IR coordinator to notify:  Patient recently had a stoke (\"about 2 weeks ago\") and is in physical therapy for this. Patient is asking for confirmation of her check in time (12:30) and that she can continue her eliquis (per the order).     Update from Josephine NAVAS: Check in time is correct, continue eliquis. Msg left for patient to notify.    Patient following fasting instructions as directed by IR orders. Bathing instructions provided per Preparing For Your Procedure information packet. Patient instructed to follow up with tt IR team or scheduling office for any additional questions or concerns.   "

## 2024-05-03 ENCOUNTER — APPOINTMENT (OUTPATIENT)
Dept: RADIOLOGY | Facility: MEDICAL CENTER | Age: 59
End: 2024-05-03
Attending: INTERNAL MEDICINE
Payer: COMMERCIAL

## 2024-05-03 ENCOUNTER — HOSPITAL ENCOUNTER (OUTPATIENT)
Facility: MEDICAL CENTER | Age: 59
End: 2024-05-03
Attending: INTERNAL MEDICINE | Admitting: INTERNAL MEDICINE
Payer: COMMERCIAL

## 2024-05-03 VITALS
BODY MASS INDEX: 26.58 KG/M2 | RESPIRATION RATE: 18 BRPM | DIASTOLIC BLOOD PRESSURE: 53 MMHG | HEIGHT: 63 IN | OXYGEN SATURATION: 100 % | WEIGHT: 150 LBS | SYSTOLIC BLOOD PRESSURE: 95 MMHG | HEART RATE: 72 BPM

## 2024-05-03 DIAGNOSIS — C50.812 MALIGNANT NEOPLASM OF OVERLAPPING SITES OF LEFT FEMALE BREAST, UNSPECIFIED ESTROGEN RECEPTOR STATUS (HCC): ICD-10-CM

## 2024-05-03 RX ORDER — ONDANSETRON 2 MG/ML
4 INJECTION INTRAMUSCULAR; INTRAVENOUS PRN
Status: DISCONTINUED | OUTPATIENT
Start: 2024-05-03 | End: 2024-05-03 | Stop reason: HOSPADM

## 2024-05-03 RX ORDER — NALOXONE HYDROCHLORIDE 0.4 MG/ML
INJECTION, SOLUTION INTRAMUSCULAR; INTRAVENOUS; SUBCUTANEOUS
Status: COMPLETED
Start: 2024-05-03 | End: 2024-05-03

## 2024-05-03 RX ORDER — MIDAZOLAM HYDROCHLORIDE 1 MG/ML
INJECTION INTRAMUSCULAR; INTRAVENOUS
Status: COMPLETED
Start: 2024-05-03 | End: 2024-05-03

## 2024-05-03 RX ORDER — SODIUM CHLORIDE 9 MG/ML
500 INJECTION, SOLUTION INTRAVENOUS
Status: DISCONTINUED | OUTPATIENT
Start: 2024-05-03 | End: 2024-05-03 | Stop reason: HOSPADM

## 2024-05-03 RX ORDER — MIDAZOLAM HYDROCHLORIDE 1 MG/ML
.5-2 INJECTION INTRAMUSCULAR; INTRAVENOUS PRN
Status: DISCONTINUED | OUTPATIENT
Start: 2024-05-03 | End: 2024-05-03 | Stop reason: HOSPADM

## 2024-05-03 RX ORDER — LIDOCAINE HCL/EPINEPHRINE/PF 2%-1:200K
VIAL (ML) INJECTION
Status: COMPLETED
Start: 2024-05-03 | End: 2024-05-03

## 2024-05-03 RX ADMIN — MIDAZOLAM HYDROCHLORIDE 1 MG: 1 INJECTION, SOLUTION INTRAMUSCULAR; INTRAVENOUS at 14:29

## 2024-05-03 RX ADMIN — MIDAZOLAM HYDROCHLORIDE 1 MG: 1 INJECTION, SOLUTION INTRAMUSCULAR; INTRAVENOUS at 14:33

## 2024-05-03 RX ADMIN — FENTANYL CITRATE 50 MCG: 50 INJECTION, SOLUTION INTRAMUSCULAR; INTRAVENOUS at 14:29

## 2024-05-03 RX ADMIN — LIDOCAINE HYDROCHLORIDE AND EPINEPHRINE: 20; 5 INJECTION, SOLUTION EPIDURAL; INFILTRATION; INTRACAUDAL; PERINEURAL at 14:30

## 2024-05-03 RX ADMIN — MIDAZOLAM HYDROCHLORIDE 1 MG: 2 INJECTION, SOLUTION INTRAMUSCULAR; INTRAVENOUS at 14:29

## 2024-05-03 ASSESSMENT — PAIN DESCRIPTION - PAIN TYPE: TYPE: CHRONIC PAIN

## 2024-05-03 ASSESSMENT — FIBROSIS 4 INDEX: FIB4 SCORE: 2.95

## 2024-05-03 NOTE — PROGRESS NOTES
OPIR   Discharge instructions.    Reviewed DC instructions with pt. Pt AOX4, No complain of pain. Vitals WNL. DC PIV. Pt had some water and snacks.  Pt had no questions.    DC home with Nephew.

## 2024-05-03 NOTE — PROGRESS NOTES
OPIR RN note    Patient underwent a power port removal by Dr. Desir.  Procedure site was verified by MD using imaging guidance. Consent was signed.  IR Erum Ontiveros and Hank assisted. Patient was placed in a supine position.  Vitals were taken every 5 minutes and remained stable during procedure (see doc flow sheet for results).  CO2 waveform capnography was monitored throughout procedure, see chart.  Right chest access site.   Internal sutures, derma bond, steri strips, gauze and tegaderm dressing was placed over surgical site.    Reviewed sedation orders with MD LOPEZ procedure ended at 1440      Port removed

## 2025-05-13 NOTE — PROGRESS NOTES
"Pharmacy Chemotherapy Verification    Patient Name: Yolanda Roe      Dx: Metastatic Breast CA (ER/LA/HER2 negative) IIb       Protocol: Pembrolizumab + PACLitaxel + CARBOplatin (x 4 cycles) followed by  Pembrolizumab + Cyclophosphamide + DOXOrubicin   (x4 cycles)  Pembrolizumab 200 mg IV over 30 minutes on Day 1  PACLitaxel 80 mg/m2 IV over 60 minutes on Days 1, 8 and 15  CARBOplatin AUC 1.5 IV over 30 minutes Days 1, 8, and 15  Repeat every 21 days x 4 cycles  ~followed by~  Pembrolizumab 200 mg IV over 30 minutes on Day 1  Cyclophosphamide 600 mg/m2 IV over 30 minutes on Day 1  DOXOrubicin 60 mg/m2 IV push on Day 1  Repeat every 21 days for 4 cycles  NCCN Guidelines for Breast Cancer V.2.2022  Alexis P, et al N Engl J Med 2020; 382(1) 301-783    Allergies:  Patient has no known allergies.      /76   Pulse 89   Temp 36.6 °C (97.9 °F) (Temporal)   Resp 18   Ht 1.6 m (5' 2.99\") Comment: Bare foot  Wt 84 kg (185 lb 3 oz)   SpO2 97%   BMI 32.81 kg/m²  Body surface area is 1.93 meters squared.    Labs 6/6/23 ([facility]):  ANC~ 1450 Plt = 207k   Hgb = 10.5  Labs 6/6/23 ([facility]):     SCr = 0.38 mg/dL CrCl ~ 115 mL/min (Minimum SCr of 0.7 used)    Drug Order   (Drug name, dose, route, IV Fluid & volume, frequency, number of doses) Cycle 3 Day 15  Previous treatment: C3D8 on 5/31/23     Medication = Pembrolizumab  Base Dose = 200 mg   Fixed dose; no calculation required  Final Dose =0 mg  Route = IV  Fluid & Volume = NS 50 mL  Admin Duration = Over 30 minutes   Day 1 only       <10% difference, okay to treat with final dose       Medication = PACLitaxel  Base Dose = 80 mg/m²  Calc Dose: Base Dose x 1.93 m² = 154.4 mg  Final Dose = 162 mg  Route = IV  Fluid & Volume =  mL  Admin Duration = Over 60 minutes          <10% difference, okay to treat with final dose     Medication = CARBOplatin   Base Dose = AUC 1.5   Calc Dose:AUC x (115 ml/min + 25) = 210 mg  Final Dose = 200 mg  Route = IV  Fluid & Volume = NS " Spoke to pt and Establish care   250 mL  Admin Duration = Over 30 minutes          <10% difference, okay to treat with final dose       By my signature below, I confirm this process was performed independently with the BSA and all final chemotherapy dosing calculations congruent. I have reviewed the above chemotherapy order and that my calculation of the final dose and BSA (when applicable) corroborate those calculations of the  pharmacist.     Michelle Castro, PharmD

## (undated) DEVICE — SET LEADWIRE 5 LEAD BEDSIDE DISPOSABLE ECG (1SET OF 5/EA)

## (undated) DEVICE — SUTURE 4-0 MONOCRYL PLUS PS-1 - 27 INCH (36/BX)

## (undated) DEVICE — GLOVE BIOGEL SZ 7 SURGICAL PF LTX - (50PR/BX 4BX/CA)

## (undated) DEVICE — NEEDLE NON SAFETY 25 GA X 1 1/2 IN HYPO (100EA/BX)

## (undated) DEVICE — LIGASURE SM JAW SEALER CRVD - (6EA/CA)

## (undated) DEVICE — GAUZE FLUFF STERILE 2-PLY 36 X 36 (100EA/CA)

## (undated) DEVICE — KIT  I.V. START (100EA/CA)

## (undated) DEVICE — GLOVE SZ 7 BIOGEL PI MICRO - PF LF (50PR/BX 4BX/CA)

## (undated) DEVICE — CHLORAPREP 26 ML APPLICATOR - ORANGE TINT(25/CA)

## (undated) DEVICE — TUBE CONNECTING SUCTION - CLEAR PLASTIC STERILE 72 IN (50EA/CA)

## (undated) DEVICE — CANNULA O2 COMFORT SOFT EAR ADULT 7 FT TUBING (50/CA)

## (undated) DEVICE — SYRINGE NON SAFETY 5 CC 20 GA X 1-1/2 IN (100/BX 4BX/CA)

## (undated) DEVICE — SUTURE 4-0 MONOCRYL PLUS PS-2 - 27 INCH (36/BX)

## (undated) DEVICE — GLOVE BIOGEL INDICATOR SZ 7.5 SURGICAL PF LTX - (50PR/BX 4BX/CA)

## (undated) DEVICE — SUTURE GENERAL

## (undated) DEVICE — MASK, LARYNGEAL AIRWAY #4

## (undated) DEVICE — SPONGE GAUZESTER. 2X2 4-PL - (2/PK 50PK/BX 30BX/CS)

## (undated) DEVICE — TOWEL STOP TIMEOUT SAFETY FLAG (40EA/CA)

## (undated) DEVICE — SOD. CHL. INJ. 0.9% 250 ML - (36/CA 50CA/PF)

## (undated) DEVICE — TUBING CLEARLINK DUO-VENT - C-FLO (48EA/CA)

## (undated) DEVICE — MASK OXYGEN VNYL ADLT MED CONC WITH 7 FOOT TUBING  - (50EA/CA)

## (undated) DEVICE — PACK BREAST RECONSTRUCTION (2EA/CA)

## (undated) DEVICE — CLOSURE SKIN STRIP 1/2 X 4 IN - (STERI STRIP) (50/BX 4BX/CA)

## (undated) DEVICE — PAD LAP STERILE 18 X 18 - (5/PK 40PK/CA)

## (undated) DEVICE — DRESSING ABDOMINAL PAD STERILE 8 X 10" (360EA/CA)"

## (undated) DEVICE — BOVIE BLADE COATED &INSULATED - 25/PK

## (undated) DEVICE — STERI STRIP COMPOUND BENZOIN - TINCTURE 0.6ML WITH APPLICATOR (40EA/BX)

## (undated) DEVICE — SODIUM CHL IRRIGATION 0.9% 1000ML (12EA/CA)

## (undated) DEVICE — DECANTER FLD BLS - (50/CA)

## (undated) DEVICE — SUTURE 3-0 VICRYL PLUS - 12 X 18 INCH (12/BX)

## (undated) DEVICE — SENSOR OXIMETER ADULT SPO2 RD SET (20EA/BX)

## (undated) DEVICE — DRAPE LAPAROTOMY T SHEET - (12EA/CA)

## (undated) DEVICE — BANDAGE ELASTIC STERILE MATRIX 6 X 10 (20EA/CA)

## (undated) DEVICE — COVER CIV-FLEX TRANSDUCER - (24/BX)

## (undated) DEVICE — SUTURE 3-0 VICRYL PLUS SH - 27 INCH (36/BX)

## (undated) DEVICE — STAPLER SKIN DISP - (6/BX 10BX/CA) VISISTAT

## (undated) DEVICE — SLEEVE VASO CALF MED - (10PR/CA)

## (undated) DEVICE — DRAPE IOBAN II INCISE 23X17 - (10EA/BX 4BX/CA)

## (undated) DEVICE — CANISTER SUCTION 3000ML MECHANICAL FILTER AUTO SHUTOFF MEDI-VAC NONSTERILE LF DISP  (40EA/CA)

## (undated) DEVICE — LACTATED RINGERS INJ 1000 ML - (14EA/CA 60CA/PF)

## (undated) DEVICE — SHEET TRANSVERSE LAP - (12EA/CA)

## (undated) DEVICE — SUTURE 3-0 VICRYL PLUS SH - 8X 18 INCH (12/BX)

## (undated) DEVICE — CANISTER SUCTION RIGID RED 1500CC (40EA/CA)

## (undated) DEVICE — SUCTION INSTRUMENT YANKAUER BULBOUS TIP W/O VENT (50EA/CA)

## (undated) DEVICE — GOWN WARMING STANDARD FLEX - (30/CA)

## (undated) DEVICE — Device

## (undated) DEVICE — SUTURE 3-0 ETHILON FS-1 - (36/BX) 30 INCH

## (undated) DEVICE — SUTURE 2-0 PROL CT-2 (36PK/BX)

## (undated) DEVICE — DRAIN J-VAC 19FR. ROUND - (10/CS)

## (undated) DEVICE — DRESSING TRANSPARENT FILM TEGADERM 2.375 X 2.75"  (100EA/BX)"

## (undated) DEVICE — CANNULA W/ SUPPLY TUBING O2 - (50/CA)

## (undated) DEVICE — RESERVOIR SUCTION 100 CC - SILICONE (20EA/CA)

## (undated) DEVICE — WATER IRRIGATION STERILE 1000ML (12EA/CA)

## (undated) DEVICE — DRESSING TRANSPARENT FILM TEGADERM 4 X 4.75" (50EA/BX)"

## (undated) DEVICE — SYSTEM PEEL & PLACE 13CM INCISIONS